# Patient Record
Sex: FEMALE | Race: WHITE | Employment: FULL TIME | URBAN - NONMETROPOLITAN AREA
[De-identification: names, ages, dates, MRNs, and addresses within clinical notes are randomized per-mention and may not be internally consistent; named-entity substitution may affect disease eponyms.]

---

## 2019-10-13 ENCOUNTER — HOSPITAL ENCOUNTER (EMERGENCY)
Age: 19
Discharge: HOME | End: 2019-10-13
Attending: NURSE PRACTITIONER
Payer: COMMERCIAL

## 2019-10-13 VITALS
DIASTOLIC BLOOD PRESSURE: 78 MMHG | HEIGHT: 68 IN | TEMPERATURE: 97.3 F | WEIGHT: 250 LBS | HEART RATE: 89 BPM | BODY MASS INDEX: 37.89 KG/M2 | SYSTOLIC BLOOD PRESSURE: 133 MMHG | OXYGEN SATURATION: 97 % | RESPIRATION RATE: 18 BRPM

## 2019-10-13 DIAGNOSIS — J01.00 ACUTE MAXILLARY SINUSITIS, RECURRENCE NOT SPECIFIED: Primary | ICD-10-CM

## 2019-10-13 DIAGNOSIS — R07.89 CHEST PAIN, MUSCULOSKELETAL: ICD-10-CM

## 2019-10-13 LAB
EKG ATRIAL RATE: 78 BPM
EKG P AXIS: 71 DEGREES
EKG P-R INTERVAL: 130 MS
EKG Q-T INTERVAL: 328 MS
EKG QRS DURATION: 84 MS
EKG QTC CALCULATION (BAZETT): 373 MS
EKG R AXIS: 69 DEGREES
EKG T AXIS: 71 DEGREES
EKG VENTRICULAR RATE: 78 BPM

## 2019-10-13 PROCEDURE — 99213 OFFICE O/P EST LOW 20 MIN: CPT

## 2019-10-13 PROCEDURE — 99203 OFFICE O/P NEW LOW 30 MIN: CPT | Performed by: NURSE PRACTITIONER

## 2019-10-13 PROCEDURE — 93010 ELECTROCARDIOGRAM REPORT: CPT | Performed by: NUCLEAR MEDICINE

## 2019-10-13 PROCEDURE — 93005 ELECTROCARDIOGRAM TRACING: CPT | Performed by: NURSE PRACTITIONER

## 2019-10-13 RX ORDER — DOXYCYCLINE HYCLATE 50 MG/1
2 CAPSULE, GELATIN COATED ORAL
COMMUNITY
End: 2021-01-28

## 2019-10-13 RX ORDER — ACETAMINOPHEN 500 MG
1000 TABLET ORAL EVERY 6 HOURS PRN
COMMUNITY
End: 2019-10-21

## 2019-10-13 RX ORDER — AMOXICILLIN AND CLAVULANATE POTASSIUM 875; 125 MG/1; MG/1
1 TABLET, FILM COATED ORAL 2 TIMES DAILY
Qty: 20 TABLET | Refills: 0 | Status: SHIPPED | OUTPATIENT
Start: 2019-10-13 | End: 2019-10-21 | Stop reason: ALTCHOICE

## 2019-10-13 RX ORDER — DEXTROMETHORPHAN HYDROBROMIDE AND PROMETHAZINE HYDROCHLORIDE 15; 6.25 MG/5ML; MG/5ML
5 SYRUP ORAL 4 TIMES DAILY PRN
Qty: 120 ML | Refills: 0 | Status: SHIPPED | OUTPATIENT
Start: 2019-10-13 | End: 2019-10-20

## 2019-10-13 RX ORDER — AMITRIPTYLINE HYDROCHLORIDE 10 MG/1
40 TABLET, FILM COATED ORAL NIGHTLY
COMMUNITY
End: 2019-12-23 | Stop reason: ALTCHOICE

## 2019-10-13 RX ORDER — VERAPAMIL HYDROCHLORIDE 120 MG/1
120 TABLET, FILM COATED ORAL 3 TIMES DAILY
COMMUNITY
End: 2020-06-22 | Stop reason: ALTCHOICE

## 2019-10-13 SDOH — HEALTH STABILITY: MENTAL HEALTH: HOW OFTEN DO YOU HAVE A DRINK CONTAINING ALCOHOL?: NEVER

## 2019-10-13 ASSESSMENT — ENCOUNTER SYMPTOMS
SINUS PRESSURE: 1
COUGH: 1
TROUBLE SWALLOWING: 0
SINUS PAIN: 1
ABDOMINAL PAIN: 0
WHEEZING: 0
SHORTNESS OF BREATH: 0

## 2019-10-13 ASSESSMENT — PAIN DESCRIPTION - PROGRESSION: CLINICAL_PROGRESSION: NOT CHANGED

## 2019-10-13 ASSESSMENT — PAIN DESCRIPTION - ORIENTATION: ORIENTATION: RIGHT

## 2019-10-13 ASSESSMENT — PAIN SCALES - GENERAL: PAINLEVEL_OUTOF10: 3

## 2019-10-13 ASSESSMENT — PAIN DESCRIPTION - DESCRIPTORS: DESCRIPTORS: DULL

## 2019-10-13 ASSESSMENT — PAIN DESCRIPTION - PAIN TYPE: TYPE: ACUTE PAIN

## 2019-10-13 ASSESSMENT — PAIN DESCRIPTION - FREQUENCY: FREQUENCY: INTERMITTENT

## 2019-10-13 ASSESSMENT — PAIN DESCRIPTION - ONSET: ONSET: ON-GOING

## 2019-10-13 ASSESSMENT — PAIN - FUNCTIONAL ASSESSMENT: PAIN_FUNCTIONAL_ASSESSMENT: PREVENTS OR INTERFERES WITH MANY ACTIVE NOT PASSIVE ACTIVITIES

## 2019-10-20 ENCOUNTER — APPOINTMENT (OUTPATIENT)
Dept: CT IMAGING | Age: 19
End: 2019-10-20
Payer: COMMERCIAL

## 2019-10-20 ENCOUNTER — HOSPITAL ENCOUNTER (EMERGENCY)
Age: 19
Discharge: HOME | End: 2019-10-20
Payer: COMMERCIAL

## 2019-10-20 VITALS
WEIGHT: 250 LBS | SYSTOLIC BLOOD PRESSURE: 141 MMHG | BODY MASS INDEX: 37.89 KG/M2 | DIASTOLIC BLOOD PRESSURE: 86 MMHG | HEART RATE: 76 BPM | TEMPERATURE: 97.8 F | RESPIRATION RATE: 16 BRPM | HEIGHT: 68 IN | OXYGEN SATURATION: 97 %

## 2019-10-20 DIAGNOSIS — R10.9 CHRONIC ABDOMINAL PAIN: ICD-10-CM

## 2019-10-20 DIAGNOSIS — G89.29 CHRONIC ABDOMINAL PAIN: ICD-10-CM

## 2019-10-20 DIAGNOSIS — K80.50 BILIARY COLIC: ICD-10-CM

## 2019-10-20 DIAGNOSIS — K80.20 CALCULUS OF GALLBLADDER WITHOUT CHOLECYSTITIS WITHOUT OBSTRUCTION: Primary | ICD-10-CM

## 2019-10-20 LAB
ALBUMIN SERPL-MCNC: 4.2 G/DL (ref 3.5–5.1)
ALP BLD-CCNC: 89 U/L (ref 38–126)
ALT SERPL-CCNC: 56 U/L (ref 11–66)
ANION GAP SERPL CALCULATED.3IONS-SCNC: 14 MEQ/L (ref 8–16)
AST SERPL-CCNC: 59 U/L (ref 5–40)
BASOPHILS # BLD: 0.2 %
BASOPHILS ABSOLUTE: 0 THOU/MM3 (ref 0–0.1)
BILIRUB SERPL-MCNC: 0.6 MG/DL (ref 0.3–1.2)
BILIRUBIN DIRECT: 0.3 MG/DL (ref 0–0.3)
BUN BLDV-MCNC: 8 MG/DL (ref 7–22)
CALCIUM SERPL-MCNC: 9.7 MG/DL (ref 8.5–10.5)
CHLORIDE BLD-SCNC: 103 MEQ/L (ref 98–111)
CO2: 21 MEQ/L (ref 23–33)
CREAT SERPL-MCNC: 0.6 MG/DL (ref 0.4–1.2)
EOSINOPHIL # BLD: 0.2 %
EOSINOPHILS ABSOLUTE: 0 THOU/MM3 (ref 0–0.4)
ERYTHROCYTE [DISTWIDTH] IN BLOOD BY AUTOMATED COUNT: 12.3 % (ref 11.5–14.5)
ERYTHROCYTE [DISTWIDTH] IN BLOOD BY AUTOMATED COUNT: 41.1 FL (ref 35–45)
GLUCOSE BLD-MCNC: 117 MG/DL (ref 70–108)
HCT VFR BLD CALC: 40.6 % (ref 37–47)
HEMOGLOBIN: 13 GM/DL (ref 12–16)
IMMATURE GRANS (ABS): 0.06 THOU/MM3 (ref 0–0.07)
IMMATURE GRANULOCYTES: 0.4 %
LIPASE: 39 U/L (ref 5.6–51.3)
LYMPHOCYTES # BLD: 9 %
LYMPHOCYTES ABSOLUTE: 1.3 THOU/MM3 (ref 1–4.8)
MCH RBC QN AUTO: 29.3 PG (ref 26–33)
MCHC RBC AUTO-ENTMCNC: 32 GM/DL (ref 32.2–35.5)
MCV RBC AUTO: 91.4 FL (ref 81–99)
MONOCYTES # BLD: 4.2 %
MONOCYTES ABSOLUTE: 0.6 THOU/MM3 (ref 0.4–1.3)
NUCLEATED RED BLOOD CELLS: 0 /100 WBC
OSMOLALITY CALCULATION: 275 MOSMOL/KG (ref 275–300)
PLATELET # BLD: 201 THOU/MM3 (ref 130–400)
PMV BLD AUTO: 10.1 FL (ref 9.4–12.4)
POTASSIUM SERPL-SCNC: 4.3 MEQ/L (ref 3.5–5.2)
PREGNANCY, SERUM: NEGATIVE
RBC # BLD: 4.44 MILL/MM3 (ref 4.2–5.4)
SEG NEUTROPHILS: 86 %
SEGMENTED NEUTROPHILS ABSOLUTE COUNT: 12.3 THOU/MM3 (ref 1.8–7.7)
SODIUM BLD-SCNC: 138 MEQ/L (ref 135–145)
TOTAL PROTEIN: 7.5 G/DL (ref 6.1–8)
WBC # BLD: 14.3 THOU/MM3 (ref 4.8–10.8)

## 2019-10-20 PROCEDURE — 74177 CT ABD & PELVIS W/CONTRAST: CPT

## 2019-10-20 PROCEDURE — 96374 THER/PROPH/DIAG INJ IV PUSH: CPT

## 2019-10-20 PROCEDURE — 6360000002 HC RX W HCPCS: Performed by: NURSE PRACTITIONER

## 2019-10-20 PROCEDURE — 82248 BILIRUBIN DIRECT: CPT

## 2019-10-20 PROCEDURE — 83690 ASSAY OF LIPASE: CPT

## 2019-10-20 PROCEDURE — 99284 EMERGENCY DEPT VISIT MOD MDM: CPT

## 2019-10-20 PROCEDURE — 80053 COMPREHEN METABOLIC PANEL: CPT

## 2019-10-20 PROCEDURE — 6370000000 HC RX 637 (ALT 250 FOR IP): Performed by: NURSE PRACTITIONER

## 2019-10-20 PROCEDURE — 96372 THER/PROPH/DIAG INJ SC/IM: CPT

## 2019-10-20 PROCEDURE — 85025 COMPLETE CBC W/AUTO DIFF WBC: CPT

## 2019-10-20 PROCEDURE — 84703 CHORIONIC GONADOTROPIN ASSAY: CPT

## 2019-10-20 PROCEDURE — 6360000004 HC RX CONTRAST MEDICATION: Performed by: NURSE PRACTITIONER

## 2019-10-20 PROCEDURE — 36415 COLL VENOUS BLD VENIPUNCTURE: CPT

## 2019-10-20 RX ORDER — KETOROLAC TROMETHAMINE 30 MG/ML
30 INJECTION, SOLUTION INTRAMUSCULAR; INTRAVENOUS ONCE
Status: COMPLETED | OUTPATIENT
Start: 2019-10-20 | End: 2019-10-20

## 2019-10-20 RX ORDER — DICYCLOMINE HYDROCHLORIDE 10 MG/1
10 CAPSULE ORAL ONCE
Status: DISCONTINUED | OUTPATIENT
Start: 2019-10-20 | End: 2019-10-20

## 2019-10-20 RX ORDER — DICYCLOMINE HYDROCHLORIDE 10 MG/ML
20 INJECTION INTRAMUSCULAR ONCE
Status: COMPLETED | OUTPATIENT
Start: 2019-10-20 | End: 2019-10-20

## 2019-10-20 RX ORDER — KETOROLAC TROMETHAMINE 10 MG/1
10 TABLET, FILM COATED ORAL EVERY 6 HOURS PRN
Qty: 20 TABLET | Refills: 0 | Status: ON HOLD | OUTPATIENT
Start: 2019-10-20 | End: 2019-10-30 | Stop reason: HOSPADM

## 2019-10-20 RX ADMIN — KETOROLAC TROMETHAMINE 30 MG: 30 INJECTION, SOLUTION INTRAMUSCULAR at 11:56

## 2019-10-20 RX ADMIN — LIDOCAINE HYDROCHLORIDE: 20 SOLUTION ORAL; TOPICAL at 09:32

## 2019-10-20 RX ADMIN — DICYCLOMINE HYDROCHLORIDE 20 MG: 10 INJECTION INTRAMUSCULAR at 10:42

## 2019-10-20 RX ADMIN — IOPAMIDOL 80 ML: 755 INJECTION, SOLUTION INTRAVENOUS at 11:19

## 2019-10-20 ASSESSMENT — ENCOUNTER SYMPTOMS
PHOTOPHOBIA: 0
NAUSEA: 0
COUGH: 0
SINUS PRESSURE: 0
WHEEZING: 0
VOMITING: 0
RHINORRHEA: 0
BACK PAIN: 0
CONSTIPATION: 0
ABDOMINAL PAIN: 1
DIARRHEA: 0
CHEST TIGHTNESS: 0
SHORTNESS OF BREATH: 0
COLOR CHANGE: 0
ABDOMINAL DISTENTION: 0
VOICE CHANGE: 0
EYE REDNESS: 0
SORE THROAT: 0
BLOOD IN STOOL: 0

## 2019-10-20 ASSESSMENT — PAIN DESCRIPTION - ORIENTATION: ORIENTATION: RIGHT;LEFT

## 2019-10-20 ASSESSMENT — PAIN DESCRIPTION - DESCRIPTORS: DESCRIPTORS: BURNING;CRAMPING

## 2019-10-20 ASSESSMENT — PAIN DESCRIPTION - FREQUENCY: FREQUENCY: INTERMITTENT

## 2019-10-20 ASSESSMENT — PAIN SCALES - GENERAL
PAINLEVEL_OUTOF10: 9
PAINLEVEL_OUTOF10: 9

## 2019-10-20 ASSESSMENT — PAIN DESCRIPTION - LOCATION: LOCATION: ABDOMEN

## 2019-10-20 ASSESSMENT — PAIN DESCRIPTION - PAIN TYPE: TYPE: ACUTE PAIN

## 2019-10-20 ASSESSMENT — PAIN DESCRIPTION - ONSET: ONSET: GRADUAL

## 2019-10-21 ENCOUNTER — NURSE ONLY (OUTPATIENT)
Dept: LAB | Age: 19
End: 2019-10-21

## 2019-10-21 ENCOUNTER — OFFICE VISIT (OUTPATIENT)
Dept: FAMILY MEDICINE CLINIC | Age: 19
End: 2019-10-21
Payer: COMMERCIAL

## 2019-10-21 VITALS
WEIGHT: 250 LBS | DIASTOLIC BLOOD PRESSURE: 64 MMHG | HEART RATE: 68 BPM | RESPIRATION RATE: 12 BRPM | SYSTOLIC BLOOD PRESSURE: 108 MMHG | BODY MASS INDEX: 37.89 KG/M2 | HEIGHT: 68 IN | OXYGEN SATURATION: 98 %

## 2019-10-21 DIAGNOSIS — K90.41 NON-CELIAC GLUTEN SENSITIVITY: ICD-10-CM

## 2019-10-21 DIAGNOSIS — K31.84 GASTROPARESIS: ICD-10-CM

## 2019-10-21 DIAGNOSIS — Z13.220 ENCOUNTER FOR SCREENING FOR LIPID DISORDER: ICD-10-CM

## 2019-10-21 DIAGNOSIS — E55.9 VITAMIN D DEFICIENCY: ICD-10-CM

## 2019-10-21 DIAGNOSIS — J30.2 SEASONAL ALLERGIC RHINITIS, UNSPECIFIED TRIGGER: ICD-10-CM

## 2019-10-21 DIAGNOSIS — Z11.4 ENCOUNTER FOR SCREENING FOR HIV: ICD-10-CM

## 2019-10-21 DIAGNOSIS — F41.9 ANXIETY: ICD-10-CM

## 2019-10-21 DIAGNOSIS — Z13.29 THYROID DISORDER SCREENING: ICD-10-CM

## 2019-10-21 DIAGNOSIS — K80.12 CALCULUS OF GALLBLADDER WITH ACUTE ON CHRONIC CHOLECYSTITIS WITHOUT OBSTRUCTION: Primary | ICD-10-CM

## 2019-10-21 DIAGNOSIS — Z00.00 WELLNESS EXAMINATION: ICD-10-CM

## 2019-10-21 DIAGNOSIS — I26.99 ACUTE PULMONARY EMBOLISM, UNSPECIFIED PULMONARY EMBOLISM TYPE, UNSPECIFIED WHETHER ACUTE COR PULMONALE PRESENT (HCC): ICD-10-CM

## 2019-10-21 DIAGNOSIS — F32.A DEPRESSION, UNSPECIFIED DEPRESSION TYPE: ICD-10-CM

## 2019-10-21 DIAGNOSIS — R05.9 COUGH: ICD-10-CM

## 2019-10-21 PROBLEM — K80.20 CHOLELITHIASIS WITHOUT OBSTRUCTION: Status: ACTIVE | Noted: 2019-10-21

## 2019-10-21 LAB
CHOLESTEROL, TOTAL: 162 MG/DL (ref 100–169)
HDLC SERPL-MCNC: 42 MG/DL
LDL CHOLESTEROL CALCULATED: 99 MG/DL
MAGNESIUM: 2.2 MG/DL (ref 1.6–2.4)
REASON FOR REJECTION: NORMAL
REJECTED TEST: NORMAL
TRIGL SERPL-MCNC: 107 MG/DL (ref 0–199)
TSH SERPL DL<=0.05 MIU/L-ACNC: 0.61 UIU/ML (ref 0.4–4.2)
VITAMIN D 25-HYDROXY: 20 NG/ML (ref 30–100)

## 2019-10-21 PROCEDURE — 99204 OFFICE O/P NEW MOD 45 MIN: CPT | Performed by: NURSE PRACTITIONER

## 2019-10-21 RX ORDER — BENZONATATE 200 MG/1
200 CAPSULE ORAL 3 TIMES DAILY PRN
Qty: 30 CAPSULE | Refills: 0 | Status: SHIPPED | OUTPATIENT
Start: 2019-10-21 | End: 2019-10-28

## 2019-10-21 RX ORDER — LORATADINE 10 MG/1
20 TABLET ORAL DAILY
Qty: 60 TABLET | Refills: 5 | Status: SHIPPED | OUTPATIENT
Start: 2019-10-21 | End: 2019-10-22 | Stop reason: DRUGHIGH

## 2019-10-21 ASSESSMENT — ENCOUNTER SYMPTOMS
SHORTNESS OF BREATH: 0
NAUSEA: 0
TROUBLE SWALLOWING: 0
VOMITING: 0
PHOTOPHOBIA: 0
COUGH: 1
EYE PAIN: 0
DIARRHEA: 0
SORE THROAT: 0
ABDOMINAL PAIN: 1
ABDOMINAL DISTENTION: 0
BLOOD IN STOOL: 0
EYE DISCHARGE: 0
RHINORRHEA: 1
CONSTIPATION: 0

## 2019-10-21 ASSESSMENT — PATIENT HEALTH QUESTIONNAIRE - PHQ9
SUM OF ALL RESPONSES TO PHQ QUESTIONS 1-9: 0
SUM OF ALL RESPONSES TO PHQ QUESTIONS 1-9: 0
1. LITTLE INTEREST OR PLEASURE IN DOING THINGS: 0
SUM OF ALL RESPONSES TO PHQ9 QUESTIONS 1 & 2: 0
2. FEELING DOWN, DEPRESSED OR HOPELESS: 0

## 2019-10-22 ENCOUNTER — TELEPHONE (OUTPATIENT)
Dept: FAMILY MEDICINE CLINIC | Age: 19
End: 2019-10-22

## 2019-10-22 ENCOUNTER — OFFICE VISIT (OUTPATIENT)
Dept: SURGERY | Age: 19
End: 2019-10-22
Payer: COMMERCIAL

## 2019-10-22 VITALS
TEMPERATURE: 97.4 F | HEART RATE: 95 BPM | RESPIRATION RATE: 18 BRPM | DIASTOLIC BLOOD PRESSURE: 80 MMHG | HEIGHT: 68 IN | SYSTOLIC BLOOD PRESSURE: 118 MMHG | BODY MASS INDEX: 39.12 KG/M2 | WEIGHT: 258.1 LBS | OXYGEN SATURATION: 99 %

## 2019-10-22 DIAGNOSIS — J30.2 SEASONAL ALLERGIC RHINITIS, UNSPECIFIED TRIGGER: ICD-10-CM

## 2019-10-22 DIAGNOSIS — I26.99 ACUTE PULMONARY EMBOLISM, UNSPECIFIED PULMONARY EMBOLISM TYPE, UNSPECIFIED WHETHER ACUTE COR PULMONALE PRESENT (HCC): ICD-10-CM

## 2019-10-22 DIAGNOSIS — Z00.00 WELLNESS EXAMINATION: ICD-10-CM

## 2019-10-22 DIAGNOSIS — K80.12 CALCULUS OF GALLBLADDER WITH ACUTE ON CHRONIC CHOLECYSTITIS WITHOUT OBSTRUCTION: Primary | ICD-10-CM

## 2019-10-22 LAB — THYROXINE (T4): 7.6 UG/DL (ref 4.5–12)

## 2019-10-22 PROCEDURE — 99204 OFFICE O/P NEW MOD 45 MIN: CPT | Performed by: SURGERY

## 2019-10-22 RX ORDER — LORATADINE 10 MG/1
10 TABLET ORAL DAILY
Qty: 60 TABLET | Refills: 5 | Status: SHIPPED | OUTPATIENT
Start: 2019-10-22 | End: 2019-10-24 | Stop reason: SDUPTHER

## 2019-10-22 ASSESSMENT — ENCOUNTER SYMPTOMS
ALLERGIC/IMMUNOLOGIC NEGATIVE: 1
CHEST TIGHTNESS: 0
NAUSEA: 0
ABDOMINAL PAIN: 1
EYES NEGATIVE: 1
APNEA: 0
DIARRHEA: 0
SORE THROAT: 0

## 2019-10-23 LAB — D-DIMER QUANTITATIVE: 294 NG/ML FEU (ref 0–500)

## 2019-10-24 DIAGNOSIS — Z00.00 WELLNESS EXAMINATION: ICD-10-CM

## 2019-10-24 DIAGNOSIS — J30.2 SEASONAL ALLERGIC RHINITIS, UNSPECIFIED TRIGGER: ICD-10-CM

## 2019-10-24 LAB — HIV-2 AB: NEGATIVE

## 2019-10-24 RX ORDER — LORATADINE 10 MG/1
10 TABLET ORAL DAILY
Qty: 30 TABLET | Refills: 5 | Status: SHIPPED | OUTPATIENT
Start: 2019-10-24 | End: 2020-04-21

## 2019-10-28 ENCOUNTER — HOSPITAL ENCOUNTER (OUTPATIENT)
Age: 19
Discharge: HOME | End: 2019-10-28
Payer: COMMERCIAL

## 2019-10-28 ENCOUNTER — HOSPITAL ENCOUNTER (OUTPATIENT)
Dept: GENERAL RADIOLOGY | Age: 19
Discharge: HOME | End: 2019-10-28
Payer: COMMERCIAL

## 2019-10-28 ENCOUNTER — OFFICE VISIT (OUTPATIENT)
Dept: FAMILY MEDICINE CLINIC | Age: 19
End: 2019-10-28
Payer: COMMERCIAL

## 2019-10-28 VITALS
HEIGHT: 68 IN | DIASTOLIC BLOOD PRESSURE: 74 MMHG | HEART RATE: 71 BPM | SYSTOLIC BLOOD PRESSURE: 112 MMHG | BODY MASS INDEX: 38.49 KG/M2 | RESPIRATION RATE: 16 BRPM | WEIGHT: 254 LBS | TEMPERATURE: 98.1 F

## 2019-10-28 DIAGNOSIS — M79.675 GREAT TOE PAIN, LEFT: Primary | ICD-10-CM

## 2019-10-28 DIAGNOSIS — E55.9 VITAMIN D DEFICIENCY: ICD-10-CM

## 2019-10-28 DIAGNOSIS — M79.675 GREAT TOE PAIN, LEFT: ICD-10-CM

## 2019-10-28 PROCEDURE — 99213 OFFICE O/P EST LOW 20 MIN: CPT | Performed by: NURSE PRACTITIONER

## 2019-10-28 PROCEDURE — 73660 X-RAY EXAM OF TOE(S): CPT

## 2019-10-28 RX ORDER — CHOLECALCIFEROL (VITAMIN D3) 50 MCG
4000 TABLET ORAL DAILY
Qty: 30 TABLET | Refills: 5 | Status: SHIPPED | OUTPATIENT
Start: 2019-10-28 | End: 2021-01-28

## 2019-10-28 ASSESSMENT — ENCOUNTER SYMPTOMS
SORE THROAT: 0
NAUSEA: 0
COUGH: 0
TROUBLE SWALLOWING: 0
SHORTNESS OF BREATH: 0
ABDOMINAL PAIN: 0
CONSTIPATION: 0
DIARRHEA: 0

## 2019-10-30 ENCOUNTER — HOSPITAL ENCOUNTER (OUTPATIENT)
Age: 19
Setting detail: OUTPATIENT SURGERY
Discharge: HOME | End: 2019-10-30
Attending: SURGERY | Admitting: SURGERY
Payer: COMMERCIAL

## 2019-10-30 ENCOUNTER — ANESTHESIA EVENT (OUTPATIENT)
Dept: OPERATING ROOM | Age: 19
End: 2019-10-30
Payer: COMMERCIAL

## 2019-10-30 ENCOUNTER — ANESTHESIA (OUTPATIENT)
Dept: OPERATING ROOM | Age: 19
End: 2019-10-30
Payer: COMMERCIAL

## 2019-10-30 VITALS
SYSTOLIC BLOOD PRESSURE: 122 MMHG | OXYGEN SATURATION: 98 % | TEMPERATURE: 97.2 F | RESPIRATION RATE: 16 BRPM | DIASTOLIC BLOOD PRESSURE: 79 MMHG

## 2019-10-30 VITALS
OXYGEN SATURATION: 99 % | TEMPERATURE: 97.5 F | BODY MASS INDEX: 38.55 KG/M2 | RESPIRATION RATE: 14 BRPM | DIASTOLIC BLOOD PRESSURE: 53 MMHG | WEIGHT: 254.4 LBS | HEIGHT: 68 IN | SYSTOLIC BLOOD PRESSURE: 100 MMHG | HEART RATE: 86 BPM

## 2019-10-30 DIAGNOSIS — K80.12 CALCULUS OF GALLBLADDER WITH ACUTE ON CHRONIC CHOLECYSTITIS WITHOUT OBSTRUCTION: Primary | ICD-10-CM

## 2019-10-30 LAB — PREGNANCY, URINE: NEGATIVE

## 2019-10-30 PROCEDURE — 2500000003 HC RX 250 WO HCPCS: Performed by: NURSE ANESTHETIST, CERTIFIED REGISTERED

## 2019-10-30 PROCEDURE — 7100000001 HC PACU RECOVERY - ADDTL 15 MIN: Performed by: SURGERY

## 2019-10-30 PROCEDURE — 2500000003 HC RX 250 WO HCPCS: Performed by: SURGERY

## 2019-10-30 PROCEDURE — 6360000002 HC RX W HCPCS: Performed by: ANESTHESIOLOGY

## 2019-10-30 PROCEDURE — 2709999900 HC NON-CHARGEABLE SUPPLY: Performed by: SURGERY

## 2019-10-30 PROCEDURE — 2780000010 HC IMPLANT OTHER: Performed by: SURGERY

## 2019-10-30 PROCEDURE — 6370000000 HC RX 637 (ALT 250 FOR IP): Performed by: SURGERY

## 2019-10-30 PROCEDURE — 7100000011 HC PHASE II RECOVERY - ADDTL 15 MIN: Performed by: SURGERY

## 2019-10-30 PROCEDURE — 7100000010 HC PHASE II RECOVERY - FIRST 15 MIN: Performed by: SURGERY

## 2019-10-30 PROCEDURE — 81025 URINE PREGNANCY TEST: CPT

## 2019-10-30 PROCEDURE — 3700000001 HC ADD 15 MINUTES (ANESTHESIA): Performed by: SURGERY

## 2019-10-30 PROCEDURE — 6360000002 HC RX W HCPCS

## 2019-10-30 PROCEDURE — S2900 ROBOTIC SURGICAL SYSTEM: HCPCS | Performed by: SURGERY

## 2019-10-30 PROCEDURE — 2580000003 HC RX 258: Performed by: SURGERY

## 2019-10-30 PROCEDURE — 3600000009 HC SURGERY ROBOT BASE: Performed by: SURGERY

## 2019-10-30 PROCEDURE — 3700000000 HC ANESTHESIA ATTENDED CARE: Performed by: SURGERY

## 2019-10-30 PROCEDURE — 3600000019 HC SURGERY ROBOT ADDTL 15MIN: Performed by: SURGERY

## 2019-10-30 PROCEDURE — 47562 LAPAROSCOPIC CHOLECYSTECTOMY: CPT | Performed by: SURGERY

## 2019-10-30 PROCEDURE — 7100000000 HC PACU RECOVERY - FIRST 15 MIN: Performed by: SURGERY

## 2019-10-30 PROCEDURE — 6360000002 HC RX W HCPCS: Performed by: NURSE ANESTHETIST, CERTIFIED REGISTERED

## 2019-10-30 PROCEDURE — 88304 TISSUE EXAM BY PATHOLOGIST: CPT

## 2019-10-30 DEVICE — Z DUP USE 2641840 CLIP INT L POLYMER LOK LIG HEM O LOK: Type: IMPLANTABLE DEVICE | Site: ABDOMEN | Status: FUNCTIONAL

## 2019-10-30 RX ORDER — NEOSTIGMINE METHYLSULFATE 5 MG/5 ML
SYRINGE (ML) INTRAVENOUS PRN
Status: DISCONTINUED | OUTPATIENT
Start: 2019-10-30 | End: 2019-10-30 | Stop reason: SDUPTHER

## 2019-10-30 RX ORDER — OXYCODONE HYDROCHLORIDE AND ACETAMINOPHEN 5; 325 MG/1; MG/1
1 TABLET ORAL EVERY 6 HOURS PRN
Qty: 20 TABLET | Refills: 0 | Status: SHIPPED | OUTPATIENT
Start: 2019-10-30 | End: 2019-11-04

## 2019-10-30 RX ORDER — DOCUSATE SODIUM 100 MG/1
100 CAPSULE, LIQUID FILLED ORAL 2 TIMES DAILY
Qty: 60 CAPSULE | Refills: 0 | Status: SHIPPED | OUTPATIENT
Start: 2019-10-30 | End: 2019-11-29

## 2019-10-30 RX ORDER — KETOROLAC TROMETHAMINE 30 MG/ML
30 INJECTION, SOLUTION INTRAMUSCULAR; INTRAVENOUS ONCE
Status: DISCONTINUED | OUTPATIENT
Start: 2019-10-30 | End: 2019-10-30 | Stop reason: HOSPADM

## 2019-10-30 RX ORDER — FENTANYL CITRATE 50 UG/ML
50 INJECTION, SOLUTION INTRAMUSCULAR; INTRAVENOUS EVERY 5 MIN PRN
Status: COMPLETED | OUTPATIENT
Start: 2019-10-30 | End: 2019-10-30

## 2019-10-30 RX ORDER — SODIUM CHLORIDE 0.9 % (FLUSH) 0.9 %
10 SYRINGE (ML) INJECTION EVERY 12 HOURS SCHEDULED
Status: CANCELLED | OUTPATIENT
Start: 2019-10-30

## 2019-10-30 RX ORDER — PHENYLEPHRINE HYDROCHLORIDE 10 MG/ML
INJECTION INTRAVENOUS PRN
Status: DISCONTINUED | OUTPATIENT
Start: 2019-10-30 | End: 2019-10-30 | Stop reason: SDUPTHER

## 2019-10-30 RX ORDER — SODIUM CHLORIDE 9 MG/ML
INJECTION, SOLUTION INTRAVENOUS CONTINUOUS
Status: DISCONTINUED | OUTPATIENT
Start: 2019-10-30 | End: 2019-10-30 | Stop reason: HOSPADM

## 2019-10-30 RX ORDER — HYDROMORPHONE HCL 110MG/55ML
PATIENT CONTROLLED ANALGESIA SYRINGE INTRAVENOUS PRN
Status: DISCONTINUED | OUTPATIENT
Start: 2019-10-30 | End: 2019-10-30 | Stop reason: SDUPTHER

## 2019-10-30 RX ORDER — KETOROLAC TROMETHAMINE 30 MG/ML
INJECTION, SOLUTION INTRAMUSCULAR; INTRAVENOUS
Status: COMPLETED
Start: 2019-10-30 | End: 2019-10-30

## 2019-10-30 RX ORDER — OXYCODONE HYDROCHLORIDE AND ACETAMINOPHEN 5; 325 MG/1; MG/1
1 TABLET ORAL ONCE
Status: DISCONTINUED | OUTPATIENT
Start: 2019-10-30 | End: 2019-10-30 | Stop reason: HOSPADM

## 2019-10-30 RX ORDER — ONDANSETRON 2 MG/ML
INJECTION INTRAMUSCULAR; INTRAVENOUS PRN
Status: DISCONTINUED | OUTPATIENT
Start: 2019-10-30 | End: 2019-10-30 | Stop reason: SDUPTHER

## 2019-10-30 RX ORDER — CEFAZOLIN SODIUM 1 G/3ML
INJECTION, POWDER, FOR SOLUTION INTRAMUSCULAR; INTRAVENOUS PRN
Status: DISCONTINUED | OUTPATIENT
Start: 2019-10-30 | End: 2019-10-30 | Stop reason: SDUPTHER

## 2019-10-30 RX ORDER — FENTANYL CITRATE 50 UG/ML
INJECTION, SOLUTION INTRAMUSCULAR; INTRAVENOUS PRN
Status: DISCONTINUED | OUTPATIENT
Start: 2019-10-30 | End: 2019-10-30 | Stop reason: SDUPTHER

## 2019-10-30 RX ORDER — GLYCOPYRROLATE 1 MG/5 ML
SYRINGE (ML) INTRAVENOUS PRN
Status: DISCONTINUED | OUTPATIENT
Start: 2019-10-30 | End: 2019-10-30 | Stop reason: SDUPTHER

## 2019-10-30 RX ORDER — MIDAZOLAM HYDROCHLORIDE 1 MG/ML
INJECTION INTRAMUSCULAR; INTRAVENOUS PRN
Status: DISCONTINUED | OUTPATIENT
Start: 2019-10-30 | End: 2019-10-30 | Stop reason: SDUPTHER

## 2019-10-30 RX ORDER — ROCURONIUM BROMIDE 10 MG/ML
INJECTION, SOLUTION INTRAVENOUS PRN
Status: DISCONTINUED | OUTPATIENT
Start: 2019-10-30 | End: 2019-10-30 | Stop reason: SDUPTHER

## 2019-10-30 RX ORDER — PROPOFOL 10 MG/ML
INJECTION, EMULSION INTRAVENOUS PRN
Status: DISCONTINUED | OUTPATIENT
Start: 2019-10-30 | End: 2019-10-30 | Stop reason: SDUPTHER

## 2019-10-30 RX ORDER — INDOCYANINE GREEN AND WATER 25 MG
5 KIT INJECTION ONCE
Status: COMPLETED | OUTPATIENT
Start: 2019-10-30 | End: 2019-10-30

## 2019-10-30 RX ORDER — PROMETHAZINE HYDROCHLORIDE 25 MG/ML
12.5 INJECTION, SOLUTION INTRAMUSCULAR; INTRAVENOUS
Status: COMPLETED | OUTPATIENT
Start: 2019-10-30 | End: 2019-10-30

## 2019-10-30 RX ORDER — HYOSCYAMINE SULFATE 0.125 MG
125 TABLET,DISINTEGRATING ORAL EVERY 4 HOURS PRN
Status: DISCONTINUED | OUTPATIENT
Start: 2019-10-30 | End: 2019-10-30 | Stop reason: HOSPADM

## 2019-10-30 RX ORDER — BUPIVACAINE HYDROCHLORIDE 5 MG/ML
INJECTION, SOLUTION EPIDURAL; INTRACAUDAL PRN
Status: DISCONTINUED | OUTPATIENT
Start: 2019-10-30 | End: 2019-10-30 | Stop reason: ALTCHOICE

## 2019-10-30 RX ORDER — SODIUM CHLORIDE 0.9 % (FLUSH) 0.9 %
10 SYRINGE (ML) INJECTION PRN
Status: CANCELLED | OUTPATIENT
Start: 2019-10-30

## 2019-10-30 RX ORDER — LIDOCAINE HCL/PF 100 MG/5ML
SYRINGE (ML) INJECTION PRN
Status: DISCONTINUED | OUTPATIENT
Start: 2019-10-30 | End: 2019-10-30 | Stop reason: SDUPTHER

## 2019-10-30 RX ORDER — DEXAMETHASONE SODIUM PHOSPHATE 4 MG/ML
INJECTION, SOLUTION INTRA-ARTICULAR; INTRALESIONAL; INTRAMUSCULAR; INTRAVENOUS; SOFT TISSUE PRN
Status: DISCONTINUED | OUTPATIENT
Start: 2019-10-30 | End: 2019-10-30 | Stop reason: SDUPTHER

## 2019-10-30 RX ORDER — LABETALOL 20 MG/4 ML (5 MG/ML) INTRAVENOUS SYRINGE
10 EVERY 10 MIN PRN
Status: DISCONTINUED | OUTPATIENT
Start: 2019-10-30 | End: 2019-10-30 | Stop reason: HOSPADM

## 2019-10-30 RX ORDER — SODIUM CHLORIDE 9 MG/ML
INJECTION, SOLUTION INTRAVENOUS CONTINUOUS
Status: CANCELLED | OUTPATIENT
Start: 2019-10-30

## 2019-10-30 RX ADMIN — HYOSCYAMINE SULFATE 125 MCG: 0.12 TABLET, ORALLY DISINTEGRATING ORAL at 12:51

## 2019-10-30 RX ADMIN — HYDROMORPHONE HYDROCHLORIDE 0.25 MG: 1 INJECTION, SOLUTION INTRAMUSCULAR; INTRAVENOUS; SUBCUTANEOUS at 10:00

## 2019-10-30 RX ADMIN — Medication 100 MG: at 08:02

## 2019-10-30 RX ADMIN — FENTANYL CITRATE 50 MCG: 50 INJECTION, SOLUTION INTRAMUSCULAR; INTRAVENOUS at 09:25

## 2019-10-30 RX ADMIN — FENTANYL CITRATE 50 MCG: 50 INJECTION, SOLUTION INTRAMUSCULAR; INTRAVENOUS at 09:38

## 2019-10-30 RX ADMIN — HYDROMORPHONE HYDROCHLORIDE 0.25 MG: 1 INJECTION, SOLUTION INTRAMUSCULAR; INTRAVENOUS; SUBCUTANEOUS at 09:50

## 2019-10-30 RX ADMIN — INDOCYANINE GREEN AND WATER 3.75 MG: KIT at 08:22

## 2019-10-30 RX ADMIN — HYDROMORPHONE HYDROCHLORIDE 0.5 MG: 2 INJECTION INTRAMUSCULAR; INTRAVENOUS; SUBCUTANEOUS at 08:28

## 2019-10-30 RX ADMIN — MIDAZOLAM HYDROCHLORIDE 2 MG: 1 INJECTION, SOLUTION INTRAMUSCULAR; INTRAVENOUS at 07:58

## 2019-10-30 RX ADMIN — HYDROMORPHONE HYDROCHLORIDE 0.25 MG: 1 INJECTION, SOLUTION INTRAMUSCULAR; INTRAVENOUS; SUBCUTANEOUS at 09:55

## 2019-10-30 RX ADMIN — ONDANSETRON HYDROCHLORIDE 4 MG: 4 INJECTION, SOLUTION INTRAMUSCULAR; INTRAVENOUS at 08:10

## 2019-10-30 RX ADMIN — FENTANYL CITRATE 100 MCG: 50 INJECTION INTRAMUSCULAR; INTRAVENOUS at 08:02

## 2019-10-30 RX ADMIN — KETOROLAC TROMETHAMINE 30 MG: 30 INJECTION, SOLUTION INTRAMUSCULAR at 09:45

## 2019-10-30 RX ADMIN — Medication 5 MG: at 08:57

## 2019-10-30 RX ADMIN — DEXAMETHASONE SODIUM PHOSPHATE 10 MG: 4 INJECTION, SOLUTION INTRAMUSCULAR; INTRAVENOUS at 08:10

## 2019-10-30 RX ADMIN — PHENYLEPHRINE HYDROCHLORIDE 100 MCG: 10 INJECTION INTRAVENOUS at 08:33

## 2019-10-30 RX ADMIN — CEFAZOLIN 2000 MG: 1 INJECTION, POWDER, FOR SOLUTION INTRAMUSCULAR; INTRAVENOUS; PARENTERAL at 08:13

## 2019-10-30 RX ADMIN — FENTANYL CITRATE 50 MCG: 50 INJECTION, SOLUTION INTRAMUSCULAR; INTRAVENOUS at 09:45

## 2019-10-30 RX ADMIN — HYDROMORPHONE HYDROCHLORIDE 0.5 MG: 2 INJECTION INTRAMUSCULAR; INTRAVENOUS; SUBCUTANEOUS at 08:58

## 2019-10-30 RX ADMIN — ROCURONIUM BROMIDE 35 MG: 10 INJECTION INTRAVENOUS at 08:04

## 2019-10-30 RX ADMIN — PROPOFOL 200 MG: 10 INJECTION, EMULSION INTRAVENOUS at 08:04

## 2019-10-30 RX ADMIN — FENTANYL CITRATE 50 MCG: 50 INJECTION, SOLUTION INTRAMUSCULAR; INTRAVENOUS at 09:33

## 2019-10-30 RX ADMIN — SODIUM CHLORIDE: 9 INJECTION, SOLUTION INTRAVENOUS at 08:51

## 2019-10-30 RX ADMIN — Medication 0.8 MG: at 08:57

## 2019-10-30 RX ADMIN — HYDROMORPHONE HYDROCHLORIDE 0.25 MG: 1 INJECTION, SOLUTION INTRAMUSCULAR; INTRAVENOUS; SUBCUTANEOUS at 10:05

## 2019-10-30 RX ADMIN — HYDROMORPHONE HYDROCHLORIDE 1 MG: 2 INJECTION INTRAMUSCULAR; INTRAVENOUS; SUBCUTANEOUS at 09:11

## 2019-10-30 RX ADMIN — PROMETHAZINE HYDROCHLORIDE 12.5 MG: 25 INJECTION INTRAMUSCULAR; INTRAVENOUS at 09:35

## 2019-10-30 RX ADMIN — SODIUM CHLORIDE: 9 INJECTION, SOLUTION INTRAVENOUS at 12:51

## 2019-10-30 RX ADMIN — SODIUM CHLORIDE: 9 INJECTION, SOLUTION INTRAVENOUS at 07:20

## 2019-10-30 ASSESSMENT — PULMONARY FUNCTION TESTS
PIF_VALUE: 23
PIF_VALUE: 25
PIF_VALUE: 20
PIF_VALUE: 24
PIF_VALUE: 25
PIF_VALUE: 20
PIF_VALUE: 0
PIF_VALUE: 14
PIF_VALUE: 23
PIF_VALUE: 20
PIF_VALUE: 25
PIF_VALUE: 2
PIF_VALUE: 25
PIF_VALUE: 15
PIF_VALUE: 20
PIF_VALUE: 20
PIF_VALUE: 24
PIF_VALUE: 34
PIF_VALUE: 20
PIF_VALUE: 2
PIF_VALUE: 30
PIF_VALUE: 2
PIF_VALUE: 25
PIF_VALUE: 25
PIF_VALUE: 23
PIF_VALUE: 20
PIF_VALUE: 22
PIF_VALUE: 1
PIF_VALUE: 24
PIF_VALUE: 20
PIF_VALUE: 18
PIF_VALUE: 21
PIF_VALUE: 1
PIF_VALUE: 17
PIF_VALUE: 3
PIF_VALUE: 2
PIF_VALUE: 19
PIF_VALUE: 20
PIF_VALUE: 25
PIF_VALUE: 20
PIF_VALUE: 25
PIF_VALUE: 6
PIF_VALUE: 21
PIF_VALUE: 18
PIF_VALUE: 32
PIF_VALUE: 22
PIF_VALUE: 25
PIF_VALUE: 1
PIF_VALUE: 20
PIF_VALUE: 25
PIF_VALUE: 25
PIF_VALUE: 24
PIF_VALUE: 11
PIF_VALUE: 23
PIF_VALUE: 4
PIF_VALUE: 21
PIF_VALUE: 24
PIF_VALUE: 20
PIF_VALUE: 1
PIF_VALUE: 3
PIF_VALUE: 0
PIF_VALUE: 24
PIF_VALUE: 17
PIF_VALUE: 20
PIF_VALUE: 23
PIF_VALUE: 13
PIF_VALUE: 24
PIF_VALUE: 15
PIF_VALUE: 21
PIF_VALUE: 25

## 2019-10-30 ASSESSMENT — PAIN SCALES - GENERAL
PAINLEVEL_OUTOF10: 6
PAINLEVEL_OUTOF10: 8
PAINLEVEL_OUTOF10: 0
PAINLEVEL_OUTOF10: 7
PAINLEVEL_OUTOF10: 9
PAINLEVEL_OUTOF10: 9
PAINLEVEL_OUTOF10: 6
PAINLEVEL_OUTOF10: 6
PAINLEVEL_OUTOF10: 7
PAINLEVEL_OUTOF10: 6
PAINLEVEL_OUTOF10: 7
PAINLEVEL_OUTOF10: 6

## 2019-10-30 ASSESSMENT — PAIN - FUNCTIONAL ASSESSMENT: PAIN_FUNCTIONAL_ASSESSMENT: 0-10

## 2019-10-30 ASSESSMENT — PAIN DESCRIPTION - LOCATION: LOCATION: ABDOMEN

## 2019-10-30 ASSESSMENT — PAIN DESCRIPTION - PAIN TYPE: TYPE: SURGICAL PAIN

## 2019-10-31 ENCOUNTER — TELEPHONE (OUTPATIENT)
Dept: SURGERY | Age: 19
End: 2019-10-31

## 2019-10-31 ENCOUNTER — TELEPHONE (OUTPATIENT)
Dept: FAMILY MEDICINE CLINIC | Age: 19
End: 2019-10-31

## 2019-10-31 DIAGNOSIS — S92.415A CLOSED NONDISPLACED FRACTURE OF PROXIMAL PHALANX OF LEFT GREAT TOE, INITIAL ENCOUNTER: Primary | ICD-10-CM

## 2019-11-01 ENCOUNTER — TELEPHONE (OUTPATIENT)
Dept: FAMILY MEDICINE CLINIC | Age: 19
End: 2019-11-01

## 2019-11-01 ENCOUNTER — OFFICE VISIT (OUTPATIENT)
Dept: FAMILY MEDICINE CLINIC | Age: 19
End: 2019-11-01
Payer: COMMERCIAL

## 2019-11-01 VITALS
BODY MASS INDEX: 39.86 KG/M2 | RESPIRATION RATE: 12 BRPM | SYSTOLIC BLOOD PRESSURE: 124 MMHG | OXYGEN SATURATION: 98 % | HEART RATE: 80 BPM | DIASTOLIC BLOOD PRESSURE: 62 MMHG | WEIGHT: 263 LBS | HEIGHT: 68 IN

## 2019-11-01 DIAGNOSIS — S92.412A CLOSED DISPLACED FRACTURE OF PROXIMAL PHALANX OF LEFT GREAT TOE, INITIAL ENCOUNTER: Primary | ICD-10-CM

## 2019-11-01 PROCEDURE — 99213 OFFICE O/P EST LOW 20 MIN: CPT | Performed by: NURSE PRACTITIONER

## 2019-11-01 ASSESSMENT — ENCOUNTER SYMPTOMS
TROUBLE SWALLOWING: 0
NAUSEA: 0
VOMITING: 0
SHORTNESS OF BREATH: 0
COLOR CHANGE: 0

## 2019-11-12 ENCOUNTER — OFFICE VISIT (OUTPATIENT)
Dept: SURGERY | Age: 19
End: 2019-11-12

## 2019-11-12 VITALS
OXYGEN SATURATION: 98 % | SYSTOLIC BLOOD PRESSURE: 124 MMHG | TEMPERATURE: 98 F | BODY MASS INDEX: 38.78 KG/M2 | DIASTOLIC BLOOD PRESSURE: 78 MMHG | WEIGHT: 255 LBS | RESPIRATION RATE: 20 BRPM | HEART RATE: 88 BPM

## 2019-11-12 DIAGNOSIS — Z48.89 ENCOUNTER FOR POSTOPERATIVE CARE: Primary | ICD-10-CM

## 2019-11-12 DIAGNOSIS — K81.9 CHOLECYSTITIS: ICD-10-CM

## 2019-11-12 PROCEDURE — 99024 POSTOP FOLLOW-UP VISIT: CPT | Performed by: SURGERY

## 2019-12-10 ENCOUNTER — HOSPITAL ENCOUNTER (OUTPATIENT)
Dept: NUCLEAR MEDICINE | Age: 19
Discharge: HOME | End: 2019-12-10
Payer: COMMERCIAL

## 2019-12-10 DIAGNOSIS — K31.84 GASTROPARESIS: ICD-10-CM

## 2019-12-10 DIAGNOSIS — R10.13 EPIGASTRIC PAIN: ICD-10-CM

## 2019-12-10 DIAGNOSIS — R11.2 NON-INTRACTABLE VOMITING WITH NAUSEA, UNSPECIFIED VOMITING TYPE: ICD-10-CM

## 2019-12-10 DIAGNOSIS — R68.81 EARLY SATIETY: ICD-10-CM

## 2019-12-10 PROCEDURE — 3430000000 HC RX DIAGNOSTIC RADIOPHARMACEUTICAL: Performed by: NURSE PRACTITIONER

## 2019-12-10 PROCEDURE — A9541 TC99M SULFUR COLLOID: HCPCS | Performed by: NURSE PRACTITIONER

## 2019-12-10 PROCEDURE — 78264 GASTRIC EMPTYING IMG STUDY: CPT

## 2019-12-10 RX ADMIN — Medication 1 MILLICURIE: at 07:52

## 2019-12-23 ENCOUNTER — NURSE ONLY (OUTPATIENT)
Dept: LAB | Age: 19
End: 2019-12-23

## 2019-12-23 ENCOUNTER — OFFICE VISIT (OUTPATIENT)
Dept: FAMILY MEDICINE CLINIC | Age: 19
End: 2019-12-23
Payer: COMMERCIAL

## 2019-12-23 VITALS
RESPIRATION RATE: 12 BRPM | BODY MASS INDEX: 38.34 KG/M2 | HEIGHT: 68 IN | DIASTOLIC BLOOD PRESSURE: 80 MMHG | HEART RATE: 77 BPM | SYSTOLIC BLOOD PRESSURE: 116 MMHG | WEIGHT: 253 LBS | OXYGEN SATURATION: 98 %

## 2019-12-23 DIAGNOSIS — F32.A DEPRESSION, UNSPECIFIED DEPRESSION TYPE: Primary | ICD-10-CM

## 2019-12-23 DIAGNOSIS — R10.13 EPIGASTRIC PAIN: ICD-10-CM

## 2019-12-23 DIAGNOSIS — E66.09 CLASS 2 OBESITY DUE TO EXCESS CALORIES WITH BODY MASS INDEX (BMI) OF 38.0 TO 38.9 IN ADULT, UNSPECIFIED WHETHER SERIOUS COMORBIDITY PRESENT: ICD-10-CM

## 2019-12-23 DIAGNOSIS — R11.0 NAUSEA: ICD-10-CM

## 2019-12-23 DIAGNOSIS — K21.9 CHRONIC GERD: ICD-10-CM

## 2019-12-23 DIAGNOSIS — N92.6 MISSED PERIOD: ICD-10-CM

## 2019-12-23 DIAGNOSIS — R10.9 ABDOMINAL CRAMPS: ICD-10-CM

## 2019-12-23 DIAGNOSIS — K31.84 GASTROPARESIS: ICD-10-CM

## 2019-12-23 PROBLEM — E66.9 OBESITY: Status: ACTIVE | Noted: 2019-12-23

## 2019-12-23 PROBLEM — I10 HYPERTENSION: Status: ACTIVE | Noted: 2019-12-23

## 2019-12-23 PROBLEM — Z86.718 HX OF BLOOD CLOTS: Status: ACTIVE | Noted: 2019-12-23

## 2019-12-23 LAB
ANION GAP SERPL CALCULATED.3IONS-SCNC: 11 MEQ/L (ref 8–16)
AVERAGE GLUCOSE: 75 MG/DL (ref 70–126)
BUN BLDV-MCNC: 10 MG/DL (ref 7–22)
CALCIUM SERPL-MCNC: 9.4 MG/DL (ref 8.5–10.5)
CHLORIDE BLD-SCNC: 104 MEQ/L (ref 98–111)
CO2: 22 MEQ/L (ref 23–33)
CREAT SERPL-MCNC: 0.7 MG/DL (ref 0.4–1.2)
ERYTHROCYTE [DISTWIDTH] IN BLOOD BY AUTOMATED COUNT: 12.6 % (ref 11.5–14.5)
ERYTHROCYTE [DISTWIDTH] IN BLOOD BY AUTOMATED COUNT: 43.8 FL (ref 35–45)
GFR SERPL CREATININE-BSD FRML MDRD: > 90 ML/MIN/1.73M2
GLUCOSE BLD-MCNC: 97 MG/DL (ref 70–108)
HBA1C MFR BLD: 4.5 % (ref 4.4–6.4)
HCG, URINE, POC: NEGATIVE
HCG,BETA SUBUNIT,QUAL,SERUM: < 0.1 MIU/ML (ref 0–5)
HCT VFR BLD CALC: 41.5 % (ref 37–47)
HEMOGLOBIN: 12.9 GM/DL (ref 12–16)
Lab: 0
MCH RBC QN AUTO: 29.4 PG (ref 26–33)
MCHC RBC AUTO-ENTMCNC: 31.1 GM/DL (ref 32.2–35.5)
MCV RBC AUTO: 94.5 FL (ref 81–99)
NEGATIVE QC PASS/FAIL: NORMAL
PLATELET # BLD: 217 THOU/MM3 (ref 130–400)
PMV BLD AUTO: 11.3 FL (ref 9.4–12.4)
POSITIVE QC PASS/FAIL: NORMAL
POTASSIUM SERPL-SCNC: 4.2 MEQ/L (ref 3.5–5.2)
RBC # BLD: 4.39 MILL/MM3 (ref 4.2–5.4)
SODIUM BLD-SCNC: 137 MEQ/L (ref 135–145)
WBC # BLD: 6.1 THOU/MM3 (ref 4.8–10.8)

## 2019-12-23 PROCEDURE — 99214 OFFICE O/P EST MOD 30 MIN: CPT | Performed by: NURSE PRACTITIONER

## 2019-12-23 PROCEDURE — 81025 URINE PREGNANCY TEST: CPT | Performed by: NURSE PRACTITIONER

## 2019-12-23 ASSESSMENT — ENCOUNTER SYMPTOMS
ABDOMINAL PAIN: 0
NAUSEA: 0
CONSTIPATION: 0
COUGH: 0
SHORTNESS OF BREATH: 0
TROUBLE SWALLOWING: 0
SORE THROAT: 0
DIARRHEA: 0

## 2020-01-23 ENCOUNTER — OFFICE VISIT (OUTPATIENT)
Dept: FAMILY MEDICINE CLINIC | Age: 20
End: 2020-01-23
Payer: COMMERCIAL

## 2020-01-23 VITALS
BODY MASS INDEX: 39.31 KG/M2 | HEART RATE: 72 BPM | DIASTOLIC BLOOD PRESSURE: 74 MMHG | HEIGHT: 68 IN | WEIGHT: 259.4 LBS | RESPIRATION RATE: 16 BRPM | TEMPERATURE: 97.7 F | SYSTOLIC BLOOD PRESSURE: 112 MMHG

## 2020-01-23 PROBLEM — R07.89 COSTOCHONDRAL CHEST PAIN: Status: ACTIVE | Noted: 2020-01-23

## 2020-01-23 LAB
INFLUENZA VIRUS A RNA: NEGATIVE
INFLUENZA VIRUS B RNA: NEGATIVE

## 2020-01-23 PROCEDURE — 99214 OFFICE O/P EST MOD 30 MIN: CPT | Performed by: NURSE PRACTITIONER

## 2020-01-23 PROCEDURE — 87502 INFLUENZA DNA AMP PROBE: CPT | Performed by: NURSE PRACTITIONER

## 2020-01-23 RX ORDER — ONDANSETRON 4 MG/1
4 TABLET, FILM COATED ORAL 3 TIMES DAILY PRN
Qty: 30 TABLET | Refills: 0 | Status: SHIPPED | OUTPATIENT
Start: 2020-01-23 | End: 2020-03-24 | Stop reason: SDUPTHER

## 2020-01-23 RX ORDER — CYCLOBENZAPRINE HCL 5 MG
5 TABLET ORAL 3 TIMES DAILY PRN
Qty: 30 TABLET | Refills: 0 | Status: SHIPPED | OUTPATIENT
Start: 2020-01-23 | End: 2020-02-02

## 2020-01-23 NOTE — PATIENT INSTRUCTIONS
You may receive a survey about your visit with us today. The feedback from our patients helps us identify what is working well and where the service to all patients can be enhanced. Thank you! Take   1000 mg vitamin C 4 times daily   25 mg Zinc 3 times daily    Patient Education        Upper Respiratory Infection (Cold): Care Instructions  Your Care Instructions    An upper respiratory infection, or URI, is an infection of the nose, sinuses, or throat. URIs are spread by coughs, sneezes, and direct contact. The common cold is the most frequent kind of URI. The flu and sinus infections are other kinds of URIs. Almost all URIs are caused by viruses. Antibiotics won't cure them. But you can treat most infections with home care. This may include drinking lots of fluids and taking over-the-counter pain medicine. You will probably feel better in 4 to 10 days. The doctor has checked you carefully, but problems can develop later. If you notice any problems or new symptoms, get medical treatment right away. Follow-up care is a key part of your treatment and safety. Be sure to make and go to all appointments, and call your doctor if you are having problems. It's also a good idea to know your test results and keep a list of the medicines you take. How can you care for yourself at home? · To prevent dehydration, drink plenty of fluids, enough so that your urine is light yellow or clear like water. Choose water and other caffeine-free clear liquids until you feel better. If you have kidney, heart, or liver disease and have to limit fluids, talk with your doctor before you increase the amount of fluids you drink. · Take an over-the-counter pain medicine, such as acetaminophen (Tylenol), ibuprofen (Advil, Motrin), or naproxen (Aleve). Read and follow all instructions on the label. · Before you use cough and cold medicines, check the label.  These medicines may not be safe for young children or for people with certain health problems. · Be careful when taking over-the-counter cold or flu medicines and Tylenol at the same time. Many of these medicines have acetaminophen, which is Tylenol. Read the labels to make sure that you are not taking more than the recommended dose. Too much acetaminophen (Tylenol) can be harmful. · Get plenty of rest.  · Do not smoke or allow others to smoke around you. If you need help quitting, talk to your doctor about stop-smoking programs and medicines. These can increase your chances of quitting for good. When should you call for help? Call 911 anytime you think you may need emergency care. For example, call if:    · You have severe trouble breathing.    Call your doctor now or seek immediate medical care if:    · You seem to be getting much sicker.     · You have new or worse trouble breathing.     · You have a new or higher fever.     · You have a new rash.    Watch closely for changes in your health, and be sure to contact your doctor if:    · You have a new symptom, such as a sore throat, an earache, or sinus pain.     · You cough more deeply or more often, especially if you notice more mucus or a change in the color of your mucus.     · You do not get better as expected. Where can you learn more? Go to https://[email or web address]. org and sign in to your [employer] account. Enter J935 in the [employer] box to learn more about \"Upper Respiratory Infection (Cold): Care Instructions. \"     If you do not have an account, please click on the \"Sign Up Now\" link. Current as of: June 9, 2019  Content Version: 12.3  © 2959-0072 [employer]. Care instructions adapted under license by [facility] ([facility]). If you have questions about a medical condition or this instruction, always ask your healthcare professional. [address] any warranty or liability for your use of this information.

## 2020-01-23 NOTE — PROGRESS NOTES
Pressure Father     Thyroid Disease Father     Other Father         gerd    Cancer Father         skin    No Known Problems Brother     Colon Polyps Maternal Grandmother     No Known Problems Maternal Grandfather     No Known Problems Paternal Grandmother     Colon Polyps Paternal Grandfather     No Known Problems Brother     Colon Cancer Neg Hx     Esophageal Cancer Neg Hx      Social History     Tobacco Use    Smoking status: Never Smoker    Smokeless tobacco: Never Used   Substance Use Topics    Alcohol use: Never     Frequency: Never      Current Outpatient Medications   Medication Sig Dispense Refill    cyclobenzaprine (FLEXERIL) 5 MG tablet Take 1 tablet by mouth 3 times daily as needed for Muscle spasms 30 tablet 0    ondansetron (ZOFRAN) 4 MG tablet Take 1 tablet by mouth 3 times daily as needed for Nausea or Vomiting 30 tablet 0    pantoprazole (PROTONIX) 40 MG tablet Take 1 tablet by mouth every morning (before breakfast) 90 tablet 1    Cholecalciferol (VITAMIN D) 50 MCG (2000 UT) TABS tablet Take 2 tablets by mouth daily 30 tablet 5    loratadine (CLARITIN) 10 MG tablet Take 1 tablet by mouth daily 30 tablet 5    Ferrous Gluconate-C-Folic Acid (IRON-C PO) Take by mouth daily      aspirin 81 MG chewable tablet Take 81 mg by mouth daily      verapamil (CALAN) 120 MG tablet Take 120 mg by mouth 3 times daily      ferrous gluconate (FERGON) 324 (38 Fe) MG tablet Take 2 tablets by mouth daily (with breakfast)      Multiple Vitamins-Calcium (ONE-A-DAY WOMENS PO) Take by mouth       No current facility-administered medications for this visit. No Known Allergies    Subjective:    Review of Systems   Constitutional: Negative for chills, fatigue and fever. HENT: Positive for congestion, postnasal drip, rhinorrhea, sinus pain and sore throat. Negative for trouble swallowing. Eyes: Negative for visual disturbance. Respiratory: Positive for cough. Negative for shortness of breath. Judgment: Judgment normal.         Lab Results   Component Value Date    WBC 6.1 12/23/2019    HGB 12.9 12/23/2019    HCT 41.5 12/23/2019     12/23/2019    CHOL 162 10/21/2019    TRIG 107 10/21/2019    HDL 42 10/21/2019    LDLCALC 99 10/21/2019    AST 59 (H) 10/20/2019     12/23/2019    K 4.2 12/23/2019     12/23/2019    CREATININE 0.7 12/23/2019    BUN 10 12/23/2019    CO2 22 (L) 12/23/2019    TSH 0.606 10/21/2019    LABA1C 4.5 12/23/2019    LABGLOM >90 12/23/2019    MG 2.2 10/21/2019    CALCIUM 9.4 12/23/2019    VITD25 20 (L) 10/21/2019       Assessment:       Diagnosis Orders   1. Viral URI     2. Muscle strain  cyclobenzaprine (FLEXERIL) 5 MG tablet   3. Repetitive motion injury  cyclobenzaprine (FLEXERIL) 5 MG tablet   4. Nausea  ondansetron (ZOFRAN) 4 MG tablet   5. Flu-like symptoms  POCT Influenza A/B DNA (Alere i)       Plan:   1. Viral URI      2. Muscle strain    - cyclobenzaprine (FLEXERIL) 5 MG tablet; Take 1 tablet by mouth 3 times daily as needed for Muscle spasms  Dispense: 30 tablet; Refill: 0    3. Repetitive motion injury    - cyclobenzaprine (FLEXERIL) 5 MG tablet; Take 1 tablet by mouth 3 times daily as needed for Muscle spasms  Dispense: 30 tablet; Refill: 0    4. Nausea    - ondansetron (ZOFRAN) 4 MG tablet; Take 1 tablet by mouth 3 times daily as needed for Nausea or Vomiting  Dispense: 30 tablet; Refill: 0    5. Flu-like symptoms    - POCT Influenza A/B DNA (Alere i)      Take   1000 mg vitamin C 4 times daily   25 mg Zinc 3 times daily     No follow-ups on file.     Orders Placed:  Orders Placed This Encounter   Procedures    POCT Influenza A/B DNA (Alere i)     Medications Prescribed:  Orders Placed This Encounter   Medications    cyclobenzaprine (FLEXERIL) 5 MG tablet     Sig: Take 1 tablet by mouth 3 times daily as needed for Muscle spasms     Dispense:  30 tablet     Refill:  0    ondansetron (ZOFRAN) 4 MG tablet     Sig: Take 1 tablet by mouth 3 times daily as needed for Nausea or Vomiting     Dispense:  30 tablet     Refill:  0       No future appointments. Patient given educational materials - see patient instructions. Discussed use, benefit, and side effects of prescribedmedications. All patient questions answered. Pt voiced understanding. Reviewed health maintenance. Instructed to continue current medications, diet and exercise. Patient agreed with treatment plan. Follow up as directed.     Electronically signed by KARLA Tobias CNP on 1/27/2020 at 5:30 PM

## 2020-01-27 PROBLEM — T14.8XXA MUSCLE STRAIN: Status: ACTIVE | Noted: 2020-01-27

## 2020-01-27 ASSESSMENT — ENCOUNTER SYMPTOMS
CONSTIPATION: 0
DIARRHEA: 0
COUGH: 1
ABDOMINAL PAIN: 0
TROUBLE SWALLOWING: 0
BACK PAIN: 1
RHINORRHEA: 1
NAUSEA: 1
SORE THROAT: 1
SHORTNESS OF BREATH: 0
SINUS PAIN: 1

## 2020-02-04 ENCOUNTER — PATIENT MESSAGE (OUTPATIENT)
Dept: FAMILY MEDICINE CLINIC | Age: 20
End: 2020-02-04

## 2020-02-06 NOTE — TELEPHONE ENCOUNTER
From: Alexx Mckeon  To: KARLA Steele CNP  Sent: 2/4/2020 6:55 PM EST  Subject: Non-Urgent Medical Question    Could you refer me to see a OBGYN preferably the petey one as my mom or do I have to come in for a visit?

## 2020-02-13 ENCOUNTER — NURSE ONLY (OUTPATIENT)
Dept: LAB | Age: 20
End: 2020-02-13

## 2020-02-13 ENCOUNTER — OFFICE VISIT (OUTPATIENT)
Dept: FAMILY MEDICINE CLINIC | Age: 20
End: 2020-02-13
Payer: COMMERCIAL

## 2020-02-13 VITALS
HEIGHT: 69 IN | BODY MASS INDEX: 39.1 KG/M2 | HEART RATE: 92 BPM | TEMPERATURE: 97.9 F | DIASTOLIC BLOOD PRESSURE: 68 MMHG | WEIGHT: 264 LBS | OXYGEN SATURATION: 97 % | SYSTOLIC BLOOD PRESSURE: 126 MMHG

## 2020-02-13 LAB
ALBUMIN SERPL-MCNC: 4.2 G/DL (ref 3.5–5.1)
ALP BLD-CCNC: 77 U/L (ref 38–126)
ALT SERPL-CCNC: 28 U/L (ref 11–66)
ANION GAP SERPL CALCULATED.3IONS-SCNC: 11 MEQ/L (ref 8–16)
AST SERPL-CCNC: 22 U/L (ref 5–40)
AVERAGE GLUCOSE: 81 MG/DL (ref 70–126)
BASOPHILS # BLD: 0.4 %
BASOPHILS ABSOLUTE: 0 THOU/MM3 (ref 0–0.1)
BILIRUB SERPL-MCNC: 0.4 MG/DL (ref 0.3–1.2)
BILIRUBIN DIRECT: < 0.2 MG/DL (ref 0–0.3)
BUN BLDV-MCNC: 10 MG/DL (ref 7–22)
C-REACTIVE PROTEIN, HIGH SENSITIVITY: 7.8 MG/L
CALCIUM SERPL-MCNC: 9.9 MG/DL (ref 8.5–10.5)
CHLORIDE BLD-SCNC: 103 MEQ/L (ref 98–111)
CHOLESTEROL, TOTAL: 159 MG/DL (ref 100–199)
CO2: 26 MEQ/L (ref 23–33)
CREAT SERPL-MCNC: 0.6 MG/DL (ref 0.4–1.2)
D-DIMER QUANTITATIVE: < 215 NG/ML FEU (ref 0–500)
EOSINOPHIL # BLD: 0.9 %
EOSINOPHILS ABSOLUTE: 0.1 THOU/MM3 (ref 0–0.4)
ERYTHROCYTE [DISTWIDTH] IN BLOOD BY AUTOMATED COUNT: 12.5 % (ref 11.5–14.5)
ERYTHROCYTE [DISTWIDTH] IN BLOOD BY AUTOMATED COUNT: 42.7 FL (ref 35–45)
GFR SERPL CREATININE-BSD FRML MDRD: > 90 ML/MIN/1.73M2
GLUCOSE BLD-MCNC: 98 MG/DL (ref 70–108)
HBA1C MFR BLD: 4.7 % (ref 4.4–6.4)
HCT VFR BLD CALC: 43 % (ref 37–47)
HDLC SERPL-MCNC: 46 MG/DL
HEMOGLOBIN: 13.7 GM/DL (ref 12–16)
HOMOCYSTEINE: 5.3 UMOL/L
IMMATURE GRANS (ABS): 0.06 THOU/MM3 (ref 0–0.07)
IMMATURE GRANULOCYTES: 0.8 %
LDL CHOLESTEROL CALCULATED: 96 MG/DL
LYMPHOCYTES # BLD: 21.3 %
LYMPHOCYTES ABSOLUTE: 1.6 THOU/MM3 (ref 1–4.8)
MAGNESIUM: 2 MG/DL (ref 1.6–2.4)
MCH RBC QN AUTO: 30 PG (ref 26–33)
MCHC RBC AUTO-ENTMCNC: 31.9 GM/DL (ref 32.2–35.5)
MCV RBC AUTO: 94.1 FL (ref 81–99)
MONOCYTES # BLD: 5.5 %
MONOCYTES ABSOLUTE: 0.4 THOU/MM3 (ref 0.4–1.3)
NUCLEATED RED BLOOD CELLS: 0 /100 WBC
PLATELET # BLD: 246 THOU/MM3 (ref 130–400)
PMV BLD AUTO: 10.3 FL (ref 9.4–12.4)
POTASSIUM SERPL-SCNC: 4.1 MEQ/L (ref 3.5–5.2)
PREGNANCY, SERUM: NEGATIVE
RBC # BLD: 4.57 MILL/MM3 (ref 4.2–5.4)
RHEUMATOID FACTOR: < 10 IU/ML (ref 0–13)
SEDIMENTATION RATE, ERYTHROCYTE: 20 MM/HR (ref 0–20)
SEG NEUTROPHILS: 71.1 %
SEGMENTED NEUTROPHILS ABSOLUTE COUNT: 5.5 THOU/MM3 (ref 1.8–7.7)
SODIUM BLD-SCNC: 140 MEQ/L (ref 135–145)
TOTAL PROTEIN: 7.6 G/DL (ref 6.1–8)
TRIGL SERPL-MCNC: 85 MG/DL (ref 0–199)
TSH SERPL DL<=0.05 MIU/L-ACNC: 0.94 UIU/ML (ref 0.4–4.2)
WBC # BLD: 7.7 THOU/MM3 (ref 4.8–10.8)

## 2020-02-13 PROCEDURE — 99214 OFFICE O/P EST MOD 30 MIN: CPT | Performed by: FAMILY MEDICINE

## 2020-02-13 SDOH — ECONOMIC STABILITY: TRANSPORTATION INSECURITY
IN THE PAST 12 MONTHS, HAS THE LACK OF TRANSPORTATION KEPT YOU FROM MEDICAL APPOINTMENTS OR FROM GETTING MEDICATIONS?: NO

## 2020-02-13 SDOH — ECONOMIC STABILITY: FOOD INSECURITY: WITHIN THE PAST 12 MONTHS, YOU WORRIED THAT YOUR FOOD WOULD RUN OUT BEFORE YOU GOT MONEY TO BUY MORE.: SOMETIMES TRUE

## 2020-02-13 SDOH — ECONOMIC STABILITY: INCOME INSECURITY: HOW HARD IS IT FOR YOU TO PAY FOR THE VERY BASICS LIKE FOOD, HOUSING, MEDICAL CARE, AND HEATING?: SOMEWHAT HARD

## 2020-02-13 SDOH — ECONOMIC STABILITY: FOOD INSECURITY: WITHIN THE PAST 12 MONTHS, THE FOOD YOU BOUGHT JUST DIDN'T LAST AND YOU DIDN'T HAVE MONEY TO GET MORE.: SOMETIMES TRUE

## 2020-02-13 SDOH — ECONOMIC STABILITY: TRANSPORTATION INSECURITY
IN THE PAST 12 MONTHS, HAS LACK OF TRANSPORTATION KEPT YOU FROM MEETINGS, WORK, OR FROM GETTING THINGS NEEDED FOR DAILY LIVING?: NO

## 2020-02-13 ASSESSMENT — ENCOUNTER SYMPTOMS
COUGH: 0
SINUS PRESSURE: 0
NAUSEA: 0
TROUBLE SWALLOWING: 0
VOMITING: 0
BACK PAIN: 0
ABDOMINAL PAIN: 0
SHORTNESS OF BREATH: 1
BLOOD IN STOOL: 0
EYE PAIN: 0
DIARRHEA: 0
CONSTIPATION: 0

## 2020-02-13 NOTE — PROGRESS NOTES
Andrews Briceño is a 20 y.o. female who presents today for:  Chief Complaint   Patient presents with    Chest Congestion     hx of blood clot s/s again     Numbness    Dizziness       Goals      Exercise 3x per week (30 min per time)           HPI:     HPI   Chest pain for 5 to 30 minutes localized in the left side of the chest - she was resting and not doing anything - radiates to her throat and has butterflies and uneasiness in her throat    History of PE and costochondritis    Hands go numb and feel weak and painful and it goes away with rest    last time the d dimer was negative and the ct was positive for the blood clot - also on her mom's side she had grandparents with  Blood clots but not why    Insurance paid for some of the gallbladder - has a large bill      No question data found.     Past Medical History:   Diagnosis Date    Anxiety     Chronic GERD     Chronic migraine     takes verapamil    Depression     Gallstones 10/2019    Gastroparesis     diagnosis as a child    Hx of blood clots     PE     Hypertension     Obesity     Pulmonary embolism (HCC)     8 months ago-was on Xarelto-sees Dr Benjamin Mata in [place]    Splenomegaly       Past Surgical History:   Procedure Laterality Date    CHOLECYSTECTOMY, LAPAROSCOPIC N/A 10/30/2019    ROBOTIC LAP BRANNON performed by Brenda Orozco MD at [address] [place] [address]  Right     TONSILLECTOMY      WISDOM TOOTH EXTRACTION       Family History   Problem Relation Age of Onset   Henry Ney Migraines Mother     Lupus Mother     High Blood Pressure Father     Thyroid Disease Father     Other Father         gerd    Cancer Father         skin    No Known Problems Brother     Colon Polyps Maternal Grandmother     No Known Problems Maternal Grandfather     No Known Problems Paternal Grandmother     Colon Polyps Paternal Grandfather     No Known Problems Brother     Colon Cancer Neg Hx     Esophageal Cancer Neg Hx      Social History     Tobacco Use Gastrointestinal: Negative for abdominal pain, blood in stool, constipation, diarrhea, nausea and vomiting. Genitourinary: Negative for difficulty urinating. Skin: Negative for rash. Neurological: Positive for dizziness. Negative for headaches. Psychiatric/Behavioral: Negative for agitation. Objective:     Vitals:    02/13/20 0751 02/13/20 0759 02/13/20 0800   BP: 122/68 134/70 126/68   Site: Right Upper Arm Right Upper Arm Right Upper Arm   Position: Supine Sitting Standing   Cuff Size: Large Adult Large Adult    Pulse: 84 88 92   Temp: 97.9 °F (36.6 °C)     TempSrc: Oral     SpO2: 97%     Weight: 264 lb (119.7 kg)     Height: 5' 9.29\" (1.76 m)         Body mass index is 38.66 kg/m². Wt Readings from Last 3 Encounters:   02/13/20 264 lb (119.7 kg)   01/23/20 259 lb 6.4 oz (117.7 kg)   12/24/19 256 lb (116.1 kg)     BP Readings from Last 3 Encounters:   02/13/20 126/68   01/23/20 112/74   12/24/19 110/64       Physical Exam  Vitals signs and nursing note reviewed. Constitutional:       General: She is not in acute distress. Appearance: She is well-developed. She is not diaphoretic. HENT:      Head: Normocephalic and atraumatic. Right Ear: External ear normal.      Left Ear: External ear normal.   Eyes:      General: No scleral icterus. Right eye: No discharge. Left eye: No discharge. Conjunctiva/sclera: Conjunctivae normal.   Neck:      Musculoskeletal: Normal range of motion. Cardiovascular:      Rate and Rhythm: Normal rate and regular rhythm. Heart sounds: Normal heart sounds. No murmur. Pulmonary:      Effort: Pulmonary effort is normal.      Breath sounds: Normal breath sounds. Skin:     General: Skin is warm and dry. Findings: No erythema or rash. Neurological:      Mental Status: She is alert and oriented to person, place, and time. Cranial Nerves: No cranial nerve deficit.    Psychiatric:         Behavior: Behavior normal. technology. **       Final report electronically signed by Dr. Becky Hernández on 12/10/2019 1:09 PM       Assessment:      Diagnosis Orders   1. Costochondral chest pain  EKG 12 Lead    HCG, Serum, Qualitative    Basic Metabolic Panel    MARISSA Screen with Reflex    CBC Auto Differential    Hemoglobin A1C    Hepatic Function Panel    High sensitivity CRP    Homocysteine, Serum    Lipid Panel    Magnesium    Sedimentation Rate    Rheumatoid Factor    TSH with Reflex    D-Dimer, Quantitative   2. Seasonal allergic rhinitis, unspecified trigger  HCG, Serum, Qualitative    Basic Metabolic Panel    MARISSA Screen with Reflex    CBC Auto Differential    Hemoglobin A1C    Hepatic Function Panel    High sensitivity CRP    Homocysteine, Serum    Lipid Panel    Magnesium    Sedimentation Rate    Rheumatoid Factor    TSH with Reflex   3. Nausea  HCG, Serum, Qualitative    Basic Metabolic Panel    MARISSA Screen with Reflex    CBC Auto Differential    Hemoglobin A1C    Hepatic Function Panel    High sensitivity CRP    Homocysteine, Serum    Lipid Panel    Magnesium    Sedimentation Rate    Rheumatoid Factor    TSH with Reflex   4. Hx of blood clots  EKG 12 Lead    HCG, Serum, Qualitative    Basic Metabolic Panel    MARISSA Screen with Reflex    CBC Auto Differential    Hemoglobin A1C    Hepatic Function Panel    High sensitivity CRP    Homocysteine, Serum    Lipid Panel    Magnesium    Sedimentation Rate    Rheumatoid Factor    TSH with Reflex    D-Dimer, Quantitative   5. Elevated liver enzymes  HCG, Serum, Qualitative    Basic Metabolic Panel    MARISSA Screen with Reflex    CBC Auto Differential    Hemoglobin A1C    Hepatic Function Panel    High sensitivity CRP    Homocysteine, Serum    Lipid Panel    Magnesium    Sedimentation Rate    Rheumatoid Factor    TSH with Reflex    D-Dimer, Quantitative   6.  Other chest pain  HCG, Serum, Qualitative    Basic Metabolic Panel    MARISSA Screen with Reflex    CBC Auto Differential    Hemoglobin A1C Hepatic Function Panel    High sensitivity CRP    Homocysteine, Serum    Lipid Panel    Magnesium    Sedimentation Rate    Rheumatoid Factor    TSH with Reflex    D-Dimer, Quantitative   7. Arm numbness  HCG, Serum, Qualitative    Basic Metabolic Panel    MARISSA Screen with Reflex    CBC Auto Differential    Hemoglobin A1C    Hepatic Function Panel    High sensitivity CRP    Homocysteine, Serum    Lipid Panel    Magnesium    Sedimentation Rate    Rheumatoid Factor    TSH with Reflex    D-Dimer, Quantitative       Plan:       will get some labs    Will do the d dimer with the labs and may need the CTA of the chest    You can't afford the DEM for the chest pain    Will see you back next week     No follow-ups on file. Orders Placed:  Orders Placed This Encounter   Procedures    HCG, Serum, Qualitative    Basic Metabolic Panel    MARISSA Screen with Reflex    CBC Auto Differential    Hemoglobin A1C    Hepatic Function Panel    High sensitivity CRP    Homocysteine, Serum    Lipid Panel    Magnesium    Sedimentation Rate    Rheumatoid Factor    TSH with Reflex    D-Dimer, Quantitative    EKG 12 Lead     Medications Prescribed:  No orders of the defined types were placed in this encounter. Future Appointments   Date Time Provider Jerome Vasquez   2/21/2020 11:40 AM KARLA Gilbert - CNP [facility]  [facility] [place]       Patient given educational materials - see patient instructions. Discussed use, benefit, and sideeffects of prescribed medications. All patient questions answered. Pt voiced understanding. Reviewed health maintenance. Instructed to continue current medications, diet and exercise. Patient agreed with treatment plan. Follow up as directed. I was present for the key portions of the exam and history and confirmed all areas of the note with the patient, staff and the student.       Electronically signed by Erlinda Mack DO on 2/13/2020 at 8:49 AM

## 2020-02-13 NOTE — PATIENT INSTRUCTIONS
Thank you   1. Thank you for trusting us with your healthcare needs. You may receive a survey regarding today's visit. It would help us out if you would take a few moments to provide your feedback. We value your input. 2. Please bring in ALL medications BOTTLES, including inhalers, herbal supplements, over the counter, prescribed & non-prescribed medicine. The office would like actual medication bottles and a list.   3. Please note our OFFICE POLICIES:   a. Prior to getting your labs drawn, please check with your insurance company for benefits and eligibility of lab services. Often, insurance companies cover certain tests for preventative visits only. It is patient's responsibility to see what is covered. b. We are unable to change a diagnosis after the test has been performed. c. Lab orders will not be re-printed. Please hold onto your original lab orders and take them to your lab to be completed. d. If you no show your scheduled appointment three times, you will be dismissed from this practice. e. Louisa Chip must be completed 24 hours prior to your schedule appointment. 4. If the list below has been completed, PLEASE FAX RECORDS TO OUR OFFICE @ [phone].  Once the records have been received we will update your records at our office:  Health Maintenance Due   Topic Date Due    Varicella vaccine (1 of 2 - 2-dose childhood series) 11/12/2000    HPV vaccine (1 - Female 2-dose series) 11/12/2010    DTaP/Tdap/Td vaccine (1 - Tdap) 11/12/2010    Chlamydia screen  11/12/2015    Flu vaccine (1) 09/01/2019

## 2020-02-13 NOTE — PROGRESS NOTES
Logan Montes is a 20 y.o. female who presents today for:  Chief Complaint   Patient presents with    Chest Congestion     hx of blood clot s/s again     Numbness    Dizziness       Goals      Exercise 3x per week (30 min per time)           HPI:     HPI     Presenting today with chest pain. Started 2 days ago. Pain is described as a sharp, stabbing pain. Now is dull in nature. Can feel like butterflies are in her throat when it happens. It is located in the LUQ and near her lower ribs on the left side, can radiate to her back. Pain will onset with no activity. She states she was sitting down last night when her heart started to race. Duration of pain can be 5 to 30 mins. When it stops, she feels no problems after. Nothing makes the pain go away. Sometimes says it feels like somebody is sitting on her chest  With increased pressure. She notices her left arm gets numb, she can still move it. Will feel sharp pains in her wrist. Also states she gets dizzy, but has never blacked out. Manual pressure doesn't make the pain any worse. States she has blood clot issues on her mothers side. Most recent PE was 8 months to a year ago - describes that the situation 2 days ago feels like this. Has a history of splenomegaly - could explain part of her symptoms on the left side of her chest and the radiation to her shoulder    No question data found.     Past Medical History:   Diagnosis Date    Anxiety     Chronic GERD     Chronic migraine     takes verapamil    Depression     Gallstones 10/2019    Gastroparesis     diagnosis as a child    Hx of blood clots     PE     Hypertension     Obesity     Pulmonary embolism (HCC)     8 months ago-was on Xarelto-sees Dr Jill Lezama in [place]    Splenomegaly       Past Surgical History:   Procedure Laterality Date    CHOLECYSTECTOMY, LAPAROSCOPIC N/A 10/30/2019    ROBOTIC LAP BRANNON performed by Ha Miller MD at [address] [place] [address] Dr Chicas     TONSILLECTOMY      WISDOM TOOTH EXTRACTION       Family History   Problem Relation Age of Onset   [facility] Migraines Mother     Lupus Mother     High Blood Pressure Father     Thyroid Disease Father     Other Father         gerd    Cancer Father         skin    No Known Problems Brother     Colon Polyps Maternal Grandmother     No Known Problems Maternal Grandfather     No Known Problems Paternal Grandmother     Colon Polyps Paternal Grandfather     No Known Problems Brother     Colon Cancer Neg Hx     Esophageal Cancer Neg Hx      Social History     Tobacco Use    Smoking status: Never Smoker    Smokeless tobacco: Never Used   Substance Use Topics    Alcohol use: Never     Frequency: Never      Current Outpatient Medications   Medication Sig Dispense Refill    ondansetron (ZOFRAN) 4 MG tablet Take 1 tablet by mouth 3 times daily as needed for Nausea or Vomiting 30 tablet 0    Ferrous Gluconate-C-Folic Acid (IRON-C PO) Take by mouth daily      aspirin 81 MG chewable tablet Take 81 mg by mouth daily      pantoprazole (PROTONIX) 40 MG tablet Take 1 tablet by mouth every morning (before breakfast) 90 tablet 1    Cholecalciferol (VITAMIN D) 50 MCG (2000 UT) TABS tablet Take 2 tablets by mouth daily 30 tablet 5    loratadine (CLARITIN) 10 MG tablet Take 1 tablet by mouth daily 30 tablet 5    verapamil (CALAN) 120 MG tablet Take 120 mg by mouth 3 times daily      ferrous gluconate (FERGON) 324 (38 Fe) MG tablet Take 2 tablets by mouth daily (with breakfast)      Multiple Vitamins-Calcium (ONE-A-DAY WOMENS PO) Take by mouth       No current facility-administered medications for this visit.       No Known Allergies    Health Maintenance   Topic Date Due    Varicella vaccine (1 of 2 - 2-dose childhood series) 11/12/2000    HPV vaccine (1 - Female 2-dose series) 11/12/2010    DTaP/Tdap/Td vaccine (1 - Tdap) 11/12/2010    Chlamydia screen  11/12/2015    Flu vaccine (1) 09/01/2019    Shingles Vaccine (1 of 2) 11/12/2049    HIV screen  Completed    Hepatitis A vaccine  Aged Out    Hepatitis B vaccine  Aged Out    Hib vaccine  Aged Out    Meningococcal (ACWY) vaccine  Aged Out    Pneumococcal 0-64 years Vaccine  Aged Out       Subjective:      Review of Systems   Constitutional: Negative for chills, fatigue and fever. HENT: Negative for congestion and sinus pressure. Eyes: Negative for pain and visual disturbance. Respiratory: Positive for shortness of breath. Negative for cough. Cardiovascular: Positive for chest pain and palpitations. Gastrointestinal: Negative for abdominal pain, constipation, diarrhea, nausea and vomiting. Musculoskeletal: Negative for back pain. Neurological: Positive for dizziness. Negative for syncope. Psychiatric/Behavioral: Negative for dysphoric mood. The patient is not nervous/anxious. Objective:     Vitals:    02/13/20 0751 02/13/20 0759 02/13/20 0800   BP: 122/68 134/70 126/68   Site: Right Upper Arm Right Upper Arm Right Upper Arm   Position: Supine Sitting Standing   Cuff Size: Large Adult Large Adult    Pulse: 84 88 92   Temp: 97.9 °F (36.6 °C)     TempSrc: Oral     SpO2: 97%     Weight: 264 lb (119.7 kg)     Height: 5' 9.29\" (1.76 m)         Body mass index is 38.66 kg/m². Wt Readings from Last 3 Encounters:   02/13/20 264 lb (119.7 kg)   01/23/20 259 lb 6.4 oz (117.7 kg)   12/24/19 256 lb (116.1 kg)     BP Readings from Last 3 Encounters:   02/13/20 126/68   01/23/20 112/74   12/24/19 110/64       Physical Exam  Constitutional:       Appearance: Normal appearance. Neck:      Musculoskeletal: No muscular tenderness. Cardiovascular:      Rate and Rhythm: Normal rate and regular rhythm. Pulses: Normal pulses. Heart sounds: Normal heart sounds. No murmur. Pulmonary:      Effort: Pulmonary effort is normal. No respiratory distress. Breath sounds: Normal breath sounds. No wheezing.    Chest:      Chest wall: Tenderness (left sided, ribs 10-11) present. Abdominal:      General: Bowel sounds are normal. There is no distension. Palpations: Abdomen is soft. Tenderness: There is abdominal tenderness (LUQ). There is guarding. Musculoskeletal:         General: No tenderness. Skin:     General: Skin is warm and dry. Neurological:      General: No focal deficit present. Mental Status: She is alert and oriented to person, place, and time. Psychiatric:         Mood and Affect: Mood normal.         Behavior: Behavior normal.         Lab Results   Component Value Date    WBC 6.1 12/23/2019    HGB 12.9 12/23/2019    HCT 41.5 12/23/2019     12/23/2019    CHOL 162 10/21/2019    TRIG 107 10/21/2019    HDL 42 10/21/2019    LDLCALC 99 10/21/2019    AST 59 (H) 10/20/2019     12/23/2019    K 4.2 12/23/2019     12/23/2019    CREATININE 0.7 12/23/2019    BUN 10 12/23/2019    CO2 22 (L) 12/23/2019    TSH 0.606 10/21/2019    LABA1C 4.5 12/23/2019    LABGLOM >90 12/23/2019    MG 2.2 10/21/2019    CALCIUM 9.4 12/23/2019    VITD25 20 (L) 10/21/2019       Imaging Results:    No results found. Assessment:      Diagnosis Orders   1. Hx of blood clots  EKG 12 Lead   2. Seasonal allergic rhinitis, unspecified trigger     3. Wellness examination     4. Nausea     5. Costochondral chest pain  EKG 12 Lead       Plan:       1. Will order routine labs including CBC with diff, blood pregnancy test, CMP, homocytseine. 2. Will order CTA but will wait for labs to come back     3. Will follow up in a week to assess labs and condition, advised to call if condition worsens. No follow-ups on file. Orders Placed:  Orders Placed This Encounter   Procedures    EKG 12 Lead     Medications Prescribed:  No orders of the defined types were placed in this encounter. No future appointments. Patient given educational materials - see patient instructions. Discussed use, benefit, and sideeffects of prescribed medications.   All

## 2020-02-14 ENCOUNTER — TELEPHONE (OUTPATIENT)
Dept: FAMILY MEDICINE CLINIC | Age: 20
End: 2020-02-14

## 2020-02-14 NOTE — TELEPHONE ENCOUNTER
----- Message from Tevin Hernandez DO sent at 2/13/2020  3:49 PM EST -----  So far the bloodwork was all pretty good!  Nothing dangerous so far - may need to look at some other ideas for the chest pain reasons besides a blood clot

## 2020-02-15 LAB — ANA SCREEN: NORMAL

## 2020-02-21 ENCOUNTER — NURSE ONLY (OUTPATIENT)
Dept: LAB | Age: 20
End: 2020-02-21

## 2020-02-21 ENCOUNTER — OFFICE VISIT (OUTPATIENT)
Dept: FAMILY MEDICINE CLINIC | Age: 20
End: 2020-02-21
Payer: COMMERCIAL

## 2020-02-21 VITALS
HEART RATE: 87 BPM | SYSTOLIC BLOOD PRESSURE: 120 MMHG | RESPIRATION RATE: 12 BRPM | OXYGEN SATURATION: 100 % | BODY MASS INDEX: 39.1 KG/M2 | TEMPERATURE: 97.6 F | HEIGHT: 69 IN | WEIGHT: 264 LBS | DIASTOLIC BLOOD PRESSURE: 74 MMHG

## 2020-02-21 LAB
FOLLICLE STIMULATING HORMONE: 2.1 MIU/ML (ref 0.6–16.9)
LUTEINIZING HORMONE: 3 MIU/ML (ref 0.6–43.9)
PROLACTIN: 11.1 NG/ML
VITAMIN D 25-HYDROXY: 16 NG/ML (ref 30–100)

## 2020-02-21 PROCEDURE — 99214 OFFICE O/P EST MOD 30 MIN: CPT | Performed by: NURSE PRACTITIONER

## 2020-02-21 ASSESSMENT — ENCOUNTER SYMPTOMS
ABDOMINAL PAIN: 0
SORE THROAT: 0
BACK PAIN: 1
SHORTNESS OF BREATH: 0
VOMITING: 0
SINUS PAIN: 0
NAUSEA: 0
COUGH: 0
SINUS PRESSURE: 0

## 2020-02-21 NOTE — PROGRESS NOTES
MG tablet Take 1 tablet by mouth daily 30 tablet 5    verapamil (CALAN) 120 MG tablet Take 120 mg by mouth 3 times daily      ferrous gluconate (FERGON) 324 (38 Fe) MG tablet Take 2 tablets by mouth daily (with breakfast)      Multiple Vitamins-Calcium (ONE-A-DAY WOMENS PO) Take by mouth       No current facility-administered medications for this visit. No Known Allergies    Health Maintenance   Topic Date Due    Varicella vaccine (1 of 2 - 2-dose childhood series) 11/12/2000    HPV vaccine (1 - Female 2-dose series) 11/12/2010    DTaP/Tdap/Td vaccine (1 - Tdap) 11/12/2010    Chlamydia screen  11/12/2015    Flu vaccine (1) 09/01/2019    Shingles Vaccine (1 of 2) 11/12/2049    HIV screen  Completed    Hepatitis A vaccine  Aged Out    Hepatitis B vaccine  Aged Out    Hib vaccine  Aged Out    Meningococcal (ACWY) vaccine  Aged Out    Pneumococcal 0-64 years Vaccine  Aged Out       Subjective:      Review of Systems   Constitutional: Negative for chills and fever. HENT: Negative for congestion, sinus pressure, sinus pain and sore throat. Eyes: Negative for visual disturbance. Respiratory: Negative for cough and shortness of breath. Cardiovascular: Negative for chest pain and palpitations. Gastrointestinal: Negative for abdominal pain, nausea and vomiting. Musculoskeletal: Positive for arthralgias (chostochondral pain ) and back pain. Neurological: Negative for dizziness and syncope. Psychiatric/Behavioral: Negative for dysphoric mood. The patient is not nervous/anxious. Objective:     Vitals:    02/21/20 1147   BP: 120/74   Site: Left Upper Arm   Position: Sitting   Cuff Size: Medium Adult   Pulse: 87   Resp: 12   Temp: 97.6 °F (36.4 °C)   TempSrc: Oral   SpO2: 100%   Weight: 264 lb (119.7 kg)   Height: 5' 9\" (1.753 m)       Body mass index is 38.99 kg/m².     Wt Readings from Last 3 Encounters:   02/21/20 264 lb (119.7 kg)   02/13/20 264 lb (119.7 kg)   01/23/20 259 lb 6.4 resolved        Plan:   1. Consider trial of a medication to control costochondral pain - gabapentin? Amitriptyline? Educated patient on the importance of at home stretches, ice packs, and heating pads. 2. Can consider ordering more specific hematological labs that are indicated in a history of blood clots - Factor V Leiden, Prothrombin mutation, Protein C/S deficiency, Anti-thrombin III deficiency, etc.     3. Can trial some B-vitamins and/or fish oil to help reduce the CRP     4. Follow up with her in 4 weeks. No follow-ups on file. Orders Placed:  No orders of the defined types were placed in this encounter. Medications Prescribed:  No orders of the defined types were placed in this encounter. No future appointments. Patient given educational materials - see patient instructions. Discussed use, benefit, and sideeffects of prescribed medications. All patient questions answered. Pt voiced understanding. Reviewed health maintenance. Instructed to continue current medications, diet and exercise. Patient agreed with treatment plan. Follow up as directed. I was present for the key portions of the exam and history and confirmed all areas of the note with the patient, staff and the student.       Electronically signed by Jose Brown on 2/21/2020 at 12:04 PM

## 2020-02-25 ENCOUNTER — NURSE ONLY (OUTPATIENT)
Dept: LAB | Age: 20
End: 2020-02-25

## 2020-02-25 ENCOUNTER — HOSPITAL ENCOUNTER (OUTPATIENT)
Dept: ULTRASOUND IMAGING | Age: 20
Discharge: HOME | End: 2020-02-25
Payer: COMMERCIAL

## 2020-02-25 LAB
DHEA UNCONJUGATED: 1.77 NG/ML (ref 1.33–7.78)
DHEAS (DHEA SULFATE): 193 UG/DL (ref 65–380)
GLUCOSE FASTING: 96 MG/DL (ref 69–109)
GLUCOSE TOLERANCE TEST 1 HOUR: 124 MG/DL (ref 120–170)
GLUCOSE TOLERANCE TEST 2 HOUR: 98 MG/DL (ref 70–120)

## 2020-02-25 PROCEDURE — 76830 TRANSVAGINAL US NON-OB: CPT

## 2020-02-29 LAB — TESTOSTERONE, FREE W SHGB, FEMALES/CHILDREN: NORMAL

## 2020-03-23 ENCOUNTER — APPOINTMENT (OUTPATIENT)
Dept: GENERAL RADIOLOGY | Age: 20
End: 2020-03-23
Payer: COMMERCIAL

## 2020-03-23 ENCOUNTER — HOSPITAL ENCOUNTER (EMERGENCY)
Age: 20
Discharge: HOME | End: 2020-03-23
Attending: EMERGENCY MEDICINE
Payer: COMMERCIAL

## 2020-03-23 ENCOUNTER — NURSE TRIAGE (OUTPATIENT)
Dept: OTHER | Facility: CLINIC | Age: 20
End: 2020-03-23

## 2020-03-23 VITALS
OXYGEN SATURATION: 99 % | HEART RATE: 85 BPM | SYSTOLIC BLOOD PRESSURE: 129 MMHG | DIASTOLIC BLOOD PRESSURE: 81 MMHG | WEIGHT: 265 LBS | HEIGHT: 69 IN | TEMPERATURE: 98.6 F | BODY MASS INDEX: 39.25 KG/M2 | RESPIRATION RATE: 18 BRPM

## 2020-03-23 LAB
EKG ATRIAL RATE: 86 BPM
EKG P AXIS: 43 DEGREES
EKG P-R INTERVAL: 150 MS
EKG Q-T INTERVAL: 334 MS
EKG QRS DURATION: 82 MS
EKG QTC CALCULATION (BAZETT): 399 MS
EKG R AXIS: 30 DEGREES
EKG T AXIS: 38 DEGREES
EKG VENTRICULAR RATE: 86 BPM
FLU A ANTIGEN: NEGATIVE
FLU B ANTIGEN: NEGATIVE
GROUP A STREP CULTURE, REFLEX: NEGATIVE
REFLEX THROAT C + S: NORMAL

## 2020-03-23 PROCEDURE — 87070 CULTURE OTHR SPECIMN AEROBIC: CPT

## 2020-03-23 PROCEDURE — 96372 THER/PROPH/DIAG INJ SC/IM: CPT

## 2020-03-23 PROCEDURE — 6360000002 HC RX W HCPCS: Performed by: EMERGENCY MEDICINE

## 2020-03-23 PROCEDURE — 71046 X-RAY EXAM CHEST 2 VIEWS: CPT

## 2020-03-23 PROCEDURE — 93005 ELECTROCARDIOGRAM TRACING: CPT | Performed by: EMERGENCY MEDICINE

## 2020-03-23 PROCEDURE — 87804 INFLUENZA ASSAY W/OPTIC: CPT

## 2020-03-23 PROCEDURE — 99282 EMERGENCY DEPT VISIT SF MDM: CPT

## 2020-03-23 PROCEDURE — 87880 STREP A ASSAY W/OPTIC: CPT

## 2020-03-23 RX ORDER — ACETAMINOPHEN 500 MG
1000 TABLET ORAL ONCE
Status: DISCONTINUED | OUTPATIENT
Start: 2020-03-23 | End: 2020-03-23 | Stop reason: HOSPADM

## 2020-03-23 RX ORDER — DEXTROMETHORPHAN HYDROBROMIDE AND PROMETHAZINE HYDROCHLORIDE 15; 6.25 MG/5ML; MG/5ML
5 SYRUP ORAL 4 TIMES DAILY PRN
Qty: 120 ML | Refills: 0 | Status: SHIPPED | OUTPATIENT
Start: 2020-03-23 | End: 2020-03-30

## 2020-03-23 RX ORDER — KETOROLAC TROMETHAMINE 30 MG/ML
30 INJECTION, SOLUTION INTRAMUSCULAR; INTRAVENOUS ONCE
Status: COMPLETED | OUTPATIENT
Start: 2020-03-23 | End: 2020-03-23

## 2020-03-23 RX ADMIN — KETOROLAC TROMETHAMINE 30 MG: 30 INJECTION, SOLUTION INTRAMUSCULAR at 08:56

## 2020-03-23 ASSESSMENT — ENCOUNTER SYMPTOMS
COUGH: 1
NAUSEA: 0
EYE DISCHARGE: 0
COLOR CHANGE: 0
EYE ITCHING: 0
DIARRHEA: 0
SHORTNESS OF BREATH: 0
WHEEZING: 0
BACK PAIN: 0
SORE THROAT: 1
CHEST TIGHTNESS: 1
RHINORRHEA: 1
ABDOMINAL PAIN: 0

## 2020-03-23 ASSESSMENT — PAIN DESCRIPTION - FREQUENCY
FREQUENCY: INTERMITTENT
FREQUENCY: INTERMITTENT

## 2020-03-23 ASSESSMENT — PAIN DESCRIPTION - PAIN TYPE
TYPE: ACUTE PAIN
TYPE: ACUTE PAIN

## 2020-03-23 ASSESSMENT — PAIN SCALES - GENERAL
PAINLEVEL_OUTOF10: 5
PAINLEVEL_OUTOF10: 4
PAINLEVEL_OUTOF10: 5

## 2020-03-23 ASSESSMENT — PAIN DESCRIPTION - LOCATION
LOCATION: CHEST
LOCATION: CHEST

## 2020-03-23 NOTE — ED PROVIDER NOTES
[address] [facility] [address] [address] EMERGENCY DEPT  EMERGENCY DEPARTMENT     Pt Name: Josesito Bolden  MRN: [id number]  [place] 2000  Date of evaluation: 3/23/2020  Provider: Grace Fuchs DO,     [facility]       Chief Complaint   Patient presents with    Cough    Fever       HISTORY OF PRESENT ILLNESS    Josesito Bolden is a 20 y.o. female who presents to the emergency department from home for evaluation of runny nose, sore throat, cough, fever chills, body aches, chest soreness. Symptoms have been progressive over the last 3 days she states last day she had a fever was yesterday. 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 She denies any known Covid-19 or travel. She states she did call in to see if she could be tested given her upper respiratory tract symptoms. He was referred to the ED due to her history of prior pulmonary embolus. She states they were unsure if she actually had a PE number of years ago at an outside hospital in [place]. She was on birth control at that time and had stopped it she was not put on blood thinners. She reports chest soreness she states coughing her soreness she states is across the anterior part and lateral rib margins. She describes it as achy. She denies sharp pain or pain with inspiration. She denies lower extremity swelling. She reports right nares intermittent bleeding resolved with pressure last was 1 day ago. Triage notes and Nursing notes were reviewed by myself. Any discrepancies are addressed above.     PAST MEDICAL HISTORY     Past Medical History:   Diagnosis Date    Anxiety     Chronic GERD     Chronic migraine     takes verapamil    Depression     Gallstones 10/2019    Gastroparesis     diagnosis as a child    Hx of blood clots     PE     Hypertension     Obesity     Pulmonary embolism ([facility] [address]     8 months ago-was on Xarelto-sees Dr Yesica Diallo in [place]    Splenomegaly        SURGICAL HISTORY       Past Surgical Sometimes true     Inability: Sometimes true    Transportation needs     Medical: No     Non-medical: No   Tobacco Use    Smoking status: Never Smoker    Smokeless tobacco: Never Used   Substance and Sexual Activity    Alcohol use: Never     Frequency: Never    Drug use: Never    Sexual activity: None   Lifestyle    Physical activity     Days per week: None     Minutes per session: None    Stress: None   Relationships    Social connections     Talks on phone: None     Gets together: None     Attends Sabianist service: None     Active member of club or organization: None     Attends meetings of clubs or organizations: None     Relationship status: None    Intimate partner violence     Fear of current or ex partner: None     Emotionally abused: None     Physically abused: None     Forced sexual activity: None   Other Topics Concern    None   Social History Narrative    None       REVIEW OF SYSTEMS     Review of Systems   Constitutional: Positive for chills and fever. HENT: Positive for congestion, postnasal drip, rhinorrhea and sore throat. Eyes: Negative for discharge and itching. Respiratory: Positive for cough and chest tightness. Negative for shortness of breath and wheezing. Cardiovascular: Positive for chest pain. Negative for palpitations and leg swelling. Gastrointestinal: Negative for abdominal pain, diarrhea and nausea. Genitourinary: Negative for decreased urine volume and dysuria. Musculoskeletal: Positive for myalgias. Negative for back pain. Skin: Negative for color change and rash. Neurological: Negative for headaches. Hematological: Negative for adenopathy. Does not bruise/bleed easily. Except as noted above the remainder of the review of systems was reviewed and is.    PHYSICAL EXAM    (up to 7 for level 4, 8 or more for level 5)     ED Triage Vitals [03/23/20 0838]   BP Temp Temp Source Pulse Resp SpO2 Height Weight   (!) 146/83 98.6 °F (37 °C) Oral 104 18 99 % 5' 9\" (1.753 m) 265 lb (120.2 kg)       Physical Exam  Vitals signs and nursing note reviewed. Exam conducted with a chaperone present. Constitutional:       General: She is not in acute distress. Appearance: Normal appearance. She is well-developed. She is not diaphoretic. HENT:      Head: Normocephalic and atraumatic. Right Ear: Tympanic membrane normal.      Left Ear: Tympanic membrane normal.      Nose: Congestion and rhinorrhea present. Comments: Right-sided anterior nares mucosal irritation stigmata of recent bleeding     Mouth/Throat:      Pharynx: Posterior oropharyngeal erythema present. Comments: Postnasal pharynx, nasal drip  Eyes:      General:         Right eye: No discharge. Left eye: No discharge. Conjunctiva/sclera: Conjunctivae normal.      Pupils: Pupils are equal, round, and reactive to light. Neck:      Musculoskeletal: Normal range of motion and neck supple. Thyroid: No thyromegaly. Vascular: No JVD. Cardiovascular:      Rate and Rhythm: Normal rate and regular rhythm. Heart sounds: Normal heart sounds. Pulmonary:      Effort: Pulmonary effort is normal. No respiratory distress. Breath sounds: Normal breath sounds. Chest:      Chest wall: Tenderness (anterior chest wall tenderness) present. Abdominal:      General: Bowel sounds are normal. There is no distension. Palpations: Abdomen is soft. Tenderness: There is no abdominal tenderness. Musculoskeletal: Normal range of motion. General: No tenderness. Comments: No Calf TTP,   Damaris's negative bilateral     Lymphadenopathy:      Cervical: Cervical adenopathy present. Skin:     General: Skin is warm and dry. Capillary Refill: Capillary refill takes less than 2 seconds. Findings: No rash. Neurological:      Mental Status: She is alert and oriented to person, place, and time. She is not disoriented. GCS: GCS eye subscore is 4.  GCS verbal subscore is 5. GCS motor subscore is 6. Cranial Nerves: No cranial nerve deficit. Sensory: No sensory deficit. Motor: No abnormal muscle tone or seizure activity. Coordination: Coordination normal.         DIAGNOSTIC RESULTS     EKG:(none if blank)  [facility] who either signs or Co-signs this chart in the absence of a cardiologist.    Normal sinus rhythm at 86 bpm, no ectopy, no acute ST-T wave changes no evidence of right heart strain. RADIOLOGY: (none if blank)   Interpretation per the Radiologistbelow, if available at the time of this note:    XR CHEST STANDARD (2 VW)   Final Result   1. No acute cardiopulmonary disease. **This report has been created using voice recognition software. It may contain minor errors which are inherent in voice recognition technology. **      Final report electronically signed by Dr. Alpa Schmitt on 3/23/2020 9:14 AM          LABS:  Labs Reviewed   RAPID INFLUENZA A/B ANTIGENS   CULTURE, THROAT   GROUP A STREP, REFLEX       All other labs were within normal range or not returned as of this dictation. Please note, any cultures that may have been sent were not resulted at the time of this patient visit. EMERGENCY DEPARTMENT COURSE andMedical Decision Making:     MDM/   Multiple etiologies were considered in the workup of this patient's complaint and presentation. The patient's chest pain is not suggestive of pulmonary embolus, cardiac ischemia, aortic dissection, or other serious etiology. The presentation is not consistent with these entities. Given the extremely low risk of these diagnoses further testing and evaluation for these possibilities in the Emergency Department today is not indicated at this time. Patient with URI viral symptoms pain with cough and chest soreness.   Her chest x-ray was negative her ECG is normal. She is to have a pulmonary embolus the diagnosis is entertained briefly because the patient has a questionable history of possible pulmonary embolus in the past.  With recent viral pandemic patient is recommended to stay off work per isolation protocols given by [facility] ([facility]). Strict follow up and return precautions have been discussed with the patient and they agree. The patient's HEART score is 1, indicating a low risk for major adverse cardiac events within the next 6 weeks. Strict returnprecautions and follow up instructions were discussed with the patient with which the patient agrees    ED Medications administered this visit:    Medications   acetaminophen (TYLENOL) tablet 1,000 mg (has no administration in time range)   ketorolac (TORADOL) injection 30 mg (30 mg Intramuscular Given 3/23/20 0856)         Procedures: (None if blank)       CLINICAL       1. Viral respiratory illness    2.  Chest wall pain          DISPOSITION/PLAN   DISPOSITION    Home  PATIENT REFERRED TO:  Jamel Barrera, APRN - CNP  [address] [facility] [address]    Schedule an appointment as soon as possible for a visit in 3 days        DISCHARGE MEDICATIONS:  Discharge Medication List as of 3/23/2020 10:18 AM      START taking these medications    Details   promethazine-dextromethorphan (PROMETHAZINE-DM) 6.25-15 MG/5ML syrup Take 5 mLs by mouth 4 times daily as needed for Cough, Disp-120 mL, R-0Print                    (Please note that portions of this note were completed with a voice recognition program.  Efforts were made to edit the dictations but occasionallywords are mis-transcribed.)      Regino Mendoza DO,(electronically signed)  Attending Physician, Emergency Department       Regino Mendoza DO  03/23/20 6010

## 2020-03-24 NOTE — TELEPHONE ENCOUNTER
Michael Jose called requesting a refill of the below medication which has been pended for you:     Requested Prescriptions     Pending Prescriptions Disp Refills    ondansetron (ZOFRAN) 4 MG tablet 30 tablet 0     Sig: Take 1 tablet by mouth 3 times daily as needed for Nausea or Vomiting       Last Appointment Date: 2/21/2020  Next Appointment Date: Visit date not found    No Known Allergies

## 2020-03-25 LAB — THROAT/NOSE CULTURE: NORMAL

## 2020-03-26 ENCOUNTER — OFFICE VISIT (OUTPATIENT)
Dept: FAMILY MEDICINE CLINIC | Age: 20
End: 2020-03-26
Payer: COMMERCIAL

## 2020-03-26 VITALS
TEMPERATURE: 98.4 F | BODY MASS INDEX: 39.25 KG/M2 | WEIGHT: 265 LBS | DIASTOLIC BLOOD PRESSURE: 88 MMHG | OXYGEN SATURATION: 98 % | HEIGHT: 69 IN | SYSTOLIC BLOOD PRESSURE: 131 MMHG | HEART RATE: 100 BPM | RESPIRATION RATE: 12 BRPM

## 2020-03-26 PROCEDURE — 99214 OFFICE O/P EST MOD 30 MIN: CPT | Performed by: FAMILY MEDICINE

## 2020-03-26 RX ORDER — ALBUTEROL SULFATE 90 UG/1
2 AEROSOL, METERED RESPIRATORY (INHALATION) 4 TIMES DAILY PRN
Qty: 1 INHALER | Refills: 0 | Status: SHIPPED | OUTPATIENT
Start: 2020-03-26 | End: 2021-02-18 | Stop reason: SDUPTHER

## 2020-03-26 RX ORDER — BLOOD PRESSURE TEST KIT
KIT MISCELLANEOUS
Qty: 1 KIT | Refills: 0 | Status: SHIPPED | OUTPATIENT
Start: 2020-03-26

## 2020-03-26 RX ORDER — ONDANSETRON 4 MG/1
4 TABLET, FILM COATED ORAL 3 TIMES DAILY PRN
Qty: 30 TABLET | Refills: 0 | Status: SHIPPED | OUTPATIENT
Start: 2020-03-26 | End: 2020-06-22 | Stop reason: ALTCHOICE

## 2020-03-26 RX ORDER — ONDANSETRON 4 MG/1
4 TABLET, ORALLY DISINTEGRATING ORAL EVERY 8 HOURS PRN
Qty: 30 TABLET | Refills: 0 | Status: SHIPPED | OUTPATIENT
Start: 2020-03-26 | End: 2020-06-03

## 2020-03-26 ASSESSMENT — ENCOUNTER SYMPTOMS
WHEEZING: 1
COUGH: 1
SORE THROAT: 1
SHORTNESS OF BREATH: 1
DIARRHEA: 0
NAUSEA: 0
CONSTIPATION: 0
BLOOD IN STOOL: 0
VOMITING: 0
EYE PAIN: 0
TROUBLE SWALLOWING: 0
ABDOMINAL PAIN: 0

## 2020-03-26 NOTE — LETTER
[address] [place] [address],[address] [facility] [address] [facility] [address] [facility] [address]  [facility] [address]  Phone: [phone]  Fax: [phone] [facility],         March 26, 2020     Patient: Nicki Boss   YOB: 2000   Date of Visit: 3/26/2020       To Whom It May Concern: It is my medical opinion that Jone Ly has been asked to stay home due to the nature of her respiratory illness and the COVID epidemic. She has medical reasons to be off work from 3-23-20 through 4-6-20. She may return to work on 4-7-20. If you have any questions or concerns, please don't hesitate to call.     Sincerely,        Angelita Douglas, DO

## 2020-03-26 NOTE — PROGRESS NOTES
Waqas Macias is a 20 y.o. female who presents today for:  Chief Complaint   Patient presents with    Chest Pain     x 1 week, EKG done 3/23/2020 at [facility]    Fever     x 1 week    Generalized Body Aches     soreness and exhausted        Goals      Exercise 3x per week (30 min per time)           HPI:     HPI   Can't walk up the steps without shortness of breath the last week - her boss at work told her to go to the er - can't lift heavy things as much without shortness of breath    She use to have an inhalers - surprised she has not been told she has asthma in the past - inhalers have helped in the past     Her brother has severe asthma    She has a purple disc at home - did taste badly but did not help    She has emergency paid leave - would be helpful to have a note that says she was told to stay home    Has been of the [facility] for 6 months now    No question data found.     Past Medical History:   Diagnosis Date    Anxiety     Chronic GERD     Chronic migraine     takes verapamil    Depression     Gallstones 10/2019    Gastroparesis     diagnosis as a child    Hx of blood clots     PE     Hypertension     Obesity     Pulmonary embolism (HCC)     8 months ago-was on Xarelto-sees Dr Marina Schofield in [place]    Splenomegaly       Past Surgical History:   Procedure Laterality Date    CHOLECYSTECTOMY, LAPAROSCOPIC N/A 10/30/2019    ROBOTIC LAP BRANNON performed by Rome Vernon MD at [address] [place] [address]  Right     TONSILLECTOMY      WISDOM TOOTH EXTRACTION       Family History   Problem Relation Age of Onset   [facility] Migraines Mother     Lupus Mother     High Blood Pressure Father     Thyroid Disease Father     Other Father         gerd    Cancer Father         skin    No Known Problems Brother     Colon Polyps Maternal Grandmother     No Known Problems Maternal Grandfather     No Known Problems Paternal Grandmother     Colon Polyps Paternal Grandfather     No Known Problems Brother     Colon Cancer Neg Hx     Esophageal Cancer Neg Hx      Social History     Tobacco Use    Smoking status: Never Smoker    Smokeless tobacco: Never Used   Substance Use Topics    Alcohol use: Never     Frequency: Never      Current Outpatient Medications   Medication Sig Dispense Refill    ondansetron (ZOFRAN) 4 MG tablet Take 1 tablet by mouth 3 times daily as needed for Nausea or Vomiting 30 tablet 0    promethazine-dextromethorphan (PROMETHAZINE-DM) 6.25-15 MG/5ML syrup Take 5 mLs by mouth 4 times daily as needed for Cough 120 mL 0    Ferrous Gluconate-C-Folic Acid (IRON-C PO) Take by mouth daily      aspirin 81 MG chewable tablet Take 81 mg by mouth daily      pantoprazole (PROTONIX) 40 MG tablet Take 1 tablet by mouth every morning (before breakfast) 90 tablet 1    Cholecalciferol (VITAMIN D) 50 MCG (2000 UT) TABS tablet Take 2 tablets by mouth daily 30 tablet 5    loratadine (CLARITIN) 10 MG tablet Take 1 tablet by mouth daily 30 tablet 5    verapamil (CALAN) 120 MG tablet Take 120 mg by mouth 3 times daily      ferrous gluconate (FERGON) 324 (38 Fe) MG tablet Take 2 tablets by mouth daily (with breakfast)      Multiple Vitamins-Calcium (ONE-A-DAY WOMENS PO) Take by mouth       No current facility-administered medications for this visit. No Known Allergies    Health Maintenance   Topic Date Due    Varicella vaccine (1 of 2 - 2-dose childhood series) 11/12/2000    HPV vaccine (1 - 2-dose series) 11/12/2010    Chlamydia screen  11/12/2015    DTaP/Tdap/Td vaccine (1 - Tdap) 11/12/2018    Flu vaccine (1) 09/01/2019    HIV screen  Completed    Hepatitis A vaccine  Aged Out    Hepatitis B vaccine  Aged Out    Hib vaccine  Aged Out    Meningococcal (ACWY) vaccine  Aged Out    Pneumococcal 0-64 years Vaccine  Aged Out       Subjective:      Review of Systems   Constitutional: Positive for fatigue and fever. Negative for chills. HENT: Positive for sore throat (if she snores).  Negative for ear pain, postnasal drip and trouble swallowing. Eyes: Negative for pain and visual disturbance. Respiratory: Positive for cough, shortness of breath and wheezing. Cardiovascular: Positive for chest pain. Negative for palpitations. Gastrointestinal: Negative for abdominal pain, blood in stool, constipation, diarrhea, nausea and vomiting. Genitourinary: Negative for difficulty urinating. Skin: Negative for rash. Neurological: Positive for headaches. Psychiatric/Behavioral: Negative for agitation. Objective:     Vitals:    03/26/20 1117   BP: 131/88   Pulse: 100   Resp: 12   Temp: 98.4 °F (36.9 °C)   TempSrc: Oral   SpO2: 98%   Weight: 265 lb (120.2 kg)   Height: 5' 9.02\" (1.753 m)       Body mass index is 39.12 kg/m². Wt Readings from Last 3 Encounters:   03/26/20 265 lb (120.2 kg)   03/23/20 265 lb (120.2 kg)   02/21/20 264 lb (119.7 kg)     BP Readings from Last 3 Encounters:   03/26/20 131/88   03/23/20 129/81   02/21/20 120/74       Physical Exam  Vitals signs and nursing note reviewed. Constitutional:       General: She is not in acute distress. Appearance: She is well-developed. She is not diaphoretic. HENT:      Head: Normocephalic and atraumatic. Right Ear: External ear normal.      Left Ear: External ear normal.   Eyes:      General: No scleral icterus. Right eye: No discharge. Left eye: No discharge. Conjunctiva/sclera: Conjunctivae normal.   Neck:      Musculoskeletal: Normal range of motion. Cardiovascular:      Rate and Rhythm: Normal rate and regular rhythm. Heart sounds: Normal heart sounds. No murmur. Pulmonary:      Effort: Pulmonary effort is normal.      Breath sounds: Normal breath sounds. Skin:     General: Skin is warm and dry. Findings: No erythema or rash. Neurological:      Mental Status: She is alert and oriented to person, place, and time. Cranial Nerves: No cranial nerve deficit.    Psychiatric: Behavior: Behavior normal.         Thought Content: Thought content normal.         Judgment: Judgment normal.           Lab Results   Component Value Date    WBC 7.7 02/13/2020    HGB 13.7 02/13/2020    HCT 43.0 02/13/2020     02/13/2020    CHOL 159 02/13/2020    TRIG 85 02/13/2020    HDL 46 02/13/2020    LDLCALC 96 02/13/2020    AST 22 02/13/2020     02/13/2020    K 4.1 02/13/2020     02/13/2020    CREATININE 0.6 02/13/2020    BUN 10 02/13/2020    CO2 26 02/13/2020    TSH 0.935 02/13/2020    GLUF 96 02/25/2020    LABA1C 4.7 02/13/2020    LABGLOM >90 02/13/2020    MG 2.0 02/13/2020    CALCIUM 9.9 02/13/2020    VITD25 16 (L) 02/21/2020       Imaging Results:    Xr Chest Standard (2 Vw)    Result Date: 3/23/2020  PROCEDURE: XR CHEST (2 VW) CLINICAL INFORMATION: chest pain, cough COMPARISON: No prior study. TECHNIQUE:  PA and lateral chest x-ray  FINDINGS: The cardiomediastinal silhouette appears within normal limits. No focal consolidation, pleural effusion or pneumothorax is seen. No acute osseous abnormalities are demonstrated. 1. No acute cardiopulmonary disease. **This report has been created using voice recognition software. It may contain minor errors which are inherent in voice recognition technology. ** Final report electronically signed by Dr. Kelechi Guzman on 3/23/2020 9:14 AM    Us Non Ob Transvaginal    Result Date: 2/25/2020  PROCEDURE: US NON OB TRANSVAGINAL CLINICAL INFORMATION: History of polycystic ovaries, Hot flashes, Abnormal menstruation. COMPARISON: No prior study. TECHNIQUE: . FINDINGS: LMP-2/25/20 Uterus - 7.5 x 5.0 x 3.5 cm Endometrium - 0.7 cm Right Ovary - 3.3 x 1.7 x 1.1 cm Left Ovary - 3.0 x 2.1 x 1.8 cm The right and left ovary are normal in appearance with no visualized evidence of polycystic ovarian changes. There is normal-appearing blood flow with Doppler assessment to the ovaries. There is trace fluid in the cul-de-sac and near the endocervical canal.     .

## 2020-04-16 ENCOUNTER — TELEPHONE (OUTPATIENT)
Dept: FAMILY MEDICINE CLINIC | Age: 20
End: 2020-04-16

## 2020-04-23 ENCOUNTER — APPOINTMENT (OUTPATIENT)
Dept: CT IMAGING | Age: 20
End: 2020-04-23
Payer: COMMERCIAL

## 2020-04-23 ENCOUNTER — NURSE TRIAGE (OUTPATIENT)
Dept: OTHER | Facility: CLINIC | Age: 20
End: 2020-04-23

## 2020-04-23 ENCOUNTER — HOSPITAL ENCOUNTER (EMERGENCY)
Age: 20
Discharge: HOME | End: 2020-04-23
Payer: COMMERCIAL

## 2020-04-23 VITALS
TEMPERATURE: 98.1 F | RESPIRATION RATE: 19 BRPM | OXYGEN SATURATION: 95 % | HEART RATE: 90 BPM | DIASTOLIC BLOOD PRESSURE: 72 MMHG | HEIGHT: 68 IN | SYSTOLIC BLOOD PRESSURE: 130 MMHG | BODY MASS INDEX: 41.37 KG/M2 | WEIGHT: 273 LBS

## 2020-04-23 LAB
ALBUMIN SERPL-MCNC: 4.5 G/DL (ref 3.5–5.1)
ALP BLD-CCNC: 77 U/L (ref 38–126)
ALT SERPL-CCNC: 23 U/L (ref 11–66)
ANION GAP SERPL CALCULATED.3IONS-SCNC: 11 MEQ/L (ref 8–16)
AST SERPL-CCNC: 19 U/L (ref 5–40)
BASOPHILS # BLD: 0.5 %
BASOPHILS ABSOLUTE: 0 THOU/MM3 (ref 0–0.1)
BILIRUB SERPL-MCNC: 0.3 MG/DL (ref 0.3–1.2)
BILIRUBIN DIRECT: < 0.2 MG/DL (ref 0–0.3)
BUN BLDV-MCNC: 9 MG/DL (ref 7–22)
C-REACTIVE PROTEIN: 1.18 MG/DL (ref 0–1)
CALCIUM SERPL-MCNC: 9.4 MG/DL (ref 8.5–10.5)
CHLORIDE BLD-SCNC: 103 MEQ/L (ref 98–111)
CO2: 25 MEQ/L (ref 23–33)
CREAT SERPL-MCNC: 0.6 MG/DL (ref 0.4–1.2)
EKG ATRIAL RATE: 88 BPM
EKG P AXIS: 34 DEGREES
EKG P-R INTERVAL: 150 MS
EKG Q-T INTERVAL: 334 MS
EKG QRS DURATION: 84 MS
EKG QTC CALCULATION (BAZETT): 404 MS
EKG R AXIS: 27 DEGREES
EKG T AXIS: 27 DEGREES
EKG VENTRICULAR RATE: 88 BPM
EOSINOPHIL # BLD: 4.4 %
EOSINOPHILS ABSOLUTE: 0.4 THOU/MM3 (ref 0–0.4)
ERYTHROCYTE [DISTWIDTH] IN BLOOD BY AUTOMATED COUNT: 12.9 % (ref 11.5–14.5)
ERYTHROCYTE [DISTWIDTH] IN BLOOD BY AUTOMATED COUNT: 42.2 FL (ref 35–45)
FLU A ANTIGEN: NEGATIVE
FLU B ANTIGEN: NEGATIVE
GFR SERPL CREATININE-BSD FRML MDRD: > 90 ML/MIN/1.73M2
GLUCOSE BLD-MCNC: 103 MG/DL (ref 70–108)
HCT VFR BLD CALC: 42.3 % (ref 37–47)
HEMOGLOBIN: 13.7 GM/DL (ref 12–16)
IMMATURE GRANS (ABS): 0.06 THOU/MM3 (ref 0–0.07)
IMMATURE GRANULOCYTES: 0.7 %
LACTIC ACID, SEPSIS: 1 MMOL/L (ref 0.5–1.9)
LD: 179 U/L (ref 100–190)
LYMPHOCYTES # BLD: 23.2 %
LYMPHOCYTES ABSOLUTE: 2 THOU/MM3 (ref 1–4.8)
MAGNESIUM: 2 MG/DL (ref 1.6–2.4)
MCH RBC QN AUTO: 29.2 PG (ref 26–33)
MCHC RBC AUTO-ENTMCNC: 32.4 GM/DL (ref 32.2–35.5)
MCV RBC AUTO: 90.2 FL (ref 81–99)
MONOCYTES # BLD: 7.3 %
MONOCYTES ABSOLUTE: 0.6 THOU/MM3 (ref 0.4–1.3)
NUCLEATED RED BLOOD CELLS: 0 /100 WBC
OSMOLALITY CALCULATION: 276.5 MOSMOL/KG (ref 275–300)
PLATELET # BLD: 208 THOU/MM3 (ref 130–400)
PMV BLD AUTO: 9.8 FL (ref 9.4–12.4)
POTASSIUM SERPL-SCNC: 3.9 MEQ/L (ref 3.5–5.2)
PREGNANCY, SERUM: NEGATIVE
PRO-BNP: 10 PG/ML (ref 0–450)
PROCALCITONIN: 0.04 NG/ML (ref 0.01–0.09)
RBC # BLD: 4.69 MILL/MM3 (ref 4.2–5.4)
SEG NEUTROPHILS: 63.9 %
SEGMENTED NEUTROPHILS ABSOLUTE COUNT: 5.6 THOU/MM3 (ref 1.8–7.7)
SODIUM BLD-SCNC: 139 MEQ/L (ref 135–145)
TOTAL PROTEIN: 7.7 G/DL (ref 6.1–8)
TROPONIN T: < 0.01 NG/ML
WBC # BLD: 8.8 THOU/MM3 (ref 4.8–10.8)

## 2020-04-23 PROCEDURE — 83615 LACTATE (LD) (LDH) ENZYME: CPT

## 2020-04-23 PROCEDURE — 84703 CHORIONIC GONADOTROPIN ASSAY: CPT

## 2020-04-23 PROCEDURE — 83605 ASSAY OF LACTIC ACID: CPT

## 2020-04-23 PROCEDURE — 96374 THER/PROPH/DIAG INJ IV PUSH: CPT

## 2020-04-23 PROCEDURE — 84484 ASSAY OF TROPONIN QUANT: CPT

## 2020-04-23 PROCEDURE — 71275 CT ANGIOGRAPHY CHEST: CPT

## 2020-04-23 PROCEDURE — 99284 EMERGENCY DEPT VISIT MOD MDM: CPT

## 2020-04-23 PROCEDURE — 93010 ELECTROCARDIOGRAM REPORT: CPT | Performed by: INTERNAL MEDICINE

## 2020-04-23 PROCEDURE — 86140 C-REACTIVE PROTEIN: CPT

## 2020-04-23 PROCEDURE — 82248 BILIRUBIN DIRECT: CPT

## 2020-04-23 PROCEDURE — 84145 PROCALCITONIN (PCT): CPT

## 2020-04-23 PROCEDURE — 87804 INFLUENZA ASSAY W/OPTIC: CPT

## 2020-04-23 PROCEDURE — 36415 COLL VENOUS BLD VENIPUNCTURE: CPT

## 2020-04-23 PROCEDURE — 6370000000 HC RX 637 (ALT 250 FOR IP): Performed by: PHYSICIAN ASSISTANT

## 2020-04-23 PROCEDURE — 85025 COMPLETE CBC W/AUTO DIFF WBC: CPT

## 2020-04-23 PROCEDURE — 80053 COMPREHEN METABOLIC PANEL: CPT

## 2020-04-23 PROCEDURE — 83735 ASSAY OF MAGNESIUM: CPT

## 2020-04-23 PROCEDURE — 6360000002 HC RX W HCPCS: Performed by: PHYSICIAN ASSISTANT

## 2020-04-23 PROCEDURE — 83880 ASSAY OF NATRIURETIC PEPTIDE: CPT

## 2020-04-23 PROCEDURE — 93005 ELECTROCARDIOGRAM TRACING: CPT | Performed by: PHYSICIAN ASSISTANT

## 2020-04-23 PROCEDURE — 2580000003 HC RX 258: Performed by: PHYSICIAN ASSISTANT

## 2020-04-23 PROCEDURE — 6360000004 HC RX CONTRAST MEDICATION: Performed by: PHYSICIAN ASSISTANT

## 2020-04-23 RX ORDER — KETOROLAC TROMETHAMINE 30 MG/ML
30 INJECTION, SOLUTION INTRAMUSCULAR; INTRAVENOUS ONCE
Status: COMPLETED | OUTPATIENT
Start: 2020-04-23 | End: 2020-04-23

## 2020-04-23 RX ORDER — ASPIRIN 81 MG/1
324 TABLET, CHEWABLE ORAL ONCE
Status: COMPLETED | OUTPATIENT
Start: 2020-04-23 | End: 2020-04-23

## 2020-04-23 RX ORDER — 0.9 % SODIUM CHLORIDE 0.9 %
1000 INTRAVENOUS SOLUTION INTRAVENOUS ONCE
Status: COMPLETED | OUTPATIENT
Start: 2020-04-23 | End: 2020-04-23

## 2020-04-23 RX ADMIN — IOPAMIDOL 80 ML: 755 INJECTION, SOLUTION INTRAVENOUS at 07:38

## 2020-04-23 RX ADMIN — ASPIRIN 81 MG 324 MG: 81 TABLET ORAL at 06:32

## 2020-04-23 RX ADMIN — KETOROLAC TROMETHAMINE 30 MG: 30 INJECTION, SOLUTION INTRAMUSCULAR at 06:32

## 2020-04-23 RX ADMIN — RIVAROXABAN 15 MG: 15 TABLET, FILM COATED ORAL at 09:17

## 2020-04-23 RX ADMIN — SODIUM CHLORIDE 1000 ML: 9 INJECTION, SOLUTION INTRAVENOUS at 06:33

## 2020-04-23 ASSESSMENT — PAIN DESCRIPTION - FREQUENCY: FREQUENCY: CONTINUOUS

## 2020-04-23 ASSESSMENT — PAIN DESCRIPTION - PAIN TYPE: TYPE: ACUTE PAIN

## 2020-04-23 ASSESSMENT — ENCOUNTER SYMPTOMS
RHINORRHEA: 0
EYE ITCHING: 0
DIARRHEA: 0
VOMITING: 0
SORE THROAT: 0
EYE DISCHARGE: 0
ABDOMINAL PAIN: 0
WHEEZING: 0
EYE PAIN: 0
BACK PAIN: 0
NAUSEA: 0
SHORTNESS OF BREATH: 1
COLOR CHANGE: 0
COUGH: 1

## 2020-04-23 ASSESSMENT — PAIN DESCRIPTION - LOCATION: LOCATION: CHEST

## 2020-04-23 ASSESSMENT — PAIN DESCRIPTION - DESCRIPTORS: DESCRIPTORS: ACHING

## 2020-04-23 ASSESSMENT — PAIN DESCRIPTION - ORIENTATION: ORIENTATION: MID

## 2020-04-23 ASSESSMENT — PAIN SCALES - GENERAL: PAINLEVEL_OUTOF10: 4

## 2020-04-23 ASSESSMENT — PAIN DESCRIPTION - ONSET: ONSET: ON-GOING

## 2020-04-23 ASSESSMENT — PAIN DESCRIPTION - PROGRESSION: CLINICAL_PROGRESSION: NOT CHANGED

## 2020-04-23 NOTE — TELEPHONE ENCOUNTER
Reason for Disposition   Chest pain present when not coughing    Answer Assessment - Initial Assessment Questions  1. ONSET: \"When did the cough begin? \"       Few days ago  2. SEVERITY: \"How bad is the cough today? \"       Not to bad  3. RESPIRATORY DISTRESS: \"Describe your breathing. \"       Mild trouble yesterday  4. FEVER: \"Do you have a fever? \" If so, ask: \"What is your temperature, how was it measured, and when did it start? \"      no  5. HEMOPTYSIS: \"Are you coughing up any blood? \" If so ask: \"How much? \" (flecks, streaks, tablespoons, etc.)      no  6. TREATMeNT: \"What have you done so far to treat the cough? \" (e.g., meds, fluids, humidifier)      Took cold and cough  7. CARDIAC HISTORY: \"Do you have any history of heart disease? \" (e.g., heart attack, congestive heart failure)       no  8. LUNG HISTORY: \"Do you have any history of lung disease? \"  (e.g., pulmonary embolus, asthma, emphysema)      no  9. PE RISK FACTORS: \"Do you have a history of blood clots? \" (or: recent major surgery, recent prolonged travel, bedridden)      yes  10. OTHER SYMPTOMS: \"Do you have any other symptoms? (e.g., runny nose, wheezing, chest pain)        Chest pain  11. PREGNANCY: \"Is there any chance you are pregnant? \" \"When was your last menstrual period? \"        no  12. TRAVEL: \"Have you traveled out of the country in the last month? \" (e.g., travel history, exposures)      no    Protocols used: COUGH-ADULT-[place]

## 2020-04-23 NOTE — ED PROVIDER NOTES
her paternal grandfather is alive. She indicated that the status of her neg hx is unknown.   family history includes Cancer in her father; Colon Polyps in her maternal grandmother and paternal grandfather; High Blood Pressure in her father; Lupus in her mother; Migraines in her mother; No Known Problems in her brother, brother, maternal grandfather, and paternal grandmother; Other in her father; Thyroid Disease in her father. SOCIALHISTORY      reports that she has never smoked. She has never used smokeless tobacco. She reports that she does not drink alcohol or use drugs. PHYSICAL EXAM     INITIAL VITALS:  height is 5' 8\" (1.727 m) and weight is 273 lb (123.8 kg). Her oral temperature is 98.1 °F (36.7 °C). Her blood pressure is 130/72 and her pulse is 90. Her respiration is 19 and oxygen saturation is 95%. Physical Exam  Vitals signs reviewed. Constitutional:       Appearance: She is well-developed. HENT:      Head: Normocephalic and atraumatic. Right Ear: Tympanic membrane normal.      Left Ear: Tympanic membrane normal.   Eyes:      Pupils: Pupils are equal, round, and reactive to light. Neck:      Musculoskeletal: Normal range of motion and neck supple. Cardiovascular:      Rate and Rhythm: Normal rate. Pulmonary:      Effort: Pulmonary effort is normal. No respiratory distress. Breath sounds: Normal breath sounds. No stridor. Abdominal:      General: There is no distension. Palpations: Abdomen is soft. There is no mass. Musculoskeletal: Normal range of motion. Skin:     General: Skin is warm and dry. Neurological:      Mental Status: She is alert and oriented to person, place, and time. Psychiatric:         Behavior: Behavior normal.         DIFFERENTIAL DIAGNOSIS:   Viral syndrome possible bronchitis possible pneumonia. She does have a history of PE and have intermittent chest pain will get a CTA of the chest.  She is not on anticoagulation.   Doubtful this is COVID    DIAGNOSTIC RESULTS     EKG: All EKG's are interpreted by the Emergency Department Physician who either signs or Co-signs this chart in the absence of a cardiologist.  ECG Results         EKG Emergency (Final result)    Component (Lab Inquiry)   Collection Time Result Time Ventricular Rate Atrial Rate P-R Interval QRS Duration Q-T Interval   04/23/20 06:27:38 04/23/20 06:27:38 88 88 150 84 334       Collection Time Result Time QTc Calculation (Bazett) P Axis R Axis T Axis   04/23/20 06:27:38 04/23/20 06:27:38 404 34 27 27         Final result                Narrative:    Normal sinus rhythm  Normal ECG  When compared with ECG of 23-MAR-2020 08:51,  No significant change was found  Confirmed by SUZE ZAMAN ([id number]) on 4/23/2020 12:44:54 PM                      RADIOLOGY: non-plain film images(s) such as CT, Ultrasound and MRI are read by the radiologist.  CT of the chest read per radiology  Final result by Monica Salazar MD (04/23/20 08:01:28)                Impression:    1.  Small filling defect consistent with small subsegmental pulmonary embolus within a posterior subsegmental branch of the right lower lobe pulmonary artery. 2. There is mild infiltrate demonstrated within the perihilar region on axial image 75 which may represent mild atelectasis or pneumonia. **This report has been created using voice recognition software.  It may contain minor errors which are inherent in voice recognition technology. **       Final report electronically signed by Dr. Kristal Nelson on 4/23/2020 8:01 AM            Narrative:    PROCEDURE: CTA CHEST W WO CONTRAST    CLINICAL INFORMATION: Chest pain PE study    COMPARISON: Chest x-ray dated 3/23/2020    TECHNIQUE: 1.5 mm axial images were obtained through the chest after the administration of IV contrast.  A non-contrast localizer was obtained.  3D reconstructions were performed on the scanner to include coronal and sagittal reformatted images through   both the

## 2020-04-24 ENCOUNTER — CARE COORDINATION (OUTPATIENT)
Dept: CARE COORDINATION | Age: 20
End: 2020-04-24

## 2020-04-24 ENCOUNTER — TELEPHONE (OUTPATIENT)
Dept: FAMILY MEDICINE CLINIC | Age: 20
End: 2020-04-24

## 2020-04-24 NOTE — CARE COORDINATION
Patient contacted regarding Yamile Hooker. Care Transition Nurse/ Ambulatory Care Manager contacted the patient by telephone to perform post discharge assessment. Verified name and  with patient as identifiers. Provided introduction to self, and explanation of the CTN/ACM role, and reason for call due to risk factors for infection and/or exposure to COVID-19. Symptoms reviewed with patient who verbalized the following symptoms: fever, cough and no worsening symptoms. Due to no new or worsening symptoms encounter was not routed to provider for escalation. Patient has following risk factors of: other chronic conditions/ ED visit. CTN/ACM reviewed discharge instructions, medical action plan and red flags such as increased shortness of breath, increasing fever and signs of decompensation with patient who verbalized understanding. Discussed exposure protocols and quarantine with CDC Guidelines What to do if you are sick with coronavirus disease .  Patient was given an opportunity for questions and concerns. The patient agrees to contact the [facility] exposure line [phone], local [facility] department [place] [facility]: ([phone]) and PCP office for questions related to their healthcare. CTN/ACM provided contact information for future needs. Reviewed and educated patient on any new and changed medications related to discharge diagnosis     Patient/family/caregiver given information for [facility] Loop and agrees to enroll yes  Patient's preferred e-mail:    Patient's preferred phone number: [phone]  Based on Loop alert triggers, patient will be contacted by nurse care manager for worsening symptoms. Pt will be further monitored by COVID Loop Team based on severity of symptoms and risk factors.

## 2020-04-26 ENCOUNTER — APPOINTMENT (OUTPATIENT)
Dept: GENERAL RADIOLOGY | Age: 20
End: 2020-04-26
Payer: COMMERCIAL

## 2020-04-26 ENCOUNTER — HOSPITAL ENCOUNTER (EMERGENCY)
Age: 20
Discharge: HOME | End: 2020-04-26
Attending: EMERGENCY MEDICINE
Payer: COMMERCIAL

## 2020-04-26 VITALS
DIASTOLIC BLOOD PRESSURE: 74 MMHG | HEART RATE: 89 BPM | SYSTOLIC BLOOD PRESSURE: 118 MMHG | WEIGHT: 275 LBS | RESPIRATION RATE: 22 BRPM | OXYGEN SATURATION: 98 % | HEIGHT: 69 IN | TEMPERATURE: 97.7 F | BODY MASS INDEX: 40.73 KG/M2

## 2020-04-26 LAB
ALBUMIN SERPL-MCNC: 4.4 G/DL (ref 3.5–5.1)
ALP BLD-CCNC: 75 U/L (ref 38–126)
ALT SERPL-CCNC: 23 U/L (ref 11–66)
ANION GAP SERPL CALCULATED.3IONS-SCNC: 11 MEQ/L (ref 8–16)
AST SERPL-CCNC: 22 U/L (ref 5–40)
BASOPHILS # BLD: 0.7 %
BASOPHILS ABSOLUTE: 0.1 THOU/MM3 (ref 0–0.1)
BILIRUB SERPL-MCNC: 0.2 MG/DL (ref 0.3–1.2)
BUN BLDV-MCNC: 13 MG/DL (ref 7–22)
CALCIUM SERPL-MCNC: 10 MG/DL (ref 8.5–10.5)
CHLORIDE BLD-SCNC: 103 MEQ/L (ref 98–111)
CO2: 25 MEQ/L (ref 23–33)
CREAT SERPL-MCNC: 0.7 MG/DL (ref 0.4–1.2)
EKG ATRIAL RATE: 86 BPM
EKG P AXIS: 38 DEGREES
EKG P-R INTERVAL: 138 MS
EKG Q-T INTERVAL: 322 MS
EKG QRS DURATION: 82 MS
EKG QTC CALCULATION (BAZETT): 385 MS
EKG R AXIS: 34 DEGREES
EKG T AXIS: 38 DEGREES
EKG VENTRICULAR RATE: 86 BPM
EOSINOPHIL # BLD: 8.2 %
EOSINOPHILS ABSOLUTE: 0.7 THOU/MM3 (ref 0–0.4)
ERYTHROCYTE [DISTWIDTH] IN BLOOD BY AUTOMATED COUNT: 13 % (ref 11.5–14.5)
ERYTHROCYTE [DISTWIDTH] IN BLOOD BY AUTOMATED COUNT: 42.7 FL (ref 35–45)
FLU A ANTIGEN: NEGATIVE
FLU B ANTIGEN: NEGATIVE
GFR SERPL CREATININE-BSD FRML MDRD: > 90 ML/MIN/1.73M2
GLUCOSE BLD-MCNC: 82 MG/DL (ref 70–108)
HCT VFR BLD CALC: 42 % (ref 37–47)
HEMOGLOBIN: 13.4 GM/DL (ref 12–16)
IMMATURE GRANS (ABS): 0.06 THOU/MM3 (ref 0–0.07)
IMMATURE GRANULOCYTES: 0.7 %
LYMPHOCYTES # BLD: 24.5 %
LYMPHOCYTES ABSOLUTE: 2.2 THOU/MM3 (ref 1–4.8)
MCH RBC QN AUTO: 28.9 PG (ref 26–33)
MCHC RBC AUTO-ENTMCNC: 31.9 GM/DL (ref 32.2–35.5)
MCV RBC AUTO: 90.7 FL (ref 81–99)
MONOCYTES # BLD: 5.8 %
MONOCYTES ABSOLUTE: 0.5 THOU/MM3 (ref 0.4–1.3)
NUCLEATED RED BLOOD CELLS: 0 /100 WBC
OSMOLALITY CALCULATION: 276.7 MOSMOL/KG (ref 275–300)
PLATELET # BLD: 212 THOU/MM3 (ref 130–400)
PMV BLD AUTO: 10.5 FL (ref 9.4–12.4)
POTASSIUM REFLEX MAGNESIUM: 4.2 MEQ/L (ref 3.5–5.2)
RBC # BLD: 4.63 MILL/MM3 (ref 4.2–5.4)
SEG NEUTROPHILS: 60.1 %
SEGMENTED NEUTROPHILS ABSOLUTE COUNT: 5.3 THOU/MM3 (ref 1.8–7.7)
SODIUM BLD-SCNC: 139 MEQ/L (ref 135–145)
TOTAL PROTEIN: 7.6 G/DL (ref 6.1–8)
TROPONIN T: < 0.01 NG/ML
WBC # BLD: 8.9 THOU/MM3 (ref 4.8–10.8)

## 2020-04-26 PROCEDURE — 36415 COLL VENOUS BLD VENIPUNCTURE: CPT

## 2020-04-26 PROCEDURE — 84484 ASSAY OF TROPONIN QUANT: CPT

## 2020-04-26 PROCEDURE — 99284 EMERGENCY DEPT VISIT MOD MDM: CPT

## 2020-04-26 PROCEDURE — 71046 X-RAY EXAM CHEST 2 VIEWS: CPT

## 2020-04-26 PROCEDURE — 87804 INFLUENZA ASSAY W/OPTIC: CPT

## 2020-04-26 PROCEDURE — 80053 COMPREHEN METABOLIC PANEL: CPT

## 2020-04-26 PROCEDURE — 93005 ELECTROCARDIOGRAM TRACING: CPT | Performed by: EMERGENCY MEDICINE

## 2020-04-26 PROCEDURE — 85025 COMPLETE CBC W/AUTO DIFF WBC: CPT

## 2020-04-26 ASSESSMENT — PAIN DESCRIPTION - LOCATION: LOCATION: GENERALIZED

## 2020-04-26 ASSESSMENT — ENCOUNTER SYMPTOMS
BACK PAIN: 0
BLOOD IN STOOL: 0
VOMITING: 0
CHEST TIGHTNESS: 0
NAUSEA: 0
ABDOMINAL PAIN: 0
TROUBLE SWALLOWING: 0
VOICE CHANGE: 0
SHORTNESS OF BREATH: 1
DIARRHEA: 0
COUGH: 1

## 2020-04-26 ASSESSMENT — PAIN DESCRIPTION - FREQUENCY: FREQUENCY: CONTINUOUS

## 2020-04-26 ASSESSMENT — PAIN SCALES - GENERAL: PAINLEVEL_OUTOF10: 6

## 2020-04-26 ASSESSMENT — PAIN DESCRIPTION - PAIN TYPE: TYPE: ACUTE PAIN

## 2020-04-26 ASSESSMENT — PAIN DESCRIPTION - DESCRIPTORS: DESCRIPTORS: ACHING

## 2020-04-26 NOTE — ED PROVIDER NOTES
education level: High school graduate   Occupational History     Employer: [employer]   Social Needs    Financial resource strain: Somewhat hard    Food insecurity     Worry: Sometimes true     Inability: Sometimes true    Transportation needs     Medical: No     Non-medical: No   Tobacco Use    Smoking status: Never Smoker    Smokeless tobacco: Never Used   Substance and Sexual Activity    Alcohol use: Never     Frequency: Never    Drug use: Never    Sexual activity: None   Lifestyle    Physical activity     Days per week: None     Minutes per session: None    Stress: None   Relationships    Social connections     Talks on phone: None     Gets together: None     Attends Yazidism service: None     Active member of club or organization: None     Attends meetings of clubs or organizations: None     Relationship status: None    Intimate partner violence     Fear of current or ex partner: None     Emotionally abused: None     Physically abused: None     Forced sexual activity: None   Other Topics Concern    None   Social History Narrative    None       REVIEW OF SYSTEMS     Review of Systems   Constitutional: Positive for fatigue and fever. Negative for chills and diaphoresis. HENT: Negative for trouble swallowing and voice change. Eyes: Negative for visual disturbance. Respiratory: Positive for cough and shortness of breath. Negative for chest tightness. Cardiovascular: Negative for chest pain and leg swelling. Gastrointestinal: Negative for abdominal pain, blood in stool, diarrhea, nausea and vomiting. Genitourinary: Negative for dysuria, frequency and hematuria. Musculoskeletal: Negative for back pain and neck pain. Skin: Negative for rash and wound. Neurological: Positive for weakness. Negative for speech difficulty, numbness and headaches. Psychiatric/Behavioral: Negative for confusion. Except as noted above the remainder of the review of systems was reviewed and is. PHYSICAL EXAM    (up to 7 for level 4, 8 or more for level 5)     ED Triage Vitals [04/26/20 0958]   BP Temp Temp Source Pulse Resp SpO2 Height Weight   113/71 97.7 °F (36.5 °C) Oral 89 19 97 % 5' 9\" (1.753 m) 275 lb (124.7 kg)       Physical Exam  Vitals signs and nursing note reviewed. Constitutional:       General: She is not in acute distress. Appearance: She is well-developed. She is not ill-appearing, toxic-appearing or diaphoretic. HENT:      Head: Normocephalic and atraumatic. Eyes:      General: No scleral icterus. Conjunctiva/sclera: Conjunctivae normal.      Right eye: Right conjunctiva is not injected. Left eye: Left conjunctiva is not injected. Pupils: Pupils are equal, round, and reactive to light. Neck:      Musculoskeletal: Normal range of motion and neck supple. Thyroid: No thyromegaly. Trachea: No tracheal deviation. Cardiovascular:      Rate and Rhythm: Normal rate and regular rhythm. Heart sounds: Normal heart sounds. No murmur. No friction rub. No gallop. Pulmonary:      Effort: Pulmonary effort is normal. No respiratory distress. Breath sounds: Normal breath sounds. No stridor. No wheezing or rales. Abdominal:      General: Bowel sounds are normal. There is no distension. Palpations: Abdomen is soft. There is no mass. Tenderness: There is no abdominal tenderness. There is no guarding or rebound. Comments: Negative Zeng's sign  Nontender McBurney's Point  Negative Rovsig's sign  No bruising or echymosis of abdomen   Musculoskeletal:         General: No tenderness. Comments: Negative Damaris's Sign bilaterally   Lymphadenopathy:      Cervical: No cervical adenopathy. Skin:     General: Skin is warm and dry. Coloration: Skin is not pale. Findings: No erythema or rash. Neurological:      Mental Status: She is alert and oriented to person, place, and time. Cranial Nerves: No cranial nerve deficit.

## 2020-04-27 ENCOUNTER — CARE COORDINATION (OUTPATIENT)
Dept: CARE COORDINATION | Age: 20
End: 2020-04-27

## 2020-04-27 ENCOUNTER — TELEPHONE (OUTPATIENT)
Dept: FAMILY MEDICINE CLINIC | Age: 20
End: 2020-04-27

## 2020-04-27 PROCEDURE — 93010 ELECTROCARDIOGRAM REPORT: CPT | Performed by: INTERNAL MEDICINE

## 2020-04-28 ENCOUNTER — CARE COORDINATION (OUTPATIENT)
Dept: CARE COORDINATION | Age: 20
End: 2020-04-28

## 2020-04-28 NOTE — CARE COORDINATION
Enrolled into Loop    Patient contacted regarding Achilles Paling. Care Transition Nurse/ Ambulatory Care Manager contacted the patient by telephone to perform post discharge assessment. Verified name and  with patient as identifiers. Provided introduction to self, and explanation of the CTN/ACM role, and reason for call due to risk factors for infection and/or exposure to COVID-19. Symptoms reviewed with patient who verbalized the following symptoms: cough, shortness of breath and no worsening symptoms. Due to no new or worsening symptoms encounter was not routed to provider for escalation. Patient has following risk factors of: ED visits/ PE. CTN/ACM reviewed discharge instructions, medical action plan and red flags such as increased shortness of breath, increasing fever and signs of decompensation with patient who verbalized understanding. Discussed exposure protocols and quarantine with CDC Guidelines What to do if you are sick with coronavirus disease .  Patient was given an opportunity for questions and concerns. The patient agrees to contact the [facility] exposure line [phone], local [facility] department [place] [facility]: ([phone]) and PCP office for questions related to their healthcare. CTN/ACM provided contact information for future needs. Reviewed and educated patient on any new and changed medications related to discharge diagnosis     Patient/family/caregiver given information for [facility] Loop and agrees to enroll yes  Patient's preferred e-mail:    Patient's preferred phone number:   Based on Loop alert triggers, patient will be contacted by nurse care manager for worsening symptoms. Pt will be further monitored by COVID Loop Team based on severity of symptoms and risk factors.

## 2020-05-25 ENCOUNTER — HOSPITAL ENCOUNTER (EMERGENCY)
Age: 20
Discharge: HOME | End: 2020-05-25
Payer: COMMERCIAL

## 2020-05-25 ENCOUNTER — APPOINTMENT (OUTPATIENT)
Dept: CT IMAGING | Age: 20
End: 2020-05-25
Payer: COMMERCIAL

## 2020-05-25 VITALS
WEIGHT: 274 LBS | BODY MASS INDEX: 40.58 KG/M2 | OXYGEN SATURATION: 99 % | SYSTOLIC BLOOD PRESSURE: 127 MMHG | TEMPERATURE: 98.4 F | HEART RATE: 94 BPM | RESPIRATION RATE: 18 BRPM | HEIGHT: 69 IN | DIASTOLIC BLOOD PRESSURE: 79 MMHG

## 2020-05-25 LAB
ALBUMIN SERPL-MCNC: 4.3 G/DL (ref 3.5–5.1)
ALP BLD-CCNC: 65 U/L (ref 38–126)
ALT SERPL-CCNC: 23 U/L (ref 11–66)
AMPHETAMINE+METHAMPHETAMINE URINE SCREEN: NEGATIVE
ANION GAP SERPL CALCULATED.3IONS-SCNC: 9 MEQ/L (ref 8–16)
AST SERPL-CCNC: 34 U/L (ref 5–40)
BACTERIA: ABNORMAL /HPF
BARBITURATE QUANTITATIVE URINE: NEGATIVE
BASOPHILS # BLD: 0.5 %
BASOPHILS ABSOLUTE: 0 THOU/MM3 (ref 0–0.1)
BENZODIAZEPINE QUANTITATIVE URINE: NEGATIVE
BILIRUB SERPL-MCNC: 0.4 MG/DL (ref 0.3–1.2)
BILIRUBIN DIRECT: < 0.2 MG/DL (ref 0–0.3)
BILIRUBIN URINE: NEGATIVE
BLOOD, URINE: NEGATIVE
BUN BLDV-MCNC: 11 MG/DL (ref 7–22)
CALCIUM SERPL-MCNC: 9.2 MG/DL (ref 8.5–10.5)
CANNABINOID QUANTITATIVE URINE: NEGATIVE
CASTS 2: ABNORMAL /LPF
CASTS UA: ABNORMAL /LPF
CHARACTER, URINE: ABNORMAL
CHLORIDE BLD-SCNC: 103 MEQ/L (ref 98–111)
CO2: 24 MEQ/L (ref 23–33)
COCAINE METABOLITE QUANTITATIVE URINE: NEGATIVE
COLOR: YELLOW
CREAT SERPL-MCNC: 0.5 MG/DL (ref 0.4–1.2)
CRYSTALS, UA: ABNORMAL
EKG ATRIAL RATE: 94 BPM
EKG P AXIS: 38 DEGREES
EKG P-R INTERVAL: 146 MS
EKG Q-T INTERVAL: 326 MS
EKG QRS DURATION: 80 MS
EKG QTC CALCULATION (BAZETT): 407 MS
EKG R AXIS: 27 DEGREES
EKG T AXIS: 20 DEGREES
EKG VENTRICULAR RATE: 94 BPM
EOSINOPHIL # BLD: 2.2 %
EOSINOPHILS ABSOLUTE: 0.2 THOU/MM3 (ref 0–0.4)
EPITHELIAL CELLS, UA: ABNORMAL /HPF
ERYTHROCYTE [DISTWIDTH] IN BLOOD BY AUTOMATED COUNT: 13.1 % (ref 11.5–14.5)
ERYTHROCYTE [DISTWIDTH] IN BLOOD BY AUTOMATED COUNT: 42.9 FL (ref 35–45)
GFR SERPL CREATININE-BSD FRML MDRD: > 90 ML/MIN/1.73M2
GLUCOSE BLD-MCNC: 93 MG/DL (ref 70–108)
GLUCOSE URINE: NEGATIVE MG/DL
HCT VFR BLD CALC: 38.4 % (ref 37–47)
HEMOGLOBIN: 12.5 GM/DL (ref 12–16)
IMMATURE GRANS (ABS): 0.03 THOU/MM3 (ref 0–0.07)
IMMATURE GRANULOCYTES: 0.4 %
INR BLD: 1 (ref 0.85–1.13)
KETONES, URINE: ABNORMAL
LEUKOCYTE ESTERASE, URINE: NEGATIVE
LIPASE: 22.6 U/L (ref 5.6–51.3)
LYMPHOCYTES # BLD: 21.9 %
LYMPHOCYTES ABSOLUTE: 1.9 THOU/MM3 (ref 1–4.8)
MCH RBC QN AUTO: 29.8 PG (ref 26–33)
MCHC RBC AUTO-ENTMCNC: 32.6 GM/DL (ref 32.2–35.5)
MCV RBC AUTO: 91.6 FL (ref 81–99)
MISCELLANEOUS 2: ABNORMAL
MONOCYTES # BLD: 4.9 %
MONOCYTES ABSOLUTE: 0.4 THOU/MM3 (ref 0.4–1.3)
NITRITE, URINE: NEGATIVE
NUCLEATED RED BLOOD CELLS: 0 /100 WBC
OPIATES, URINE: NEGATIVE
OSMOLALITY CALCULATION: 271.1 MOSMOL/KG (ref 275–300)
OXYCODONE: NEGATIVE
PH UA: 6 (ref 5–9)
PHENCYCLIDINE QUANTITATIVE URINE: NEGATIVE
PLATELET # BLD: 221 THOU/MM3 (ref 130–400)
PMV BLD AUTO: 10.7 FL (ref 9.4–12.4)
POTASSIUM SERPL-SCNC: 4.4 MEQ/L (ref 3.5–5.2)
PREGNANCY, URINE: NEGATIVE
PROTEIN UA: NEGATIVE
RBC # BLD: 4.19 MILL/MM3 (ref 4.2–5.4)
RBC URINE: ABNORMAL /HPF
REASON FOR REJECTION: NORMAL
REJECTED TEST: NORMAL
RENAL EPITHELIAL, UA: ABNORMAL
SEG NEUTROPHILS: 70.1 %
SEGMENTED NEUTROPHILS ABSOLUTE COUNT: 6 THOU/MM3 (ref 1.8–7.7)
SODIUM BLD-SCNC: 136 MEQ/L (ref 135–145)
SPECIFIC GRAVITY, URINE: 1.01 (ref 1–1.03)
TOTAL PROTEIN: 7.4 G/DL (ref 6.1–8)
TROPONIN T: < 0.01 NG/ML
UROBILINOGEN, URINE: 0.2 EU/DL (ref 0–1)
WBC # BLD: 8.5 THOU/MM3 (ref 4.8–10.8)
WBC UA: ABNORMAL /HPF
YEAST: ABNORMAL

## 2020-05-25 PROCEDURE — 96361 HYDRATE IV INFUSION ADD-ON: CPT

## 2020-05-25 PROCEDURE — 36415 COLL VENOUS BLD VENIPUNCTURE: CPT

## 2020-05-25 PROCEDURE — 84484 ASSAY OF TROPONIN QUANT: CPT

## 2020-05-25 PROCEDURE — 70450 CT HEAD/BRAIN W/O DYE: CPT

## 2020-05-25 PROCEDURE — 80307 DRUG TEST PRSMV CHEM ANLYZR: CPT

## 2020-05-25 PROCEDURE — 96374 THER/PROPH/DIAG INJ IV PUSH: CPT

## 2020-05-25 PROCEDURE — 6360000004 HC RX CONTRAST MEDICATION: Performed by: NURSE PRACTITIONER

## 2020-05-25 PROCEDURE — 99283 EMERGENCY DEPT VISIT LOW MDM: CPT

## 2020-05-25 PROCEDURE — 93005 ELECTROCARDIOGRAM TRACING: CPT | Performed by: NURSE PRACTITIONER

## 2020-05-25 PROCEDURE — 85610 PROTHROMBIN TIME: CPT

## 2020-05-25 PROCEDURE — 71275 CT ANGIOGRAPHY CHEST: CPT

## 2020-05-25 PROCEDURE — 81025 URINE PREGNANCY TEST: CPT

## 2020-05-25 PROCEDURE — 2580000003 HC RX 258: Performed by: NURSE PRACTITIONER

## 2020-05-25 PROCEDURE — 83690 ASSAY OF LIPASE: CPT

## 2020-05-25 PROCEDURE — 81001 URINALYSIS AUTO W/SCOPE: CPT

## 2020-05-25 PROCEDURE — 6360000002 HC RX W HCPCS: Performed by: NURSE PRACTITIONER

## 2020-05-25 PROCEDURE — 96375 TX/PRO/DX INJ NEW DRUG ADDON: CPT

## 2020-05-25 PROCEDURE — 87086 URINE CULTURE/COLONY COUNT: CPT

## 2020-05-25 PROCEDURE — 93010 ELECTROCARDIOGRAM REPORT: CPT | Performed by: INTERNAL MEDICINE

## 2020-05-25 PROCEDURE — 82248 BILIRUBIN DIRECT: CPT

## 2020-05-25 PROCEDURE — 80053 COMPREHEN METABOLIC PANEL: CPT

## 2020-05-25 PROCEDURE — 85025 COMPLETE CBC W/AUTO DIFF WBC: CPT

## 2020-05-25 RX ORDER — DIPHENHYDRAMINE HYDROCHLORIDE 50 MG/ML
25 INJECTION INTRAMUSCULAR; INTRAVENOUS ONCE
Status: COMPLETED | OUTPATIENT
Start: 2020-05-25 | End: 2020-05-25

## 2020-05-25 RX ORDER — 0.9 % SODIUM CHLORIDE 0.9 %
1000 INTRAVENOUS SOLUTION INTRAVENOUS ONCE
Status: COMPLETED | OUTPATIENT
Start: 2020-05-25 | End: 2020-05-25

## 2020-05-25 RX ORDER — METOCLOPRAMIDE HYDROCHLORIDE 5 MG/ML
10 INJECTION INTRAMUSCULAR; INTRAVENOUS ONCE
Status: COMPLETED | OUTPATIENT
Start: 2020-05-25 | End: 2020-05-25

## 2020-05-25 RX ADMIN — METOCLOPRAMIDE 10 MG: 5 INJECTION, SOLUTION INTRAMUSCULAR; INTRAVENOUS at 20:12

## 2020-05-25 RX ADMIN — SODIUM CHLORIDE 1000 ML: 9 INJECTION, SOLUTION INTRAVENOUS at 20:08

## 2020-05-25 RX ADMIN — DIPHENHYDRAMINE HYDROCHLORIDE 25 MG: 50 INJECTION, SOLUTION INTRAMUSCULAR; INTRAVENOUS at 20:12

## 2020-05-25 RX ADMIN — IOPAMIDOL 80 ML: 755 INJECTION, SOLUTION INTRAVENOUS at 19:53

## 2020-05-25 ASSESSMENT — ENCOUNTER SYMPTOMS
EYE PAIN: 1
VOMITING: 1
NAUSEA: 1
SHORTNESS OF BREATH: 0
ABDOMINAL PAIN: 0

## 2020-05-25 ASSESSMENT — PAIN SCALES - GENERAL
PAINLEVEL_OUTOF10: 7
PAINLEVEL_OUTOF10: 7

## 2020-05-25 ASSESSMENT — PAIN DESCRIPTION - PAIN TYPE: TYPE: ACUTE PAIN

## 2020-05-25 ASSESSMENT — PAIN DESCRIPTION - LOCATION
LOCATION: HEAD
LOCATION: HEAD

## 2020-05-25 NOTE — ED PROVIDER NOTES
tablet Take 2 tablets by mouth daily (with breakfast)Historical Med      Multiple Vitamins-Calcium (ONE-A-DAY WOMENS PO) Take by mouthHistorical Med             ALLERGIES     has No Known Allergies. FAMILY HISTORY     She indicated that her mother is alive. She indicated that her father is alive. She indicated that both of her brothers are alive. She indicated that her maternal grandmother is alive. She indicated that her maternal grandfather is alive. She indicated that her paternal grandmother is alive. She indicated that her paternal grandfather is alive. She indicated that the status of her neg hx is unknown.   family history includes Cancer in her father; Colon Polyps in her maternal grandmother and paternal grandfather; High Blood Pressure in her father; Lupus in her mother; Migraines in her mother; No Known Problems in her brother, brother, maternal grandfather, and paternal grandmother; Other in her father; Thyroid Disease in her father. SOCIAL HISTORY      reports that she has never smoked. She has never used smokeless tobacco. She reports that she does not drink alcohol or use drugs. PHYSICAL EXAM     INITIAL VITALS:  height is 5' 9\" (1.753 m) and weight is 274 lb (124.3 kg). Her oral temperature is 98.4 °F (36.9 °C). Her blood pressure is 127/79 and her pulse is 94. Her respiration is 18 and oxygen saturation is 99%. Physical Exam  Vitals signs and nursing note reviewed. Constitutional:       Appearance: She is well-developed. She is not diaphoretic. HENT:      Head: Normocephalic and atraumatic. Right Ear: External ear normal.      Left Ear: External ear normal.   Eyes:      General: No scleral icterus. Right eye: No discharge. Left eye: No discharge. Conjunctiva/sclera: Conjunctivae normal.      Comments: Pin point pupils, minimally reactive. Neck:      Musculoskeletal: Normal range of motion and neck supple. Vascular: No JVD.    Pulmonary:      Effort: edema or hemorrhage. **This report has been created using voice recognition software. It may contain minor errors which are inherent in voice recognition technology. **      Final report electronically signed by Dr. Oscar Mansfield on 5/25/2020 8:15 PM            LABS:   Labs Reviewed   CULTURE, REFLEXED, URINE - Abnormal; Notable for the following components:       Result Value    Urine Culture Reflex   (*)     Value: Growth of Contaminants. The mixture of organisms present are not a common cause of urinary tract infections and probably represent skin dhara or distal urethral dhara. Organism Growth of Contaminants   (*)     All other components within normal limits    Narrative:     Source: urine, clean catch       Site:           Current Antibiotics:   CBC WITH AUTO DIFFERENTIAL - Abnormal; Notable for the following components:    RBC 4.19 (*)     All other components within normal limits   OSMOLALITY - Abnormal; Notable for the following components:    Osmolality Calc 271.1 (*)     All other components within normal limits   URINE WITH REFLEXED MICRO - Abnormal; Notable for the following components:    Ketones, Urine TRACE (*)     Character, Urine CLOUDY (*)     All other components within normal limits   BASIC METABOLIC PANEL   HEPATIC FUNCTION PANEL   LIPASE   PROTIME-INR   URINE DRUG SCREEN   PREGNANCY, URINE   ANION GAP   GLOMERULAR FILTRATION RATE, ESTIMATED   SPECIMEN REJECTION   TROPONIN         EMERGENCYDEPARTMENT COURSE AND MEDICAL DECISION MAKING:   Vitals:    Vitals:    05/25/20 1830 05/25/20 2006   BP: 131/70 127/79   Pulse: 103 94   Resp: 16 18   Temp: 98.4 °F (36.9 °C)    TempSrc: Oral    SpO2: 98% 99%   Weight: 274 lb (124.3 kg)    Height: 5' 9\" (1.753 m)          Pertinent Labs & Imaging studies reviewed. (See chart for details)           Controlled Substances Monitoring:     No flowsheet data found.                   The patient was seen and evaluated in atimely manner for multiple complaints. Appropriate labs and imaging are ordered and reviewed. Work up is grossly benign including CT head and CTA of chest.  Benadryl and reglan improved her headache. The patient has a long history of atypical migraines. This is likely what this is. She is requesting to leave just as her results come back. States she feels better. Discharged home in stable condition. Strict return precautions and follow up instructions were discussed with the patient with which the patient agrees     Physical assessment findings, diagnostic testing(s) if applicable, and vital signs reviewed with patient/patient representative. Questions answered. Medications asdirected, including OTC medications for supportive care. Education provided on medications. Differential diagnosis(s) discussed with patient/patient representative. Home care/self care instructions reviewed withpatient/patient representative. Patient is to follow-up with family care provider in 2-3 days if no improvement. Patient is to go to the emergency department if symptoms worsen. Patient/patient representative isaware of care plan, questions answered, verbalizes understanding and is in agreement. ED Medications administered this visit:   Medications   0.9 % sodium chloride bolus (0 mLs Intravenous Stopped 5/25/20 2059)   iopamidol (ISOVUE-370) 76 % injection 80 mL (80 mLs Intravenous Given 5/25/20 1953)   diphenhydrAMINE (BENADRYL) injection 25 mg (25 mg Intravenous Given 5/25/20 2012)   metoclopramide (REGLAN) injection 10 mg (10 mg Intravenous Given 5/25/20 2012)           I have given the patient strict written and verbal instructions about care at home,follow-up, and signs and symptoms of worsening of condition and they did verbalize understanding. Patient was seen independently by myself. The Patient's final impression and disposition and plan was determined by myself.        CRITICAL CARE:   None CONSULTS:  None    PROCEDURES:  None    FINAL IMPRESSION      1. Atypical migraine          DISPOSITION/PLAN   DISPOSITION          PATIENT REFERRED TO:  KARLA Mcneill - CNP  [address]  [phone]    Schedule an appointment as soon as possible for a visit in 2 days  For follow up      [address]:  Discharge Medication List as of 5/25/2020  8:42 PM          (Please note that portions of this note were completed with a voice recognition program.  Efforts were made to edit thedictations but occasionally words are mis-transcribed.)    By signing my name below, Cornelius Zarco, attest that this documentation has been prepared under the direction and in the presence of Chantal Whaley CNP     Electronically Signed: Jennifer Robles, 5/25/20, 6:44 PM EDT.      KARLA Jorgensen CNP, APRN - CNP  05/27/20 0576

## 2020-05-26 ENCOUNTER — TELEPHONE (OUTPATIENT)
Dept: FAMILY MEDICINE CLINIC | Age: 20
End: 2020-05-26

## 2020-05-26 LAB
ORGANISM: ABNORMAL
URINE CULTURE REFLEX: ABNORMAL

## 2020-05-26 NOTE — TELEPHONE ENCOUNTER
[facility] ED on 4/23/20 for PE. They put her on blood thinners but Aspen wanted to see her before refilling those. Offered her a video visit but she is asking how that works when you need to listen to her lungs? She would not schedule. She was at the ED again last night 5/25/20 for a severe dejaviu moment. Please advise.     [facility] [facility]

## 2020-05-26 NOTE — ED NOTES
Pt reassessed at this time. Resting comfortably on cot. Rate headache 7/10. Denies any needs at this time. Call light in reach.  Will continue to monitor      Akilah Torres RN  05/25/20 2008

## 2020-06-03 ENCOUNTER — OFFICE VISIT (OUTPATIENT)
Dept: FAMILY MEDICINE CLINIC | Age: 20
End: 2020-06-03
Payer: COMMERCIAL

## 2020-06-03 VITALS
BODY MASS INDEX: 38.91 KG/M2 | HEART RATE: 83 BPM | DIASTOLIC BLOOD PRESSURE: 70 MMHG | SYSTOLIC BLOOD PRESSURE: 128 MMHG | WEIGHT: 271.8 LBS | TEMPERATURE: 97.4 F | OXYGEN SATURATION: 98 % | HEIGHT: 70 IN

## 2020-06-03 PROCEDURE — 99214 OFFICE O/P EST MOD 30 MIN: CPT | Performed by: NURSE PRACTITIONER

## 2020-06-03 RX ORDER — BLOOD PRESSURE TEST KIT
KIT MISCELLANEOUS
COMMUNITY
Start: 2020-03-26 | End: 2021-09-28 | Stop reason: ALTCHOICE

## 2020-06-03 ASSESSMENT — ENCOUNTER SYMPTOMS
VOMITING: 0
SINUS PAIN: 0
BACK PAIN: 1
SHORTNESS OF BREATH: 0
SINUS PRESSURE: 0
ABDOMINAL PAIN: 0
NAUSEA: 0
SORE THROAT: 0
COUGH: 0

## 2020-06-03 NOTE — PATIENT INSTRUCTIONS
Thank you   1. Thank you for trusting us with your healthcare needs. You may receive a survey regarding today's visit. It would help us out if you would take a few moments to provide your feedback. We value your input. 2. Please bring in ALL medications BOTTLES, including inhalers, herbal supplements, over the counter, prescribed & non-prescribed medicine. The office would like actual medication bottles and a list.   3. Please note our OFFICE POLICIES:   a. Prior to getting your labs drawn, please check with your insurance company for benefits and eligibility of lab services. Often, insurance companies cover certain tests for preventative visits only. It is patient's responsibility to see what is covered. b. We are unable to change a diagnosis after the test has been performed. c. Lab orders will not be re-printed. Please hold onto your original lab orders and take them to your lab to be completed. d. If you no show your scheduled appointment three times, you will be dismissed from this practice. e. Brijesh Meline must be completed 24 hours prior to your schedule appointment. 4. If the list below has been completed, PLEASE FAX RECORDS TO OUR OFFICE @ [phone].  Once the records have been received we will update your records at our office:  Health Maintenance Due   Topic Date Due    Varicella vaccine (1 of 2 - 2-dose childhood series) 11/12/2000    Pneumococcal 0-64 years Vaccine (1 of 1 - PPSV23) 11/12/2005    HPV vaccine (1 - 2-dose series) 11/12/2010    Chlamydia screen  11/12/2015    DTaP/Tdap/Td vaccine (1 - Tdap) 11/12/2018

## 2020-06-03 NOTE — PROGRESS NOTES
0-64 years Vaccine (1 of 1 - PPSV23) 11/12/2005    HPV vaccine (1 - 2-dose series) 11/12/2010    Chlamydia screen  11/12/2015    DTaP/Tdap/Td vaccine (1 - Tdap) 11/12/2018    Flu vaccine (Season Ended) 09/01/2020    HIV screen  Completed    Hepatitis A vaccine  Aged Out    Hepatitis B vaccine  Aged Out    Hib vaccine  Aged Out    Meningococcal (ACWY) vaccine  Aged Out       Past Medical History:   Diagnosis Date    Anxiety     Chronic GERD     Chronic migraine     takes verapamil    Depression     Gallstones 10/2019    Gastroparesis     diagnosis as a child    Hx of blood clots     PE     Hypertension     Obesity     Pulmonary embolism (HCC)     8 months ago-was on Xarelto-sees Dr Sigifredo Cotton in [place]    Splenomegaly       Past Surgical History:   Procedure Laterality Date    CHOLECYSTECTOMY, LAPAROSCOPIC N/A 10/30/2019    ROBOTIC LAP BRANNON performed by [facility]ha Saucedo [facility] at [facility] Right     TONSILLECTOMY      WISDOM TOOTH EXTRACTION       Family History   Problem Relation Age of Onset    Migraines Mother     Lupus Mother     High Blood Pressure Father     Thyroid Disease Father     Other Father         gerd    Cancer Father         skin    No Known Problems Brother     Colon Polyps Maternal Grandmother     No Known Problems Maternal Grandfather     No Known Problems Paternal Grandmother     Colon Polyps Paternal Grandfather     No Known Problems Brother     Colon Cancer Neg Hx     Esophageal Cancer Neg Hx      Social History     Tobacco Use    Smoking status: Never Smoker    Smokeless tobacco: Never Used   Substance Use Topics    Alcohol use: Never     Frequency: Never      Current Outpatient Medications   Medication Sig Dispense Refill    aspirin 81 MG tablet Take by mouth      Blood Pressure Monitoring (OMRON 5 SERIES BP MONITOR) SHAWANDA       ondansetron (ZOFRAN) 4 MG tablet Take 1 tablet by mouth 3 times daily as needed for Nausea or Vomiting 30 tablet 0 Class 2 obesity with body mass index (BMI) of 39.0 to 39.9 in adult, unspecified obesity type, unspecified whether serious comorbidity present     2. History of blood clots  Easton Duke MD, Hematology & Oncology, [place]   3. History of hypertension  [address], MD, Cardiology, [place]   4. Chronic migraine  External Referral To Neurology    Magnesium   5. Vitamin D deficiency  Vitamin D 25 Hydroxy   6. Depressed mood  Crystal Ovalle, PhD, [facility], [facility] [place]       Plan:   1. History of blood clots    - Easton Duke MD, Hematology & Oncology, [place]    2. History of hypertension    - [facility] [address], MD, Cardiology, [place]    3. Chronic migraine    - External Referral To Neurology  - Magnesium; Future    4. Vitamin D deficiency    - Vitamin D 25 Hydroxy; Future    5. Class 2 obesity with body mass index (BMI) of 39.0 to 39.9 in adult, unspecified obesity type, unspecified whether serious comorbidity present  Will write down her calorie intake and food choices     6. Depressed mood    - Crystal Ovalle, PhD, Psychology, [facility]    Next  Review her food intake Mg and vit D       Return in about 1 month (around 7/3/2020). Orders Placed:  Orders Placed This Encounter   Procedures    Vitamin D 25 Hydroxy    Magnesium    Easton Duke MD, Hematology & Oncology, [address] [place] [address], MD, Cardiology, [facility] External Referral To Neurology   Crystal Ovalle, PhD, Psychology, [facility]     Medications Prescribed:  No orders of the defined types were placed in this encounter. Future Appointments   Date Time Provider Jerome Vasquez   6/18/2020  9:00 AM Rick Langston PA-C Oncology [facility] [place]        Patient given educational materials - see patient instructions. Discussed use, benefit, and side effects of prescribedmedications. All patient questions answered. Pt voiced understanding. Reviewed health maintenance.   Instructed to continue current medications, diet and exercise. Patient agreed with treatment plan. Follow up as directed.     Electronically signed by KARLA Vicente CNP on 6/3/2020 at 4:58 PM

## 2020-06-18 ENCOUNTER — TELEPHONE (OUTPATIENT)
Dept: ONCOLOGY | Age: 20
End: 2020-06-18

## 2020-06-22 ENCOUNTER — HOSPITAL ENCOUNTER (EMERGENCY)
Age: 20
Discharge: HOME | End: 2020-06-22
Payer: COMMERCIAL

## 2020-06-22 VITALS
DIASTOLIC BLOOD PRESSURE: 67 MMHG | HEART RATE: 72 BPM | TEMPERATURE: 97.7 F | OXYGEN SATURATION: 98 % | WEIGHT: 275 LBS | SYSTOLIC BLOOD PRESSURE: 109 MMHG | HEIGHT: 69 IN | RESPIRATION RATE: 16 BRPM | BODY MASS INDEX: 40.73 KG/M2

## 2020-06-22 PROCEDURE — 6360000002 HC RX W HCPCS: Performed by: NURSE PRACTITIONER

## 2020-06-22 PROCEDURE — 99213 OFFICE O/P EST LOW 20 MIN: CPT | Performed by: NURSE PRACTITIONER

## 2020-06-22 PROCEDURE — 99214 OFFICE O/P EST MOD 30 MIN: CPT

## 2020-06-22 PROCEDURE — 96372 THER/PROPH/DIAG INJ SC/IM: CPT

## 2020-06-22 PROCEDURE — 6370000000 HC RX 637 (ALT 250 FOR IP): Performed by: NURSE PRACTITIONER

## 2020-06-22 RX ORDER — ONDANSETRON 4 MG/1
4 TABLET, FILM COATED ORAL EVERY 8 HOURS PRN
Qty: 30 TABLET | Refills: 0 | Status: SHIPPED | OUTPATIENT
Start: 2020-06-22 | End: 2022-02-10

## 2020-06-22 RX ORDER — ONDANSETRON 4 MG/1
4 TABLET, ORALLY DISINTEGRATING ORAL ONCE
Status: COMPLETED | OUTPATIENT
Start: 2020-06-22 | End: 2020-06-22

## 2020-06-22 RX ORDER — SUMATRIPTAN 50 MG/1
50 TABLET, FILM COATED ORAL
Qty: 9 TABLET | Refills: 0 | Status: SHIPPED | OUTPATIENT
Start: 2020-06-22 | End: 2020-07-07

## 2020-06-22 RX ORDER — KETOROLAC TROMETHAMINE 30 MG/ML
30 INJECTION, SOLUTION INTRAMUSCULAR; INTRAVENOUS ONCE
Status: COMPLETED | OUTPATIENT
Start: 2020-06-22 | End: 2020-06-22

## 2020-06-22 RX ADMIN — ONDANSETRON 4 MG: 4 TABLET, ORALLY DISINTEGRATING ORAL at 18:41

## 2020-06-22 RX ADMIN — KETOROLAC TROMETHAMINE 30 MG: 30 INJECTION, SOLUTION INTRAMUSCULAR at 18:41

## 2020-06-22 ASSESSMENT — ENCOUNTER SYMPTOMS
SORE THROAT: 0
SHORTNESS OF BREATH: 0
EYE REDNESS: 0
DIARRHEA: 0
COUGH: 0
EYE DISCHARGE: 0
PHOTOPHOBIA: 1
NAUSEA: 1
TROUBLE SWALLOWING: 0
RHINORRHEA: 0
VOMITING: 0

## 2020-06-22 ASSESSMENT — PAIN DESCRIPTION - LOCATION: LOCATION: HEAD

## 2020-06-22 ASSESSMENT — PAIN SCALES - GENERAL: PAINLEVEL_OUTOF10: 10

## 2020-06-22 NOTE — ED PROVIDER NOTES
 Pulmonary embolism (HCC)     8 months ago-was on Xarelto-sees Dr Stewart Stephens in [place] Splenomegaly        SURGICAL HISTORY     Patient  has a past surgical history that includes Tonsillectomy; [place] tooth extraction; Elbow surgery (Right); and Cholecystectomy, laparoscopic (N/A, 10/30/2019). CURRENT MEDICATIONS       Previous Medications    ALBUTEROL SULFATE HFA (VENTOLIN HFA) 108 (90 BASE) MCG/ACT INHALER    Inhale 2 puffs into the lungs 4 times daily as needed for Wheezing    BLOOD PRESSURE MONITOR KIT    Take blood pressure daily    BLOOD PRESSURE MONITORING (OMRON 5 SERIES BP MONITOR) SHAWNADA        CHOLECALCIFEROL (VITAMIN D) 50 MCG (2000 UT) TABS TABLET    Take 2 tablets by mouth daily    FERROUS GLUCONATE (FERGON) 324 (38 FE) MG TABLET    Take 2 tablets by mouth daily (with breakfast)    FERROUS GLUCONATE-C-FOLIC ACID (IRON-C PO)    Take by mouth daily    PANTOPRAZOLE (PROTONIX) 40 MG TABLET    Take 1 tablet by mouth every morning (before breakfast)    RIVAROXABAN (XARELTO) 15 MG TABS TABLET    Take 1 tablet by mouth 2 times daily (with meals)       ALLERGIES     Patient is has No Known Allergies. FAMILY HISTORY     Patient'sfamily history includes Cancer in her father; Colon Polyps in her maternal grandmother and paternal grandfather; High Blood Pressure in her father; Lupus in her mother; Migraines in her mother; No Known Problems in her brother, brother, maternal grandfather, and paternal grandmother; Other in her father; Thyroid Disease in her father. SOCIAL HISTORY     Patient  reports that she has never smoked. She has never used smokeless tobacco. She reports that she does not drink alcohol or use drugs. PHYSICAL EXAM     ED TRIAGE VITALS  BP: 109/67, Temp: 97.7 °F (36.5 °C), Pulse: 72, Resp: 16, SpO2: 98 %  Physical Exam  Vitals signs and nursing note reviewed. Constitutional:       General: She is not in acute distress. Appearance: Normal appearance. She is well-developed.  She Mental Status: She is alert and oriented to person, place, and time. She is not disoriented. Cranial Nerves: Cranial nerves are intact. Psychiatric:         Mood and Affect: Mood normal.         Behavior: Behavior is cooperative. DIAGNOSTIC RESULTS   Labs: No results found for this visit on 06/22/20. IMAGING:  No orders to display     URGENT CARE COURSE:     Vitals:    06/22/20 1823 06/22/20 1903   BP: 120/78 109/67   Pulse: 72 72   Resp: 16    Temp: 97.7 °F (36.5 °C)    TempSrc: Infrared    SpO2: 98%    Weight: 275 lb (124.7 kg)    Height: 5' 9\" (1.753 m)        Medications   ketorolac (TORADOL) injection 30 mg (30 mg Intramuscular Given 6/22/20 1841)   ondansetron (ZOFRAN-ODT) disintegrating tablet 4 mg (4 mg Oral Given 6/22/20 1841)     PROCEDURES:  None  FINALIMPRESSION      1. Migraine with aura and without status migrainosus, not intractable        DISPOSITION/PLAN   DISPOSITION Decision To Discharge 06/22/2020 06:39:33 PM  Nontoxic, no acute distress. Neurologically intact. Exam consistent with migraine headache. Patient was given Toradol 30 mg in office as well as Zofran. Imitrex and Zofran prescribed. Follow-up with neurology as scheduled. If symptoms worsen go to ER. PATIENT REFERRED TO:  KARLA Goetz CNP  [address]  [address]  [phone]      Follow up with PCP as needed. Medications as prescribed. If worse go to ER. DISCHARGE MEDICATIONS:  New Prescriptions    ONDANSETRON (ZOFRAN) 4 MG TABLET    Take 1 tablet by mouth every 8 hours as needed for Nausea or Vomiting    SUMATRIPTAN (IMITREX) 50 MG TABLET    Take 1 tablet by mouth once as needed for Migraine May repeat dose in 2 hours. Not to exceed 200 mg in 24 hours.      Current Discharge Medication List          [address], KARLA  [place]  06/22/20 1905

## 2020-06-23 ENCOUNTER — TELEPHONE (OUTPATIENT)
Dept: FAMILY MEDICINE CLINIC | Age: 20
End: 2020-06-23

## 2020-07-07 RX ORDER — SUMATRIPTAN 50 MG/1
TABLET, FILM COATED ORAL
Qty: 9 TABLET | Refills: 0 | Status: SHIPPED | OUTPATIENT
Start: 2020-07-07 | End: 2020-07-24

## 2020-07-13 ENCOUNTER — NURSE ONLY (OUTPATIENT)
Dept: LAB | Age: 20
End: 2020-07-13

## 2020-07-13 LAB
MAGNESIUM: 2.1 MG/DL (ref 1.6–2.4)
VITAMIN D 25-HYDROXY: 19 NG/ML (ref 30–100)

## 2020-07-20 ENCOUNTER — PATIENT MESSAGE (OUTPATIENT)
Dept: FAMILY MEDICINE CLINIC | Age: 20
End: 2020-07-20

## 2020-07-20 NOTE — LETTER
[address] [place] [address],[address] [facility] [address] [place] [address]  Phone: [phone]  Fax: [phone]    KARLA Tadeo CNP        July 21, 2020     Patient: Katherine Ward   YOB: 2000   Date of Visit: 7/20/2020       To Whom It May Concern: It is my medical opinion that Tierra Alexander may return to full participation immediately with restrictions: do not wear a mask. If you have to wear ome limit to no more than 20 minutes. If you have any questions or concerns, please don't hesitate to call.     Sincerely,          KARLA Tadeo CNP

## 2020-07-20 NOTE — TELEPHONE ENCOUNTER
From: Andry Fry  To: KARLA Madrigal - CNP  Sent: 7/20/2020 11:55 AM EDT  Subject: Non-Urgent Medical Question    My job is now requiring us to wear a mask or a Drs note saying we have a reason not to wear then. I remembered you offered to write one before and I was wondering if you still could?

## 2020-07-20 NOTE — LETTER
[address] [address],[address] [facility] [address] [place] [address]  Phone: [phone]  Fax: [phone]    KARLA Medina CNP        July 21, 2020     Patient: Annemarie Stark   YOB: 2000   Date of Visit: 7/20/2020       To Whom It May Concern: It is my medical opinion that Samina Faes may return to full participation immediately with restrictions: do not wear a mask. If you have any questions or concerns, please don't hesitate to call.     Sincerely,          KARLA Medina CNP

## 2020-07-24 RX ORDER — SUMATRIPTAN 50 MG/1
TABLET, FILM COATED ORAL
Qty: 9 TABLET | Refills: 0 | Status: SHIPPED | OUTPATIENT
Start: 2020-07-24 | End: 2020-08-25 | Stop reason: SINTOL

## 2020-08-25 ENCOUNTER — OFFICE VISIT (OUTPATIENT)
Dept: CARDIOLOGY CLINIC | Age: 20
End: 2020-08-25
Payer: COMMERCIAL

## 2020-08-25 VITALS
DIASTOLIC BLOOD PRESSURE: 81 MMHG | HEART RATE: 79 BPM | WEIGHT: 261 LBS | SYSTOLIC BLOOD PRESSURE: 120 MMHG | BODY MASS INDEX: 39.56 KG/M2 | HEIGHT: 68 IN

## 2020-08-25 PROBLEM — R07.89 CHEST PAIN, ATYPICAL: Status: ACTIVE | Noted: 2020-08-25

## 2020-08-25 PROBLEM — Z86.711 HISTORY OF PULMONARY EMBOLISM: Status: ACTIVE | Noted: 2020-08-25

## 2020-08-25 PROCEDURE — 99204 OFFICE O/P NEW MOD 45 MIN: CPT | Performed by: INTERNAL MEDICINE

## 2020-08-25 RX ORDER — TOPIRAMATE 25 MG/1
25 TABLET ORAL
COMMUNITY
Start: 2020-08-13 | End: 2021-07-21 | Stop reason: SDUPTHER

## 2020-08-25 NOTE — PROGRESS NOTES
Chief Complaint   Patient presents with    New Patient     NP referral, dx hypertension      Moved from [facility]    Used to follow see hematologist in [facility]; for PE DXed multiple episodes since 2018 and no taking any OA now -per information from the pat    Referred to me for HTN and chest pain    DXed with PE 04/2020   and took xerelto for 1 month and then stopped not on it for a while now    Has been having chronic chest pain -considered to be costochondritis. Dull , intermittent, worse with cough, not related to exercise    A lots of heavy lifting at [employer]    Sob on exertion    Denied palpitation or dizziness or edema    States no changes in her pain or symptoms and was previously evaluated and diagnosed. Patient Active Problem List   Diagnosis    Cholelithiasis without obstruction    Chronic migraine    Pulmonary embolism (HCC)    Anxiety    Depression    Gastroparesis    Obesity    Chronic GERD    Hypertension    Hx of blood clots    Costochondral chest pain    Muscle strain    Chest pain, atypical    History of pulmonary embolism       Past Surgical History:   Procedure Laterality Date    CHOLECYSTECTOMY, LAPAROSCOPIC N/A 10/30/2019    ROBOTIC LAP BRANNON performed by Yoly Bradley MD at [address] [place] [address]  Right     TONSILLECTOMY      WISDOM TOOTH EXTRACTION         Allergies   Allergen Reactions    Imitrex [Sumatriptan] Shortness Of Breath     Pt reports hot flashes and chest pain also.          Family History   Problem Relation Age of Onset   [place] Migraines Mother     Lupus Mother     High Blood Pressure Father     Thyroid Disease Father     Other Father         gerd    Cancer Father         skin    No Known Problems Brother     Colon Polyps Maternal Grandmother     No Known Problems Maternal Grandfather     No Known Problems Paternal Grandmother     Colon Polyps Paternal Grandfather     No Known Problems Brother     Colon Cancer Neg Hx     Esophageal Cancer Neg Hx [facility] [place] Social History     Socioeconomic History    Marital status: Single     Spouse name: Not on file    Number of children: 0    Years of education: 15    Highest education level: High school graduate   Occupational History     Employer: [employer]   Social Needs    Financial resource strain: Somewhat hard    Food insecurity     Worry: Sometimes true     Inability: Sometimes true    Transportation needs     Medical: No     Non-medical: No   Tobacco Use    Smoking status: Never Smoker    Smokeless tobacco: Never Used   Substance and Sexual Activity    Alcohol use: Never     Frequency: Never    Drug use: Never    Sexual activity: Not on file   Lifestyle    Physical activity     Days per week: Not on file     Minutes per session: Not on file    Stress: Not on file   Relationships    Social connections     Talks on phone: Not on file     Gets together: Not on file     Attends Adventism service: Not on file     Active member of club or organization: Not on file     Attends meetings of clubs or organizations: Not on file     Relationship status: Not on file    Intimate partner violence     Fear of current or ex partner: Not on file     Emotionally abused: Not on file     Physically abused: Not on file     Forced sexual activity: Not on file   Other Topics Concern    Not on file   Social History Narrative    Not on file       Current Outpatient Medications   Medication Sig Dispense Refill    topiramate (TOPAMAX) 25 MG tablet Take 25 mg by mouth      ondansetron (ZOFRAN) 4 MG tablet Take 1 tablet by mouth every 8 hours as needed for Nausea or Vomiting 30 tablet 0    Blood Pressure Monitoring (OMRON 5 SERIES BP MONITOR) SHAWANDA       Blood Pressure Monitor KIT Take blood pressure daily 1 kit 0    rivaroxaban (XARELTO) 15 MG TABS tablet Take 1 tablet by mouth 2 times daily (with meals) (Patient not taking: Reported on 6/3/2020) 42 tablet 0    albuterol sulfate HFA (VENTOLIN HFA) 108 (90 Base) MCG/ACT inhaler Inhale 2 puffs into the lungs 4 times daily as needed for Wheezing (Patient not taking: Reported on 8/25/2020) 1 Inhaler 0    Ferrous Gluconate-C-Folic Acid (IRON-C PO) Take by mouth daily      pantoprazole (PROTONIX) 40 MG tablet Take 1 tablet by mouth every morning (before breakfast) (Patient not taking: Reported on 6/3/2020) 90 tablet 1    Cholecalciferol (VITAMIN D) 50 MCG (2000 UT) TABS tablet Take 2 tablets by mouth daily (Patient not taking: Reported on 6/3/2020) 30 tablet 5    ferrous gluconate (FERGON) 324 (38 Fe) MG tablet Take 2 tablets by mouth daily (with breakfast)       No current facility-administered medications for this visit. Review of Systems -     General ROS: negative  Psychological ROS: negative  Hematological and Lymphatic ROS: No history of blood clots or bleeding disorder. Respiratory ROS: no cough,  or wheezing, the rest see HPI  Cardiovascular ROS: See HPI  Gastrointestinal ROS: negative  Genito-Urinary ROS: no dysuria, trouble voiding, or hematuria  Musculoskeletal ROS: negative  Neurological ROS: no TIA or stroke symptoms  Dermatological ROS: negative      Blood pressure 120/81, pulse 79, height 5' 8\" (1.727 m), weight 261 lb (118.4 kg), not currently breastfeeding.         Physical Examination:    General appearance - alert, well appearing, and in no distress  HEENT- Pink conjunctiva  , Non-icteri sclera,PERRLA  Mental status - alert, oriented to person, place, and time  Neck - supple, no significant adenopathy, no JVD, or carotid bruits  Chest - clear to auscultation, no wheezes, rales or rhonchi, symmetric air entry  Heart - normal rate, regular rhythm, normal S1, S2, no murmurs, rubs, clicks or gallops  Abdomen - soft, nontender, nondistended, no masses or organomegaly  MJ- no CVA or flank tenderness, no suprapubic tenderness  Neurological - alert, oriented, normal speech, no focal findings or movement disorder noted  Musculoskeletal/limbs - no joint tenderness, deformity or swelling   - peripheral pulses normal, no pedal edema, no clubbing or cyanosis  Skin - normal coloration and turgor, no rashes, no suspicious skin lesions noted  Psych- appropriate mood and affect    Lab  No results for input(s): CKTOTAL, CKMB, CKMBINDEX, TROPONINI in the last 72 hours. CBC:   Lab Results   Component Value Date    WBC 8.5 05/25/2020    RBC 4.19 05/25/2020    HGB 12.5 05/25/2020    HCT 38.4 05/25/2020    MCV 91.6 05/25/2020    MCH 29.8 05/25/2020    MCHC 32.6 05/25/2020     05/25/2020    MPV 10.7 05/25/2020     BMP:    Lab Results   Component Value Date     05/25/2020    K 4.4 05/25/2020    K 4.2 04/26/2020     05/25/2020    CO2 24 05/25/2020    BUN 11 05/25/2020    LABALBU 4.3 05/25/2020    CREATININE 0.5 05/25/2020    CALCIUM 9.2 05/25/2020    LABGLOM >90 05/25/2020    GLUCOSE 93 05/25/2020     Hepatic Function Panel:    Lab Results   Component Value Date    ALKPHOS 65 05/25/2020    ALT 23 05/25/2020    AST 34 05/25/2020    PROT 7.4 05/25/2020    BILITOT 0.4 05/25/2020    BILIDIR <0.2 05/25/2020    LABALBU 4.3 05/25/2020     Magnesium:    Lab Results   Component Value Date    MG 2.1 07/13/2020     Warfarin PT/INR:  No components found for: PTPATWAR, PTINRWAR  HgBA1c:    Lab Results   Component Value Date    LABA1C 4.7 02/13/2020     FLP:    Lab Results   Component Value Date    TRIG 85 02/13/2020    HDL 46 02/13/2020    LDLCALC 96 02/13/2020     TSH:    Lab Results   Component Value Date    TSH 0.935 02/13/2020     Small filling defect consistent with small subsegmental pulmonary embolus within a posterior subsegmental branch of the right lower lobe pulmonary artery.         2.  There is mild infiltrate demonstrated within the perihilar region on axial image 75 which may represent mild atelectasis or pneumonia.         This report has been created using voice recognition software.  It may contain minor errors which are inherent in voice recognition technology.          Final report electronically signed by Dr. Baljit Borrego on 4/23/2020 8:01 AM                No pulmonary emboli or pulmonary infiltrates.                        This report has been created using voice recognition software. It may contain minor errors which are inherent in voice recognition technology.        Final report electronically signed by Dr. Jasson Torres on 5/25/2020 8:22 PM             ekg  Sinus rhythm with marked sinus arrhythmia  Otherwise normal ECG  When compared with ECG of 26-APR-2020 09:55,  No significant change was found  Confirmed by Paras Lowery MD, [place] ([id number]) on 5/25/2020 9:13:13 PM    Assessment   Diagnosis Orders   1. Chest pain, atypical     2.  History of pulmonary embolism           Plan       Chest pain on deep breath and bend over  Atypical  Chronic   For few yrs  Lift staffs at work  ekg no acute abn  Echo  Stress echo    No HTN   BP normal in office   Previous BP profile good    Hx of PE  Off her OA  Need hematology eval asap for work up and decision for long term OA  FHX Blood clot issue  D/w the pat the plan of care        RTC in 3 weeks      [facility]

## 2020-09-10 ENCOUNTER — OFFICE VISIT (OUTPATIENT)
Dept: PSYCHIATRY | Age: 20
End: 2020-09-10
Payer: COMMERCIAL

## 2020-09-10 PROCEDURE — 90791 PSYCH DIAGNOSTIC EVALUATION: CPT | Performed by: PSYCHOLOGIST
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
## 2020-09-21 ENCOUNTER — OFFICE VISIT (OUTPATIENT)
Dept: CARDIOLOGY CLINIC | Age: 20
End: 2020-09-21
Payer: COMMERCIAL

## 2020-09-21 VITALS
SYSTOLIC BLOOD PRESSURE: 130 MMHG | HEIGHT: 68 IN | DIASTOLIC BLOOD PRESSURE: 89 MMHG | BODY MASS INDEX: 40.18 KG/M2 | WEIGHT: 265.13 LBS | HEART RATE: 66 BPM

## 2020-09-21 PROCEDURE — 93000 ELECTROCARDIOGRAM COMPLETE: CPT | Performed by: INTERNAL MEDICINE

## 2020-09-21 PROCEDURE — 99213 OFFICE O/P EST LOW 20 MIN: CPT | Performed by: INTERNAL MEDICINE

## 2020-09-21 NOTE — PROGRESS NOTES
Chief Complaint   Patient presents with   [facility] from [facility]    Used to follow see hematologist in [facility]; for PE DXed multiple episodes since 2018 and no taking any OA now -per information from the pat    Referred to me for HTN and chest pain    DXed with PE 04/2020   and took xerelto for 1 month and then stopped not on it for a while now      Pt here for 3 week check up     Pt scheduled to have echo 9/28/20    Remain to have chest pain  Like 2 to 3 x / week hours to whole day  Did not have any of the test ordered    Has been having chronic chest pain -considered to be costochondritis. Dull , intermittent, worse with cough, not related to exercise    A lots of heavy lifting at [employer]    Sob on exertion    Denied palpitation or dizziness or edema    States no changes in her pain or symptoms and was previously evaluated and diagnosed. Patient Active Problem List   Diagnosis    Cholelithiasis without obstruction    Chronic migraine    Pulmonary embolism (HCC)    Anxiety    Depression    Gastroparesis    Obesity    Chronic GERD    Hypertension    Hx of blood clots    Costochondral chest pain    Muscle strain    Chest pain, atypical    History of pulmonary embolism       Past Surgical History:   Procedure Laterality Date    CHOLECYSTECTOMY, LAPAROSCOPIC N/A 10/30/2019    ROBOTIC LAP BRANNON performed by Coretta Melvin MD at [address] [place] [address]  Right     TONSILLECTOMY      WISDOM TOOTH EXTRACTION         Allergies   Allergen Reactions    Imitrex [Sumatriptan] Shortness Of Breath     Pt reports hot flashes and chest pain also.          Family History   Problem Relation Age of Onset   [place] Migraines Mother     Lupus Mother     High Blood Pressure Father     Thyroid Disease Father     Other Father         gerd    Cancer Father         skin    No Known Problems Brother     Colon Polyps Maternal Grandmother     No Known Problems Maternal Grandfather     No Known Problems Paternal Grandmother     Colon Polyps Paternal Grandfather     No Known Problems Brother     Colon Cancer Neg Hx     Esophageal Cancer Neg Hx         Social History     Socioeconomic History    Marital status: Single     Spouse name: Not on file    Number of children: 0    Years of education: 15    Highest education level: High school graduate   Occupational History     Employer: [employer]   Social Needs    Financial resource strain: Somewhat hard    Food insecurity     Worry: Sometimes true     Inability: Sometimes true    Transportation needs     Medical: No     Non-medical: No   Tobacco Use    Smoking status: Never Smoker    Smokeless tobacco: Never Used   Substance and Sexual Activity    Alcohol use: Never     Frequency: Never    Drug use: Never    Sexual activity: Not on file   Lifestyle    Physical activity     Days per week: Not on file     Minutes per session: Not on file    Stress: Not on file   Relationships    Social connections     Talks on phone: Not on file     Gets together: Not on file     Attends Yazidi service: Not on file     Active member of club or organization: Not on file     Attends meetings of clubs or organizations: Not on file     Relationship status: Not on file    Intimate partner violence     Fear of current or ex partner: Not on file     Emotionally abused: Not on file     Physically abused: Not on file     Forced sexual activity: Not on file   Other Topics Concern    Not on file   Social History Narrative    Not on file       Current Outpatient Medications   Medication Sig Dispense Refill    topiramate (TOPAMAX) 25 MG tablet Take 25 mg by mouth      ondansetron (ZOFRAN) 4 MG tablet Take 1 tablet by mouth every 8 hours as needed for Nausea or Vomiting 30 tablet 0    Blood Pressure Monitoring (OMRON 5 SERIES BP MONITOR) SHAWANDA       Blood Pressure Monitor KIT Take blood pressure daily 1 kit 0    albuterol sulfate HFA (VENTOLIN HFA) 108 (90 Base) MCG/ACT inhaler Inhale 2 puffs into the lungs 4 times daily as needed for Wheezing 1 Inhaler 0    Ferrous Gluconate-C-Folic Acid (IRON-C PO) Take by mouth daily      Cholecalciferol (VITAMIN D) 50 MCG (2000 UT) TABS tablet Take 2 tablets by mouth daily 30 tablet 5    ferrous gluconate (FERGON) 324 (38 Fe) MG tablet Take 2 tablets by mouth daily (with breakfast)       No current facility-administered medications for this visit. Review of Systems -     General ROS: negative  Psychological ROS: negative  Hematological and Lymphatic ROS: No history of blood clots or bleeding disorder. Respiratory ROS: no cough,  or wheezing, the rest see HPI  Cardiovascular ROS: See HPI  Gastrointestinal ROS: negative  Genito-Urinary ROS: no dysuria, trouble voiding, or hematuria  Musculoskeletal ROS: negative  Neurological ROS: no TIA or stroke symptoms  Dermatological ROS: negative      Blood pressure 130/89, pulse 66, height 5' 8\" (1.727 m), weight 265 lb 2 oz (120.3 kg), not currently breastfeeding.         Physical Examination:    General appearance - alert, well appearing, and in no distress  HEENT- Pink conjunctiva  , Non-icteri sclera,PERRLA  Mental status - alert, oriented to person, place, and time  Neck - supple, no significant adenopathy, no JVD, or carotid bruits  Chest - clear to auscultation, no wheezes, rales or rhonchi, symmetric air entry  Heart - normal rate, regular rhythm, normal S1, S2, no murmurs, rubs, clicks or gallops  Abdomen - soft, nontender, nondistended, no masses or organomegaly  MJ- no CVA or flank tenderness, no suprapubic tenderness  Neurological - alert, oriented, normal speech, no focal findings or movement disorder noted  Musculoskeletal/limbs - no joint tenderness, deformity or swelling   - peripheral pulses normal, no pedal edema, no clubbing or cyanosis  Skin - normal coloration and turgor, no rashes, no suspicious skin lesions noted  Psych- appropriate mood and affect    Lab  No results for may contain minor errors which are inherent in voice recognition technology.        Final report electronically signed by Dr. Lilo Fry on 5/25/2020 8:22 PM             ekg  Sinus rhythm with marked sinus arrhythmia  Otherwise normal ECG  When compared with ECG of 26-APR-2020 09:55,  No significant change was found  Confirmed by Zana Khan MD, Dio Sanchez ([id number]) on 5/25/2020 9:13:13 PM      ekg 9/21/2020  Sinus  Rhythm   WITHIN NORMAL LIMITS      Assessment   Diagnosis Orders   1. Chest pain, atypical     2. Costochondral chest pain     3. Essential hypertension     4.  History of pulmonary embolism           Plan     meds and labs reviewed  Chest pain on deep breath and bend over  Atypical  Chronic   For few yrs  Lift staffs at work  ekg no acute abn  Echo and Stress echo- not done yet    No HTN   BP normal in office   Previous BP profile good    Hx of PE  Had  Been Off her OA  Need hematology eval asap for work up and decision for long term OA  FHX Blood clot issue  D/w the pat the plan of care  Re-advised to see hematologist    Advised to get the stress and echo done    RTC in 3 weeks      [facility]

## 2020-09-27 ENCOUNTER — APPOINTMENT (OUTPATIENT)
Dept: GENERAL RADIOLOGY | Age: 20
End: 2020-09-27
Payer: COMMERCIAL

## 2020-09-27 ENCOUNTER — HOSPITAL ENCOUNTER (EMERGENCY)
Age: 20
Discharge: HOME | End: 2020-09-28
Attending: EMERGENCY MEDICINE
Payer: COMMERCIAL

## 2020-09-27 VITALS
TEMPERATURE: 98.3 F | OXYGEN SATURATION: 99 % | BODY MASS INDEX: 39.99 KG/M2 | HEART RATE: 94 BPM | RESPIRATION RATE: 18 BRPM | WEIGHT: 270 LBS | DIASTOLIC BLOOD PRESSURE: 81 MMHG | SYSTOLIC BLOOD PRESSURE: 159 MMHG | HEIGHT: 69 IN

## 2020-09-27 PROCEDURE — 99282 EMERGENCY DEPT VISIT SF MDM: CPT

## 2020-09-27 PROCEDURE — 73130 X-RAY EXAM OF HAND: CPT

## 2020-09-27 PROCEDURE — 99283 EMERGENCY DEPT VISIT LOW MDM: CPT

## 2020-09-27 RX ORDER — AMOXICILLIN AND CLAVULANATE POTASSIUM 875; 125 MG/1; MG/1
1 TABLET, FILM COATED ORAL 2 TIMES DAILY
Qty: 14 TABLET | Refills: 0 | Status: SHIPPED | OUTPATIENT
Start: 2020-09-27 | End: 2020-10-04

## 2020-09-27 ASSESSMENT — PAIN SCALES - GENERAL: PAINLEVEL_OUTOF10: 3

## 2020-09-28 ENCOUNTER — TELEPHONE (OUTPATIENT)
Dept: FAMILY MEDICINE CLINIC | Age: 20
End: 2020-09-28

## 2020-09-28 ASSESSMENT — ENCOUNTER SYMPTOMS
EYE REDNESS: 0
PHOTOPHOBIA: 0
CHEST TIGHTNESS: 0
SINUS PRESSURE: 0
WHEEZING: 0
NAUSEA: 0
SORE THROAT: 0
CONSTIPATION: 0
CHOKING: 0
ABDOMINAL DISTENTION: 0
VOICE CHANGE: 0
ABDOMINAL PAIN: 0
APNEA: 0
SINUS PAIN: 0
STRIDOR: 0
BACK PAIN: 0
COUGH: 0
DIARRHEA: 0
TROUBLE SWALLOWING: 0
SHORTNESS OF BREATH: 0

## 2020-09-28 NOTE — TELEPHONE ENCOUNTER
[facility]  [address], [address]  Phone: [phone]  Fax: [phone]    September 28, 2020    [address] [address]      Dear Chacorta Robertson,    This letter is regarding your Emergency Department (ED) visit at [facility] on 9/28/20. Dr. Margaret Schmitt wanted to make sure that you understand your discharge instructions and that you were able to fill any prescriptions that may have been ordered for you. Please contact the office at the above phone number if the ED advised you to follow up with Dr. Margaret Schmitt, or if you have any further questions or needs. Also did you know -   *Visiting the ED for a non-emergency could result in higher co-pays than you would normally be subject to paying? *You can call your doctor even after hours so they can direct you to the most appropriate care. [facility]) practices can often offer you an appointment on the same day that you call. *We have some [facility] offices that offer Walk-in appointments; check our website for availability in your community, [email or web address].      *Evisits are now available for patients for $36 through [employer] for certain conditions:  * Sinus, cold and or cough       * Diarrhea            * Headache  * Heartburn                                * Poison Josie          * Back pain     * Urinary problems                         If you do not have [employer] and are interested, please contact the office and a staff member may assist you or go to www."[employer]     Sincerely,     KARLA Farris CNP and your [facility]

## 2020-09-28 NOTE — ED PROVIDER NOTES
Physician Note:    I have personally performed and participated in the history, exam and medical decision making and agree with all pertinent clinical information. I have also reviewed and agree with the past medical, family and social history unless otherwise noted. Evaluation:      10:37 PM EDT: The patient was evaluated. Ed Prieto is a 20 y.o. female who presents to the Emergency Department for the evaluation of dog bite. Patient was bit on the left hand. Minor abrasions, no puncture wounds. Minor hematoma. Patient is distally neurovascularly intact. Patient able to move hand without difficulty. Investigation found that the dog had jumped over the railing on the porch and she reached over to try to pull it back so it would not struggle itself and it bit her. He did not bite all the way through. XR HAND LEFT (MIN 3 VIEWS)   Final Result   Ordering Diagnosis: Animal Bite (Left hand bit by dog)      IMPRESSION:   No acute findings. This document has been electronically signed by: Kael Cronin MD on    09/27/2020 10:51 PM               FINAL IMPRESSION      1. Bite by animal        I saw this patient with Dr. Kelsey Trujillo; I agree with her assessment and plan.         Carmen Montilla DO 11:38 PM          Carmen Montilla,   09/28/20 [id number],   09/28/20 [id number]

## 2020-09-28 NOTE — ED PROVIDER NOTES
[place] ENCOUNTER          Pt Name: Marbella Hampton  MRN: [id number]  [place] 2000  Date of evaluation: 9/27/2020  Treating Resident Physician: Hollis Lara MD  Supervising Physician: Dr. Khoa Marks       Chief Complaint   Patient presents with    Animal Bite     lt hand     History obtained from the patient. HISTORY OF PRESENT ILLNESS    HPI  Marbella Hampton is a 20 y.o. female who presents to the emergency department for evaluation of right to left hand 2 hrs ago. Bite was provoked of vaccinated dog. Patient having some pain over dorsum of hand. She described it  As an aching, 3 out of 10, worse with movement, relieved by remaining still. Denies any numbness or weakness to hand. The patient has no other acute complaints at this time. REVIEW OF SYSTEMS   Review of Systems   Constitutional: Negative for activity change, chills, diaphoresis, fatigue and fever. HENT: Negative for sinus pressure, sinus pain, sneezing, sore throat, trouble swallowing and voice change. Eyes: Negative for photophobia, redness and visual disturbance. Respiratory: Negative for apnea, cough, choking, chest tightness, shortness of breath, wheezing and stridor. Cardiovascular: Negative for chest pain, palpitations and leg swelling. Gastrointestinal: Negative for abdominal distention, abdominal pain, constipation, diarrhea and nausea. Genitourinary: Negative for dysuria, frequency, hematuria and urgency. Musculoskeletal: Negative for arthralgias, back pain, gait problem, joint swelling, myalgias, neck pain and neck stiffness. Neurological: Negative for dizziness, tremors, seizures, weakness, light-headedness, numbness and headaches. Hematological: Does not bruise/bleed easily.          PAST MEDICAL AND SURGICAL HISTORY     Past Medical History:   Diagnosis Date    Anxiety     Chronic GERD     Chronic migraine takes verapamil    Depression     Gallstones 10/2019    Gastroparesis     diagnosis as a child    Hx of blood clots     PE     Hypertension     Obesity     Pulmonary embolism (HCC)     8 months ago-was on Xarelto-sees Dr Jennifer Chand in [place]    Splenomegaly      Past Surgical History:   Procedure Laterality Date    CHOLECYSTECTOMY, LAPAROSCOPIC N/A 10/30/2019    ROBOTIC LAP BRANNON performed by Miko Page MD at [address] [place] [address] Dr Right    [address]   No current facility-administered medications for this encounter.      Current Outpatient Medications:     amoxicillin-clavulanate (AUGMENTIN) 875-125 MG per tablet, Take 1 tablet by mouth 2 times daily for 7 days, Disp: 14 tablet, Rfl: 0    topiramate (TOPAMAX) 25 MG tablet, Take 25 mg by mouth, Disp: , Rfl:     ondansetron (ZOFRAN) 4 MG tablet, Take 1 tablet by mouth every 8 hours as needed for Nausea or Vomiting, Disp: 30 tablet, Rfl: 0    Blood Pressure Monitoring (OMRON 5 SERIES BP MONITOR) SHAWANDA, , Disp: , Rfl:     Blood Pressure Monitor KIT, Take blood pressure daily, Disp: 1 kit, Rfl: 0    albuterol sulfate HFA (VENTOLIN HFA) 108 (90 Base) MCG/ACT inhaler, Inhale 2 puffs into the lungs 4 times daily as needed for Wheezing, Disp: 1 Inhaler, Rfl: 0    Ferrous Gluconate-C-Folic Acid (IRON-C PO), Take by mouth daily, Disp: , Rfl:     Cholecalciferol (VITAMIN D) 50 MCG (2000 UT) TABS tablet, Take 2 tablets by mouth daily, Disp: 30 tablet, Rfl: 5    ferrous gluconate (FERGON) 324 (38 Fe) MG tablet, Take 2 tablets by mouth daily (with breakfast), Disp: , Rfl:       SOCIAL HISTORY     Social History     Social History Narrative    Not on file     Social History     Tobacco Use    Smoking status: Never Smoker    Smokeless tobacco: Never Used   Substance Use Topics    Alcohol use: Never     Frequency: Never    Drug use: Never         ALLERGIES     Allergies   Allergen Reactions    Imitrex [Sumatriptan] Shortness Of Breath     Pt reports hot flashes and chest pain also. FAMILY HISTORY     Family History   Problem Relation Age of Onset   Deloris Me Migraines Mother     Lupus Mother     High Blood Pressure Father     Thyroid Disease Father     Other Father         gerd    Cancer Father         skin    No Known Problems Brother     Colon Polyps Maternal Grandmother     No Known Problems Maternal Grandfather     No Known Problems Paternal Grandmother     Colon Polyps Paternal Grandfather     No Known Problems Brother     Colon Cancer Neg Hx     Esophageal Cancer Neg Hx          PREVIOUS RECORDS   Previous records reviewed: Past medical, surgical, medications, allergies. PHYSICAL EXAM     ED Triage Vitals [09/27/20 2157]   BP Temp Temp Source Pulse Resp SpO2 Height Weight   (!) 159/81 98.3 °F (36.8 °C) Oral 94 18 99 % 5' 9\" (1.753 m) 270 lb (122.5 kg)     Initial vital signs and nursing assessment reviewed and Hypertensive. Pulsoximetry is normal per my interpretation. Additional Vital Signs:  Vitals:    09/27/20 2157   BP: (!) 159/81   Pulse: 94   Resp: 18   Temp: 98.3 °F (36.8 °C)   SpO2: 99%       Physical Exam  Constitutional:       General: She is not in acute distress. Appearance: She is not toxic-appearing. HENT:      Head: Normocephalic and atraumatic. Right Ear: External ear normal.      Left Ear: External ear normal.      Nose: Nose normal.      Mouth/Throat:      Mouth: Mucous membranes are moist.      Pharynx: Oropharynx is clear. Eyes:      Conjunctiva/sclera: Conjunctivae normal.   Neck:      Musculoskeletal: Normal range of motion and neck supple. Cardiovascular:      Rate and Rhythm: Normal rate and regular rhythm. Pulses: Normal pulses. Heart sounds: Normal heart sounds. Pulmonary:      Effort: Pulmonary effort is normal.      Breath sounds: Normal breath sounds. Abdominal:      General: Abdomen is flat.       Palpations: Abdomen is soft. Musculoskeletal:         General: Signs of injury present. No deformity. Left hand: She exhibits tenderness and swelling. She exhibits normal range of motion and no bony tenderness. Hands:       Right lower leg: No edema. Left lower leg: No edema. Skin:     General: Skin is warm and dry. Capillary Refill: Capillary refill takes less than 2 seconds. Findings: Bruising present. Neurological:      General: No focal deficit present. Mental Status: She is alert. Mental status is at baseline. MEDICAL DECISION MAKING   Initial Assessment: 50-year-old female who presented after dog bite, provoked, with bruising and hematoma to dorsum of left hand. X-ray unremarkable. Plan: Discharge to home with Augmentin. Patient instructed on strict return precautions, including signs of hematoma infection. ED RESULTS   Laboratory results:  Labs Reviewed - No data to display    Radiologic studies results:  XR HAND LEFT (MIN 3 VIEWS)   Final Result   Ordering Diagnosis: Animal Bite (Left hand bit by dog)      IMPRESSION:   No acute findings. This document has been electronically signed by: Emily Law MD on    09/27/2020 10:51 PM             ED Medications administered this visit: Medications - No data to display      ED COURSE        Strict return precautions and follow up instructions were discussed with the patient prior to discharge, with which the patient agrees. MEDICATION CHANGES     Discharge Medication List as of 9/28/2020 12:02 AM      START taking these medications    Details   amoxicillin-clavulanate (AUGMENTIN) 875-125 MG per tablet Take 1 tablet by mouth 2 times daily for 7 days, Disp-14 tablet,R-0Print               FINAL DISPOSITION     Final diagnoses:   Bite by animal     Condition: condition: good  Dispo: Discharge to home      This transcription was electronically signed.  Parts of this transcriptions may have been dictated by use of voice recognition software and electronically transcribed, and parts may have been transcribed with the assistance of an ED scribe. The transcription may contain errors not detected in proofreading. Please refer to my supervising physician's documentation if my documentation differs.     Electronically Signed: Kendall Brown, 09/28/20, 7:24 AM       Kendall Brown MD  Resident  09/28/20 7278

## 2020-09-28 NOTE — ED NOTES
Pt to er. Pt states she was trying to help a dog from hanging by his leash and the dog bit her. States happened in [address] and it was her boyfriends dog. Dog did not puncture skin but bruising noted to lt hand.       Otilio Romberg, RN  09/27/20 0717

## 2020-10-05 NOTE — TELEPHONE ENCOUNTER
Noted the Dr Carrillo Number referral from Feb and that the patient was seen in the office on 2-21-20. I did see the scanned document from the referral patient was seen in march at ob/gyn office.     Nancy Navarro cosigned the medical student note and added her own wording to the note

## 2020-10-06 ENCOUNTER — NURSE TRIAGE (OUTPATIENT)
Dept: OTHER | Facility: CLINIC | Age: 20
End: 2020-10-06

## 2020-10-06 ENCOUNTER — TELEPHONE (OUTPATIENT)
Dept: FAMILY MEDICINE CLINIC | Age: 20
End: 2020-10-06

## 2020-10-06 NOTE — TELEPHONE ENCOUNTER
Nurse Triage called requesting an appointment for the patient, she stated that the recommendation was for the patient to be seen either today or tomorrow and requested something before 11:30am. The patient screened is needing seen for the following symptoms cough, sinus congestion, headache.  Patient is scheduled for a VV appointment with Juan Manuel Bryant at 11:40 am.

## 2020-10-06 NOTE — TELEPHONE ENCOUNTER
Reason for Disposition   Using nasal washes and pain medicine > 24 hours and sinus pain persists   Patient wants to be seen    Protocols used: COUGH-ADULT-[place]    Spoke to patient directly regarding cough and fever 100.2 that has been on going for 2 days. Patient denies severe difficulty breathing, allergic reaction, chest pain, fainting, coughing up blood and weakened immune system. Patient states that even with pain medications she continues with the sinus pain. She is also requesting to be seen. Recommend that patient is seen today, due to timing of call, recommend that patient goes to urgent care. Patient states she is currently at work and is not able to be seen and is requesting appointment for tomorrow. Patient at work and is unable to continue with phone with triage nurse. Nursing called [facility] for available appointments. Video appointment made for patient with scheduling. Patient called back by nursing and explained appointment time, provider and time of appointment made. Again, reiterated that if patient felt she needs to be seen sooner, then she should proceed to urgent care. Care advice as documented, patient agreeable.

## 2020-10-07 ENCOUNTER — HOSPITAL ENCOUNTER (OUTPATIENT)
Age: 20
Discharge: HOME | End: 2020-10-07
Payer: COMMERCIAL

## 2020-10-07 ENCOUNTER — VIRTUAL VISIT (OUTPATIENT)
Dept: FAMILY MEDICINE CLINIC | Age: 20
End: 2020-10-07
Payer: COMMERCIAL

## 2020-10-07 ENCOUNTER — TELEPHONE (OUTPATIENT)
Dept: FAMILY MEDICINE CLINIC | Age: 20
End: 2020-10-07

## 2020-10-07 DIAGNOSIS — R05.9 COUGH: ICD-10-CM

## 2020-10-07 DIAGNOSIS — R50.9 FEBRILE ILLNESS: ICD-10-CM

## 2020-10-07 DIAGNOSIS — Z20.822 ENCOUNTER BY TELEHEALTH FOR SUSPECTED COVID-19: ICD-10-CM

## 2020-10-07 LAB
FLU A ANTIGEN: NEGATIVE
FLU B ANTIGEN: NEGATIVE

## 2020-10-07 PROCEDURE — 99214 OFFICE O/P EST MOD 30 MIN: CPT | Performed by: NURSE PRACTITIONER

## 2020-10-07 PROCEDURE — U0003 INFECTIOUS AGENT DETECTION BY NUCLEIC ACID (DNA OR RNA); SEVERE ACUTE RESPIRATORY SYNDROME CORONAVIRUS 2 (SARS-COV-2) (CORONAVIRUS DISEASE [COVID-19]), AMPLIFIED PROBE TECHNIQUE, MAKING USE OF HIGH THROUGHPUT TECHNOLOGIES AS DESCRIBED BY CMS-2020-01-R: HCPCS

## 2020-10-07 PROCEDURE — 87804 INFLUENZA ASSAY W/OPTIC: CPT

## 2020-10-07 PROCEDURE — 99211 OFF/OP EST MAY X REQ PHY/QHP: CPT

## 2020-10-07 ASSESSMENT — ENCOUNTER SYMPTOMS
ANAL BLEEDING: 0
EYE DISCHARGE: 0
ABDOMINAL DISTENTION: 0
ABDOMINAL PAIN: 0
COUGH: 1
RHINORRHEA: 0
SHORTNESS OF BREATH: 0
DIARRHEA: 0
EYE REDNESS: 0
SORE THROAT: 0
BLOOD IN STOOL: 0
NAUSEA: 0
COLOR CHANGE: 0
CONSTIPATION: 0

## 2020-10-07 NOTE — LETTER
[address] [place] [address],[address] [facility] [address] [place] [address]  Phone: [phone]  Fax: [place]. Ignacia Cornelius, APRN - ADOLFO        October 7, 2020     Patient: Providence Boast   YOB: 2000   Date of Visit: 10/7/2020       To Whom It May Concern: It is my medical opinion that Franck Mendoza may return to work on 10/12/2020. If you have any questions or concerns, please don't hesitate to call.     Sincerely,        Billy Le, KARLA - CNP

## 2020-10-07 NOTE — PROGRESS NOTES
abdominal pain, anal bleeding, blood in stool, constipation, diarrhea and nausea. Skin: Negative for color change and rash. Neurological: Negative for facial asymmetry, speech difficulty and weakness. Hematological: Does not bruise/bleed easily. Psychiatric/Behavioral: Negative for agitation and confusion. Objective: There were no vitals filed for this visit. There is no height or weight on file to calculate BMI. Wt Readings from Last 3 Encounters:   09/27/20 270 lb (122.5 kg)   09/21/20 265 lb 2 oz (120.3 kg)   08/25/20 261 lb (118.4 kg)     BP Readings from Last 3 Encounters:   09/27/20 (!) 159/81   09/21/20 130/89   08/25/20 120/81     Physical Exam    Lab Results   Component Value Date    WBC 8.5 05/25/2020    HGB 12.5 05/25/2020    HCT 38.4 05/25/2020     05/25/2020    CHOL 159 02/13/2020    TRIG 85 02/13/2020    HDL 46 02/13/2020    LDLCALC 96 02/13/2020    AST 34 05/25/2020     05/25/2020    K 4.4 05/25/2020     05/25/2020    CREATININE 0.5 05/25/2020    BUN 11 05/25/2020    CO2 24 05/25/2020    TSH 0.935 02/13/2020    INR 1.00 05/25/2020    GLUF 96 02/25/2020    LABA1C 4.7 02/13/2020    LABGLOM >90 05/25/2020    MG 2.1 07/13/2020    CALCIUM 9.2 05/25/2020    VITD25 19 (L) 07/13/2020     Assessment:       Diagnosis Orders   1. Febrile illness  Rapid Influenza A/B Antigens    COVID-19 Ambulatory   2. Cough  Rapid Influenza A/B Antigens    COVID-19 Ambulatory   3. Encounter by telehealth for suspected COVID-19  COVID-19 Ambulatory       Plan:   Work letter given - off until COVID test is resulted  Will also test for flu    · Source of symptoms likely viral  · Instructed most viral illnesses last 7-14 days, and antibiotics do not help.     · Get plenty of rest  · Increase fluids  · Avoid secondhand smoke  · Can use the [place] to rinse the sinuses as needed  · Cool-mist humidifier at night   · Use Tylenol OTC as directed for fever  · Return to office  if symptoms do not start to improve over the 7-10 days    Pt will go to  for testing      No follow-ups on file. Orders Placed:  Orders Placed This Encounter   Procedures    Rapid Influenza A/B Antigens    COVID-19 Ambulatory     Medications Prescribed:  No orders of the defined types were placed in this encounter. Future Appointments   Date Time Provider Jerome Christina   10/16/2020  8:30 AM STR ECHO RM3 STRZ ECHO Brandon HOD   10/16/2020  9:30 AM STR ECHO RM1 STRZ ECHO Brandon HOD   11/2/2020 11:00 AM Shalini Baltazar MD [address] [facility] [place]   1/7/2021  8:30 AM Blanche Peak, PhD Leslie Santiago P - [place]   1/14/2021  8:30 AM Blanche Peak, PhD Leslie Santiago P - [place]   1/21/2021  8:30 AM Blanche Peak, PhD Leslie Santiago P - [place]   1/28/2021  8:30 AM Blanche Peak, PhD Leslie Santiago P - [place]   2/4/2021  8:30 AM Blanche Peak, PhD Leslie Santiago [facility] - [place]   2/11/2021  8:30 AM Blanche Peak, PhD Leslie Santiago P - [place]   2/18/2021  8:30 AM Blanche Peak, PhD Leslie Santiago [facility] - [place]   2/25/2021  8:30 AM Blanche Peak, PhD Leslie Santiago P - [place]   3/4/2021  8:30 AM Blanche Peak, PhD Leslie Santiago [facility] - [place]   3/11/2021  8:30 AM Blanche Peak, PhD Leslie Santiago P - [place]   3/18/2021  8:30 AM Blanche Peak, PhD Leslie Santiago [facility] [place]   3/25/2021  8:30 AM Blanche Urbina, PhD Leslie Santiago [address] [facility]      Patient given educational materials - see patient instructions. Discussed use, benefit, and side effects of prescribedmedications. All patient questions answered. Pt voiced understanding. Reviewed health maintenance. Instructed to continue current medications, diet and exercise. Patient agreed with treatment plan. Follow up as directed. Carla Mallory is a 20 y.o. female being evaluated by a Virtual Visit (video visit) encounter to address concerns as mentioned above. A caregiver was present when appropriate. Due to this being a TeleHealth encounter (During [facility]- public health emergency).

## 2020-10-07 NOTE — TELEPHONE ENCOUNTER
Patient had Anton Lambert  return to work letter from . [facility], I contacted patient , she verified her personal information and also verified Anton Lambert was able to  the letter. Second verbal was completed by London Medrano.

## 2020-10-07 NOTE — TELEPHONE ENCOUNTER
Called and verified with patient birthdate  Who and why paper was being picked up then Blayne Walker verified with her.

## 2020-10-08 ENCOUNTER — TELEPHONE (OUTPATIENT)
Dept: FAMILY MEDICINE CLINIC | Age: 20
End: 2020-10-08

## 2020-10-09 LAB — SARS-COV-2: NOT DETECTED

## 2020-10-16 ENCOUNTER — HOSPITAL ENCOUNTER (OUTPATIENT)
Dept: NON INVASIVE DIAGNOSTICS | Age: 20
Discharge: HOME | End: 2020-10-16
Payer: COMMERCIAL

## 2020-10-16 VITALS — WEIGHT: 267 LBS | BODY MASS INDEX: 39.55 KG/M2 | HEIGHT: 69 IN

## 2020-10-16 LAB
LV EF: 50 %
LV EF: 63 %
LVEF MODALITY: NORMAL
LVEF MODALITY: NORMAL

## 2020-10-16 PROCEDURE — 93306 TTE W/DOPPLER COMPLETE: CPT

## 2020-10-16 PROCEDURE — 93017 CV STRESS TEST TRACING ONLY: CPT | Performed by: INTERNAL MEDICINE

## 2020-10-19 ENCOUNTER — TELEPHONE (OUTPATIENT)
Dept: FAMILY MEDICINE CLINIC | Age: 20
End: 2020-10-19

## 2020-10-19 ENCOUNTER — OFFICE VISIT (OUTPATIENT)
Dept: FAMILY MEDICINE CLINIC | Age: 20
End: 2020-10-19
Payer: COMMERCIAL

## 2020-10-19 VITALS
SYSTOLIC BLOOD PRESSURE: 128 MMHG | HEIGHT: 68 IN | WEIGHT: 258 LBS | OXYGEN SATURATION: 99 % | HEART RATE: 81 BPM | TEMPERATURE: 97.5 F | BODY MASS INDEX: 39.1 KG/M2 | DIASTOLIC BLOOD PRESSURE: 70 MMHG

## 2020-10-19 LAB
BILIRUBIN URINE: NEGATIVE
BLOOD URINE, POC: NORMAL
CHARACTER, URINE: NORMAL
COLOR, URINE: YELLOW
CONTROL: NORMAL
GLUCOSE URINE: NEGATIVE MG/DL
KETONES, URINE: NEGATIVE
LEUKOCYTE CLUMPS, URINE: NEGATIVE
NITRITE, URINE: NEGATIVE
PH, URINE: 6 (ref 5–9)
PREGNANCY TEST URINE, POC: NEGATIVE
PROTEIN, URINE: NEGATIVE MG/DL
SPECIFIC GRAVITY, URINE: >= 1.03 (ref 1–1.03)
UROBILINOGEN, URINE: 0.2 EU/DL (ref 0–1)

## 2020-10-19 PROCEDURE — 81003 URINALYSIS AUTO W/O SCOPE: CPT | Performed by: FAMILY MEDICINE

## 2020-10-19 PROCEDURE — 99214 OFFICE O/P EST MOD 30 MIN: CPT | Performed by: FAMILY MEDICINE

## 2020-10-19 PROCEDURE — 81025 URINE PREGNANCY TEST: CPT | Performed by: FAMILY MEDICINE

## 2020-10-19 ASSESSMENT — ENCOUNTER SYMPTOMS
TROUBLE SWALLOWING: 0
COUGH: 0
SHORTNESS OF BREATH: 0
NAUSEA: 0
VOMITING: 0
CONSTIPATION: 0
DIARRHEA: 0
ABDOMINAL PAIN: 1
BLOOD IN STOOL: 0
EYE PAIN: 0

## 2020-10-19 NOTE — TELEPHONE ENCOUNTER
[facility] [place]   Obstetrician-gynecologist   [address] [place] [address]  [phone]  Called office,  Spoke with [facility]  No pap smear results. 03/17/2020 seen, not completed,   She will fax over old results.

## 2020-10-19 NOTE — PATIENT INSTRUCTIONS
Will do a pregnancy test and a urine today    Will see janelle 11-2 to go over the test    If the cramping is worse - can do an US and look at the ovaries    Will see you in 6 months and recheck the vit d and mag and the cbc at that time    If the abd pain gets worse - can get the labs before you comeback to see us

## 2020-10-19 NOTE — TELEPHONE ENCOUNTER
Health Maintenance Due   Topic Date Due    Varicella vaccine (1 of 2 - 2-dose childhood series) 11/12/2000  Pending     Pneumococcal 0-64 years Vaccine (1 of 1 - PPSV23) 11/12/2005 refused     HPV vaccine (1 - 2-dose series) 11/12/2010 refused     Chlamydia screen  11/12/2015 830     DTaP/Tdap/Td vaccine (1 - Tdap) 11/12/2018 refused     Flu vaccine (1) 09/01/2020 refused

## 2020-11-02 ENCOUNTER — TELEPHONE (OUTPATIENT)
Dept: CARDIOLOGY CLINIC | Age: 20
End: 2020-11-02

## 2020-11-03 ENCOUNTER — NURSE TRIAGE (OUTPATIENT)
Dept: OTHER | Facility: CLINIC | Age: 20
End: 2020-11-03

## 2020-11-03 NOTE — TELEPHONE ENCOUNTER
Reason for Disposition   Back pain from overuse (work, exercise, gardening) OR from twisting, lifting, or bending injury   Back pain    Answer Assessment - Initial Assessment Questions  1. MECHANISM: \"How did the injury happen? 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 ONSET: \"When did the injury happen? \" (Minutes or hours ago)      Two days ago    3. LOCATION: \"What part of the back is injured? \"      Mid part of back    4. SEVERITY: \"Can you move the back normally? \"      Cannot bend as far down    5. PAIN: \"Is there any pain? \" If so, ask: \"How bad is the pain? \"   (Scale 1-10; or mild, moderate, severe)      Moderate unless bend over is severe    6. CORD SYMPTOMS: Any weakness or numbness of the arms or legs? \"      No    7. SIZE: For cuts, bruises, or swelling, ask: \"How large is it? \" (e.g., inches or centimeters)      No    8. TETANUS: For any breaks in the skin, ask: \"When was the last tetanus booster? \"      N/a    9. OTHER SYMPTOMS: \"Do you have any other symptoms? \" (e.g., abdominal pain, blood in urine)      No    10. PREGNANCY: \"Is there any chance you are pregnant? \" \"When was your last menstrual period? \"        no    Answer Assessment - Initial Assessment Questions  1. ONSET: \"When did the pain begin? \"       Two days ago    2. LOCATION: \"Where does it hurt? \" (upper, mid or lower back)      Mid    3. SEVERITY: \"How bad is the pain? \"  (e.g., Scale 1-10; mild, moderate, or severe)    - MILD (1-3): doesn't interfere with normal activities     - MODERATE (4-7): interferes with normal activities or awakens from sleep     - SEVERE (8-10): excruciating pain, unable to do any normal activities       Moderate    4. PATTERN: \"Is the pain constant? \" (e.g., yes, no; constant, intermittent)       Constant but worse if bend over or turn to side    5. RADIATION: \"Does the pain shoot into your legs or elsewhere? \"      No    6. CAUSE:  \"What do you think is causing the back pain? \"       Lifting bags of dog food and water boxes    7. BACK OVERUSE:  Jarred Patterson recent lifting of heavy objects, strenuous work or exercise? \"      Yes at work    8. MEDICATIONS: \"What have you taken so far for the pain? \" (e.g., nothing, acetaminophen, NSAIDS)      Tylenol and icy hot     9. NEUROLOGIC SYMPTOMS: \"Do you have any weakness, numbness, or problems with bowel/bladder control? \"      No    10. OTHER SYMPTOMS: \"Do you have any other symptoms? \" (e.g., fever, abdominal pain, burning with urination, blood in urine)        No    11. PREGNANCY: \"Is there any chance you are pregnant? \" (e.g., yes, no; LMP)        no    Protocols used: BACK INJURY-ADULT-[place], BACK PAIN-ADULT-[place]    Pt needs note for work today    Attention Provider: Thank you for allowing me to participate in the care of your patient. The patient was connected to triage in response to information provided to the [facility]. Please do not respond through this encounter as the response is not directed to a shared pool. Caller provided care advice and instructed to call back with worsening symptoms.

## 2020-12-02 ENCOUNTER — OFFICE VISIT (OUTPATIENT)
Dept: CARDIOLOGY CLINIC | Age: 20
End: 2020-12-02
Payer: COMMERCIAL

## 2020-12-02 VITALS
DIASTOLIC BLOOD PRESSURE: 72 MMHG | WEIGHT: 263 LBS | SYSTOLIC BLOOD PRESSURE: 109 MMHG | BODY MASS INDEX: 38.95 KG/M2 | HEART RATE: 81 BPM | HEIGHT: 69 IN

## 2020-12-02 PROCEDURE — 99213 OFFICE O/P EST LOW 20 MIN: CPT | Performed by: INTERNAL MEDICINE

## 2020-12-02 NOTE — PROGRESS NOTES
Component Value Date    WBC 8.5 05/25/2020    RBC 4.19 05/25/2020    HGB 12.5 05/25/2020    HCT 38.4 05/25/2020    MCV 91.6 05/25/2020    MCH 29.8 05/25/2020    MCHC 32.6 05/25/2020     05/25/2020    MPV 10.7 05/25/2020     BMP:    Lab Results   Component Value Date     05/25/2020    K 4.4 05/25/2020    K 4.2 04/26/2020     05/25/2020    CO2 24 05/25/2020    BUN 11 05/25/2020    LABALBU 4.3 05/25/2020    CREATININE 0.5 05/25/2020    CALCIUM 9.2 05/25/2020    LABGLOM >90 05/25/2020    GLUCOSE 93 05/25/2020     Hepatic Function Panel:    Lab Results   Component Value Date    ALKPHOS 65 05/25/2020    ALT 23 05/25/2020    AST 34 05/25/2020    PROT 7.4 05/25/2020    BILITOT 0.4 05/25/2020    BILIDIR <0.2 05/25/2020    LABALBU 4.3 05/25/2020     Magnesium:    Lab Results   Component Value Date    MG 2.1 07/13/2020     Warfarin PT/INR:  No components found for: PTPATWAR, PTINRWAR  HgBA1c:    Lab Results   Component Value Date    LABA1C 4.7 02/13/2020     FLP:    Lab Results   Component Value Date    TRIG 85 02/13/2020    HDL 46 02/13/2020    LDLCALC 96 02/13/2020     TSH:    Lab Results   Component Value Date    TSH 0.935 02/13/2020     Small filling defect consistent with small subsegmental pulmonary embolus within a posterior subsegmental branch of the right lower lobe pulmonary artery.         2. There is mild infiltrate demonstrated within the perihilar region on axial image 75 which may represent mild atelectasis or pneumonia.         This report has been created using voice recognition software.  It may contain minor errors which are inherent in voice recognition technology.             Final report electronically signed by Dr. Edgar Haddad on 4/23/2020 8:01 AM                No pulmonary emboli or pulmonary infiltrates.                        This report has been created using voice recognition software.  It may contain minor errors which are inherent in voice recognition technology.        Final report electronically signed by Dr. June Pathak on 5/25/2020 8:22 PM             ekg  Sinus rhythm with marked sinus arrhythmia  Otherwise normal ECG  When compared with ECG of 26-APR-2020 09:55,  No significant change was found  Confirmed by Marleni Salmon MD, Tamara Lowery ([id number]) on 5/25/2020 9:13:13 PM      ekg 9/21/2020  Sinus  Rhythm   WITHIN NORMAL LIMITS      Conclusions    Summary   No chest pain   No stress Induced Arrhythmia   Fair exercise capacity with METS of 10   Exercise time was 9 minutes   Exercise EKG is negative for ischemia   no stress-induced segmental wall motion abnormality. Proper augmentation of the left ventricular with exercise   no stress induced cavity dilation. Exercise stress echo is not suggestive for cardiac ischemia   Appropriate hemodynamic response to exercise. Signature    ----------------------------------------------------------------   Electronically signed by Flaca Underwood MD (Interpreting   physician) on 10/16/2020 at 08:23 PM   ----------------------------------------------------------------      Conclusions      Summary   Normal left ventricle size and systolic function. Ejection fraction was   estimated at 60 to 65 %. There were no regional left ventricular wall   motion abnormalities and wall thickness was within normal limits. Signature      ----------------------------------------------------------------   Electronically signed by Flaca Underwood MD (Interpreting   physician) on 10/16/2020 at 08:20 PM          Assessment   Diagnosis Orders   1. Costochondral chest pain     2.  History of pulmonary embolism           Plan     The current meds and labs reviewed  Hx of Chest pain on deep breath and bend over  Atypical  Chronic   For few yrs  Lift staffs at work  ekg no acute abn  Echo and Stress echo-  WNL    No HTN   BP normal in office   Previous BP profile good    Hx of PE  Had  Been Off her OA by herself  Need hematology eval asap for work up and decision for long term OA  FHX Blood clot issue  D/w the pat the plan of care  [id number] to see hematologist  Referred to Dr. Jess Doyle for evaluation and care and decision for OA  Re-advised the need for OA  NO bleeding Hx from xarelto  Tend to have heavy mensus  Had been be off OA for several months      D/w the pat the plan of care    Overall better    RTC in 6 months      [facility]

## 2020-12-06 PROCEDURE — 96374 THER/PROPH/DIAG INJ IV PUSH: CPT

## 2020-12-06 PROCEDURE — 99283 EMERGENCY DEPT VISIT LOW MDM: CPT

## 2020-12-07 ENCOUNTER — HOSPITAL ENCOUNTER (EMERGENCY)
Age: 20
Discharge: HOME | End: 2020-12-07
Payer: COMMERCIAL

## 2020-12-07 ENCOUNTER — APPOINTMENT (OUTPATIENT)
Dept: GENERAL RADIOLOGY | Age: 20
End: 2020-12-07
Payer: COMMERCIAL

## 2020-12-07 ENCOUNTER — APPOINTMENT (OUTPATIENT)
Dept: CT IMAGING | Age: 20
End: 2020-12-07
Payer: COMMERCIAL

## 2020-12-07 VITALS
WEIGHT: 265 LBS | TEMPERATURE: 98.4 F | BODY MASS INDEX: 39.13 KG/M2 | HEART RATE: 86 BPM | OXYGEN SATURATION: 99 % | DIASTOLIC BLOOD PRESSURE: 84 MMHG | RESPIRATION RATE: 18 BRPM | SYSTOLIC BLOOD PRESSURE: 129 MMHG

## 2020-12-07 LAB
ALBUMIN SERPL-MCNC: 4 G/DL (ref 3.5–5.1)
ALP BLD-CCNC: 64 U/L (ref 38–126)
ALT SERPL-CCNC: 15 U/L (ref 11–66)
ANION GAP SERPL CALCULATED.3IONS-SCNC: 10 MEQ/L (ref 8–16)
AST SERPL-CCNC: 15 U/L (ref 5–40)
BASOPHILS # BLD: 0.3 %
BASOPHILS ABSOLUTE: 0 THOU/MM3 (ref 0–0.1)
BILIRUB SERPL-MCNC: 0.2 MG/DL (ref 0.3–1.2)
BUN BLDV-MCNC: 7 MG/DL (ref 7–22)
CALCIUM SERPL-MCNC: 9.1 MG/DL (ref 8.5–10.5)
CHLORIDE BLD-SCNC: 102 MEQ/L (ref 98–111)
CO2: 24 MEQ/L (ref 23–33)
CREAT SERPL-MCNC: 0.5 MG/DL (ref 0.4–1.2)
EKG ATRIAL RATE: 89 BPM
EKG P AXIS: 51 DEGREES
EKG P-R INTERVAL: 142 MS
EKG Q-T INTERVAL: 326 MS
EKG QRS DURATION: 74 MS
EKG QTC CALCULATION (BAZETT): 396 MS
EKG R AXIS: 38 DEGREES
EKG T AXIS: 30 DEGREES
EKG VENTRICULAR RATE: 89 BPM
EOSINOPHIL # BLD: 1 %
EOSINOPHILS ABSOLUTE: 0 THOU/MM3 (ref 0–0.4)
ERYTHROCYTE [DISTWIDTH] IN BLOOD BY AUTOMATED COUNT: 12.3 % (ref 11.5–14.5)
ERYTHROCYTE [DISTWIDTH] IN BLOOD BY AUTOMATED COUNT: 42.2 FL (ref 35–45)
GFR SERPL CREATININE-BSD FRML MDRD: > 90 ML/MIN/1.73M2
GLUCOSE BLD-MCNC: 94 MG/DL (ref 70–108)
HCT VFR BLD CALC: 40.2 % (ref 37–47)
HEMOGLOBIN: 13.2 GM/DL (ref 12–16)
IMMATURE GRANS (ABS): 0.01 THOU/MM3 (ref 0–0.07)
IMMATURE GRANULOCYTES: 0.3 %
LYMPHOCYTES # BLD: 29 %
LYMPHOCYTES ABSOLUTE: 1.2 THOU/MM3 (ref 1–4.8)
MCH RBC QN AUTO: 30.7 PG (ref 26–33)
MCHC RBC AUTO-ENTMCNC: 32.8 GM/DL (ref 32.2–35.5)
MCV RBC AUTO: 93.5 FL (ref 81–99)
MONOCYTES # BLD: 9.3 %
MONOCYTES ABSOLUTE: 0.4 THOU/MM3 (ref 0.4–1.3)
NUCLEATED RED BLOOD CELLS: 0 /100 WBC
OSMOLALITY CALCULATION: 269.7 MOSMOL/KG (ref 275–300)
PLATELET # BLD: 178 THOU/MM3 (ref 130–400)
PMV BLD AUTO: 10.4 FL (ref 9.4–12.4)
POTASSIUM SERPL-SCNC: 3.8 MEQ/L (ref 3.5–5.2)
PREGNANCY, SERUM: NEGATIVE
RBC # BLD: 4.3 MILL/MM3 (ref 4.2–5.4)
SEG NEUTROPHILS: 60.1 %
SEGMENTED NEUTROPHILS ABSOLUTE COUNT: 2.4 THOU/MM3 (ref 1.8–7.7)
SODIUM BLD-SCNC: 136 MEQ/L (ref 135–145)
TOTAL PROTEIN: 6.5 G/DL (ref 6.1–8)
WBC # BLD: 4 THOU/MM3 (ref 4.8–10.8)

## 2020-12-07 PROCEDURE — 6360000004 HC RX CONTRAST MEDICATION: Performed by: PHYSICIAN ASSISTANT

## 2020-12-07 PROCEDURE — 2580000003 HC RX 258: Performed by: PHYSICIAN ASSISTANT

## 2020-12-07 PROCEDURE — 93010 ELECTROCARDIOGRAM REPORT: CPT | Performed by: NUCLEAR MEDICINE

## 2020-12-07 PROCEDURE — 80053 COMPREHEN METABOLIC PANEL: CPT

## 2020-12-07 PROCEDURE — 71275 CT ANGIOGRAPHY CHEST: CPT

## 2020-12-07 PROCEDURE — 85025 COMPLETE CBC W/AUTO DIFF WBC: CPT

## 2020-12-07 PROCEDURE — 84703 CHORIONIC GONADOTROPIN ASSAY: CPT

## 2020-12-07 PROCEDURE — 6360000002 HC RX W HCPCS

## 2020-12-07 PROCEDURE — 93005 ELECTROCARDIOGRAM TRACING: CPT | Performed by: PHYSICIAN ASSISTANT

## 2020-12-07 PROCEDURE — 71045 X-RAY EXAM CHEST 1 VIEW: CPT

## 2020-12-07 PROCEDURE — 36415 COLL VENOUS BLD VENIPUNCTURE: CPT

## 2020-12-07 RX ORDER — KETOROLAC TROMETHAMINE 30 MG/ML
30 INJECTION, SOLUTION INTRAMUSCULAR; INTRAVENOUS ONCE
Status: COMPLETED | OUTPATIENT
Start: 2020-12-07 | End: 2020-12-07

## 2020-12-07 RX ORDER — KETOROLAC TROMETHAMINE 30 MG/ML
INJECTION, SOLUTION INTRAMUSCULAR; INTRAVENOUS
Status: COMPLETED
Start: 2020-12-07 | End: 2020-12-07

## 2020-12-07 RX ORDER — 0.9 % SODIUM CHLORIDE 0.9 %
500 INTRAVENOUS SOLUTION INTRAVENOUS ONCE
Status: COMPLETED | OUTPATIENT
Start: 2020-12-07 | End: 2020-12-07

## 2020-12-07 RX ADMIN — KETOROLAC TROMETHAMINE 30 MG: 30 INJECTION, SOLUTION INTRAMUSCULAR; INTRAVENOUS at 02:15

## 2020-12-07 RX ADMIN — IOPAMIDOL 80 ML: 755 INJECTION, SOLUTION INTRAVENOUS at 01:25

## 2020-12-07 RX ADMIN — SODIUM CHLORIDE 500 ML: 9 INJECTION, SOLUTION INTRAVENOUS at 00:45

## 2020-12-07 RX ADMIN — KETOROLAC TROMETHAMINE 30 MG: 30 INJECTION, SOLUTION INTRAMUSCULAR at 02:15

## 2020-12-07 ASSESSMENT — ENCOUNTER SYMPTOMS
COUGH: 1
EYE DISCHARGE: 0
EYE ITCHING: 0
VOMITING: 0
SHORTNESS OF BREATH: 1
SORE THROAT: 0
RHINORRHEA: 1
ABDOMINAL PAIN: 0
NAUSEA: 1
DIARRHEA: 0
SINUS PRESSURE: 0

## 2020-12-07 ASSESSMENT — PAIN SCALES - GENERAL
PAINLEVEL_OUTOF10: 6

## 2020-12-07 ASSESSMENT — PAIN DESCRIPTION - LOCATION: LOCATION: GENERALIZED

## 2020-12-07 ASSESSMENT — PAIN DESCRIPTION - PAIN TYPE: TYPE: ACUTE PAIN

## 2020-12-07 ASSESSMENT — PAIN DESCRIPTION - DESCRIPTORS: DESCRIPTORS: ACHING

## 2020-12-07 NOTE — ED PROVIDER NOTES
medical history of Anxiety, Chronic GERD, Chronic migraine, Depression, Gallstones, Gastroparesis, Hx of blood clots, Hypertension, Obesity, Pulmonary embolism ([address] and Splenomegaly. SURGICAL HISTORY      has a past surgical history that includes Tonsillectomy; [place] tooth extraction; Elbow surgery (Right); and Cholecystectomy, laparoscopic (N/A, 10/30/2019). CURRENT MEDICATIONS       Previous Medications    ALBUTEROL SULFATE HFA (VENTOLIN HFA) 108 (90 BASE) MCG/ACT INHALER    Inhale 2 puffs into the lungs 4 times daily as needed for Wheezing    BLOOD PRESSURE MONITOR KIT    Take blood pressure daily    BLOOD PRESSURE MONITORING (OMRON 5 SERIES BP MONITOR) SHAWANDA        CHOLECALCIFEROL (VITAMIN D) 50 MCG (2000 UT) TABS TABLET    Take 2 tablets by mouth daily    FERROUS GLUCONATE (FERGON) 324 (38 FE) MG TABLET    Take 2 tablets by mouth daily (with breakfast)    FERROUS GLUCONATE-C-FOLIC ACID (IRON-C PO)    Take by mouth daily    ONDANSETRON (ZOFRAN) 4 MG TABLET    Take 1 tablet by mouth every 8 hours as needed for Nausea or Vomiting    TOPIRAMATE (TOPAMAX) 25 MG TABLET    Take 25 mg by mouth       ALLERGIES     is allergic to imitrex [sumatriptan]. FAMILY HISTORY     She indicated that her mother is alive. She indicated that her father is alive. She indicated that both of her brothers are alive. She indicated that her maternal grandmother is alive. She indicated that her maternal grandfather is alive. She indicated that her paternal grandmother is alive. She indicated that her paternal grandfather is alive. She indicated that the status of her neg hx is unknown.   family history includes Cancer in her father; Colon Polyps in her maternal grandmother and paternal grandfather; High Blood Pressure in her father; Lupus in her mother; Migraines in her mother; No Known Problems in her brother, brother, maternal grandfather, and paternal grandmother; Other in her father; Thyroid Disease in her father.     SOCIAL HISTORY    reports that she has never smoked. She has never used smokeless tobacco. She reports that she does not drink alcohol or use drugs. PHYSICAL EXAM     INITIAL VITALS:  weight is 265 lb (120.2 kg). Her oral temperature is 98.4 °F (36.9 °C). Her blood pressure is 129/84 and her pulse is 86. Her respiration is 18 and oxygen saturation is 99%. Physical Exam  Constitutional:       Appearance: Normal appearance. She is well-developed. She is morbidly obese. She is not ill-appearing or diaphoretic. HENT:      Head: Normocephalic and atraumatic. Right Ear: Hearing normal.      Left Ear: Hearing normal.      Nose: Nose normal. No rhinorrhea. Mouth/Throat:      Lips: Pink. Mouth: Mucous membranes are moist.      Pharynx: Oropharynx is clear. Eyes:      General: Lids are normal. No scleral icterus. Extraocular Movements: Extraocular movements intact. Conjunctiva/sclera: Conjunctivae normal.      Pupils: Pupils are equal, round, and reactive to light. Neck:      Musculoskeletal: Normal range of motion and neck supple. No neck rigidity. Trachea: Trachea normal.   Cardiovascular:      Rate and Rhythm: Normal rate and regular rhythm. Heart sounds: Normal heart sounds. No murmur. Pulmonary:      Effort: Pulmonary effort is normal.      Breath sounds: Normal breath sounds and air entry. No decreased breath sounds, wheezing or rhonchi. Chest:       Abdominal:      General: There is no distension. Palpations: Abdomen is soft. Tenderness: There is no abdominal tenderness. Musculoskeletal:      Comments: Well perfused; movement normal as observed; no signs of DVT   Lymphadenopathy:      Cervical: No cervical adenopathy. Skin:     General: Skin is warm and dry. Findings: No rash. Neurological:      General: No focal deficit present. Mental Status: She is alert. Sensory: Sensation is intact. Motor: Motor function is intact.       Gait: Gait is intact. Psychiatric:         Mood and Affect: Mood normal.         Speech: Speech normal.         Behavior: Behavior is cooperative. DIFFERENTIAL DIAGNOSIS:   Including but not limited to: Covid pneumonia, PE, costochondritis, influenza    DIAGNOSTIC RESULTS     EKG: All E[facility]'s are [facility] who either signs or Co-signs this chart in the absence of a cardiologist.  Ventricular rate 89 bpm  UT interval 142 ms  QRS duration 74 ms  QTc interval 396 ms  P discharge ST axes 51, 38, and 30  Normal sinus rhythm. No PE    RADIOLOGY: non-plain film images(s) such as CT,Ultrasound and MRI are read by the radiologist.  Plain radiographic images are visualized and preliminarily interpreted by the emergency physician unless otherwise stated below. CTA CHEST W WO CONTRAST   Final Result   Impression:   Finding suggestive of very early atypical pneumonia. Although subtle,    findings are increased since previous      This document has been electronically signed by: Jeannie Holm MD on    12/07/2020 02:00 AM      All CT scans at this facility use dose modulation, iterative    reconstruction, and/or weight-based   dosing when appropriate to reduce radiation dose to as low as reasonably    achievable. XR CHEST PORTABLE   Final Result   Impression:   No acute disease. This document has been electronically signed by: Jeannie Holm MD on    12/07/2020 01:02 AM             LABS:   Labs Reviewed   CBC WITH AUTO DIFFERENTIAL - Abnormal; Notable for the following components:       Result Value    WBC 4.0 (*)     All other components within normal limits   COMPREHENSIVE METABOLIC PANEL - Abnormal; Notable for the following components:     Total Bilirubin 0.2 (*)     All other components within normal limits   OSMOLALITY - Abnormal; Notable for the following components:    Osmolality Calc 269.7 (*)     All other components within normal limits   HCG, SERUM, QUALITATIVE ANION GAP   GLOMERULAR FILTRATION RATE, ESTIMATED       EMERGENCY DEPARTMENT COURSE:   Vitals:    Vitals:    12/07/20 0006 12/07/20 0008 12/07/20 0036 12/07/20 0053   BP: 139/80  129/84    Pulse: 94  98 86   Resp: 18  16 18   Temp:  98.4 °F (36.9 °C)     TempSrc:  Oral     SpO2: 98%  95% 99%   Weight: 265 lb (120.2 kg)          MDM:  The patient was seen and evaluated within the ED today for concern for PE. On exam, I appreciated a nontoxic-appearing patient in no respiratory distress working on her computer. Old records were reviewed. Within the department, I observed the patient's vital signs to be within acceptable range. Radiological studies within the department revealed no PE. Laboratory work was reassuring. Within the department, the patient was treated with Toradol and IV fluids. I observed the patient's condition to modestly improve during the duration of their stay. I have considered PE, sepsis and this patient's presentation is not consistent with such entities and therefore, no further testing was warranted. The patient was comfortable with the plan of discharge home and to follow up with this department if symptoms worsen. Anticipatory guidance was given. The patient was instructed to return to the emergency department for any worsening of their symptoms. Patient was discharged from the emergency department in good condition with all questions answered. See disposition below. I have given the patient strict written and verbal instructions about care at home, follow-up, and signs and symptoms of worsening of condition and they did verbalize understanding. CRITICAL CARE:   None    CONSULTS:  None    PROCEDURES:  None    FINAL IMPRESSION      1. COVID-19 virus infection    2. Chest pain, unspecified type    3. Dyspnea and respiratory abnormalities          DISPOSITION/PLAN     1. COVID-19 virus infection    2. Chest pain, unspecified type    3.  Dyspnea and respiratory abnormalities        PATIENT REFERRED TO:  MD Destiney Haque[address] [facility] [phone]      As scheduled    [address] [address] EMERGENCY DEPT  [address] [facility]  [phone]    If symptoms worsen      DISCHARGE MEDICATIONS:  New Prescriptions    No medications on file       (Please note that portions of this note were completed with a voice recognition program.  Efforts were made to edit the dictations but occasionally words are mis-transcribed.)    Marina Young PA-C 12/07/20 2:17 AM    XENIA De Paz PA-C  12/07/20 [id number]

## 2020-12-08 ENCOUNTER — CARE COORDINATION (OUTPATIENT)
Dept: CARE COORDINATION | Age: 20
End: 2020-12-08

## 2020-12-08 NOTE — CARE COORDINATION
Attempted outreach for [id number] monitoring. Left message to return phone call to ambulatory care manager at [phone]. 2nd message left. 3rd attempt to reach for covid19 monitoring. Left message to return phone call to ambulatory care manager at [phone]. Unable to complete follow up.

## 2021-01-14 ENCOUNTER — NURSE ONLY (OUTPATIENT)
Dept: LAB | Age: 21
End: 2021-01-14

## 2021-01-14 ENCOUNTER — OFFICE VISIT (OUTPATIENT)
Dept: PSYCHIATRY | Age: 21
End: 2021-01-14
Payer: COMMERCIAL

## 2021-01-14 DIAGNOSIS — K31.84 GASTROPARESIS: ICD-10-CM

## 2021-01-14 DIAGNOSIS — R10.30 LOWER ABDOMINAL PAIN: ICD-10-CM

## 2021-01-14 DIAGNOSIS — F43.9 TRAUMA AND STRESSOR-RELATED DISORDER: Primary | ICD-10-CM

## 2021-01-14 DIAGNOSIS — N83.201 CYST OF RIGHT OVARY: ICD-10-CM

## 2021-01-14 LAB
ALBUMIN SERPL-MCNC: 4.2 G/DL (ref 3.5–5.1)
ALP BLD-CCNC: 67 U/L (ref 38–126)
ALT SERPL-CCNC: 16 U/L (ref 11–66)
ANION GAP SERPL CALCULATED.3IONS-SCNC: 8 MEQ/L (ref 8–16)
AST SERPL-CCNC: 18 U/L (ref 5–40)
BASOPHILS # BLD: 0.3 %
BASOPHILS ABSOLUTE: 0 THOU/MM3 (ref 0–0.1)
BILIRUB SERPL-MCNC: 0.4 MG/DL (ref 0.3–1.2)
BILIRUBIN DIRECT: < 0.2 MG/DL (ref 0–0.3)
BUN BLDV-MCNC: 11 MG/DL (ref 7–22)
CALCIUM SERPL-MCNC: 9.7 MG/DL (ref 8.5–10.5)
CHLORIDE BLD-SCNC: 103 MEQ/L (ref 98–111)
CO2: 25 MEQ/L (ref 23–33)
CREAT SERPL-MCNC: 0.5 MG/DL (ref 0.4–1.2)
EOSINOPHIL # BLD: 1 %
EOSINOPHILS ABSOLUTE: 0.1 THOU/MM3 (ref 0–0.4)
ERYTHROCYTE [DISTWIDTH] IN BLOOD BY AUTOMATED COUNT: 12.3 % (ref 11.5–14.5)
ERYTHROCYTE [DISTWIDTH] IN BLOOD BY AUTOMATED COUNT: 42.4 FL (ref 35–45)
GFR SERPL CREATININE-BSD FRML MDRD: > 90 ML/MIN/1.73M2
GLUCOSE BLD-MCNC: 95 MG/DL (ref 70–108)
HCG,BETA SUBUNIT,QUAL,SERUM: < 0.1 MIU/ML (ref 0–5)
HCT VFR BLD CALC: 42.1 % (ref 37–47)
HEMOGLOBIN: 13.3 GM/DL (ref 12–16)
IMMATURE GRANS (ABS): 0.03 THOU/MM3 (ref 0–0.07)
IMMATURE GRANULOCYTES: 0.4 %
IRON: 70 UG/DL (ref 50–170)
LYMPHOCYTES # BLD: 21.3 %
LYMPHOCYTES ABSOLUTE: 1.5 THOU/MM3 (ref 1–4.8)
MAGNESIUM: 2 MG/DL (ref 1.6–2.4)
MCH RBC QN AUTO: 29.6 PG (ref 26–33)
MCHC RBC AUTO-ENTMCNC: 31.6 GM/DL (ref 32.2–35.5)
MCV RBC AUTO: 93.8 FL (ref 81–99)
MONOCYTES # BLD: 6 %
MONOCYTES ABSOLUTE: 0.4 THOU/MM3 (ref 0.4–1.3)
NUCLEATED RED BLOOD CELLS: 0 /100 WBC
PLATELET # BLD: 231 THOU/MM3 (ref 130–400)
PMV BLD AUTO: 11.1 FL (ref 9.4–12.4)
POTASSIUM SERPL-SCNC: 4.2 MEQ/L (ref 3.5–5.2)
RBC # BLD: 4.49 MILL/MM3 (ref 4.2–5.4)
SEG NEUTROPHILS: 71 %
SEGMENTED NEUTROPHILS ABSOLUTE COUNT: 5 THOU/MM3 (ref 1.8–7.7)
SODIUM BLD-SCNC: 136 MEQ/L (ref 135–145)
TOTAL IRON BINDING CAPACITY: 271 UG/DL (ref 171–450)
TOTAL PROTEIN: 7.1 G/DL (ref 6.1–8)
VITAMIN D 25-HYDROXY: 16 NG/ML (ref 30–100)
WBC # BLD: 7.1 THOU/MM3 (ref 4.8–10.8)

## 2021-01-14 PROCEDURE — 90834 PSYTX W PT 45 MINUTES: CPT | Performed by: PSYCHOLOGIST

## 2021-01-18 LAB — VZV IGG SER QL IA: 1.57

## 2021-01-19 LAB — VARICELLA ZOSTER AB IGM: 0.04 ISR

## 2021-01-19 NOTE — PROGRESS NOTES
This note will not be viewable in [facility] for the following reason(s). This is a Psychotherapy Note. PSYCHOLOGICAL FOLLOW-UP FOR trauma:       History of Presenting Illness:   Sudha Garner is a 23year-old, female with PMH of chronic migraine, PE, anxiety, chronic GERD, hypertension, and obesity who was referred by Minerva Larsen for mood. Today is her first follow up for trauma focused therapy. Assessment:  Patient reports that she has made behavioral changes that have improved her mood. She is denying some symptoms of PTSD related to previous trauma; however, she is still reporting subclinical symptoms of PTSD. She reports she recently moved back in with her mother and her mother has been supportive. Patient also disclosed additional information related to her mother's history of trauma and patient reports this is how her mother is able to relate to her. PSYCHIATRIC EXAM  General appearance: dressed appropriately, obese  Attitude & Behavior: pleasant and cooperative  Motor Activity & Musculoskeletal: some fidgeting during session likely related to increased anxiety  Speech & Language: normal rate, volume, and prosody  Gait & Station: sitting  Mood: calm, somewhat anxious  Affect: congruent with mood, appropriate to setting  Thought content: appropriate  Suicidal ideation: denies  Homicidal ideation: denies  Thought process & Associations: logical, coherent, goal-directed  Perceptions: appropriate  Orientation: to person, place, and time  Attention & Concentration: appears intact  Fund of knowledge: average  Insight: average  Judgment: average    DSM DIAGNOSIS:  Trauma and stressor related disorder     INTERVENTION:  Reviewed treatment rationale for CPT vs. WET and pros/cons of each treatment. Based on patient's symptoms it was decided to move forward with briefer trauma treatment, WET. Completed session 1 today. Patient verbally identified index trauma.         SUMMARY/PLAN:  Patient was actively engaged during today's visit.   This session was co-ran by Ronnie Sutton, PhD and nurse practitioner student, Luis Uribe. Patient agrees to treatment plan and agrees to nurse practitioner student facilitating care with supervision from Ronnie Sutton, PhD.       Psychology Clinician:  Ronnie Sutton, PhD     Start time: 8:00am  End time: 8:50am

## 2021-01-22 ENCOUNTER — TELEPHONE (OUTPATIENT)
Dept: FAMILY MEDICINE CLINIC | Age: 21
End: 2021-01-22

## 2021-01-22 NOTE — TELEPHONE ENCOUNTER
Patient calling in requesting pregnancy blood draw. States she had a positive at home urine test last night and this morning. Advised patient normally requires a visit. Advised her to call her OBGYN and make an appointment as well. Please advise if patient needs to be seen or not.

## 2021-01-22 NOTE — TELEPHONE ENCOUNTER
Patient declined appointment at this time. States she will call back if she decides to schedule. Denied any other questions or concerns at this time.

## 2021-01-27 ENCOUNTER — TELEPHONE (OUTPATIENT)
Dept: FAMILY MEDICINE CLINIC | Age: 21
End: 2021-01-27

## 2021-01-27 NOTE — TELEPHONE ENCOUNTER
Pt called states that her Period was 10 days late, and she started bleeding today with clots and cramping in the right side. Pt states had 2 positive pregnancy test on the 21st and today 27th. Scheduled an appointment for pt 1/28/21 at 9:40a. Pt verbalized understanding.

## 2021-01-28 ENCOUNTER — OFFICE VISIT (OUTPATIENT)
Dept: FAMILY MEDICINE CLINIC | Age: 21
End: 2021-01-28
Payer: COMMERCIAL

## 2021-01-28 ENCOUNTER — NURSE ONLY (OUTPATIENT)
Dept: LAB | Age: 21
End: 2021-01-28

## 2021-01-28 ENCOUNTER — OFFICE VISIT (OUTPATIENT)
Dept: PSYCHIATRY | Age: 21
End: 2021-01-28
Payer: COMMERCIAL

## 2021-01-28 VITALS
DIASTOLIC BLOOD PRESSURE: 78 MMHG | WEIGHT: 267.4 LBS | OXYGEN SATURATION: 99 % | RESPIRATION RATE: 16 BRPM | SYSTOLIC BLOOD PRESSURE: 126 MMHG | TEMPERATURE: 97.2 F | HEART RATE: 81 BPM | BODY MASS INDEX: 39.49 KG/M2

## 2021-01-28 DIAGNOSIS — F43.9 TRAUMA AND STRESSOR-RELATED DISORDER: Primary | ICD-10-CM

## 2021-01-28 DIAGNOSIS — N93.9 VAGINAL BLEEDING: Primary | ICD-10-CM

## 2021-01-28 DIAGNOSIS — E55.9 HYPOVITAMINOSIS D: ICD-10-CM

## 2021-01-28 DIAGNOSIS — R10.9 ABDOMINAL PAIN, UNSPECIFIED ABDOMINAL LOCATION: ICD-10-CM

## 2021-01-28 DIAGNOSIS — N93.9 VAGINAL BLEEDING: ICD-10-CM

## 2021-01-28 LAB
CONTROL: NORMAL
HCG,BETA SUBUNIT,QUAL,SERUM: < 0.1 MIU/ML (ref 0–5)
HCT VFR BLD CALC: 41.3 % (ref 37–47)
HEMOGLOBIN: 13.1 GM/DL (ref 12–16)
PREGNANCY TEST URINE, POC: NEGATIVE

## 2021-01-28 PROCEDURE — 90834 PSYTX W PT 45 MINUTES: CPT | Performed by: PSYCHOLOGIST

## 2021-01-28 PROCEDURE — 99213 OFFICE O/P EST LOW 20 MIN: CPT | Performed by: STUDENT IN AN ORGANIZED HEALTH CARE EDUCATION/TRAINING PROGRAM

## 2021-01-28 PROCEDURE — 81025 URINE PREGNANCY TEST: CPT | Performed by: FAMILY MEDICINE

## 2021-01-28 RX ORDER — PNV NO.95/FERROUS FUM/FOLIC AC 28MG-0.8MG
1 TABLET ORAL DAILY
Qty: 90 TABLET | Refills: 3 | Status: SHIPPED | OUTPATIENT
Start: 2021-01-28 | End: 2021-01-28

## 2021-01-28 RX ORDER — PNV NO.95/FERROUS FUM/FOLIC AC 28MG-0.8MG
1 TABLET ORAL DAILY
Qty: 90 TABLET | Refills: 3 | Status: SHIPPED | OUTPATIENT
Start: 2021-01-28 | End: 2021-08-02 | Stop reason: SDUPTHER

## 2021-01-28 RX ORDER — MELATONIN
2000 DAILY
Qty: 90 TABLET | Refills: 3 | Status: SHIPPED | OUTPATIENT
Start: 2021-01-28 | End: 2021-01-28

## 2021-01-28 RX ORDER — MELATONIN
2000 DAILY
Qty: 90 TABLET | Refills: 3 | Status: SHIPPED | OUTPATIENT
Start: 2021-01-28

## 2021-01-28 ASSESSMENT — ENCOUNTER SYMPTOMS
NAUSEA: 1
SHORTNESS OF BREATH: 0
DIARRHEA: 0
VOMITING: 0
EYE PAIN: 0
ABDOMINAL PAIN: 0
BLOOD IN STOOL: 0
COUGH: 0
TROUBLE SWALLOWING: 0
CONSTIPATION: 0

## 2021-01-28 NOTE — PROGRESS NOTES
Drea Gage (:  2000) is a 23 y.o. female,Established patient, here for evaluation of the following chief complaint(s):  Abdominal Pain (left side) and Other (2 positive pregnancy test and started bleeding yesterday. )      ASSESSMENT/PLAN:  1. Vaginal bleeding  Negative urine preg in office. Suspect this is her normal menstrual cycle bleeding considering pattern of home pregnancy tests as below and timing of bleeding. Will get anticardiolipin abs to further assess for antiphospholipid syndrome consdering multiple similar events. Labs as below. Per USPSTF guidelines, will start her on prenatal vitamins. She is advised to follow up with OBGYN as scheduled on 2021 for further evaluation and management as she would like to become pregnant.  -     POCT urine pregnancy  -     Hemoglobin and Hematocrit, Blood; Future  -     HCG, Quantitative, Pregnancy; Future  -     Prenatal Vit-Fe Fumarate-FA (PRENATAL VITAMIN) 27-0.8 MG TABS; Take 1 tablet by mouth daily, Disp-90 tablet, R-3Normal  -     vitamin D3 (CHOLECALCIFEROL) 25 MCG (1000 UT) TABS tablet; Take 2 tablets by mouth daily, Disp-90 tablet, R-3Normal  -     Cardiolipin Antibodies IgG & IgM; Future  2. Abdominal pain, unspecified abdominal location  As above. -     POCT urine pregnancy  -     Hemoglobin and Hematocrit, Blood; Future  -     HCG, Quantitative, Pregnancy; Future  -     Prenatal Vit-Fe Fumarate-FA (PRENATAL VITAMIN) 27-0.8 MG TABS; Take 1 tablet by mouth daily, Disp-90 tablet, R-3Normal  -     vitamin D3 (CHOLECALCIFEROL) 25 MCG (1000 UT) TABS tablet; Take 2 tablets by mouth daily, Disp-90 tablet, R-3Normal  -     Cardiolipin Antibodies IgG & IgM; Future  3. Hypovitaminosis D  Supplementation ordered. Should improve chronic fatigue as well. -     vitamin D3 (CHOLECALCIFEROL) 25 MCG (1000 UT) TABS tablet;  Take 2 tablets by mouth daily, Disp-90 tablet, R-3Normal      Return in about 3 months (around 2021) for follow up with  [facility].    SUBJECTIVE/OBJECTIVE:  HPI     2 home pregnancy test were positive. First positive was on the 21st. Had a negative one on 24th. Maybe another positive on the 26th but not sure. Pinkish discharge, heavy bleeding at this time, having right lower abdominal cramps. Did have big clots as well. Bleeding started yesterday. Prior to this: was getting period every 28-30 days. Last period started 12/19/2020. Ended on 12/24/2020. Usually last 4-6 days. No fevers. Chronic nausea. Is sexually active with fiancee. No other partners. Does have a history of blood clots, 2-3 PE in the past. Using pull out method as best as possible. Does track ovulation at home. Is seeing OBGYN on 2/19/2021. Review of Systems   Constitutional: Positive for fever. Negative for chills and fatigue. HENT: Negative for ear pain, postnasal drip and trouble swallowing. Eyes: Negative for pain and visual disturbance. Respiratory: Negative for cough and shortness of breath. Cardiovascular: Negative for chest pain and palpitations. Gastrointestinal: Positive for nausea. Negative for abdominal pain, blood in stool, constipation, diarrhea and vomiting. Genitourinary: Positive for vaginal bleeding. Negative for dysuria. Skin: Negative for rash. Neurological: Negative for headaches. Physical Exam  Vitals signs and nursing note reviewed. Constitutional:       General: She is not in acute distress. Appearance: She is well-developed. She is not diaphoretic. HENT:      Head: Normocephalic and atraumatic. Right Ear: External ear normal.      Left Ear: External ear normal.      Nose: Nose normal.   Eyes:      General: No scleral icterus. Right eye: No discharge. Left eye: No discharge. Conjunctiva/sclera: Conjunctivae normal.   Neck:      Musculoskeletal: Normal range of motion. Cardiovascular:      Rate and Rhythm: Normal rate and regular rhythm.       Heart sounds:

## 2021-01-28 NOTE — LETTER
[facility] [address],[address] [facility] [address] [place] [address]  Phone: [phone]  Fax: [phone]    Madina Reyes MD        January 28, 2021     Patient: Miah Cutler   YOB: 2000   Date of Visit: 1/28/2021       To Whom it May Concern:    Tete Vaughn was seen in my clinic on 1/28/2021. She may return to work on 1/29/2021. If you have any questions or concerns, please don't hesitate to call.     Sincerely,         Madina Reyes MD

## 2021-01-28 NOTE — PROGRESS NOTES
S: 23 y.o. female with   Chief Complaint   Patient presents with    Abdominal Pain     left side    Other     2 positive pregnancy test and started bleeding yesterday. HPI: today's preg test is negative in the office. 4 preg test at home, last one 2-4 days ago. 2 days ago bleeding with clots. Happening on and off for years. Now having lower abd pain and cramping. Chronic nausea. LMP 39 days ago. BP Readings from Last 3 Encounters:   01/28/21 126/78   12/07/20 129/84   12/02/20 109/72     Wt Readings from Last 3 Encounters:   01/28/21 267 lb 6.4 oz (121.3 kg)   12/07/20 265 lb (120.2 kg)   12/02/20 263 lb (119.3 kg)           O: VS:  weight is 267 lb 6.4 oz (121.3 kg). Her skin temperature is 97.2 °F (36.2 °C). Her blood pressure is 126/78 and her pulse is 81. Her respiration is 16 and oxygen saturation is 99%. Diagnosis Orders   1. Abdominal pain, unspecified abdominal location  POCT urine pregnancy   2. Vaginal bleeding  POCT urine pregnancy       Plan:  Serum preg test ordered. Cbc ordered. F/u with GYN for better method of contraception. Health Maintenance Due   Topic Date Due    Hepatitis C screen  2000    Cervical cancer screen  11/12/2020         Attending Physician Statement  I have discussed the case, including pertinent history and exam findings with the resident. I agree with the documented assessment and plan as documented by the resident.         Enrrique Mendez DO 1/28/2021 9:34 AM

## 2021-01-28 NOTE — PROGRESS NOTES
Health Maintenance Due   Topic Date Due    Hepatitis C screen  2000    HPV vaccine (1 - 2-dose series) 11/12/2010 patient unsure, patient declines    DTaP/Tdap/Td vaccine (1 - Tdap) 11/12/2018 patient unsure, declines today    Flu vaccine (1) 09/01/2020 patient declines    Cervical cancer screen  11/12/2020 patient is due.

## 2021-01-29 NOTE — PROGRESS NOTES
This note will not be viewable in [employer] [facility] for the following reason(s). This is a Psychotherapy Note. PSYCHOLOGICAL FOLLOW-UP FOR trauma:       History of Presenting Illness:   Ms. Thea Mcfarlane is a 23year-old, female with PMH of chronic migraine, PE, anxiety, chronic GERD, hypertension, and obesity who was referred by Vivek Bermudez for mood. Today is her second follow up for trauma focused therapy. Assessment:  Patient reports that she is currently experiencing a miscarriage and has an OB appointment after this appointment. She also reported that her godfather was given 2 weeks to live and passed away last night from stage 3 lung cancer. Patient said she thought about the trauma index event for 2 days before other stressors filled her mind. Patient reports she has been sad and grieving about recent life stressors. PSYCHIATRIC EXAM  General appearance: dressed appropriately, morbidly obese  Attitude & Behavior: calm and cooperative  Motor Activity & Musculoskeletal: intact  Speech & Language: normal rate, volume, and prosody  Gait & Station: sitting  Mood: sad and grieving  Affect: congruent with mood, appropriate to setting  Thought content: appropriate  Suicidal ideation: denies  Homicidal ideation: denies  Thought process & Associations: logical, coherent, goal-directed  Perceptions: appropriate  Orientation: to person, place, and time  Attention & Concentration: appears intact  Fund of knowledge: average  Insight: average  Judgment: average    DSM DIAGNOSIS:  Trauma and stressor related disorder     INTERVENTION:  Completed session 2 of 5 written exposure therapy sessions today following written exposure protocol. Patient verbally identified index trauma included the distress of being alienated from her parents and family. SUDS rating prior to writing was 20/100 and SUDS rating after writing remained the same at 20/100 per patient report. Due to recent life events, suicidality was also assessed. Patient denies suicidal ideation, plan and intent. She identified her boyfriend and family as her reasons for living. SUMMARY/PLAN: Patient was actively engaged during today's visit. She has multiple follow up appointments scheduled with Psychology.        Denzel Britton MSN, BSN, RN, Psychiatric Nurse Practitioner Student, [facility]    Time in: 8:10am  Time out:  9:00am

## 2021-01-30 NOTE — RESULT ENCOUNTER NOTE
Hemoglobin is normal. Blood pregnancy hormone level testing is negative - this means you likely were not pregnant and that your current bleeding is your usual menstrual cycle. Thank you for getting labs done.

## 2021-01-31 LAB
CARDIOLIPIN AB IGM: 6 MPL (ref 0–12)
CARDIOLIPIN ANTIBODY, IGG: 5 GPL (ref 0–14)

## 2021-02-18 RX ORDER — ALBUTEROL SULFATE 90 UG/1
2 AEROSOL, METERED RESPIRATORY (INHALATION) 4 TIMES DAILY PRN
Qty: 1 INHALER | Refills: 0 | Status: SHIPPED | OUTPATIENT
Start: 2021-02-18 | End: 2021-10-21 | Stop reason: SDUPTHER

## 2021-02-18 NOTE — TELEPHONE ENCOUNTER
Recent Visits  Date Type Provider Dept   01/28/21 Office Visit James Rowan MD [facility]   10/19/20 Office Visit Catracho Julian, [address]   06/03/20 Office Visit KARLA Vazquez CNP  [facility]   03/26/20 Office Visit Chasity Hernández   02/21/20 Office Visit KARLA Vazquez CNP [facility]   02/13/20 Office Visit Chasity Hernández   01/23/20 Office Visit KARLA Vazquez CNP 59   12/23/19 Office Visit KARLA Vazquez CNP [facility]   11/01/19 Office Visit KARLA Jamison CNP [facility]   10/28/19 Office Visit KARLA Vazquez - CNP [facility]   Showing recent visits within past 540 days with a meds authorizing provider and meeting all other requirements     Future Appointments  Date Type Provider Dept   04/19/21 Appointment Catracho Julian, [address]   Showing future appointments within next 150 days with a meds authorizing provider and meeting all other requirements

## 2021-02-24 ENCOUNTER — TELEPHONE (OUTPATIENT)
Dept: PSYCHIATRY | Age: 21
End: 2021-02-24

## 2021-02-25 ENCOUNTER — APPOINTMENT (OUTPATIENT)
Dept: GENERAL RADIOLOGY | Age: 21
End: 2021-02-25
Payer: COMMERCIAL

## 2021-02-25 ENCOUNTER — HOSPITAL ENCOUNTER (EMERGENCY)
Age: 21
Discharge: HOME | End: 2021-02-25
Attending: EMERGENCY MEDICINE
Payer: COMMERCIAL

## 2021-02-25 VITALS
BODY MASS INDEX: 39.25 KG/M2 | TEMPERATURE: 98 F | HEIGHT: 69 IN | SYSTOLIC BLOOD PRESSURE: 125 MMHG | OXYGEN SATURATION: 99 % | WEIGHT: 265 LBS | RESPIRATION RATE: 12 BRPM | HEART RATE: 82 BPM | DIASTOLIC BLOOD PRESSURE: 82 MMHG

## 2021-02-25 DIAGNOSIS — R19.7 NAUSEA VOMITING AND DIARRHEA: Primary | ICD-10-CM

## 2021-02-25 DIAGNOSIS — R11.2 NAUSEA VOMITING AND DIARRHEA: Primary | ICD-10-CM

## 2021-02-25 DIAGNOSIS — R10.13 ABDOMINAL PAIN, EPIGASTRIC: ICD-10-CM

## 2021-02-25 LAB
ALBUMIN SERPL-MCNC: 4.3 G/DL (ref 3.5–5.1)
ALP BLD-CCNC: 72 U/L (ref 38–126)
ALT SERPL-CCNC: 33 U/L (ref 11–66)
ANION GAP SERPL CALCULATED.3IONS-SCNC: 7 MEQ/L (ref 8–16)
AST SERPL-CCNC: 27 U/L (ref 5–40)
BASOPHILS # BLD: 0.5 %
BASOPHILS ABSOLUTE: 0 THOU/MM3 (ref 0–0.1)
BILIRUB SERPL-MCNC: 0.6 MG/DL (ref 0.3–1.2)
BILIRUBIN DIRECT: < 0.2 MG/DL (ref 0–0.3)
BILIRUBIN URINE: NEGATIVE
BLOOD, URINE: NEGATIVE
BUN BLDV-MCNC: 14 MG/DL (ref 7–22)
CALCIUM SERPL-MCNC: 9.1 MG/DL (ref 8.5–10.5)
CHARACTER, URINE: CLEAR
CHLORIDE BLD-SCNC: 105 MEQ/L (ref 98–111)
CO2: 26 MEQ/L (ref 23–33)
COLOR: YELLOW
CREAT SERPL-MCNC: 0.6 MG/DL (ref 0.4–1.2)
EKG ATRIAL RATE: 68 BPM
EKG P AXIS: 34 DEGREES
EKG P-R INTERVAL: 138 MS
EKG Q-T INTERVAL: 380 MS
EKG QRS DURATION: 88 MS
EKG QTC CALCULATION (BAZETT): 404 MS
EKG R AXIS: 41 DEGREES
EKG T AXIS: 34 DEGREES
EKG VENTRICULAR RATE: 68 BPM
EOSINOPHIL # BLD: 1.2 %
EOSINOPHILS ABSOLUTE: 0.1 THOU/MM3 (ref 0–0.4)
ERYTHROCYTE [DISTWIDTH] IN BLOOD BY AUTOMATED COUNT: 12.8 % (ref 11.5–14.5)
ERYTHROCYTE [DISTWIDTH] IN BLOOD BY AUTOMATED COUNT: 43.5 FL (ref 35–45)
GFR SERPL CREATININE-BSD FRML MDRD: > 90 ML/MIN/1.73M2
GLUCOSE BLD-MCNC: 99 MG/DL (ref 70–108)
GLUCOSE URINE: NEGATIVE MG/DL
HCT VFR BLD CALC: 42.9 % (ref 37–47)
HEMOGLOBIN: 13.6 GM/DL (ref 12–16)
IMMATURE GRANS (ABS): 0.02 THOU/MM3 (ref 0–0.07)
IMMATURE GRANULOCYTES: 0.3 %
KETONES, URINE: NEGATIVE
LEUKOCYTE ESTERASE, URINE: NEGATIVE
LIPASE: 28.9 U/L (ref 5.6–51.3)
LYMPHOCYTES # BLD: 22.5 %
LYMPHOCYTES ABSOLUTE: 1.5 THOU/MM3 (ref 1–4.8)
MCH RBC QN AUTO: 29.4 PG (ref 26–33)
MCHC RBC AUTO-ENTMCNC: 31.7 GM/DL (ref 32.2–35.5)
MCV RBC AUTO: 92.9 FL (ref 81–99)
MONOCYTES # BLD: 5.6 %
MONOCYTES ABSOLUTE: 0.4 THOU/MM3 (ref 0.4–1.3)
NITRITE, URINE: NEGATIVE
NUCLEATED RED BLOOD CELLS: 0 /100 WBC
OSMOLALITY CALCULATION: 276.2 MOSMOL/KG (ref 275–300)
PH UA: 8 (ref 5–9)
PLATELET # BLD: 210 THOU/MM3 (ref 130–400)
PMV BLD AUTO: 10.2 FL (ref 9.4–12.4)
POTASSIUM REFLEX MAGNESIUM: 4.4 MEQ/L (ref 3.5–5.2)
PREGNANCY, SERUM: NEGATIVE
PROTEIN UA: NEGATIVE
RBC # BLD: 4.62 MILL/MM3 (ref 4.2–5.4)
SEG NEUTROPHILS: 69.9 %
SEGMENTED NEUTROPHILS ABSOLUTE COUNT: 4.5 THOU/MM3 (ref 1.8–7.7)
SODIUM BLD-SCNC: 138 MEQ/L (ref 135–145)
SPECIFIC GRAVITY, URINE: 1.02 (ref 1–1.03)
TOTAL PROTEIN: 7.6 G/DL (ref 6.1–8)
UROBILINOGEN, URINE: 0.2 EU/DL (ref 0–1)
WBC # BLD: 6.5 THOU/MM3 (ref 4.8–10.8)

## 2021-02-25 PROCEDURE — 36415 COLL VENOUS BLD VENIPUNCTURE: CPT

## 2021-02-25 PROCEDURE — 93010 ELECTROCARDIOGRAM REPORT: CPT | Performed by: NUCLEAR MEDICINE

## 2021-02-25 PROCEDURE — 2580000003 HC RX 258: Performed by: STUDENT IN AN ORGANIZED HEALTH CARE EDUCATION/TRAINING PROGRAM

## 2021-02-25 PROCEDURE — 83690 ASSAY OF LIPASE: CPT

## 2021-02-25 PROCEDURE — 6360000002 HC RX W HCPCS: Performed by: STUDENT IN AN ORGANIZED HEALTH CARE EDUCATION/TRAINING PROGRAM

## 2021-02-25 PROCEDURE — 85025 COMPLETE CBC W/AUTO DIFF WBC: CPT

## 2021-02-25 PROCEDURE — 96374 THER/PROPH/DIAG INJ IV PUSH: CPT

## 2021-02-25 PROCEDURE — 80076 HEPATIC FUNCTION PANEL: CPT

## 2021-02-25 PROCEDURE — 81003 URINALYSIS AUTO W/O SCOPE: CPT

## 2021-02-25 PROCEDURE — 80048 BASIC METABOLIC PNL TOTAL CA: CPT

## 2021-02-25 PROCEDURE — 96375 TX/PRO/DX INJ NEW DRUG ADDON: CPT

## 2021-02-25 PROCEDURE — 99284 EMERGENCY DEPT VISIT MOD MDM: CPT

## 2021-02-25 PROCEDURE — 71045 X-RAY EXAM CHEST 1 VIEW: CPT

## 2021-02-25 PROCEDURE — 84703 CHORIONIC GONADOTROPIN ASSAY: CPT

## 2021-02-25 PROCEDURE — 93005 ELECTROCARDIOGRAM TRACING: CPT | Performed by: STUDENT IN AN ORGANIZED HEALTH CARE EDUCATION/TRAINING PROGRAM

## 2021-02-25 RX ORDER — DIPHENHYDRAMINE HYDROCHLORIDE 50 MG/ML
25 INJECTION INTRAMUSCULAR; INTRAVENOUS ONCE
Status: COMPLETED | OUTPATIENT
Start: 2021-02-25 | End: 2021-02-25

## 2021-02-25 RX ORDER — ONDANSETRON 2 MG/ML
4 INJECTION INTRAMUSCULAR; INTRAVENOUS ONCE
Status: COMPLETED | OUTPATIENT
Start: 2021-02-25 | End: 2021-02-25

## 2021-02-25 RX ORDER — METOCLOPRAMIDE HYDROCHLORIDE 5 MG/ML
10 INJECTION INTRAMUSCULAR; INTRAVENOUS ONCE
Status: COMPLETED | OUTPATIENT
Start: 2021-02-25 | End: 2021-02-25

## 2021-02-25 RX ORDER — 0.9 % SODIUM CHLORIDE 0.9 %
1000 INTRAVENOUS SOLUTION INTRAVENOUS ONCE
Status: COMPLETED | OUTPATIENT
Start: 2021-02-25 | End: 2021-02-25

## 2021-02-25 RX ORDER — DICYCLOMINE HYDROCHLORIDE 10 MG/ML
10 INJECTION INTRAMUSCULAR ONCE
Status: DISCONTINUED | OUTPATIENT
Start: 2021-02-25 | End: 2021-02-25

## 2021-02-25 RX ADMIN — SODIUM CHLORIDE 1000 ML: 9 INJECTION, SOLUTION INTRAVENOUS at 08:00

## 2021-02-25 RX ADMIN — METOCLOPRAMIDE 10 MG: 5 INJECTION, SOLUTION INTRAMUSCULAR; INTRAVENOUS at 08:04

## 2021-02-25 RX ADMIN — ONDANSETRON 4 MG: 2 INJECTION INTRAMUSCULAR; INTRAVENOUS at 08:00

## 2021-02-25 RX ADMIN — DIPHENHYDRAMINE HYDROCHLORIDE 25 MG: 50 INJECTION, SOLUTION INTRAMUSCULAR; INTRAVENOUS at 08:03

## 2021-02-25 ASSESSMENT — ENCOUNTER SYMPTOMS
BLOOD IN STOOL: 0
NAUSEA: 1
SHORTNESS OF BREATH: 0
ABDOMINAL PAIN: 1
RHINORRHEA: 0
COUGH: 1
SINUS PAIN: 0
VOMITING: 1
SORE THROAT: 0
EYE REDNESS: 0
BACK PAIN: 0
DIARRHEA: 1

## 2021-02-25 ASSESSMENT — PAIN DESCRIPTION - FREQUENCY: FREQUENCY: CONTINUOUS

## 2021-02-25 ASSESSMENT — PAIN SCALES - GENERAL: PAINLEVEL_OUTOF10: 6

## 2021-02-25 ASSESSMENT — PAIN DESCRIPTION - LOCATION: LOCATION: ABDOMEN

## 2021-02-25 ASSESSMENT — PAIN DESCRIPTION - DESCRIPTORS: DESCRIPTORS: CRAMPING;ACHING

## 2021-02-25 NOTE — ED PROVIDER NOTES
for back pain. Skin: Negative for rash. Neurological: Negative for dizziness, light-headedness and headaches. Psychiatric/Behavioral: Negative for agitation. PAST MEDICAL AND SURGICAL HISTORY     Past Medical History:   Diagnosis Date    Anxiety     Chronic GERD     Chronic migraine     takes verapamil    Depression     Gallstones 10/2019    Gastroparesis     diagnosis as a child    Hx of blood clots     PE     Hypertension     Obesity     Pulmonary embolism (HCC)     8 months ago-was on Xarelto-sees Dr Cate Khan in [facility]      Past Surgical History:   Procedure Laterality Date    CHOLECYSTECTOMY, LAPAROSCOPIC N/A 10/30/2019    ROBOTIC LAP BRANNON performed by Gabe Mercedes MD at [address] [place] [address] Dr Right    [address]   No current facility-administered medications for this encounter.      Current Outpatient Medications:     albuterol sulfate HFA (VENTOLIN HFA) 108 (90 Base) MCG/ACT inhaler, Inhale 2 puffs into the lungs 4 times daily as needed for Wheezing, Disp: 1 Inhaler, Rfl: 0    Prenatal Vit-Fe Fumarate-FA (PRENATAL VITAMIN) 27-0.8 MG TABS, Take 1 tablet by mouth daily, Disp: 90 tablet, Rfl: 3    vitamin D3 (CHOLECALCIFEROL) 25 MCG (1000 UT) TABS tablet, Take 2 tablets by mouth daily, Disp: 90 tablet, Rfl: 3    topiramate (TOPAMAX) 25 MG tablet, Take 25 mg by mouth, Disp: , Rfl:     ondansetron (ZOFRAN) 4 MG tablet, Take 1 tablet by mouth every 8 hours as needed for Nausea or Vomiting, Disp: 30 tablet, Rfl: 0    Blood Pressure Monitoring (OMRON 5 SERIES BP MONITOR) SHAWANDA, , Disp: , Rfl:     Blood Pressure Monitor KIT, Take blood pressure daily, Disp: 1 kit, Rfl: 0      SOCIAL HISTORY     Social History     Social History Narrative    Not on file     Social History     Tobacco Use    Smoking status: Never Smoker    Smokeless tobacco: Never Used   Substance Use Topics    Alcohol use: Never Frequency: Never    Drug use: Never         ALLERGIES     Allergies   Allergen Reactions    Imitrex [Sumatriptan] Shortness Of Breath     Pt reports hot flashes and chest pain also. FAMILY HISTORY     Family History   Problem Relation Age of Onset   [facility] Migraines Mother     Lupus Mother     High Blood Pressure Father     Thyroid Disease Father     Other Father         gerd    Cancer Father         skin    No Known Problems Brother     Colon Polyps Maternal Grandmother     No Known Problems Maternal Grandfather     No Known Problems Paternal Grandmother     Colon Polyps Paternal Grandfather     No Known Problems Brother     Colon Cancer Neg Hx     Esophageal Cancer Neg Hx          PREVIOUS RECORDS   Previous records reviewed: Patient seen here on 12/7/2020 for COVID-19. PHYSICAL EXAM     ED Triage Vitals [02/25/21 0738]   BP Temp Temp Source Pulse Resp SpO2 Height Weight   130/86 98 °F (36.7 °C) Oral 77 15 98 % 5' 9\" (1.753 m) 265 lb (120.2 kg)     Initial vital signs and nursing assessment reviewed and normal. Pulsoximetry is normal per my interpretation. Additional Vital Signs:  Vitals:    02/25/21 0921   BP: 125/82   Pulse: 82   Resp: 12   Temp:    SpO2: 99%       Physical Exam  Vitals signs and nursing note reviewed. Constitutional:       General: She is not in acute distress. Appearance: Normal appearance. She is not toxic-appearing. HENT:      Head: Normocephalic and atraumatic. Right Ear: External ear normal.      Left Ear: External ear normal.      Nose: Nose normal. No congestion or rhinorrhea. Mouth/Throat:      Mouth: Mucous membranes are moist.      Pharynx: Oropharynx is clear. Eyes:      General: No scleral icterus. Extraocular Movements: Extraocular movements intact. Conjunctiva/sclera: Conjunctivae normal.      Pupils: Pupils are equal, round, and reactive to light. Neck:      Musculoskeletal: Normal range of motion and neck supple.  No All other components within normal limits   BASIC METABOLIC PANEL W/ REFLEX TO MG FOR LOW K   HEPATIC FUNCTION PANEL   LIPASE   URINE RT REFLEX TO CULTURE   HCG, SERUM, QUALITATIVE   OSMOLALITY   GLOMERULAR FILTRATION RATE, ESTIMATED       Radiologic studies results:  XR CHEST PORTABLE   Final Result   No acute cardiopulmonary process. **This report has been created using voice recognition software. It may contain minor errors which are inherent in voice recognition technology. **      Final report electronically signed by Dr. Racheal Coyne on 2/25/2021 8:38 AM          ED Medications administered this visit:   Medications   ondansetron [facility]) injection 4 mg (4 mg Intravenous Given 2/25/21 0800)   0.9 % sodium chloride bolus (1,000 mLs Intravenous New Bag 2/25/21 0800)   metoclopramide (REGLAN) injection 10 mg (10 mg Intravenous Given 2/25/21 0804)   diphenhydrAMINE (BENADRYL) injection 25 mg (25 mg Intravenous Given 2/25/21 0803)         ED COURSE     ED Course as of Feb 25 1001   Thu Feb 25, 2021   0817 Within normal limits   CBC Auto Differential(!):    WBC 6.5   RBC 4.62   Hemoglobin Quant 13.6   Hematocrit 42.9   MCV 92.9   MCH 29.4   MCHC 31.7(!)   RDW-CV 12.8   RDW-SD 43.5   Platelet Count 008   MPV 10.2   Seg Neutrophils 69.9   Lymphocytes 22.5   Monocytes 5.6   Eosinophils 1.2   Basophils 0.5   Immature Granulocytes 0.3   Segs Absolute 4.5   Lymphocytes Absolute 1.5   Monocytes Absolute 0.4   Eosinophils Absolute 0.1   Basophils Absolute 0.0   Immature Grans (Abs) 0.02   Nucleated Red Blood Cells 0 [AL]   0817 Preg, Serum: NEGATIVE [AL]   0819 Lipase: 28.9 [AL]   0819 Within normal limits   Basic Metabolic Panel w/ Reflex to MG:    Sodium 138   Potassium 4.4   Chloride 105   CO2 26   Glucose 99   BUN 14   Creatinine 0.6   Calcium 9.1 [AL]   0819 Within normal limts   Hepatic Function Panel:    Albumin 4.3   Bilirubin 0.6   Bilirubin, Direct <0.2   Alk Phos 72   AST 27   ALT 33   Total Protein 7.6 [AL]   0955 Urinalysis is negative for any signs of infection. Urinalysis Reflex to Culture:    Glucose, UA NEGATIVE   Bilirubin, Urine NEGATIVE   Ketones, Urine NEGATIVE   Specific Gravity, Urine 1.017   Blood, Urine NEGATIVE   pH, UA 8.0   Protein, UA NEGATIVE   Urobilinogen, Urine 0.2   Nitrite, Urine NEGATIVE   Leukocyte Esterase, Urine NEGATIVE   Color, UA YELLOW   Character, Urine CLEAR [AL]   1001 Patient will be discharged home given strict return precautions and will follow up with her primary care physician as well as her OB. [AL]      ED Course User Index  [AL] Eliana Velazquez MD     Strict return precautions and follow up instructions were discussed with the patient prior to discharge, with which the patient agrees. MEDICATION CHANGES     New Prescriptions    No medications on file         FINAL DISPOSITION     Final diagnoses:   Abdominal pain, epigastric   Nausea vomiting and diarrhea     Condition: condition: good  Dispo: Discharge to home      This transcription was electronically signed. Parts of this transcriptions may have been dictated by use of voice recognition software and electronically transcribed, and parts may have been transcribed with the assistance of an ED scribe. The transcription may contain errors not detected in proofreading. Please refer to my supervising physician's documentation if my documentation differs.     Electronically Signed: Eliana Velazquez, 02/25/21, 10:01 AM         Eliana Velazquez MD  Resident  02/25/21 5687

## 2021-02-25 NOTE — ED TRIAGE NOTES
Pt presents to ED c.c abdominal pain and cramping that started 2-3 days ago and intermittent emesis that started yesterday. Pt states she has vomited 5-6 times. Pt has hx of gastroparesis.

## 2021-05-25 ENCOUNTER — HOSPITAL ENCOUNTER (OUTPATIENT)
Dept: CT IMAGING | Age: 21
Discharge: HOME | End: 2021-05-25
Payer: COMMERCIAL

## 2021-05-25 ENCOUNTER — OFFICE VISIT (OUTPATIENT)
Dept: FAMILY MEDICINE CLINIC | Age: 21
End: 2021-05-25
Payer: COMMERCIAL

## 2021-05-25 VITALS
TEMPERATURE: 98.2 F | WEIGHT: 267.8 LBS | HEIGHT: 69 IN | HEART RATE: 82 BPM | OXYGEN SATURATION: 98 % | SYSTOLIC BLOOD PRESSURE: 102 MMHG | DIASTOLIC BLOOD PRESSURE: 68 MMHG | RESPIRATION RATE: 18 BRPM | BODY MASS INDEX: 39.66 KG/M2

## 2021-05-25 DIAGNOSIS — R07.9 CHEST PAIN, UNSPECIFIED TYPE: Primary | ICD-10-CM

## 2021-05-25 DIAGNOSIS — R07.9 CHEST PAIN, UNSPECIFIED TYPE: ICD-10-CM

## 2021-05-25 DIAGNOSIS — Z86.711 HISTORY OF PULMONARY EMBOLUS (PE): ICD-10-CM

## 2021-05-25 PROCEDURE — 6360000004 HC RX CONTRAST MEDICATION: Performed by: STUDENT IN AN ORGANIZED HEALTH CARE EDUCATION/TRAINING PROGRAM

## 2021-05-25 PROCEDURE — 93000 ELECTROCARDIOGRAM COMPLETE: CPT | Performed by: STUDENT IN AN ORGANIZED HEALTH CARE EDUCATION/TRAINING PROGRAM

## 2021-05-25 PROCEDURE — 99214 OFFICE O/P EST MOD 30 MIN: CPT | Performed by: STUDENT IN AN ORGANIZED HEALTH CARE EDUCATION/TRAINING PROGRAM

## 2021-05-25 PROCEDURE — 71275 CT ANGIOGRAPHY CHEST: CPT

## 2021-05-25 RX ADMIN — IOPAMIDOL 80 ML: 755 INJECTION, SOLUTION INTRAVENOUS at 12:49

## 2021-05-25 SDOH — ECONOMIC STABILITY: FOOD INSECURITY: WITHIN THE PAST 12 MONTHS, THE FOOD YOU BOUGHT JUST DIDN'T LAST AND YOU DIDN'T HAVE MONEY TO GET MORE.: SOMETIMES TRUE

## 2021-05-25 ASSESSMENT — ENCOUNTER SYMPTOMS
BLOOD IN STOOL: 0
COUGH: 0
DIARRHEA: 0
CHEST TIGHTNESS: 1
SHORTNESS OF BREATH: 1
ABDOMINAL PAIN: 0
TROUBLE SWALLOWING: 0
CONSTIPATION: 0
EYE PAIN: 0
NAUSEA: 0
VOMITING: 0

## 2021-05-25 ASSESSMENT — SOCIAL DETERMINANTS OF HEALTH (SDOH): HOW HARD IS IT FOR YOU TO PAY FOR THE VERY BASICS LIKE FOOD, HOUSING, MEDICAL CARE, AND HEATING?: SOMEWHAT HARD

## 2021-05-25 NOTE — PROGRESS NOTES
Health Maintenance Due   Topic Date Due    Hepatitis C screen  Never done    COVID-19 Vaccine (1) Never done    Cervical cancer screen  Never done    Chlamydia screen  03/19/2021     Pt aware.      [facility] [address]

## 2021-05-25 NOTE — PROGRESS NOTES
[address] [facility] [address] [address]  Dept: [phone]  Loc: [phone]      Maureen Aguilar (:  2000) is a 23 y.o. female,Established patient, here for evaluation of the following chief complaint(s):  Hip Pain (left side - left shoulder, lower back and chest pain started yesterday, HX blood clots) and Letter for School/Work (works for [employer])      ASSESSMENT/PLAN:  1. Chest pain, unspecified type  -     EKG 12 Lead; Standing  -     EKG 12 Lead  -     CTA CHEST W WO CONTRAST; Future  2. History of pulmonary embolus (PE)  -     CTA CHEST W WO CONTRAST; Future  -     Marina Hall MD, Hematology & Oncology, [facility]    EKG reviewed: no abnormal findings. Concern for PE - STAT CTA chest with and without contrast ordered. If positive, will start xarelto. If negative, will plan to manage as MSK pain considering reproducible pain with palpation of midsternal region of chest.   Referral to hematology considering recurrent PE in the past, not on any anticoagulation. Return in about 6 weeks (around 2021). 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 Left hip popped yesterday, hurt a lot, went away, 2/3 pain. Developed chest pain, sharp, sitting it is 6/10. With movement is it 9/10. Started yesterday. Having SOB. History of clots - 2 or 3, both PE. Last one was almost 1 year ago. Is not on any anticoagulation. Initial etiology thought to be birth control, had an IUD. Currently no birth control, implanted or oral. Has been seeing cardiology. Denies any calf pain or swelling. Review of Systems   Constitutional: Negative for chills, fatigue and fever. HENT: Negative for ear pain, postnasal drip and trouble swallowing. Eyes: Negative for pain and visual disturbance.    Respiratory: Positive for chest tightness and shortness of breath. Negative for cough. Cardiovascular: Negative for chest pain and palpitations. Gastrointestinal: Negative for abdominal pain, blood in stool, constipation, diarrhea, nausea and vomiting. Genitourinary: Negative for dysuria. Musculoskeletal:        No calf swelling, redness, or pain   Skin: Negative for rash. Neurological: Negative for headaches. Physical Exam  Vitals and nursing note reviewed. Constitutional:       General: She is not in acute distress. Appearance: She is well-developed. She is not diaphoretic. HENT:      Head: Normocephalic and atraumatic. Right Ear: External ear normal.      Left Ear: External ear normal.      Nose: Nose normal.   Eyes:      General: No scleral icterus. Right eye: No discharge. Left eye: No discharge. Conjunctiva/sclera: Conjunctivae normal.   Cardiovascular:      Rate and Rhythm: Normal rate and regular rhythm. Heart sounds: Normal heart sounds. No murmur heard. Pulmonary:      Effort: Pulmonary effort is normal.      Breath sounds: Normal breath sounds. Musculoskeletal:      Cervical back: Normal range of motion. Comments: Reproducible chest pain with palpation of midsternal region. Skin:     General: Skin is warm and dry. Findings: No erythema or rash. Neurological:      Mental Status: She is alert and oriented to person, place, and time. Cranial Nerves: No cranial nerve deficit. Psychiatric:         Behavior: Behavior normal.         Thought Content: Thought content normal.         Judgment: Judgment normal.           Vitals:    05/25/21 1056   BP: 102/68   Site: Left Upper Arm   Position: Sitting   Cuff Size: Medium Adult   Pulse: 82   Resp: 18   Temp: 98.2 °F (36.8 °C)   TempSrc: Oral   SpO2: 98%   Weight: 267 lb 12.8 oz (121.5 kg)   Height: 5' 9\" (1.753 m)         An electronic signature was used to authenticate this note.     --Jesse Carlson MD

## 2021-05-25 NOTE — PROGRESS NOTES
normal.         Behavior: Behavior normal.         Thought Content: Thought content normal.         Judgment: Judgment normal.             No results found for this visit on 05/25/21. Lab Results   Component Value Date    LABA1C 4.7 02/13/2020       Lab Results   Component Value Date    CHOL 159 02/13/2020    TRIG 85 02/13/2020    HDL 46 02/13/2020    LDLCALC 96 02/13/2020       The ASCVD Risk score (Elvie Galindo et al., 2013) failed to calculate for the following reasons: The 2013 ASCVD risk score is only valid for ages 36 to 78    Lab Results   Component Value Date     02/25/2021    K 4.4 02/25/2021     02/25/2021    CO2 26 02/25/2021    BUN 14 02/25/2021    CREATININE 0.6 02/25/2021    GLUCOSE 99 02/25/2021    CALCIUM 9.1 02/25/2021    PROT 7.6 02/25/2021    LABALBU 4.3 02/25/2021    BILITOT 0.6 02/25/2021    ALKPHOS 72 02/25/2021    AST 27 02/25/2021    ALT 33 02/25/2021    LABGLOM >90 02/25/2021         Lab Results   Component Value Date    TSH 0.935 02/13/2020       Lab Results   Component Value Date    WBC 6.5 02/25/2021    HGB 13.6 02/25/2021    HCT 42.9 02/25/2021    MCV 92.9 02/25/2021     02/25/2021         There is no immunization history on file for this patient.     Health Maintenance   Topic Date Due    Hepatitis C screen  Never done    COVID-19 Vaccine (1) Never done    Cervical cancer screen  Never done    Chlamydia screen  03/19/2021    HPV vaccine (1 - 2-dose series) 01/28/2022 (Originally 11/12/2010)    DTaP/Tdap/Td vaccine (1 - Tdap) 01/28/2022 (Originally 11/12/2018)    Flu vaccine (Season Ended) 01/28/2022 (Originally 9/1/2021)    HIV screen  Completed    Hepatitis A vaccine  Aged Out    Hepatitis B vaccine  Aged Out    Hib vaccine  Aged Out    Meningococcal (ACWY) vaccine  Aged Out    Pneumococcal 0-64 years Vaccine  Aged Out    Varicella vaccine  Discontinued              Future Appointments   Date Time Provider Jerome Vasquez   6/2/2021  9:00 AM Rex Mao MD N SRPX Heart [facility] [email or web address]   6/9/2021 11:00 AM Moiz Batista APRN - CNP N Oncology [facility] [email or web address]       ASSESSMENT       Diagnosis Orders   1. Chest pain, unspecified type  EKG 12 Lead    EKG 12 Lead    EKG 12 Lead    CTA CHEST W WO CONTRAST   2. History of pulmonary embolus (PE)  CTA CHEST Silvia Abarca MD, Hematology & Oncology, [facility]      After discussion with pt and Dr. Gordon Ibrahim, we agreed on plan as follows:    Check EKG now  Check stat CTA chest with and without contrast to rule out PE at this time as patient states similar symptoms occurred with previous PE. Refer to hematology for hypercoagulation work-up -Dr. Marian Monsalve in 1 week for reevaluation. Attending Physician Statement  I have discussed the case, including pertinent history and exam findings with the resident. I also have seen the patient and performed key portions of the examination. I agree with the documented assessment and plan as documented by the resident.   GC modifier added to this encounter     Electronically signed by Erik Washington MD on 5/25/2021 at 5:34 PM

## 2021-05-25 NOTE — LETTER
[address] [address],[address] [address]  Phone: [phone]  Fax: [phone]    Laury Treviño MD        May 25, 2021     Patient: Modesto Acevedo   YOB: 2000   Date of Visit: 5/25/2021       To Whom it May Concern:    Clayton Lubin was seen in my clinic on 5/25/2021. She may return to work on 5/28/2021. If you have any questions or concerns, please don't hesitate to call.     Sincerely,           Laury Treviño MD

## 2021-06-02 ENCOUNTER — TELEPHONE (OUTPATIENT)
Dept: FAMILY MEDICINE CLINIC | Age: 21
End: 2021-06-02

## 2021-06-02 NOTE — TELEPHONE ENCOUNTER
Pt called to see if we received a fax from her employer, she has some paperwork needing to be filled out. If not she has a copy she can bring by, pt would like a call back either way at #[phone].

## 2021-06-09 ENCOUNTER — HOSPITAL ENCOUNTER (OUTPATIENT)
Dept: INFUSION THERAPY | Age: 21
Discharge: HOME | End: 2021-06-09
Payer: COMMERCIAL

## 2021-06-09 ENCOUNTER — OFFICE VISIT (OUTPATIENT)
Dept: ONCOLOGY | Age: 21
End: 2021-06-09
Payer: COMMERCIAL

## 2021-06-09 VITALS
OXYGEN SATURATION: 96 % | HEART RATE: 78 BPM | SYSTOLIC BLOOD PRESSURE: 117 MMHG | WEIGHT: 266.8 LBS | RESPIRATION RATE: 18 BRPM | TEMPERATURE: 98.1 F | HEIGHT: 69 IN | DIASTOLIC BLOOD PRESSURE: 66 MMHG | BODY MASS INDEX: 39.52 KG/M2

## 2021-06-09 DIAGNOSIS — D68.59 HYPERCOAGULABLE STATE (HCC): ICD-10-CM

## 2021-06-09 DIAGNOSIS — D68.59 HYPERCOAGULABLE STATE (HCC): Primary | ICD-10-CM

## 2021-06-09 LAB
ABSOLUTE IMMATURE GRANULOCYTE: 0.04 THOU/MM3 (ref 0–0.07)
BASINOPHIL, AUTOMATED: 0 % (ref 0–3)
BASOPHILS ABSOLUTE: 0 THOU/MM3 (ref 0–0.1)
EOSINOPHILS ABSOLUTE: 0.1 THOU/MM3 (ref 0–0.4)
EOSINOPHILS RELATIVE PERCENT: 2 % (ref 0–4)
FIBRINOGEN: 358 MG/100ML (ref 155–475)
HCT VFR BLD CALC: 41.1 % (ref 37–47)
HEMOGLOBIN: 13.7 GM/DL (ref 12–16)
HOMOCYSTEINE: 5.8 UMOL/L
IMMATURE GRANULOCYTES: 1 %
LYMPHOCYTES # BLD: 27 % (ref 15–47)
LYMPHOCYTES ABSOLUTE: 1.8 THOU/MM3 (ref 1–4.8)
MCH RBC QN AUTO: 30 PG (ref 26–33)
MCHC RBC AUTO-ENTMCNC: 33.3 GM/DL (ref 32.2–35.5)
MCV RBC AUTO: 90 FL (ref 81–99)
MONOCYTES ABSOLUTE: 0.4 THOU/MM3 (ref 0.4–1.3)
MONOCYTES: 6 % (ref 0–12)
PDW BLD-RTO: 12.3 % (ref 11.5–14.5)
PLATELET # BLD: 235 THOU/MM3 (ref 130–400)
PMV BLD AUTO: 10.4 FL (ref 9.4–12.4)
RBC # BLD: 4.56 MILL/MM3 (ref 4.2–5.4)
SEG NEUTROPHILS: 64 % (ref 43–75)
SEGMENTED NEUTROPHILS ABSOLUTE COUNT: 4.1 THOU/MM3 (ref 1.8–7.7)
WBC # BLD: 6.5 THOU/MM3 (ref 4.8–10.8)

## 2021-06-09 PROCEDURE — 85613 RUSSELL VIPER VENOM DILUTED: CPT

## 2021-06-09 PROCEDURE — 85610 PROTHROMBIN TIME: CPT

## 2021-06-09 PROCEDURE — 85025 COMPLETE CBC W/AUTO DIFF WBC: CPT

## 2021-06-09 PROCEDURE — 81240 F2 GENE: CPT

## 2021-06-09 PROCEDURE — 99204 OFFICE O/P NEW MOD 45 MIN: CPT | Performed by: NURSE PRACTITIONER

## 2021-06-09 PROCEDURE — 85300 ANTITHROMBIN III ACTIVITY: CPT

## 2021-06-09 PROCEDURE — 85303 CLOT INHIBIT PROT C ACTIVITY: CPT

## 2021-06-09 PROCEDURE — 36415 COLL VENOUS BLD VENIPUNCTURE: CPT

## 2021-06-09 PROCEDURE — 85302 CLOT INHIBIT PROT C ANTIGEN: CPT

## 2021-06-09 PROCEDURE — 85305 CLOT INHIBIT PROT S TOTAL: CPT

## 2021-06-09 PROCEDURE — 86146 BETA-2 GLYCOPROTEIN ANTIBODY: CPT

## 2021-06-09 PROCEDURE — 99211 OFF/OP EST MAY X REQ PHY/QHP: CPT

## 2021-06-09 PROCEDURE — 85385 FIBRINOGEN ANTIGEN: CPT

## 2021-06-09 PROCEDURE — 83090 ASSAY OF HOMOCYSTEINE: CPT

## 2021-06-09 PROCEDURE — 85730 THROMBOPLASTIN TIME PARTIAL: CPT

## 2021-06-09 PROCEDURE — 86147 CARDIOLIPIN ANTIBODY EA IG: CPT

## 2021-06-09 PROCEDURE — 85306 CLOT INHIBIT PROT S FREE: CPT

## 2021-06-09 PROCEDURE — 81241 F5 GENE: CPT

## 2021-06-09 RX ORDER — NAPROXEN SODIUM 220 MG
220 TABLET ORAL PRN
COMMUNITY
End: 2022-01-19 | Stop reason: ALTCHOICE

## 2021-06-09 RX ORDER — ASPIRIN 81 MG/1
81 TABLET ORAL DAILY
COMMUNITY

## 2021-06-09 NOTE — PROGRESS NOTES
[facility] [place] [facility], [address] [address],[address]  [address] [address]  Dept: [phone]  Dept Fax: [phone]: [phone]      Visit Date:6/9/2021     Kris Oliveros is a 23 y.o. female who presents today for:   Chief Complaint   Patient presents with    New Patient     Hx of PE        HPI:   Kris Oliveros is a 23 y.o. female referred to our office by her PCP for history of pulmonary embolism. Hx PEs in 2016, 2017, 2019. PMH includes cholelithiasis, chronic migraines, PE, anxiety, depression, gastroparesis, obesity with BMI 39.40, chronic GERD, HTN, Hx blood clots, COVID -19 in December 2020. Pt has not received COVID-19 vaccinations. Family history lupus, arthritis. Pt had recent admission on 5/25/2021 to ED for CP, per chart review pt had stated symptoms were similar to when she had PE, CTA (-) PE. Pt is a current smoker, used to smoke 1/2 ppd - 1 ppd, now vaping & trying to cut down. Pt is a social drinker, less than weekly. Pt denies street drug use. Pt works FT at [employer] goes to gym 3-4 x per week, no immobility. No long term hospitalizations. Lap/D&C in March 2021 s/p miscarriage in Feb 2021. No trauma at time of PEs. Pt was on Topamax but was not on it during PEs. Pt was on amitriptyline when had last PE, not on it now. No extended travel during PEs. No kidney/liver/heart disease or DM. NO IBD, Crohns, UC.  (+) PCOS. No SARAI, has been tested before. No varicose veins noted. Pt was on birth control when first PE occurred, was told to go off birth control. Pt stopped birth control but had 2 subsequent episodes of PEs. Pt reports chronic migraines. Pt reports chronic cough since COVID in Dec.  Pt reports chronic costochondritis. Pt denies redness/swelling/pain/warmth to extremities. PMH, SH, and FH:  I reviewed the patient's medication and allergy lists as noted on the electronic medical record.   The PMH, SH, and FH were also reviewed as noted on the EMR.        Past Medical History:   Diagnosis Date    Anxiety     Chronic GERD     Chronic migraine     takes verapamil    Depression     Gallstones 10/2019    Gastroparesis     diagnosis as a child    Hx of blood clots     PE     Hypertension     Obesity     Pulmonary embolism ([address]     8 months ago-was on Xarelto-sees Dr Denise Miranda in [place]    Splenomegaly       Past Surgical History:   Procedure Laterality Date    CHOLECYSTECTOMY, LAPAROSCOPIC N/A 10/30/2019    ROBOTIC LAP BRANNON performed by Mar Shankar MD at [facility]  03/16/2021    ELBOW SURGERY Right     TONSILLECTOMY      WISDOM TOOTH EXTRACTION        Family History   Problem Relation Age of Onset   Kayla Colón Migraines Mother     Lupus Mother     High Blood Pressure Father     Thyroid Disease Father     Other Father         gerd    Cancer Father         skin    No Known Problems Brother     Colon Polyps Maternal Grandmother     No Known Problems Maternal Grandfather     No Known Problems Paternal Grandmother     Colon Polyps Paternal Grandfather     No Known Problems Brother     Colon Cancer Neg Hx     Esophageal Cancer Neg Hx       Social History     Tobacco Use    Smoking status: Never Smoker    Smokeless tobacco: Never Used   Substance Use Topics    Alcohol use: Never      Current Outpatient Medications   Medication Sig Dispense Refill    naproxen sodium (ALEVE) 220 MG tablet Take by mouth      albuterol sulfate HFA (VENTOLIN HFA) 108 (90 Base) MCG/ACT inhaler Inhale 2 puffs into the lungs 4 times daily as needed for Wheezing 1 Inhaler 0    Prenatal Vit-Fe Fumarate-FA (PRENATAL VITAMIN) 27-0.8 MG TABS Take 1 tablet by mouth daily 90 tablet 3    vitamin D3 (CHOLECALCIFEROL) 25 MCG (1000 UT) TABS tablet Take 2 tablets by mouth daily 90 tablet 3    ondansetron (ZOFRAN) 4 MG tablet Take 1 tablet by mouth every 8 hours as needed for Nausea or Vomiting 30 tablet 0    Blood Pressure Monitoring (OMRON 5 SERIES BP MONITOR) SHAWANDA       Blood Pressure Monitor KIT Take blood pressure daily 1 kit 0    aspirin 81 MG EC tablet Take by mouth (Patient not taking: Reported on 6/9/2021)      topiramate (TOPAMAX) 25 MG tablet Take 25 mg by mouth (Patient not taking: Reported on 5/25/2021)       No current facility-administered medications for this visit. Allergies   Allergen Reactions    Imitrex [Sumatriptan] Shortness Of Breath     Pt reports hot flashes and chest pain also. Review of Systems:   Review of Systems   Pertinent review of systems noted in HPI, all other ROS negative. Objective:   Physical Exam   /66 (Site: Left Upper Arm, Position: Sitting, Cuff Size: Medium Adult)   Pulse 78   Temp 98.1 °F (36.7 °C) (Oral)   Resp 18   Ht 5' 9\" (1.753 m)   Wt 266 lb 12.8 oz (121 kg)   SpO2 96%   BMI 39.40 kg/m²    General appearance: No apparent distress, calm and cooperative. Obese, BMI 39.40. HEENT: Pupils equal, round, and reactive to light. Conjunctivae/corneas clear. Oral mucosa moist.  Neck: Supple, with full range of motion. Trachea midline. Respiratory:  Normal respiratory effort. Clear to auscultation, bilaterally diminished. Cardiovascular:  RRR, S1/S2. Abdomen: Soft, non-tender, non-distended with active BS x 4. Musculoskeletal: No clubbing, cyanosis or edema bilaterally. Pt able to ambulate without difficulty or use of assistive device. Skin: Skin color, texture, turgor normal.  No visible rashes or lesions. Neurologic:  Neurovascularly intact without any focal sensory/motor deficits.  Cranial nerves: II-XII intact, grossly non-focal.  Psychiatric: Alert and oriented x 3, thought content appropriate, normal insight  Capillary Refill: Brisk,< 3 seconds   Peripheral Pulses: +2 palpable, equal bilaterally       Imaging Studies and Labs:   CBC:   Lab Results   Component Value Date    WBC 6.5 02/25/2021    HGB 13.6 02/25/2021    HCT 42.9 02/25/2021    MCV 92.9 02/25/2021

## 2021-06-11 LAB
ANTITHROMBIN ACTIVITY: 117 % (ref 76–128)
CARDIOLIPIN AB IGM: < 10 MPL (ref 0–12)
CARDIOLIPIN ANTIBODY, IGG: < 10 GPL (ref 0–14)
DRVVT 1:1 MIX: ABNORMAL SEC (ref 33–44)
DRVVT CONFIRMATION TEST: ABNORMAL RATIO
DRVVT SCREEN: 31 SEC (ref 33–44)
HEXAGONAL PHOSPHOLIPID NEUTRALIZAT TEST: ABNORMAL
LUPUS ANTICOAG INTERP: ABNORMAL
PLATELET NEUTRALIZATION: ABNORMAL
PROTEIN C FUNCTIONAL: 164 % (ref 83–168)
PROTEIN S ANTIGEN, TOTAL: 124 % (ref 63–126)
PROTHROMBIN TIME: 12 SEC (ref 12–15.5)
PTT 1:1 MIX: ABNORMAL SEC (ref 32–48)
PTT LUPUS ANTICOAGULANT: 37 SEC (ref 32–48)
PTT-HEPARIN NEUTRALIZED: ABNORMAL SEC (ref 32–48)
REPTILASE TIME: ABNORMAL SEC
THROMBIN TIME: ABNORMAL SEC (ref 14.7–19.5)

## 2021-06-12 LAB
MISC. #1 REFERENCE GROUP TEST: NORMAL
PROTEIN C ANTIGEN: 91 % (ref 63–153)
PROTEIN S ANTIGEN, FREE: NORMAL

## 2021-06-13 LAB — MISC. #1 REFERENCE GROUP TEST: NORMAL

## 2021-06-14 LAB — MISC. #1 REFERENCE GROUP TEST: NORMAL

## 2021-07-19 ENCOUNTER — TELEPHONE (OUTPATIENT)
Dept: CARDIOLOGY CLINIC | Age: 21
End: 2021-07-19

## 2021-07-21 ENCOUNTER — OFFICE VISIT (OUTPATIENT)
Dept: FAMILY MEDICINE CLINIC | Age: 21
End: 2021-07-21
Payer: COMMERCIAL

## 2021-07-21 VITALS
BODY MASS INDEX: 39.84 KG/M2 | RESPIRATION RATE: 16 BRPM | HEIGHT: 69 IN | WEIGHT: 269 LBS | OXYGEN SATURATION: 98 % | DIASTOLIC BLOOD PRESSURE: 86 MMHG | SYSTOLIC BLOOD PRESSURE: 128 MMHG | TEMPERATURE: 97.5 F | HEART RATE: 87 BPM

## 2021-07-21 DIAGNOSIS — G43.809 OTHER MIGRAINE WITHOUT STATUS MIGRAINOSUS, NOT INTRACTABLE: Primary | ICD-10-CM

## 2021-07-21 PROCEDURE — 96372 THER/PROPH/DIAG INJ SC/IM: CPT | Performed by: STUDENT IN AN ORGANIZED HEALTH CARE EDUCATION/TRAINING PROGRAM

## 2021-07-21 PROCEDURE — 99214 OFFICE O/P EST MOD 30 MIN: CPT | Performed by: STUDENT IN AN ORGANIZED HEALTH CARE EDUCATION/TRAINING PROGRAM

## 2021-07-21 RX ORDER — ONDANSETRON 4 MG/1
4 TABLET, ORALLY DISINTEGRATING ORAL EVERY 8 HOURS PRN
Qty: 30 TABLET | Refills: 1 | Status: SHIPPED | OUTPATIENT
Start: 2021-07-21 | End: 2021-09-28 | Stop reason: ALTCHOICE

## 2021-07-21 RX ORDER — PROMETHAZINE HYDROCHLORIDE 25 MG/ML
25 INJECTION, SOLUTION INTRAMUSCULAR; INTRAVENOUS ONCE
Status: COMPLETED | OUTPATIENT
Start: 2021-07-21 | End: 2021-07-21

## 2021-07-21 RX ORDER — KETOROLAC TROMETHAMINE 30 MG/ML
30 INJECTION, SOLUTION INTRAMUSCULAR; INTRAVENOUS ONCE
Status: DISCONTINUED | OUTPATIENT
Start: 2021-07-21 | End: 2021-07-21

## 2021-07-21 RX ORDER — TOPIRAMATE 25 MG/1
25 TABLET ORAL DAILY PRN
Qty: 30 TABLET | Refills: 1 | Status: SHIPPED | OUTPATIENT
Start: 2021-07-21 | End: 2021-07-21

## 2021-07-21 RX ORDER — TOPIRAMATE 25 MG/1
25 TABLET ORAL EVERY EVENING
Qty: 30 TABLET | Refills: 1 | Status: SHIPPED | OUTPATIENT
Start: 2021-07-21 | End: 2021-08-02

## 2021-07-21 RX ORDER — KETOROLAC TROMETHAMINE 30 MG/ML
30 INJECTION, SOLUTION INTRAMUSCULAR; INTRAVENOUS ONCE
Status: COMPLETED | OUTPATIENT
Start: 2021-07-21 | End: 2021-07-21

## 2021-07-21 RX ADMIN — KETOROLAC TROMETHAMINE 30 MG: 30 INJECTION, SOLUTION INTRAMUSCULAR; INTRAVENOUS at 13:52

## 2021-07-21 RX ADMIN — PROMETHAZINE HYDROCHLORIDE 25 MG: 25 INJECTION, SOLUTION INTRAMUSCULAR; INTRAVENOUS at 13:53

## 2021-07-21 ASSESSMENT — ENCOUNTER SYMPTOMS
CONSTIPATION: 0
SHORTNESS OF BREATH: 0
COUGH: 0
ABDOMINAL PAIN: 0
TROUBLE SWALLOWING: 0
DIARRHEA: 0
VOMITING: 0
NAUSEA: 0
BLOOD IN STOOL: 0
EYE PAIN: 0

## 2021-07-21 NOTE — PROGRESS NOTES
SUBJECTIVE     Alyssa Garcia is a 23 y. o.female    Chief Complaint   Patient presents with    Migraine     Pt presents c/o a migraine.  Referral - General     She would like a referral to Neuro in [facility]. [place]nam Chief complaint, [place], and all pertinent details of the case reviewed with the resident. Please see resident's note for specific details discussed at today's visit. Patient Active Problem List   Diagnosis    Cholelithiasis without obstruction    Chronic migraine    Pulmonary embolism (HCC)    Anxiety    Depression    Gastroparesis    Obesity    Chronic GERD    Hypertension    Hx of blood clots    Costochondral chest pain    Muscle strain    Chest pain, atypical    History of pulmonary embolism       Current Outpatient Medications   Medication Sig Dispense Refill    ondansetron (ZOFRAN ODT) 4 MG disintegrating tablet Take 1 tablet by mouth every 8 hours as needed for Nausea or Vomiting 30 tablet 1    topiramate (TOPAMAX) 25 MG tablet Take 1 tablet by mouth every evening 30 tablet 1    naproxen sodium (ALEVE) 220 MG tablet Take by mouth      albuterol sulfate HFA (VENTOLIN HFA) 108 (90 Base) MCG/ACT inhaler Inhale 2 puffs into the lungs 4 times daily as needed for Wheezing 1 Inhaler 0    Prenatal Vit-Fe Fumarate-FA (PRENATAL VITAMIN) 27-0.8 MG TABS Take 1 tablet by mouth daily 90 tablet 3    ondansetron (ZOFRAN) 4 MG tablet Take 1 tablet by mouth every 8 hours as needed for Nausea or Vomiting 30 tablet 0    Blood Pressure Monitoring (OMRON 5 SERIES BP MONITOR) SHAWANDA       Blood Pressure Monitor KIT Take blood pressure daily 1 kit 0    aspirin 81 MG EC tablet Take by mouth  (Patient not taking: Reported on 7/21/2021)      vitamin D3 (CHOLECALCIFEROL) 25 MCG (1000 UT) TABS tablet Take 2 tablets by mouth daily (Patient not taking: Reported on 7/21/2021) 90 tablet 3     No current facility-administered medications for this visit.        Review of Systems per  [place]    OBJECTIVE     /86   Pulse 87   Temp 97.5 °F (36.4 °C)   Resp 16   Ht 5' 9\" (1.753 m)   Wt 269 lb (122 kg)   SpO2 98%   BMI 39.72 kg/m²   BP Readings from Last 3 Encounters:   07/21/21 128/86   06/09/21 117/66   05/25/21 102/68       Wt Readings from Last 3 Encounters:   07/21/21 269 lb (122 kg)   06/09/21 266 lb 12.8 oz (121 kg)   05/25/21 267 lb 12.8 oz (121.5 kg)     Body mass index is 39.72 kg/m². Physical Exam  Vitals and nursing note reviewed. Constitutional:       General: She is not in acute distress. Appearance: Normal appearance. She is normal weight. She is not ill-appearing, toxic-appearing or diaphoretic. HENT:      Head: Normocephalic and atraumatic. Nose: Nose normal.   Eyes:      General: No scleral icterus. Right eye: No discharge. Left eye: No discharge. Extraocular Movements: Extraocular movements intact. Conjunctiva/sclera: Conjunctivae normal.      Pupils: Pupils are equal, round, and reactive to light. Neck:      Vascular: No carotid bruit. Cardiovascular:      Rate and Rhythm: Normal rate and regular rhythm. Heart sounds: Normal heart sounds. No murmur heard. No friction rub. No gallop. Pulmonary:      Effort: Pulmonary effort is normal. No respiratory distress. Breath sounds: Normal breath sounds. No stridor. No wheezing, rhonchi or rales. Abdominal:      General: Abdomen is flat. Bowel sounds are normal. There is no distension. Palpations: Abdomen is soft. There is no mass. Tenderness: There is no abdominal tenderness. There is no guarding or rebound. Hernia: No hernia is present. Musculoskeletal:         General: No swelling or signs of injury. Normal range of motion. Cervical back: Normal range of motion. Right lower leg: No edema. Left lower leg: No edema. Skin:     General: Skin is warm and dry. Coloration: Skin is not jaundiced or pale.       Findings: No bruising, erythema, lesion or rash. Neurological:      General: No focal deficit present. Mental Status: She is alert and oriented to person, place, and time. Psychiatric:         Mood and Affect: Mood normal.         Behavior: Behavior normal.         Thought Content: Thought content normal.         Judgment: Judgment normal.           No results found for this visit on 07/21/21. Lab Results   Component Value Date    LABA1C 4.7 02/13/2020       Lab Results   Component Value Date    CHOL 159 02/13/2020    TRIG 85 02/13/2020    HDL 46 02/13/2020    LDLCALC 96 02/13/2020       The ASCVD Risk score (Sam Stephenson et al., 2013) failed to calculate for the following reasons: The 2013 ASCVD risk score is only valid for ages 36 to 78    Lab Results   Component Value Date     02/25/2021    K 4.4 02/25/2021     02/25/2021    CO2 26 02/25/2021    BUN 14 02/25/2021    CREATININE 0.6 02/25/2021    GLUCOSE 99 02/25/2021    CALCIUM 9.1 02/25/2021    PROT 7.6 02/25/2021    LABALBU 4.3 02/25/2021    BILITOT 0.6 02/25/2021    ALKPHOS 72 02/25/2021    AST 27 02/25/2021    ALT 33 02/25/2021    LABGLOM >90 02/25/2021       No results found for: LABMICR, [id number]    Lab Results   Component Value Date    TSH 0.935 02/13/2020       Lab Results   Component Value Date    WBC 6.5 06/09/2021    HGB 13.7 06/09/2021    HCT 41.1 06/09/2021    MCV 90 06/09/2021     06/09/2021         There is no immunization history on file for this patient.     Health Maintenance   Topic Date Due    Hepatitis C screen  Never done    COVID-19 Vaccine (1) Never done    Cervical cancer screen  Never done    Chlamydia screen  03/19/2021    HPV vaccine (1 - 2-dose series) 01/28/2022 (Originally 11/12/2010)    DTaP/Tdap/Td vaccine (1 - Tdap) 01/28/2022 (Originally 11/12/2018)    Flu vaccine (1) 09/01/2021    HIV screen  Completed    Hepatitis A vaccine  Aged Out    Hepatitis B vaccine  Aged Out    Hib vaccine  Aged C/ Fabio Angulo 19 Meningococcal (ACWY) vaccine  Aged Out    Pneumococcal 0-64 years Vaccine  Aged Out    Varicella vaccine  Discontinued         Future Appointments   Date Time Provider Jerome Vasquez   8/2/2021  8:20 AM Grecia Wood MD [facility]       ASSESSMENT       Diagnosis Orders   1. Other migraine without status migrainosus, not intractable  External Referral To Neurology    ondansetron (ZOFRAN ODT) 4 MG disintegrating tablet    promethazine (PHENERGAN) injection 25 mg    topiramate (TOPAMAX) 25 MG tablet    ketorolac (TORADOL) injection 30 mg    DISCONTINUED: topiramate (TOPAMAX) 25 MG tablet    DISCONTINUED: ketorolac (TORADOL) injection 30 mg       PLAN      After discussion with pt and Dr. Radha Montes, we agreed on plan as follows: Toradol 30 mg IM x1 and Phenergan 25 mg IM x1 in office today  Start Topamax 25 mg nightly (#30/1 refill) -we will consider increasing Topamax to 25 mg twice daily at follow-up visit  Refer to [address] [place] [address] neurology per patient preference  Okay for Zofran ODT as needed for nausea associated with migraines  Follow-up in 1 week or sooner if any further problems          Attending Physician Statement  I have discussed the case, including pertinent history and exam findings with the resident. I also have seen the patient and performed key portions of the examination. I agree with the documented assessment and plan as documented by the resident.   GC modifier added to this encounter     Electronically signed by Garland Matos MD on 7/21/2021 at 2:30 PM

## 2021-07-21 NOTE — PROGRESS NOTES
[address] [facility] [address] [address]  Dept: [phone]  Loc: Jose Guadalupe Castro (:  2000) is a 23 y.o. female,Established patient, here for evaluation of the following chief complaint(s):  Migraine (Pt presents c/o a migraine. ) and Referral - General (She would like a referral to Neuro in [facility]. )      ASSESSMENT/PLAN:  1. Other migraine without status migrainosus, not intractable  -     External Referral To Neurology  -     ondansetron (ZOFRAN ODT) 4 MG disintegrating tablet; Take 1 tablet by mouth every 8 hours as needed for Nausea or Vomiting, Disp-30 tablet, R-1Normal  -     promethazine (PHENERGAN) injection 25 mg; 25 mg, Intramuscular, ONCE, On 21 at 1345, For 1 doseRecommended route is IM. For IV administration, dilute to 10ml with normal saline. Must be administered over at least 10 minutes. -     ketorolac (TORADOL) injection 30 mg; 30 mg, Intramuscular, ONCE, On 21 at 1345, For 1 doseDo not administer for more than 5 days. -     topiramate (TOPAMAX) 25 MG tablet; Take 1 tablet by mouth every evening, Disp-30 tablet, R-1Normal    Medications given today, pt tolerated well. Topamax refilled, will start as 25 mg qhs for one week then ramp up to BID. Start tomorrow. Zofran given for PRN use. Referral made per patient preference. Will plan to see her back in 1 week to discuss memory. Return in about 1 week (around 2021) for migraines, memory. SUBJECTIVE/OBJECTIVE:  HPI     Migraine x2 weeks, left side of head. Has taken ibuprofen, tried sleeping, warm shower, tylenol, excedrin - none of these helped. Has an allergy to imitrex. Has not had topamax for months. Reports blurriness in the left eye. Has been able to drive. Review of Systems   Constitutional: Negative for chills, fatigue and fever.    HENT: Negative for ear pain, postnasal drip and trouble swallowing. Eyes: Negative for pain and visual disturbance. Respiratory: Negative for cough and shortness of breath. Cardiovascular: Negative for chest pain and palpitations. Gastrointestinal: Negative for abdominal pain, blood in stool, constipation, diarrhea, nausea and vomiting. Genitourinary: Negative for dysuria. Skin: Negative for rash. Neurological: Positive for headaches. Physical Exam  Vitals and nursing note reviewed. Constitutional:       General: She is not in acute distress. Appearance: She is well-developed. She is not diaphoretic. HENT:      Head: Normocephalic and atraumatic. Right Ear: External ear normal.      Left Ear: External ear normal.      Nose: Nose normal.   Eyes:      General: No scleral icterus. Right eye: No discharge. Left eye: No discharge. Conjunctiva/sclera: Conjunctivae normal.   Cardiovascular:      Rate and Rhythm: Normal rate and regular rhythm. Heart sounds: Normal heart sounds. No murmur heard. Pulmonary:      Effort: Pulmonary effort is normal.      Breath sounds: Normal breath sounds. Musculoskeletal:      Cervical back: Normal range of motion. Skin:     General: Skin is warm and dry. Findings: No erythema or rash. Neurological:      Mental Status: She is alert and oriented to person, place, and time. Cranial Nerves: No cranial nerve deficit. Psychiatric:         Behavior: Behavior normal.         Thought Content: Thought content normal.         Judgment: Judgment normal.           Vitals:    07/21/21 1300   BP: 128/86   Pulse: 87   Resp: 16   Temp: 97.5 °F (36.4 °C)   SpO2: 98%   Weight: 269 lb (122 kg)   Height: 5' 9\" (1.753 m)         An electronic signature was used to authenticate this note.     --Jerrod Poole MD

## 2021-07-27 NOTE — PROGRESS NOTES
Administrations This Visit     ketorolac (TORADOL) injection 30 mg     Admin Date  07/21/2021  13:52 Action  Given Dose  30 mg Route  Intramuscular Site  Deltoid Right Administered By  Kentrell Bush MD    Ordering Provider: Kentrell Bush MD    [facility][1]: [id number]    Lot#: [id number]    : [facility] [facility]    Patient Supplied?: No    Comments: Given by SYLVIA Lynn MD.          promethazine [facility]) injection 25 mg     Admin Date  07/21/2021  13:53 Action  Given Dose  25 mg Route  Intramuscular Site  Deltoid Left Administered By  Kentrell Bush MD    Ordering Provider: Kentrell Bush MD    [facility][1]: [id number]    Lot#: [id number]    : [address] [facility]    Patient Supplied?: No    Comments: Given by SYLVIA Lynn MD.

## 2021-08-02 ENCOUNTER — OFFICE VISIT (OUTPATIENT)
Dept: FAMILY MEDICINE CLINIC | Age: 21
End: 2021-08-02
Payer: COMMERCIAL

## 2021-08-02 ENCOUNTER — NURSE ONLY (OUTPATIENT)
Dept: LAB | Age: 21
End: 2021-08-02

## 2021-08-02 VITALS
TEMPERATURE: 97.1 F | SYSTOLIC BLOOD PRESSURE: 130 MMHG | DIASTOLIC BLOOD PRESSURE: 78 MMHG | HEART RATE: 86 BPM | RESPIRATION RATE: 14 BRPM | WEIGHT: 268.8 LBS | HEIGHT: 69 IN | OXYGEN SATURATION: 98 % | BODY MASS INDEX: 39.81 KG/M2

## 2021-08-02 DIAGNOSIS — Z32.00 ENCOUNTER FOR PREGNANCY TEST, RESULT UNKNOWN: ICD-10-CM

## 2021-08-02 DIAGNOSIS — Z31.69 PRE-CONCEPTION COUNSELING: ICD-10-CM

## 2021-08-02 DIAGNOSIS — R51.9 CHRONIC NONINTRACTABLE HEADACHE, UNSPECIFIED HEADACHE TYPE: Primary | ICD-10-CM

## 2021-08-02 DIAGNOSIS — E55.9 VITAMIN D DEFICIENCY: ICD-10-CM

## 2021-08-02 DIAGNOSIS — G89.29 CHRONIC NONINTRACTABLE HEADACHE, UNSPECIFIED HEADACHE TYPE: Primary | ICD-10-CM

## 2021-08-02 LAB
REASON FOR REJECTION: NORMAL
REJECTED TEST: NORMAL

## 2021-08-02 PROCEDURE — 99214 OFFICE O/P EST MOD 30 MIN: CPT | Performed by: STUDENT IN AN ORGANIZED HEALTH CARE EDUCATION/TRAINING PROGRAM

## 2021-08-02 RX ORDER — MULTIVITAMIN WITH IRON
500 TABLET ORAL
Qty: 240 TABLET | Refills: 1 | Status: SHIPPED | OUTPATIENT
Start: 2021-08-02 | End: 2022-02-10

## 2021-08-02 RX ORDER — PNV NO.95/FERROUS FUM/FOLIC AC 28MG-0.8MG
1 TABLET ORAL DAILY
Qty: 90 TABLET | Refills: 3 | Status: SHIPPED | OUTPATIENT
Start: 2021-08-02

## 2021-08-02 ASSESSMENT — ENCOUNTER SYMPTOMS
ABDOMINAL PAIN: 0
NAUSEA: 0
COUGH: 0
CONSTIPATION: 0
DIARRHEA: 0
VOMITING: 0
BLOOD IN STOOL: 0
TROUBLE SWALLOWING: 0
SHORTNESS OF BREATH: 0
EYE PAIN: 0

## 2021-08-02 NOTE — PROGRESS NOTES
today to discuss migraines and mood. Reports medications given at last visit helped relieve migraines - dulled it to a headache. Allergic to imitrex. Causes throat swelling. Nipple soreness and sensitivity, had unprotected sex on the 27th of last month. Is taking a prenatal, needs a refill. Review of Systems   Constitutional: Negative for chills, fatigue and fever. HENT: Negative for ear pain, postnasal drip and trouble swallowing. Eyes: Negative for pain and visual disturbance. Respiratory: Negative for cough and shortness of breath. Cardiovascular: Negative for chest pain and palpitations. Gastrointestinal: Negative for abdominal pain, blood in stool, constipation, diarrhea, nausea and vomiting. Genitourinary: Negative for dysuria. Skin: Negative for rash. Neurological: Positive for headaches. Physical Exam  Vitals and nursing note reviewed. Constitutional:       General: She is not in acute distress. Appearance: She is well-developed. She is not diaphoretic. HENT:      Head: Normocephalic and atraumatic. Right Ear: External ear normal.      Left Ear: External ear normal.      Nose: Nose normal.   Eyes:      General: No scleral icterus. Right eye: No discharge. Left eye: No discharge. Conjunctiva/sclera: Conjunctivae normal.   Cardiovascular:      Rate and Rhythm: Normal rate and regular rhythm. Heart sounds: Normal heart sounds. No murmur heard. Pulmonary:      Effort: Pulmonary effort is normal.      Breath sounds: Normal breath sounds. Musculoskeletal:      Cervical back: Normal range of motion. Skin:     General: Skin is warm and dry. Findings: No erythema or rash. Neurological:      Mental Status: She is alert and oriented to person, place, and time. Cranial Nerves: No cranial nerve deficit. Psychiatric:         Behavior: Behavior normal.         Thought Content:  Thought content normal.         Judgment: Judgment normal.           Vitals:    08/02/21 1548   BP: 130/78   Pulse: 86   Resp: 14   Temp: 97.1 °F (36.2 °C)   SpO2: 98%   Weight: 268 lb 12.8 oz (121.9 kg)   Height: 5' 9\" (1.753 m)         An electronic signature was used to authenticate this note.     --Nella Harrison MD

## 2021-08-02 NOTE — PROGRESS NOTES
S: 23 y.o. female with   Chief Complaint   Patient presents with    Follow-up     Pt presents for a 1 week f/u. Pt states the migrane has gotten better but is still there. She also needs the lymph node in her arm looked at. toradol helped the migraine last time    Has not taken a lot - imitrex causes throat swelling  Comfortable now not doing a lot of meds and will see neurology    Not sure if she is pregnant but wants to be - curently on topamax    Can't do ocp - did get a blood clot    BP Readings from Last 3 Encounters:   08/02/21 130/78   07/21/21 128/86   06/09/21 117/66     Wt Readings from Last 3 Encounters:   08/02/21 268 lb 12.8 oz (121.9 kg)   07/21/21 269 lb (122 kg)   06/09/21 266 lb 12.8 oz (121 kg)           O: VS:   Vitals:    08/02/21 1548   BP: 130/78   Pulse: 86   Resp: 14   Temp: 97.1 °F (36.2 °C)   SpO2: 98%   Weight: 268 lb 12.8 oz (121.9 kg)   Height: 5' 9\" (1.753 m)     Body mass index is 39.69 kg/m². AAO/NAD, appropriate affect for mood  Normocephalic, atraumatic, eyes - conjunctiva and sclera normal,   skin no rashes on exposed areas   Insight, judgement normal and in no acute distress      Lab Results   Component Value Date    WBC 6.5 06/09/2021    HGB 13.7 06/09/2021    HCT 41.1 06/09/2021     06/09/2021    CHOL 159 02/13/2020    TRIG 85 02/13/2020    HDL 46 02/13/2020    LDLCALC 96 02/13/2020    AST 27 02/25/2021     02/25/2021    K 4.4 02/25/2021     02/25/2021    CREATININE 0.6 02/25/2021    BUN 14 02/25/2021    CO2 26 02/25/2021    TSH 0.935 02/13/2020    INR 1.00 05/25/2020    GLUF 96 02/25/2020    LABA1C 4.7 02/13/2020    LABGLOM >90 02/25/2021    MG 2.0 01/14/2021    CALCIUM 9.1 02/25/2021    VITD25 16 (L) 01/14/2021       No results found. Diagnosis Orders   1. Chronic nonintractable headache, unspecified headache type  magnesium (MAGNESIUM-OXIDE) 250 MG TABS tablet   2.  Pre-conception counseling  Prenatal Vit-Fe Fumarate-FA (PRENATAL VITAMIN) 27-0.8 MG TABS    HCG, Serum, Qualitative   3. Encounter for pregnancy test, result unknown  HCG, Serum, Qualitative       Plan  Will d/c topamax    Will do 500mg 4 times a day for the headaches    Will do the pregnancy test in case of pregnancy       Return in about 4 weeks (around 8/30/2021) for HAs. Orders Placed:  Orders Placed This Encounter   Procedures    HCG, Serum, Qualitative     Medications Prescribed:  Orders Placed This Encounter   Medications    magnesium (MAGNESIUM-OXIDE) 250 MG TABS tablet     Sig: Take 2 tablets by mouth 4 times daily (with meals and nightly)     Dispense:  240 tablet     Refill:  1    Prenatal Vit-Fe Fumarate-FA (PRENATAL VITAMIN) 27-0.8 MG TABS     Sig: Take 1 tablet by mouth daily     Dispense:  90 tablet     Refill:  3       No future appointments. Health Maintenance Due   Topic Date Due    Hepatitis C screen  Never done    COVID-19 Vaccine (1) Never done    Cervical cancer screen  Never done    Chlamydia screen  03/19/2021         Attending Physician Statement  I have discussed the case, including pertinent history and exam findings with the resident. I also have seen the patient and performed key portions of the examination. I agree with the documented assessment and plan as documented by the resident.   GE modifier added to this encounter      Uma Negron, DO 8/2/2021 4:08 PM

## 2021-08-04 LAB — PREGNANCY, SERUM: NEGATIVE

## 2021-08-05 ENCOUNTER — OFFICE VISIT (OUTPATIENT)
Dept: FAMILY MEDICINE CLINIC | Age: 21
End: 2021-08-05
Payer: COMMERCIAL

## 2021-08-05 VITALS
RESPIRATION RATE: 14 BRPM | HEIGHT: 69 IN | HEART RATE: 78 BPM | DIASTOLIC BLOOD PRESSURE: 74 MMHG | OXYGEN SATURATION: 98 % | WEIGHT: 266.4 LBS | BODY MASS INDEX: 39.46 KG/M2 | TEMPERATURE: 98.1 F | SYSTOLIC BLOOD PRESSURE: 102 MMHG

## 2021-08-05 DIAGNOSIS — Z11.59 NEED FOR HEPATITIS C SCREENING TEST: ICD-10-CM

## 2021-08-05 DIAGNOSIS — Z32.00 ENCOUNTER FOR PREGNANCY TEST, RESULT UNKNOWN: Primary | ICD-10-CM

## 2021-08-05 PROCEDURE — 99213 OFFICE O/P EST LOW 20 MIN: CPT | Performed by: STUDENT IN AN ORGANIZED HEALTH CARE EDUCATION/TRAINING PROGRAM

## 2021-08-05 ASSESSMENT — ENCOUNTER SYMPTOMS
EYE PAIN: 0
TROUBLE SWALLOWING: 0
VOMITING: 0
SHORTNESS OF BREATH: 0
ABDOMINAL PAIN: 0
NAUSEA: 1
DIARRHEA: 1
CONSTIPATION: 0
COUGH: 0
BLOOD IN STOOL: 0

## 2021-08-05 NOTE — PATIENT INSTRUCTIONS
Patient Education        Learning About Pregnancy  Your Care Instructions     Your health in the early weeks of your pregnancy is particularly important for your baby's health. Take good care of yourself. Anything you do that harms your body can also harm your baby. Make sure to go to all of your doctor appointments. Regular checkups will help keep you and your baby healthy. How can you care for yourself at home? Diet    · Eat a balanced diet. Make sure your diet includes plenty of beans, peas, and leafy green vegetables.     · Do not skip meals or go for many hours without eating. If you are nauseated, try to eat a small, healthy snack every 2 to 3 hours.     · Do not eat fish that has a high level of mercury, such as shark, swordfish, or mackerel. Do not eat more than one can of tuna each week.     · Drink plenty of fluids. If you have kidney, heart, or liver disease and have to limit fluids, talk with your doctor before you increase the amount of fluids you drink.     · Cut down on caffeine, such as coffee, tea, and cola.     · Do not drink alcohol, such as beer, wine, or hard liquor.     · Take a multivitamin that contains at least 400 micrograms (mcg) of folic acid to help prevent birth defects. Fortified cereal and whole wheat bread are good additional sources of folic acid.     · Increase the calcium in your diet. Try to drink a quart of skim milk each day. You may also take calcium supplements and choose foods such as cheese and yogurt. Lifestyle    · Make sure you go to your follow-up appointments.     · Get plenty of rest. You may be unusually tired while you are pregnant.     · Get at least 30 minutes of exercise on most days of the week. Walking is a good choice. If you have not exercised in the past, start out slowly. Take several short walks each day.     · Do not smoke. If you need help quitting, talk to your doctor about stop-smoking programs.  These can increase your chances of quitting for good.     · Do not touch cat feces or litter boxes. Also, wash your hands after you handle raw meat, and fully cook all meat before you eat it. Wear gloves when you work in the yard or garden, and wash your hands well when you are done. Cat feces, raw or undercooked meat, and contaminated dirt can cause an infection that may harm your baby or lead to a miscarriage.     · Do not use saunas or hot tubs. Raising your body temperature may harm your baby.     · Avoid chemical fumes, paint fumes, or poisons.     · Do not use illegal drugs, marijuana, or alcohol. Medicines    · Review all of your medicines with your doctor. Some of your routine medicines may need to be changed to protect your baby.     · Use acetaminophen (Tylenol) to relieve minor problems, such as a mild headache or backache or a mild fever with cold symptoms. Do not use nonsteroidal anti-inflammatory drugs (NSAIDs), such as ibuprofen (Advil, Motrin) or naproxen (Aleve), unless your doctor says it is okay.     · Do not take two or more pain medicines at the same time unless the doctor told you to. Many pain medicines have acetaminophen, which is Tylenol. Too much acetaminophen (Tylenol) can be harmful.     · Take your medicines exactly as prescribed. Call your doctor if you think you are having a problem with your medicine. To manage morning sickness    · If you feel sick when you first wake up, try eating a small snack (such as crackers) before you get out of bed. Allow some time to digest the snack, and then get out of bed slowly.     · Do not skip meals or go for long periods without eating. An empty stomach can make nausea worse.     · Eat small, frequent meals instead of three large meals each day.     · Drink plenty of fluids.     · Eat foods that are high in protein but low in fat.     · If you are taking iron supplements, ask your doctor if they are necessary.  Iron can make nausea worse.     · Avoid any smells, such as coffee, that make you feel sick.     · Get lots of rest. Morning sickness may be worse when you are tired. Follow-up care is a key part of your treatment and safety. Be sure to make and go to all appointments, and call your doctor if you are having problems. It's also a good idea to know your test results and keep a list of the medicines you take. Where can you learn more? Go to [email or web address] and sign in to your [employer] account. Enter N430 in the [employer] box to learn more about \"Learning About Pregnancy. \"     If you do not have an account, please click on the \"Sign Up Now\" link. Current as of: October 8, 2020               Content Version: 12.9  © 2006-2021 Healthwise, Incorporated. Care instructions adapted under license by [facility] ([facility]). If you have questions about a medical condition or this instruction, always ask your healthcare professional. [place] [address] any warranty or liability for your use of this information.

## 2021-08-05 NOTE — PROGRESS NOTES
S: 23 y.o. female with   Chief Complaint   Patient presents with    Other     possible pregnancy       Trying to get pregnant and had intercourse when doing the ovulation test - did a home pregnancy test - one was positive and one negative    Serum hcg was negative last week    BP Readings from Last 3 Encounters:   08/05/21 102/74   08/02/21 130/78   07/21/21 128/86     Wt Readings from Last 3 Encounters:   08/05/21 266 lb 6.4 oz (120.8 kg)   08/02/21 268 lb 12.8 oz (121.9 kg)   07/21/21 269 lb (122 kg)           O: VS:   Vitals:    08/05/21 0914   BP: 102/74   Site: Left Upper Arm   Position: Sitting   Cuff Size: Large Adult   Pulse: 78   Resp: 14   Temp: 98.1 °F (36.7 °C)   TempSrc: Oral   SpO2: 98%   Weight: 266 lb 6.4 oz (120.8 kg)   Height: 5' 9\" (1.753 m)     Body mass index is 39.34 kg/m². AAO/NAD, appropriate affect for mood  Normocephalic, atraumatic, eyes - conjunctiva and sclera normal,   skin no rashes on exposed areas   Insight, judgement normal and in no acute distress      Lab Results   Component Value Date    WBC 6.5 06/09/2021    HGB 13.7 06/09/2021    HCT 41.1 06/09/2021     06/09/2021    CHOL 159 02/13/2020    TRIG 85 02/13/2020    HDL 46 02/13/2020    LDLCALC 96 02/13/2020    AST 27 02/25/2021     02/25/2021    K 4.4 02/25/2021     02/25/2021    CREATININE 0.6 02/25/2021    BUN 14 02/25/2021    CO2 26 02/25/2021    TSH 0.935 02/13/2020    INR 1.00 05/25/2020    GLUF 96 02/25/2020    LABA1C 4.7 02/13/2020    LABGLOM >90 02/25/2021    MG 2.0 01/14/2021    CALCIUM 9.1 02/25/2021    VITD25 16 (L) 01/14/2021       No results found. Diagnosis Orders   1. Encounter for pregnancy test, result unknown  POCT urine pregnancy   2.  Need for hepatitis C screening test  Hepatitis C Antibody       Plan  Will try another urine today    The serum may be positive but wait till next week to get it    Will see you back on the 30th with Dr Jeannette tejeda do at least 2000 of the d a day    Can stay on the prenatal - be sure to be on the folic acid       No follow-ups on file. Orders Placed:  No orders of the defined types were placed in this encounter. Medications Prescribed:  No orders of the defined types were placed in this encounter. Future Appointments   Date Time Provider Jerome Vasquez   8/30/2021  9:40 AM Ricci Lundborg, MD [facility] Maintenance Due   Topic Date Due    Hepatitis C screen  Never done    COVID-19 Vaccine (1) Never done    Cervical cancer screen  Never done    Chlamydia screen  03/19/2021         Attending Physician Statement  I have discussed the case, including pertinent history and exam findings with the resident. I also have seen the patient and performed key portions of the examination. I agree with the documented assessment and plan as documented by the resident.   GE modifier added to this encounter      Blayne Ivy DO 8/5/2021 9:46 AM

## 2021-08-05 NOTE — PROGRESS NOTES
Health Maintenance Due   Topic Date Due    Hepatitis C screen  Never done    COVID-19 Vaccine (1) Never done    Cervical cancer screen  Never done    Chlamydia screen  03/19/2021   Patient states that she is here for a visit, she did two pregnancy tests this morning, one was positive and one was negative. Neetu Kennedy states that she was here earlier this week and saw Dr. Asmita Adams, who thinks she pregnant. Lab was done for this, serum, which was negative. Patient c/o same symptoms nipple soreness, spotting and discharge, stomach acting off.

## 2021-08-05 NOTE — PROGRESS NOTES
[id number] [facility] [address]  Dept: [phone]  Loc: [phone]      Murali Rojas (:  2000) is a 23 y.o. female,Established patient, here for evaluation of the following chief complaint(s): Other (possible pregnancy)      ASSESSMENT/PLAN:  1. Encounter for pregnancy test, result unknown  -     Possible pregnancy with sexual intercourse during ovulation 2021.  - Patient counseled on importance of prenatal vitamins (including folate and vitamin D), avoiding alcohol, healthy diet and exercise, and overall pregnancy/prenatal care. - Recommended patient wait several more days days to 1 week before testing serum hCG as takes minimum 6 to 8 weeks for hCG to show in serum.  - HCG, Serum, Qualitative; Future  2. Need for hepatitis C screening test  -     Hepatitis C Antibody; Future    Return in about 2 weeks (around 2021). SUBJECTIVE/OBJECTIVE:  HPI  Patient presents to clinic for possible pregnancy. Patient very anxious to know if she is positive for negative for pregnancy. Last menstrual period: Started 2021. Ovulated on 2021, patient doing at home ovulation test.    Sexual intercourse the day before, day of and day after a positive ovulation test.   Today patient took two pregnancy tests, one positive pregnancy test, 1 negative pregnancy test.    No barrier contraceptives or other form of contraception use. 1 male partner x3 years    Monday, pink/brown vaginal discharge. Also a clear watery like discharge. No pain on urination. Starting 2021 Sensitive nipples and areola, shirts, bras, sheets at night all cause discomfort. Mild nausea, no vomiting. Some abdominal discomfort patient attributes to life long gastroparesis and GERD. Review of Systems   Constitutional: Negative for appetite change, chills, fatigue and fever.    HENT: Negative for ear pain, postnasal drip and trouble swallowing. Eyes: Negative for pain and visual disturbance. Respiratory: Negative for cough and shortness of breath. Cardiovascular: Negative for chest pain and palpitations. Gastrointestinal: Positive for diarrhea (patient attributes to gastroparesis) and nausea. Negative for abdominal pain, blood in stool, constipation and vomiting. Genitourinary: Negative for dysuria. Skin: Negative for rash. Neurological: Positive for headaches. Physical Exam  Vitals and nursing note reviewed. Constitutional:       General: She is not in acute distress. Appearance: She is well-developed. She is not diaphoretic. HENT:      Head: Normocephalic and atraumatic. Right Ear: External ear normal.      Left Ear: External ear normal.      Nose: Nose normal.   Eyes:      General: No scleral icterus. Right eye: No discharge. Left eye: No discharge. Conjunctiva/sclera: Conjunctivae normal.   Cardiovascular:      Rate and Rhythm: Normal rate and regular rhythm. Heart sounds: Normal heart sounds. No murmur heard. Pulmonary:      Effort: Pulmonary effort is normal.      Breath sounds: Normal breath sounds. Musculoskeletal:      Cervical back: Normal range of motion. Skin:     General: Skin is warm and dry. Findings: No erythema or rash. Neurological:      Mental Status: She is alert and oriented to person, place, and time. Cranial Nerves: No cranial nerve deficit. Psychiatric:         Behavior: Behavior normal.         Thought Content: Thought content normal.         Judgment: Judgment normal.         Vitals:    08/05/21 0914   BP: 102/74   Site: Left Upper Arm   Position: Sitting   Cuff Size: Large Adult   Pulse: 78   Resp: 14   Temp: 98.1 °F (36.7 °C)   TempSrc: Oral   SpO2: 98%   Weight: 266 lb 6.4 oz (120.8 kg)   Height: 5' 9\" (1.753 m)         An electronic signature was used to authenticate this note.     --Noe Rojas MD

## 2021-08-07 ENCOUNTER — NURSE ONLY (OUTPATIENT)
Dept: LAB | Age: 21
End: 2021-08-07

## 2021-08-07 DIAGNOSIS — Z11.59 NEED FOR HEPATITIS C SCREENING TEST: ICD-10-CM

## 2021-08-07 DIAGNOSIS — Z32.00 ENCOUNTER FOR PREGNANCY TEST, RESULT UNKNOWN: ICD-10-CM

## 2021-08-07 LAB
HEPATITIS C ANTIBODY: NEGATIVE
PREGNANCY, SERUM: NEGATIVE

## 2021-09-20 PROBLEM — J45.20 MILD INTERMITTENT ASTHMA: Status: ACTIVE | Noted: 2021-09-20

## 2021-09-22 ENCOUNTER — TELEPHONE (OUTPATIENT)
Dept: FAMILY MEDICINE CLINIC | Age: 21
End: 2021-09-22

## 2021-09-22 NOTE — TELEPHONE ENCOUNTER
Pt called c/o R ear tenderness and fulness that started yesterday (it is now starting on her L side and she thinks it is an ear infection). She was seen 9/20/21 about some cold sxs.  Please advise

## 2021-09-23 RX ORDER — OXYMETAZOLINE HYDROCHLORIDE 0.05 G/100ML
2 SPRAY NASAL 2 TIMES DAILY
Qty: 1 EACH | Refills: 1 | Status: SHIPPED | OUTPATIENT
Start: 2021-09-23 | End: 2021-10-05 | Stop reason: ALTCHOICE

## 2021-09-23 RX ORDER — GUAIFENESIN 600 MG/1
600 TABLET, EXTENDED RELEASE ORAL 2 TIMES DAILY
Qty: 30 TABLET | Refills: 0 | Status: SHIPPED | OUTPATIENT
Start: 2021-09-23 | End: 2021-09-28 | Stop reason: ALTCHOICE

## 2021-09-23 RX ORDER — FLUTICASONE PROPIONATE 50 MCG
1 SPRAY, SUSPENSION (ML) NASAL DAILY
Qty: 32 G | Refills: 1 | Status: SHIPPED | OUTPATIENT
Start: 2021-09-23 | End: 2021-10-05 | Stop reason: ALTCHOICE

## 2021-09-23 NOTE — TELEPHONE ENCOUNTER
May want to get a covid test - and will also give some things to get your ears to drain    The zithromax should work if we get them to drain - will add flonase and afrin for a few days and mucinex

## 2021-09-24 ENCOUNTER — TELEPHONE (OUTPATIENT)
Dept: FAMILY MEDICINE CLINIC | Age: 21
End: 2021-09-24

## 2021-09-24 NOTE — TELEPHONE ENCOUNTER
Pt called states that she started taking mucinex today with breakfast and is experiencing dizzy feeling like she is on a boat. Pt asking if this is a side effect of mucinex and what she should do. Please advise.

## 2021-09-28 ENCOUNTER — OFFICE VISIT (OUTPATIENT)
Dept: FAMILY MEDICINE CLINIC | Age: 21
End: 2021-09-28
Payer: COMMERCIAL

## 2021-09-28 VITALS
OXYGEN SATURATION: 99 % | DIASTOLIC BLOOD PRESSURE: 82 MMHG | SYSTOLIC BLOOD PRESSURE: 122 MMHG | HEIGHT: 69 IN | WEIGHT: 246.2 LBS | BODY MASS INDEX: 36.46 KG/M2 | TEMPERATURE: 97.9 F | HEART RATE: 86 BPM | RESPIRATION RATE: 16 BRPM

## 2021-09-28 DIAGNOSIS — H92.01 RIGHT EAR PAIN: Primary | ICD-10-CM

## 2021-09-28 PROCEDURE — 99213 OFFICE O/P EST LOW 20 MIN: CPT | Performed by: STUDENT IN AN ORGANIZED HEALTH CARE EDUCATION/TRAINING PROGRAM

## 2021-09-28 ASSESSMENT — ENCOUNTER SYMPTOMS
WHEEZING: 1
RHINORRHEA: 1
SINUS PRESSURE: 1
COUGH: 1

## 2021-09-28 NOTE — PATIENT INSTRUCTIONS
Thank you   1. Thank you for trusting us with your healthcare needs. You may receive a survey regarding today's visit. It would help us out if you would take a few moments to provide your feedback. We value your input. 2. Please bring in ALL medications BOTTLES, including inhalers, herbal supplements, over the counter, prescribed & non-prescribed medicine. The office would like actual medication bottles and a list.   3. Please note our OFFICE POLICIES:   a. Prior to getting your labs drawn, please check with your insurance company for benefits and eligibility of lab services. Often, insurance companies cover certain tests for preventative visits only. It is patient's responsibility to see what is covered. b. We are unable to change a diagnosis after the test has been performed. c. Lab orders will not be re-printed. Please hold onto your original lab orders and take them to your lab to be completed. d. If you no show your scheduled appointment three times, you will be dismissed from this practice. e. Teryl [place] must be completed 24 hours prior to your schedule appointment. 4. If the list below has been completed, PLEASE FAX RECORDS TO OUR OFFICE @ [phone].  Once the records have been received we will update your records at our office:  Health Maintenance Due   Topic Date Due    Pneumococcal 0-64 years Vaccine (1 of 2 - PPSV23) Never done    Pap smear  Never done    Chlamydia screen  03/19/2021    Flu vaccine (1) 09/01/2021    COVID-19 Vaccine (2 - Moderna 2-dose series) 10/02/2021

## 2021-09-28 NOTE — PROGRESS NOTES
facility-administered medications for this visit. Review of Systems per Dr. Maria Dolores Garcia     /82 (Site: Left Upper Arm, Position: Sitting, Cuff Size: Large Adult)   Pulse 86   Temp 97.9 °F (36.6 °C) (Temporal)   Resp 16   Ht 5' 9\" (1.753 m)   Wt 246 lb 3.2 oz (111.7 kg)   SpO2 99%   BMI 36.36 kg/m²   BP Readings from Last 3 Encounters:   09/28/21 122/82   08/05/21 102/74   08/02/21 130/78       Wt Readings from Last 3 Encounters:   09/28/21 246 lb 3.2 oz (111.7 kg)   08/05/21 266 lb 6.4 oz (120.8 kg)   08/02/21 268 lb 12.8 oz (121.9 kg)     Body mass index is 36.36 kg/m². Physical Exam per Dr. Luli Hartmann    Lab Results   Component Value Date    LABA1C 4.7 02/13/2020       Lab Results   Component Value Date    CHOL 159 02/13/2020    TRIG 85 02/13/2020    HDL 46 02/13/2020    LDLCALC 96 02/13/2020       The ASCVD Risk score (Génesis Flannery, et al., 2013) failed to calculate for the following reasons: The 2013 ASCVD risk score is only valid for ages 36 to 78    Lab Results   Component Value Date     02/25/2021    K 4.4 02/25/2021     02/25/2021    CO2 26 02/25/2021    BUN 14 02/25/2021    CREATININE 0.6 02/25/2021    GLUCOSE 99 02/25/2021    CALCIUM 9.1 02/25/2021    PROT 7.6 02/25/2021    LABALBU 4.3 02/25/2021    BILITOT 0.6 02/25/2021    ALKPHOS 72 02/25/2021    AST 27 02/25/2021    ALT 33 02/25/2021    LABGLOM >90 02/25/2021       Lab Results   Component Value Date    TSH 0.935 02/13/2020       Lab Results   Component Value Date    WBC 6.5 06/09/2021    HGB 13.7 06/09/2021    HCT 41.1 06/09/2021    MCV 90 06/09/2021     06/09/2021         No future appointments. ASSESSMENT       Diagnosis Orders   1. Right ear pain         PLAN      After discussion with pt and Dr. Luli Hartmann, we agreed on plan as follows:    1.  antihistamine. Tylenol prn Follow up if not improved.             Attending Physician Statement  I have discussed the case, including pertinent history and exam findings with the resident. I agree with the documented assessment and plan as documented by the resident.   GE modifier added  to this encounter        Electronically signed by Tomer Busby MD on 9/28/2021 at 5:01 PM

## 2021-09-28 NOTE — PROGRESS NOTES
Arsenio Severance is a 23 y.o. female who presents today for:  Chief Complaint   Patient presents with    Ear Fullness     Right Ear pain and popping started Saturday       HPI:   Lots of pressure in the R ear  Tried hot showers  Did Zpack  Chest clearing up, ear is lingering  Cough better, rhinorrhea better   Was covid neg  Flonase, mucinex, mist nose spray  No fevers or chills    Not on Claritin or zyrtec        Food Insecurity: Food Insecurity Present    Worried About Running Out of Food in the Last Year: Sometimes true    Ran Out of Food in the Last Year: Sometimes true     Health Maintenance reviewed -   Health Maintenance   Topic Date Due    Pneumococcal 0-64 years Vaccine (1 of 2 - PPSV23) Never done    Pap smear  Never done    Chlamydia screen  03/19/2021    Flu vaccine (1) 09/01/2021    COVID-19 Vaccine (2 - Moderna 2-dose series) 10/02/2021    HPV vaccine (1 - 2-dose series) 01/28/2022 (Originally 11/12/2010)    DTaP/Tdap/Td vaccine (1 - Tdap) 01/28/2022 (Originally 11/12/2018)    Hepatitis C screen  Completed    HIV screen  Completed    Hepatitis A vaccine  Aged Out    Hepatitis B vaccine  Aged Out    Hib vaccine  Aged Out    Meningococcal (ACWY) vaccine  Aged Out    Varicella vaccine  Discontinued     Current Outpatient Medications   Medication Sig Dispense Refill    Phenylephrine-DM-GG-APAP (DELSYM COUGH/COLD DAYTIME PO) Take by mouth as needed      oxymetazoline (12 HOUR NASAL SPRAY) 0.05 % nasal spray 2 sprays by Nasal route 2 times daily 1 each 1    fluticasone (FLONASE) 50 MCG/ACT nasal spray 1 spray by Each Nostril route daily 32 g 1    magnesium (MAGNESIUM-OXIDE) 250 MG TABS tablet Take 2 tablets by mouth 4 times daily (with meals and nightly) 240 tablet 1    Prenatal Vit-Fe Fumarate-FA (PRENATAL VITAMIN) 27-0.8 MG TABS Take 1 tablet by mouth daily 90 tablet 3    aspirin 81 MG EC tablet Take 81 mg by mouth daily       naproxen sodium (ALEVE) 220 MG tablet Take 220 mg by mouth as needed       albuterol sulfate HFA (VENTOLIN HFA) 108 (90 Base) MCG/ACT inhaler Inhale 2 puffs into the lungs 4 times daily as needed for Wheezing 1 Inhaler 0    vitamin D3 (CHOLECALCIFEROL) 25 MCG (1000 UT) TABS tablet Take 2 tablets by mouth daily 90 tablet 3    ondansetron (ZOFRAN) 4 MG tablet Take 1 tablet by mouth every 8 hours as needed for Nausea or Vomiting 30 tablet 0    Blood Pressure Monitor KIT Take blood pressure daily (Patient not taking: Reported on 9/28/2021) 1 kit 0     No current facility-administered medications for this visit. Social History     Tobacco Use    Smoking status: Never Smoker    Smokeless tobacco: Never Used   Substance Use Topics    Alcohol use: Never      Subjective:   Review of Systems   Constitutional: Positive for fever. HENT: Positive for congestion, ear pain, rhinorrhea and sinus pressure. Respiratory: Positive for cough and wheezing. Cardiovascular: Negative for chest pain and palpitations. Objective:     Vitals:    09/28/21 1613   BP: 122/82   Site: Left Upper Arm   Position: Sitting   Cuff Size: Large Adult   Pulse: 86   Resp: 16   Temp: 97.9 °F (36.6 °C)   TempSrc: Temporal   SpO2: 99%   Weight: 246 lb 3.2 oz (111.7 kg)   Height: 5' 9\" (1.753 m)     Body mass index is 36.36 kg/m². Wt Readings from Last 3 Encounters:   09/28/21 246 lb 3.2 oz (111.7 kg)   08/05/21 266 lb 6.4 oz (120.8 kg)   08/02/21 268 lb 12.8 oz (121.9 kg)     BP Readings from Last 3 Encounters:   09/28/21 122/82   08/05/21 102/74   08/02/21 130/78     Physical Exam  Vitals and nursing note reviewed. Constitutional:       General: She is not in acute distress. Appearance: She is well-developed. She is not diaphoretic. HENT:      Left Ear: Tympanic membrane normal.      Ears:      Comments: R TM erythematous, no overt air fluid levels  Cardiovascular:      Rate and Rhythm: Normal rate and regular rhythm. Heart sounds: Normal heart sounds. No murmur heard. Pulmonary:      Effort: Pulmonary effort is normal.      Breath sounds: Normal breath sounds. No wheezing. Abdominal:      General: There is no distension. Palpations: Abdomen is soft. Tenderness: There is no abdominal tenderness. Neurological:      Mental Status: She is alert. Psychiatric:         Thought Content: Thought content normal.         Judgment: Judgment normal.         Assessment / Plan:   Kathy Greenfield was seen today for ear fullness. Diagnoses and all orders for this visit:    Right ear pain      Patient presents the same day to discuss his right ear pain   Right ear pain-patient getting over an upper respiratory virus. Was tested for Covid and was negative. Other symptoms are resolving other than her right ear pain. Likely fluid backup. Discussed using Claritin versus Zyrtec in addition to the Flonase and nasal spray she is currently using. Discussed that should get better with time. Recommended copious hydration as well    Patient to follow-up in 4 months, earlier as needed       Return in about 4 months (around 1/28/2022). Medications Prescribed:  No orders of the defined types were placed in this encounter. No future appointments. Patient given educational materials - see patient instructions. Discussed use, benefit, and side effects of prescribed medications. All patient questions answered. Pt voiced understanding. Instructed to continue current medications, diet and exercise. Patient agreed with treatment plan. Follow up as directed. Part of this visit was documented via [id number] technology, please excuse any errors.      Electronically signed by Chanda Choudhary MD on 9/28/2021 at 5:11 PM

## 2021-09-28 NOTE — PROGRESS NOTES
Health Maintenance Due   Topic Date Due    Pneumococcal 0-64 years Vaccine (1 of 2 - PPSV23) Never done    Pap smear  Never done    Chlamydia screen  03/19/2021    Flu vaccine (1) 09/01/2021    COVID-19 Vaccine (2 - Moderna 2-dose series) 10/02/2021

## 2021-09-28 NOTE — TELEPHONE ENCOUNTER
No chest pain  EKG showed sinus tachycardia on admission  Initial troponin 0 06, trended down to 0 05 and 0 05 subsequently  Non MI troponin elevation Patient states that she stopped taking mucinex. Patient states that she has not experienced symptoms since she stopped taking it. Patient c/o right ear feels clogged, like there is a lot of pressure, using nasal spray, can't hear out of ear. Patient okay with scheduling an appointment, scheduled 9/28/21 with Dr. Clara Gonsalez.

## 2021-10-04 ENCOUNTER — TELEPHONE (OUTPATIENT)
Dept: FAMILY MEDICINE CLINIC | Age: 21
End: 2021-10-04

## 2021-10-04 NOTE — TELEPHONE ENCOUNTER
Dr. Francy Lopes states that she got her 2nd dose of Moderna vaccine on Saturday and is now having low grade fever that comes and goes, vertigo, and chest pains. Please Advise.

## 2021-10-05 ENCOUNTER — NURSE TRIAGE (OUTPATIENT)
Dept: OTHER | Facility: CLINIC | Age: 21
End: 2021-10-05

## 2021-10-05 ENCOUNTER — OFFICE VISIT (OUTPATIENT)
Dept: FAMILY MEDICINE CLINIC | Age: 21
End: 2021-10-05
Payer: COMMERCIAL

## 2021-10-05 VITALS
TEMPERATURE: 97.3 F | RESPIRATION RATE: 16 BRPM | OXYGEN SATURATION: 100 % | SYSTOLIC BLOOD PRESSURE: 120 MMHG | WEIGHT: 277.6 LBS | HEIGHT: 69 IN | BODY MASS INDEX: 41.12 KG/M2 | HEART RATE: 76 BPM | DIASTOLIC BLOOD PRESSURE: 76 MMHG

## 2021-10-05 DIAGNOSIS — G43.909 MIGRAINE WITHOUT STATUS MIGRAINOSUS, NOT INTRACTABLE, UNSPECIFIED MIGRAINE TYPE: Primary | ICD-10-CM

## 2021-10-05 DIAGNOSIS — R07.9 CHEST PAIN, UNSPECIFIED TYPE: ICD-10-CM

## 2021-10-05 PROCEDURE — 93000 ELECTROCARDIOGRAM COMPLETE: CPT | Performed by: STUDENT IN AN ORGANIZED HEALTH CARE EDUCATION/TRAINING PROGRAM

## 2021-10-05 PROCEDURE — 99213 OFFICE O/P EST LOW 20 MIN: CPT | Performed by: STUDENT IN AN ORGANIZED HEALTH CARE EDUCATION/TRAINING PROGRAM

## 2021-10-05 RX ORDER — BUTALBITAL, ACETAMINOPHEN AND CAFFEINE 50; 325; 40 MG/1; MG/1; MG/1
1 TABLET ORAL EVERY 4 HOURS PRN
Qty: 10 TABLET | Refills: 0 | Status: SHIPPED | OUTPATIENT
Start: 2021-10-05

## 2021-10-05 RX ORDER — KETOROLAC TROMETHAMINE 10 MG/1
10 TABLET, FILM COATED ORAL 2 TIMES DAILY
Qty: 6 TABLET | Refills: 0 | Status: SHIPPED | OUTPATIENT
Start: 2021-10-05 | End: 2021-10-25

## 2021-10-05 ASSESSMENT — ENCOUNTER SYMPTOMS
COUGH: 1
SHORTNESS OF BREATH: 0
WHEEZING: 0

## 2021-10-05 NOTE — PROGRESS NOTES
S: 23 y.o. female with   Chief Complaint   Patient presents with    Follow-up     See Nurse Triage Note 10/05/2021 Migraine, Chest Pain, and Vertigo       After second shot feeling really poorly    Tried tylenol - feels worse than yesterday - pushing the water    Having chest pain also    Also a migraine    Allergic to triptans - fioricet helped in the past - toradol shots also help    BP Readings from Last 3 Encounters:   10/05/21 120/76   09/28/21 122/82   08/05/21 102/74     Wt Readings from Last 3 Encounters:   10/05/21 277 lb 9.6 oz (125.9 kg)   09/28/21 246 lb 3.2 oz (111.7 kg)   08/05/21 266 lb 6.4 oz (120.8 kg)           O: VS:   Vitals:    10/05/21 1324   BP: 120/76   Site: Right Upper Arm   Position: Sitting   Cuff Size: Large Adult   Pulse: 76   Resp: 16   Temp: 97.3 °F (36.3 °C)   TempSrc: Temporal   SpO2: 100%   Weight: 277 lb 9.6 oz (125.9 kg)   Height: 5' 9\" (1.753 m)     Body mass index is 40.99 kg/m². Lab Results   Component Value Date    WBC 6.5 06/09/2021    HGB 13.7 06/09/2021    HCT 41.1 06/09/2021     06/09/2021    CHOL 159 02/13/2020    TRIG 85 02/13/2020    HDL 46 02/13/2020    LDLCALC 96 02/13/2020    AST 27 02/25/2021     02/25/2021    K 4.4 02/25/2021     02/25/2021    CREATININE 0.6 02/25/2021    BUN 14 02/25/2021    CO2 26 02/25/2021    TSH 0.935 02/13/2020    INR 1.00 05/25/2020    GLUF 96 02/25/2020    LABA1C 4.7 02/13/2020    LABGLOM >90 02/25/2021    MG 2.0 01/14/2021    CALCIUM 9.1 02/25/2021    VITD25 16 (L) 01/14/2021       No results found. Diagnosis Orders   1. Migraine without status migrainosus, not intractable, unspecified migraine type     2. Chest pain, unspecified type  EKG 12 Lead       Plan  Will do 10 of the fioricet and a few of the toradol also for the next few days    Lots of water       No follow-ups on file.     Orders Placed:  Orders Placed This Encounter   Procedures    EKG 12 Lead     Medications Prescribed:  No orders of the defined types were placed in this encounter. No future appointments. Health Maintenance Due   Topic Date Due    Pneumococcal 0-64 years Vaccine (1 of 2 - PPSV23) Never done    Chlamydia screen  03/19/2021    Flu vaccine (1) 09/01/2021         Attending Physician Statement  I have discussed the case, including pertinent history and exam findings with the resident. [id number] agree with the documented assessment and plan as documented by the resident.   GE modifier added to this encounter      Adrian Yung DO 10/5/2021 1:57 PM

## 2021-10-05 NOTE — TELEPHONE ENCOUNTER
This should last for about 2 days . Gia Sales Can you call to see if she neds a work note or how she is doing today? If not a lot better can do a video visit  in the next few days . Gia Sales

## 2021-10-05 NOTE — PROGRESS NOTES
Hesham Duran is a 23 y.o. female who presents today for:  Chief Complaint   Patient presents with    Follow-up     See Nurse Triage Note 10/05/2021 Migraine, Chest Pain, and Vertigo       HPI:   Got 2nd COVID shot and feeling poorly  Got it 10/2/21  Has a R sided migraine, chest pains, dizziness, arm pain, R eye blurry, neck pain, nausea   Tried tylenol and excedrin and ibuprofen without relief   No problem with first shot   Feeling worse than yesterday  Pushing water   Has had some success with fiorcet and toradol in the past     Health Maintenance reviewed -   Health Maintenance   Topic Date Due    Pneumococcal 0-64 years Vaccine (1 of 2 - PPSV23) Never done    Chlamydia screen  03/19/2021    Flu vaccine (1) 09/01/2021    HPV vaccine (1 - 2-dose series) 01/28/2022 (Originally 11/12/2010)    DTaP/Tdap/Td vaccine (1 - Tdap) 01/28/2022 (Originally 11/12/2018)    Pap smear  03/05/2024    COVID-19 Vaccine  Completed    Hepatitis C screen  Completed    HIV screen  Completed    Hepatitis A vaccine  Aged Out    Hepatitis B vaccine  Aged Out    Hib vaccine  Aged Out    Meningococcal (ACWY) vaccine  Aged Out    Varicella vaccine  Discontinued     Current Outpatient Medications   Medication Sig Dispense Refill    butalbital-acetaminophen-caffeine (FIORICET, ESGIC) -40 MG per tablet Take 1 tablet by mouth every 4 hours as needed for Headaches 10 tablet 0    ketorolac (TORADOL) 10 MG tablet Take 1 tablet by mouth 2 times daily for 3 days 6 tablet 0    Phenylephrine-DM-GG-APAP (DELSYM COUGH/COLD DAYTIME PO) Take by mouth as needed      magnesium (MAGNESIUM-OXIDE) 250 MG TABS tablet Take 2 tablets by mouth 4 times daily (with meals and nightly) 240 tablet 1    Prenatal Vit-Fe Fumarate-FA (PRENATAL VITAMIN) 27-0.8 MG TABS Take 1 tablet by mouth daily 90 tablet 3    naproxen sodium (ALEVE) 220 MG tablet Take 220 mg by mouth as needed       albuterol sulfate HFA (VENTOLIN HFA) 108 (90 Base) MCG/ACT sounds: Normal heart sounds. No murmur heard. Pulmonary:      Effort: Pulmonary effort is normal.      Breath sounds: Normal breath sounds. No wheezing. Abdominal:      General: There is no distension. Palpations: Abdomen is soft. Tenderness: There is no abdominal tenderness. Neurological:      Mental Status: She is alert. Psychiatric:         Thought Content: Thought content normal.         Judgment: Judgment normal.       EKG reviewed, within normal limits    Assessment / Plan:   Amadeo Raphael was seen today for follow-up. Diagnoses and all orders for this visit:    Migraine without status migrainosus, not intractable, unspecified migraine type  -     butalbital-acetaminophen-caffeine (FIORICET, ESGIC) -40 MG per tablet; Take 1 tablet by mouth every 4 hours as needed for Headaches  -     ketorolac (TORADOL) 10 MG tablet; Take 1 tablet by mouth 2 times daily for 3 days    Chest pain, unspecified type  -     EKG 12 Lead      Patient presents same day to discuss vaccine reaction   Chest pains-likely secondary to recent second COVID vaccine. EKG done in office was within normal limits. Continue to monitor   Migraine-chronic, poorly controlled. Patient is on 3 medications to try and get pregnant. Not currently pregnant. Will prescribe short courses of Fioricet and Toradol. Patient to push fluids and rest.   Work note was provided    Patient to follow-up in 3 months for general follow-up, earlier as needed       Return in about 3 months (around 1/5/2022). Medications Prescribed:  Orders Placed This Encounter   Medications    butalbital-acetaminophen-caffeine (FIORICET, ESGIC) -40 MG per tablet     Sig: Take 1 tablet by mouth every 4 hours as needed for Headaches     Dispense:  10 tablet     Refill:  0    ketorolac (TORADOL) 10 MG tablet     Sig: Take 1 tablet by mouth 2 times daily for 3 days     Dispense:  6 tablet     Refill:  0       No future appointments.     Patient given educational materials - see patient instructions. Discussed use, benefit, and side effects of prescribed medications. All patient questions answered. Pt voiced understanding. Instructed to continue current medications, diet and exercise. Patient agreed with treatment plan. Follow up as directed. Part of this visit was documented via [id number] technology, please excuse any errors.      Electronically signed by Guido Awan MD on 10/5/2021 at 2:19 PM

## 2021-10-05 NOTE — PATIENT INSTRUCTIONS
Thank you   1. Thank you for trusting us with your healthcare needs. You may receive a survey regarding today's visit. It would help us out if you would take a few moments to provide your feedback. We value your input. 2. Please bring in ALL medications BOTTLES, including inhalers, herbal supplements, over the counter, prescribed & non-prescribed medicine. The office would like actual medication bottles and a list.   3. Please note our OFFICE POLICIES:   a. Prior to getting your labs drawn, please check with your insurance company for benefits and eligibility of lab services. Often, insurance companies cover certain tests for preventative visits only. It is patient's responsibility to see what is covered. b. We are unable to change a diagnosis after the test has been performed. c. Lab orders will not be re-printed. Please hold onto your original lab orders and take them to your lab to be completed. d. If you no show your scheduled appointment three times, you will be dismissed from this practice. e. Reggie Eaton must be completed 24 hours prior to your schedule appointment. 4. If the list below has been completed, PLEASE FAX RECORDS TO OUR OFFICE @ [phone].  Once the records have been received we will update your records at our office:  Health Maintenance Due   Topic Date Due    Pneumococcal 0-64 years Vaccine (1 of 2 - PPSV23) Never done    Chlamydia screen  03/19/2021    Flu vaccine (1) 09/01/2021

## 2021-10-05 NOTE — TELEPHONE ENCOUNTER
Reason for Disposition   Sounds like a severe, unusual reaction to the triager    Answer Assessment - Initial Assessment Questions  1. MAIN CONCERN OR SYMPTOM:  \"What is your main concern right now? \" \"What question do you have? \" \"What's the main symptom you're worried about? \" (e.g., fever, pain, redness, swelling)      Chest pain and vertigo, right side migraine    2. VACCINE: \"What vaccination did you receive? \" \"Is this your first or second shot? \" (e.g., none; [employer], J&J, [address], other)      2nd dose of [address] on Saturday at noon    3. SYMPTOM ONSET: \"When did the sx begin? \" (e.g., not relevant; hours, days)       Vertigo started couple hours after shot on Saturday and progressively getting worse. CP started yesterday. 4. SYMPTOM SEVERITY: \"How bad is it? \"       Migraine 10/10    5. FEVER: \"Is there a fever? \" If Yes, ask: \"What is it, how was it measured, and when did it start? \"       Off and on, hasn't checked today. 6. PAST REACTIONS: \"Have you reacted to immunizations before? \" If Yes, ask: \"What happened? \"     Flu shot gets deathly sick    7. OTHER SYMPTOMS: \"Do you have any other symptoms? \"      nausea    Protocols used: CORONAVIRUS (COVID-19) VACCINE QUESTIONS AND REACTIONS-ADULT-[place]    Received call from [facility] with Red Flag Complaint. Brief description of triage: see above    2nd level triage completed with Magnolia Esqueda NP; provider recommends patient be seen today by PCP. Advised to go to [facility] if no appts available. Care advice provided, patient verbalizes understanding; denies any other questions or concerns; instructed to call back for any new or worsening symptoms. Writer provided warm transfer to [facility] for appointment scheduling. Attention Provider: Thank you for allowing me to participate in the care of your patient. The patient was connected to triage in response to information provided to the [facility].   Please do not respond through this encounter as the response is not directed to a shared pool.

## 2021-10-05 NOTE — PROGRESS NOTES
Health Maintenance Due   Topic Date Due    Pneumococcal 0-64 years Vaccine (1 of 2 - PPSV23) Never done    Chlamydia screen  03/19/2021    Flu vaccine (1) 09/01/2021

## 2021-10-21 ENCOUNTER — PATIENT MESSAGE (OUTPATIENT)
Dept: FAMILY MEDICINE CLINIC | Age: 21
End: 2021-10-21

## 2021-10-21 NOTE — TELEPHONE ENCOUNTER
Patient's last appointment was : 10/5/2021  Patient's next appointment is : Visit date not found  Last refilled: 2/18/2021

## 2021-10-21 NOTE — TELEPHONE ENCOUNTER
Spoke with pt, scheduled appointment for pt to discuss asthma. Appointment 10/25/2021 at 3:40p with Dr Teresa Clarke. Pt verbalized understanding.

## 2021-10-23 RX ORDER — ALBUTEROL SULFATE 90 UG/1
2 AEROSOL, METERED RESPIRATORY (INHALATION) 4 TIMES DAILY PRN
Qty: 1 EACH | Refills: 1 | Status: SHIPPED | OUTPATIENT
Start: 2021-10-23 | End: 2021-10-25 | Stop reason: SDUPTHER

## 2021-10-25 ENCOUNTER — OFFICE VISIT (OUTPATIENT)
Dept: FAMILY MEDICINE CLINIC | Age: 21
End: 2021-10-25
Payer: COMMERCIAL

## 2021-10-25 VITALS
OXYGEN SATURATION: 97 % | BODY MASS INDEX: 41.35 KG/M2 | DIASTOLIC BLOOD PRESSURE: 70 MMHG | TEMPERATURE: 97.2 F | HEIGHT: 69 IN | SYSTOLIC BLOOD PRESSURE: 122 MMHG | HEART RATE: 69 BPM | RESPIRATION RATE: 16 BRPM | WEIGHT: 279.2 LBS

## 2021-10-25 DIAGNOSIS — J45.41 MODERATE PERSISTENT ASTHMA WITH ACUTE EXACERBATION: ICD-10-CM

## 2021-10-25 DIAGNOSIS — J45.909 ACUTE ASTHMA: Primary | ICD-10-CM

## 2021-10-25 PROCEDURE — 99213 OFFICE O/P EST LOW 20 MIN: CPT | Performed by: STUDENT IN AN ORGANIZED HEALTH CARE EDUCATION/TRAINING PROGRAM

## 2021-10-25 RX ORDER — PREDNISONE 20 MG/1
20 TABLET ORAL EVERY MORNING
Qty: 5 TABLET | Refills: 0 | Status: SHIPPED | OUTPATIENT
Start: 2021-10-25 | End: 2021-10-30

## 2021-10-25 RX ORDER — ALBUTEROL SULFATE 90 UG/1
2 AEROSOL, METERED RESPIRATORY (INHALATION) 4 TIMES DAILY PRN
Qty: 1 EACH | Refills: 1 | Status: SHIPPED | OUTPATIENT
Start: 2021-10-25

## 2021-10-25 NOTE — PROGRESS NOTES
After pharmacist chart review, the following recommendations are made:    1. Due to patient's recent asthma attack, appears patient is only on a rescue inhaler (albuterol) - could consider adding maintenance inhaler (low dose corticosteroid is recommended for mild therapy/step up from current therapy)  · Flovent HFA - 88mcg BID  · Arnuity Ellipta - 100mcg/day     2. Could recommend for patient to get the following vaccines: pneumococcal and flu    For Pharmacy [id number] in place:   Yes   Recommendation Provided To: Provider: 2 via Note to Provider   Intervention Detail: New Rx: 1, reason: Needs Additional Therapy and Vaccine Recommended/Administered   Gap Closed?: No    Intervention Accepted By: Provider: 2   Time Spent (min): 20 Alert and oriented to person, place and time, memory intact, behavior appropriate to situation, PERRL.

## 2021-10-25 NOTE — PROGRESS NOTES
[id number] [facility] Stephanie MA [address] [address]  Dept: [phone]  Loc: [phone]      Mikel Mirza (:  2000) is a 23 y.o. female,Established patient, here for evaluation of the following chief complaint(s):  Asthma (attack)      ASSESSMENT/PLAN:  1. Acute asthma  -     predniSONE (DELTASONE) 20 MG tablet; Take 1 tablet by mouth every morning for 5 days, Disp-5 tablet, R-0Normal  -     mometasone-formoterol (DULERA) 100-5 MCG/ACT inhaler; Inhale 2 puffs into the lungs every 12 hours, Disp-1 each, R-2Normal  -     Spacer/Aero-Holding Chambers SHAWANDA; DAILY Starting Mon 10/25/2021, Disp-1 each, R-0, Normal  2. Moderate persistent asthma with acute exacerbation  -     mometasone-formoterol (DULERA) 100-5 MCG/ACT inhaler; Inhale 2 puffs into the lungs every 12 hours, Disp-1 each, R-2Normal  -     Spacer/Aero-Holding Chambers SHAWANDA; DAILY Starting Mon 10/25/2021, Disp-1 each, R-0, Normal    While pt does have clear lungs on exam, she is complaining of chest tightness with trying to take deep breaths. Considering significant symptoms, will do a short steroid burst at this time to reduce any inflammation in lungs/chest.  Will step up her asthma management for the fall/winter since this appears to be when she has the worst symptoms. Pt does not want to try advair again because it made her cough. She is advised that powder inhalers can occasional do this. Symbicort apparently not covered by her insurance based on Epic. Dulera inhaler sent to her pharmacy. Spacer ordered to be used with albuterol MDI which is also refilled today. Pt strongly advised to get flu vaccine but she declines today. She is advised if repeat severe breathing symptoms again, she should go straight to ED. Return in about 4 weeks (around 2021) for asthma.     SUBJECTIVE/OBJECTIVE:  HPI     Had an asthma attack on Friday - a friend gave her her albuterol inhaler which helped. Had another episode on Saturday. Chest feels tight. Did not go to ED. States last asthma attack was last year around this time. Symptoms worse when it starts to get cold. Was using her albuterol inhaler almost every day in the week leading up to above acute exacerbation. States albuterol inhaler almost empty. Feeling well at this time without any symptoms or difficulty breathing. Review of Systems   Constitutional: Negative for chills, fatigue and fever. HENT: Negative for ear pain, postnasal drip and trouble swallowing. Eyes: Negative for pain and visual disturbance. Respiratory: Negative for cough and shortness of breath. Cardiovascular: Negative for chest pain and palpitations. Gastrointestinal: Negative for abdominal pain, blood in stool, constipation, diarrhea, nausea and vomiting. Genitourinary: Negative for dysuria. Skin: Negative for rash. Neurological: Negative for headaches. Physical Exam  Vitals and nursing note reviewed. Constitutional:       General: She is not in acute distress. Appearance: She is well-developed. She is not diaphoretic. HENT:      Head: Normocephalic and atraumatic. Right Ear: External ear normal.      Left Ear: External ear normal.      Nose: Nose normal.   Eyes:      General: No scleral icterus. Right eye: No discharge. Left eye: No discharge. Conjunctiva/sclera: Conjunctivae normal.   Cardiovascular:      Rate and Rhythm: Normal rate and regular rhythm. Heart sounds: Normal heart sounds. No murmur heard. Pulmonary:      Effort: Pulmonary effort is normal. No respiratory distress. Breath sounds: Normal breath sounds. No stridor. No wheezing. Musculoskeletal:      Cervical back: Normal range of motion. Skin:     General: Skin is warm and dry. Findings: No erythema or rash.    Neurological:      Mental Status: She is alert and oriented to person, place, and time.      Cranial Nerves: No cranial nerve deficit. Psychiatric:         Behavior: Behavior normal.         Thought Content: Thought content normal.         Judgment: Judgment normal.           Vitals:    10/25/21 1612   BP: 122/70   Site: Left Upper Arm   Position: Sitting   Cuff Size: Large Adult   Pulse: 69   Resp: 16   Temp: 97.2 °F (36.2 °C)   TempSrc: Skin   SpO2: 97%   Weight: 279 lb 3.2 oz (126.6 kg)   Height: 5' 9\" (1.753 m)         An electronic signature was used to authenticate this note.     --Troy Mehta MD

## 2021-10-25 NOTE — PROGRESS NOTES
S: 23 y.o. female with   Chief Complaint   Patient presents with    Asthma     attack     Few days ago had sudden chest tightness and wheezing. Had friend had albuterol she used which slowly helped. Last exacerbation last year. Seems seasonal.  Using albuterol almost every day for past 2 days    No wheezing today. Some chest tightness. Given prednisone daily x 5 days and albuterol   Starting dulera for maintenance     BP Readings from Last 3 Encounters:   10/25/21 122/70   10/05/21 120/76   09/28/21 122/82     Wt Readings from Last 3 Encounters:   10/25/21 279 lb 3.2 oz (126.6 kg)   10/05/21 277 lb 9.6 oz (125.9 kg)   09/28/21 246 lb 3.2 oz (111.7 kg)           O: VS:   Vitals:    10/25/21 1612   BP: 122/70   Site: Left Upper Arm   Position: Sitting   Cuff Size: Large Adult   Pulse: 69   Resp: 16   Temp: 97.2 °F (36.2 °C)   TempSrc: Skin   SpO2: 97%   Weight: 279 lb 3.2 oz (126.6 kg)   Height: 5' 9\" (1.753 m)     Body mass index is 41.23 kg/m². Lab Results   Component Value Date    WBC 6.5 06/09/2021    HGB 13.7 06/09/2021    HCT 41.1 06/09/2021     06/09/2021    CHOL 159 02/13/2020    TRIG 85 02/13/2020    HDL 46 02/13/2020    LDLCALC 96 02/13/2020    AST 27 02/25/2021     02/25/2021    K 4.4 02/25/2021     02/25/2021    CREATININE 0.6 02/25/2021    BUN 14 02/25/2021    CO2 26 02/25/2021    TSH 0.935 02/13/2020    INR 1.00 05/25/2020    GLUF 96 02/25/2020    LABA1C 4.7 02/13/2020    LABGLOM >90 02/25/2021    MG 2.0 01/14/2021    CALCIUM 9.1 02/25/2021    VITD25 16 (L) 01/14/2021       No results found. Diagnosis Orders   1. Acute asthma  predniSONE (DELTASONE) 20 MG tablet    mometasone-formoterol (DULERA) 100-5 MCG/ACT inhaler    Spacer/Aero-Holding Chambers SHAWANDA   2.  Moderate persistent asthma with acute exacerbation  mometasone-formoterol (DULERA) 100-5 MCG/ACT inhaler    Spacer/Aero-Holding [place]         Return in about 4 weeks (around 11/22/2021) for asthma. Orders Placed:  No orders of the defined types were placed in this encounter. Medications Prescribed:  Orders Placed This Encounter   Medications    albuterol sulfate HFA (VENTOLIN HFA) 108 (90 Base) MCG/ACT inhaler     Sig: Inhale 2 puffs into the lungs 4 times daily as needed for Wheezing     Dispense:  1 each     Refill:  1    predniSONE (DELTASONE) 20 MG tablet     Sig: Take 1 tablet by mouth every morning for 5 days     Dispense:  5 tablet     Refill:  0    mometasone-formoterol (DULERA) 100-5 MCG/ACT inhaler     Sig: Inhale 2 puffs into the lungs every 12 hours     Dispense:  1 each     Refill:  2    Spacer/Aero-Holding Chambers SHAWANDA     Si Device by Does not apply route daily     Dispense:  1 each     Refill:  0       Future Appointments   Date Time Provider Jerome Gomezi   2021  3:40 PM Judy Irwin MD  Renny Brown [facility] Maintenance Due   Topic Date Due    Pneumococcal 0-64 years Vaccine (1 of 2 - PPSV23) Never done    Chlamydia screen  2021    Flu vaccine (1) 2021         Attending Physician Statement  I have discussed the case, including pertinent history and exam findings with the resident. I agree with the documented assessment and plan as documented by the resident.   GE modifier added to this encounter      Tiffanie Jenkins MD 10/25/2021 5:25 PM

## 2021-10-25 NOTE — PATIENT INSTRUCTIONS
Patient Education        Asthma Attack: Care Instructions  Overview     During an asthma attack, the airways swell and narrow. This makes it hard to breathe. Severe asthma attacks can be dangerous. But you can help prevent these attacks by keeping your asthma under control and treating symptoms before they get bad. Symptoms include being short of breath, having chest tightness, coughing, and wheezing. Noting and treating these symptoms can also help you avoid trips to the emergency room. If you notice any problems or new symptoms, get medical treatment right away. Follow-up care is a key part of your treatment and safety. Be sure to make and go to all appointments, and call your doctor if you are having problems. It's also a good idea to know your test results and keep a list of the medicines you take. How can you care for yourself at home? · Follow your asthma action plan to prevent and treat attacks. If you don't have an asthma action plan, work with your doctor to create one. · Take your asthma medicines exactly as prescribed. Talk to your doctor right away if you have any questions about how to take them. ? Use your quick-relief medicine when you have symptoms of an asthma attack. Some people need to use quick-relief medicine before they exercise to prevent asthma symptoms. Albuterol is a quick-relief medicine that is often used. In some cases, a certain type of controller inhaler is used as a quick-relief medicine. Ask your doctor what to use for quick relief. ? Take your controller medicine. If you have symptoms often, you will likely need to take it every day. Controller medicine usually includes an inhaled corticosteroid. The goal is to prevent problems before they occur. ? If your doctor prescribed corticosteroid pills to use during an attack, take them exactly as prescribed. It may take hours for the pills to work, but they may make the episode shorter and help you breathe better. ?  Keep your quick-relief medicine with you at all times. · Talk to your doctor before using other medicines. Some medicines, such as aspirin, can cause asthma attacks in some people. · If you have a peak flow meter, use it to check how well you are breathing. This can help you predict when an asthma attack is going to occur. Then you can take medicine to prevent the asthma attack or make it less severe. · Do not smoke or allow others to smoke around you. Avoid smoky places. Smoking makes asthma worse. If you need help quitting, talk to your doctor about stop-smoking programs and medicines. These can increase your chances of quitting for good. · Learn what triggers an asthma attack for you, and avoid the triggers when you can. Common triggers include colds, smoke, air pollution, dust, pollen, mold, pets, cockroaches, stress, and cold air. · Avoid colds and the flu. Talk to your doctor about getting a pneumococcal vaccine shot. If you have had one before, ask your doctor if you need a second dose. Get a flu vaccine every fall. If you must be around people with colds or the flu, wash your hands often. When should you call for help? Call 911 anytime you think you may need emergency care. For example, call if:    · You have severe trouble breathing. Call your doctor now or seek immediate medical care if:    · Your symptoms do not get better after you have followed your asthma action plan.     · You have new or worse trouble breathing.     · Your coughing and wheezing get worse.     · You cough up dark brown or bloody mucus (sputum).     · You have a new or higher fever.    Watch closely for changes in your health, and be sure to contact your doctor if:    · You need to use quick-relief medicine on more than 2 days a week within a month (unless it is just for exercise).     · You cough more deeply or more often, especially if you notice more mucus or a change in the color of your mucus.     · You are not getting better as expected. Where can you learn more? Go to https://chpepiceweb.[email or web address]. org and sign in to your [employer] account. Enter R518 in the [facility] box to learn more about \"Asthma Attack: Care Instructions. \"     If you do not have an account, please click on the \"Sign Up Now\" link. Current as of: July 6, 2021               Content Version: 13.0  © 2006-2021 [employer]. Care instructions adapted under license by [facility] ([facility]). If you have questions about a medical condition or this instruction, always ask your healthcare professional. [place] [address] any warranty or liability for your use of this information. Patient Education        Learning About Asthma Triggers  What are asthma triggers? When you have asthma, some things can make your symptoms worse. These things are called triggers. Things that you're allergic to can trigger your asthma. These may include dust or dust mites, cockroach droppings, pet dander, or mold. Other things can also trigger your asthma, like smoke, colds or the flu, or air pollution. How do asthma triggers affect you? Triggers can make it harder for your lungs to work as they should. They can lead to sudden breathing problems and other symptoms. When you are around a trigger, an asthma attack is more likely. If your symptoms are severe, you may need emergency treatment or have to go to the hospital for treatment. What can you do to avoid triggers? The best way to avoid your asthma triggers is to know what your triggers are. When you are having symptoms, note the things around you that might be causing them. Then look for patterns that may be triggering your symptoms. Record your triggers on a piece of paper or in an asthma diary. When you have your list of possible triggers, work with your doctor to find ways to avoid them. Here are some ways to avoid a few common triggers.   · Don't smoke or allow others to smoke around you. If you need help quitting, talk to your doctor about stop-smoking programs and medicines. These can increase your chances of quitting for good. · If there is a lot of pollution, pollen, or dust outside, stay at home and keep your windows closed. Use an air conditioner or air filter in your home. Check your local weather report or newspaper for air quality and pollen reports. · Avoid colds and flu. Get a flu vaccine every year, as soon as it's available. If you must be around people with colds or the flu, wash your hands often. · Talk to your doctor about getting a pneumococcal vaccine shot. If you have had one before, ask your doctor if you need a second dose. To help prevent problems before they occur, take your controller medicine every day, not only when you have symptoms. Where can you learn more? Go to https://LearnVestpeglenneb.[email or web address] org and sign in to your [employer] account. Enter G021 in the [employer] box to learn more about \"Learning About Asthma Triggers. \"     If you do not have an account, please click on the \"Sign Up Now\" link. Current as of: July 6, 2021               Content Version: 13.0  © 9917-4138 [employer]. Care instructions adapted under license by [facility] ([facility]). If you have questions about a medical condition or this instruction, always ask your healthcare professional. [place] [address] any warranty or liability for your use of this information. Patient Education        Using a Metered-Dose Inhaler: Care Instructions  Overview     A metered-dose inhaler lets you breathe medicine into your lungs quickly. Inhaled medicine works faster than the same medicine in a pill. An inhaler allows you to take less medicine than you would need if you took it as a pill. \"Metered-dose\" means that the inhaler gives a measured amount of medicine each time you use it.  A metered-dose inhaler gives medicine in the form of a liquid mist.  Your doctor may want you to use a spacer with your inhaler. A spacer is a chamber that you attach to the inhaler. The chamber holds the medicine before you inhale it. That way, you can inhale the medicine in as many breaths as you need. Doctors recommend using a spacer with most metered-dose inhalers. This is even more important when using corticosteroid medicines. Follow-up care is a key part of your treatment and safety. Be sure to make and go to all appointments, and call your doctor if you are having problems. It's also a good idea to know your test results and keep a list of the medicines you take. How can you care for yourself at home? To get started  · Talk with your health care provider to be sure you are using your inhaler the right way. It might help if you practice using it in front of a mirror. Use the inhaler exactly as prescribed. · Check that you have the correct medicine. If you use more than one inhaler, put a label on each one. This will let you know which one to use at the right time. · Keep track of how much medicine is in the inhaler. Check the label to see how many doses are in the container. If you know how many puffs you can take, you can replace the inhaler before you run out. Ask your health care provider how you can keep track of how much medicine is left. · Talk to your health care provider about using a spacer with your inhaler. Spacers make it easier to get the medicine into your lungs. You may need a spacer if you are using corticosteroid medicines. A spacer can also help if you have problems pressing the inhaler and breathing in at the same time. · If you are using a corticosteroid inhaler, gargle and rinse out your mouth with water after use. Do not swallow the water. Swallowing the water will increase the chance that the medicine will get into your bloodstream. This may make it more likely that you will have side effects. To use a spacer with an inhaler  1. Shake the inhaler. Remove the inhaler cap, and place the mouthpiece of the inhaler into the spacer. Check the inhaler instructions to see if you need to prime your inhaler before you use it. If it needs priming, follow the instructions on how to prime your inhaler. 2. Remove the cap from the spacer. 3. Hold the inhaler upright with the mouthpiece at the bottom. 4. Tilt your head back a little, and breathe out slowly and completely. 5. Place the spacer's mouthpiece in your mouth. 6. Press down on the inhaler to spray one puff of medicine into the spacer, and then start breathing in slowly. Wait to inhale until after you have pressed down on the inhaler. Some spacers have a whistle. If you hear it, you should breathe in more slowly. 7. Hold your breath for 10 seconds. This will let the medicine settle in your lungs. 8. If you need to take a second dose, wait 30 to 60 seconds to allow the inhaler valve to refill. To use an inhaler without a spacer  1. Shake the inhaler as directed. Remove the cap. Check the instructions to see if you need to prime your inhaler before you use it. If it needs priming, follow the instructions on how to prime your inhaler. 2. Hold the inhaler upright with the mouthpiece at the bottom. 3. Tilt your head back a little, and breathe out slowly and completely. 4. Position the inhaler in one of two ways:  ? You can place the inhaler in your mouth. This is easier for most people. And it lowers the risk that any of the medicine will get into your eyes. ? Or you can place the inhaler 1 to 2 inches in front of your open mouth, without closing your lips over it. Try to open your mouth as wide as you can. Placing the inhaler in front of your open mouth may be better for getting the medicine into your lungs. But some people may find this too hard to do. 5. Start taking slow, even breaths through your mouth. Press down on the inhaler once, then inhale fully. 6. Hold your breath for 10 seconds.  This will let the medicine settle in your lungs. 7. If you need to take a second dose, wait 30 to 60 seconds to allow the inhaler valve to refill. Where can you learn more? Go to https://Betteryjovita.Mobisante. org and sign in to your "[employer] account. Enter K111 in the [facility] box to learn more about \"Using a Metered-Dose Inhaler: Care Instructions. \"     If you do not have an account, please click on the \"Sign Up Now\" link. Current as of: July 6, 2021               Content Version: 13.0  © 8176-0720 [employer] ???. Care instructions adapted under license by [facility] ([facility]). If you have questions about a medical condition or this instruction, always ask your healthcare professional. [address] any warranty or liability for your use of this information. Patient Education        Using a Dry Powder Inhaler: Care Instructions  Overview     A dry powder inhaler lets you breathe medicine into your lungs quickly. Inhaled medicine works faster than the same medicine in a pill. An inhaler lets you take less medicine than you would need if you took it as a pill. A dry powder inhaler delivers medicine in the form of a fine powder. Dry powder inhalers are activated by breathing. When you breathe in through the inhaler, the inhaler puts medicine into your lungs. Dry powder inhalers come in different shapes and sizes. For some, you need to add the medicine to the inhaler each time you use it. Some come with a supply of medicine already in them. For these, you'll need to [facility]" each dose of medicine each time you use it. How you load a dose depends on the type of inhaler you have. Follow-up care is a key part of your treatment and safety. Be sure to make and go to all appointments, and call your doctor if you are having problems. It's also a good idea to know your test results and keep a list of the medicines you take. How can you care for yourself at home?   To get started  · Talk with your doctor, respiratory therapist, or pharmacist to be sure you are using your inhaler the right way. It might help if you practice using it in front of a mirror. Use the inhaler exactly as prescribed. · Check that you have the correct medicine. If you use several inhalers, put a label on each one so that you know which one to use at the right time. · Keep your inhaler in a cool, dry place. Do not store your inhaler in the bathroom. Moisture in the air can cause the dry powder to clump together. This can clog the inhaler. · Keep track of how much medicine is in the inhaler. Some dry powder inhalers have dose counters that show how many doses are left. If your inhaler does not have a dose counter, your doctor or pharmacist can teach you how to keep track of how much medicine is left. · If you are using a steroid medicine in the inhaler, gargle and rinse out your mouth with water after use. Do not swallow the water. Swallowing the water will increase the chance that the medicine will get into your bloodstream. This may make it more likely that you will have side effects. · Some powder may build up on the inhaler. You do not need to clean the inhaler every day. Follow your doctor's or pharmacist's instructions for cleaning your inhaler. To use a dry powder inhaler  · Remove the inhaler cap, if there is one. · Add or load a dose of medicine as directed by your health care provider. · Tilt your head back a little, and breathe out slowly and completely. Hold the inhaler away from your mouth while you breathe out. Do not breathe out into the inhaler. This can blow some of the powder medicine out of the inhaler. The moisture in your breath also can cause the dry powder to clump together and clog the inhaler. · Place the inhaler's mouthpiece in your mouth. Close your lips tightly around the mouthpiece. · Inhale quickly and deeply through your mouth for 2 or 3 seconds.  This pulls the powder from the inhaler into your lungs. After you have inhaled the powder, take the inhaler out of your mouth. · Hold your breath for 10 seconds. This will let the medicine settle in your lungs. Then slowly breathe out through pursed lips. Make sure not to breathe out into the inhaler. · Repeat these steps if you need to take a second dose. Where can you learn more? Go to https://Brown [email or web address] Meyer Enterprisespepiceweb.[email or web address] org and sign in to your [email or web address] account. Enter 0489 [id number] [phone] in the [employer] box to learn more about \"Using a Dry Powder Inhaler: Care Instructions. \"     If you do not have an account, please click on the \"Sign Up Now\" link. Current as of: July 6, 2021               Content Version: 13.0  © 5210-3673 [employer]. Care instructions adapted under license by [facility] ([facility]). If you have questions about a medical condition or this instruction, always ask your healthcare professional. [place] [address] any warranty or liability for your use of this information.

## 2021-10-26 ASSESSMENT — ENCOUNTER SYMPTOMS
CONSTIPATION: 0
BLOOD IN STOOL: 0
EYE PAIN: 0
DIARRHEA: 0
VOMITING: 0
ABDOMINAL PAIN: 0
COUGH: 0
NAUSEA: 0
TROUBLE SWALLOWING: 0
SHORTNESS OF BREATH: 0

## 2021-11-12 ENCOUNTER — HOSPITAL ENCOUNTER (EMERGENCY)
Age: 21
Discharge: HOME | End: 2021-11-12
Payer: COMMERCIAL

## 2021-11-12 VITALS
TEMPERATURE: 98.2 F | RESPIRATION RATE: 16 BRPM | OXYGEN SATURATION: 98 % | SYSTOLIC BLOOD PRESSURE: 130 MMHG | HEART RATE: 88 BPM | DIASTOLIC BLOOD PRESSURE: 86 MMHG

## 2021-11-12 DIAGNOSIS — R11.2 NON-INTRACTABLE VOMITING WITH NAUSEA, UNSPECIFIED VOMITING TYPE: Primary | ICD-10-CM

## 2021-11-12 LAB
BILIRUBIN URINE: NEGATIVE
BLOOD, URINE: NORMAL
CHARACTER, URINE: CLEAR
COLOR: YELLOW
GLUCOSE URINE: NEGATIVE MG/DL
KETONES, URINE: NEGATIVE
LEUKOCYTE ESTERASE, URINE: NEGATIVE
NITRITE, URINE: NEGATIVE
PH UA: 6.5 (ref 5–9)
PREGNANCY, URINE: NEGATIVE
PROTEIN UA: NEGATIVE MG/DL
SPECIFIC GRAVITY UA: 1.02 (ref 1–1.03)
UROBILINOGEN, URINE: 0.2 EU/DL (ref 0.2–1)

## 2021-11-12 PROCEDURE — 96372 THER/PROPH/DIAG INJ SC/IM: CPT

## 2021-11-12 PROCEDURE — 81003 URINALYSIS AUTO W/O SCOPE: CPT

## 2021-11-12 PROCEDURE — 81025 URINE PREGNANCY TEST: CPT

## 2021-11-12 PROCEDURE — 99214 OFFICE O/P EST MOD 30 MIN: CPT | Performed by: NURSE PRACTITIONER

## 2021-11-12 PROCEDURE — 6360000002 HC RX W HCPCS: Performed by: NURSE PRACTITIONER

## 2021-11-12 PROCEDURE — 99213 OFFICE O/P EST LOW 20 MIN: CPT

## 2021-11-12 RX ORDER — PROMETHAZINE HYDROCHLORIDE 25 MG/ML
25 INJECTION, SOLUTION INTRAMUSCULAR; INTRAVENOUS ONCE
Status: COMPLETED | OUTPATIENT
Start: 2021-11-12 | End: 2021-11-12

## 2021-11-12 RX ADMIN — PROMETHAZINE HYDROCHLORIDE 25 MG: 25 INJECTION INTRAMUSCULAR; INTRAVENOUS at 14:53

## 2021-11-12 ASSESSMENT — ENCOUNTER SYMPTOMS
BACK PAIN: 0
SHORTNESS OF BREATH: 0
ABDOMINAL PAIN: 0
WHEEZING: 0
VOMITING: 0
EYE REDNESS: 0
ALLERGIC/IMMUNOLOGIC NEGATIVE: 1
EYE DISCHARGE: 0
EYE PAIN: 0
NAUSEA: 1
DIARRHEA: 1
SORE THROAT: 0
TROUBLE SWALLOWING: 0
COUGH: 0
RHINORRHEA: 0
CONSTIPATION: 0

## 2021-11-12 NOTE — ED PROVIDER NOTES
[facility] 36  Urgent Care Encounter      CHIEF COMPLAINT       Chief Complaint   Patient presents with    Emesis       Nurses Notes reviewed and I agree except as noted in the HPI. HISTORY OF PRESENT ILLNESS   Jaiden Galindo is a 24 y.o. female who presents with complaint of nausea onset yesterday that has not been helped with Phenergan last night and 2 doses of Zofran today. Patient states she has a history of chronic nausea intermittently, and gastroparesis since she was a child. Her last emesis was 3 hours ago, but she states it is not producing any type of solids or liquids as she has vomited several times in the last 2 days. Denies any different kind of food intake, travel or other etiology that she can think of. Denies fever, chills, abdominal pain but has had an onset of diarrhea. Denies urinary symptoms. Last menstrual period was very unusual and short over 2 weeks ago. Patient denies use of marijuana. REVIEW OF SYSTEMS     Review of Systems   Constitutional: Negative for activity change, fatigue and fever. HENT: Negative for congestion, ear pain, rhinorrhea, sore throat and trouble swallowing. Eyes: Negative for pain, discharge and redness. Respiratory: Negative for cough, shortness of breath and wheezing. Cardiovascular: Negative. Gastrointestinal: Positive for diarrhea and nausea. Negative for abdominal pain, constipation and vomiting. Endocrine: Negative. Genitourinary: Negative for dysuria, frequency and urgency. Musculoskeletal: Negative for arthralgias, back pain and myalgias. Skin: Negative for rash. Allergic/Immunologic: Negative. Neurological: Negative for dizziness, tremors, weakness and headaches. Hematological: Negative. Psychiatric/Behavioral: Negative for dysphoric mood and sleep disturbance. The patient is not nervous/anxious.         PAST MEDICAL HISTORY         Diagnosis Date    Anxiety     Chronic GERD     Chronic migraine takes verapamil    Depression     Gallstones 10/2019    Gastroparesis     diagnosis as a child    Hx of blood clots     PE     Hypertension     Obesity     Pulmonary embolism (HCC)     8 months ago-was on Xarelto-sees Dr Maritza Fink in [facility]        SURGICAL HISTORY     Patient  has a past surgical history that includes Tonsillectomy; Stevenson tooth extraction; Elbow surgery (Right); Cholecystectomy, laparoscopic (N/A, 10/30/2019); and Dilation and curettage of uterus (03/16/2021). CURRENT MEDICATIONS       Previous Medications    ALBUTEROL SULFATE HFA (VENTOLIN HFA) 108 (90 BASE) MCG/ACT INHALER    Inhale 2 puffs into the lungs 4 times daily as needed for Wheezing    ASPIRIN 81 MG EC TABLET    Take 81 mg by mouth daily     BLOOD PRESSURE MONITOR KIT    Take blood pressure daily    BUTALBITAL-ACETAMINOPHEN-CAFFEINE (FIORICET, ESGIC) -40 MG PER TABLET    Take 1 tablet by mouth every 4 hours as needed for Headaches    MAGNESIUM (MAGNESIUM-OXIDE) 250 MG TABS TABLET    Take 2 tablets by mouth 4 times daily (with meals and nightly)    MOMETASONE-FORMOTEROL (DULERA) 100-5 MCG/ACT INHALER    Inhale 2 puffs into the lungs every 12 hours    NAPROXEN SODIUM (ALEVE) 220 MG TABLET    Take 220 mg by mouth as needed     ONDANSETRON (ZOFRAN) 4 MG TABLET    Take 1 tablet by mouth every 8 hours as needed for Nausea or Vomiting    PRENATAL VIT-FE FUMARATE-FA (PRENATAL VITAMIN) 27-0.8 MG TABS    Take 1 tablet by mouth daily    SPACER/AERO-HOLDING CHAMBERS SHAWANDA    1 Device by Does not apply route daily    VITAMIN D3 (CHOLECALCIFEROL) 25 MCG (1000 UT) TABS TABLET    Take 2 tablets by mouth daily       ALLERGIES     Patient is is allergic to imitrex [sumatriptan] and mucinex [guaifenesin er].     FAMILY HISTORY     Patient'sfamily history includes Cancer in her father; Colon Polyps in her maternal grandmother and paternal grandfather; High Blood Pressure in her father; Lupus in her mother; Migraines in her mother; No Known Problems in her brother, brother, maternal grandfather, and paternal grandmother; Other in her father; Thyroid Disease in her father. SOCIAL HISTORY     Patient  reports that she has never smoked. She has never used smokeless tobacco. She reports that she does not drink alcohol and does not use drugs. PHYSICAL EXAM     ED TRIAGE VITALS  BP: 130/86, Temp: 98.2 °F (36.8 °C), Pulse: 88, Resp: 16, SpO2: 98 %  Physical Exam  Vitals reviewed. Constitutional:       General: She is not in acute distress. Appearance: Normal appearance. She is well-developed. She is not toxic-appearing or diaphoretic. HENT:      Head: Normocephalic. No right periorbital erythema or left periorbital erythema. Jaw: No trismus. Right Ear: Hearing, tympanic membrane, ear canal and external ear normal.      Left Ear: Hearing, tympanic membrane, ear canal and external ear normal.      Nose: Nose normal. No mucosal edema or rhinorrhea. Mouth/Throat:      Mouth: Mucous membranes are moist.      Dentition: Normal dentition. Pharynx: Uvula midline. No posterior oropharyngeal erythema. Tonsils: No tonsillar exudate. 0 on the right. 0 on the left. Eyes:      General: Lids are normal.         Right eye: No discharge. Left eye: No discharge. Conjunctiva/sclera: Conjunctivae normal.      Right eye: Right conjunctiva is not injected. No chemosis. Left eye: No chemosis. Pupils: Pupils are equal, round, and reactive to light. Neck:      Vascular: No JVD. Trachea: Trachea normal.   Cardiovascular:      Rate and Rhythm: Normal rate and regular rhythm. Heart sounds: Normal heart sounds. No murmur heard. Pulmonary:      Effort: Pulmonary effort is normal. No respiratory distress. Breath sounds: Normal breath sounds. No stridor. No wheezing. Abdominal:      General: Bowel sounds are normal. There is no distension. Palpations: Abdomen is soft.       Tenderness: There is no abdominal tenderness. Musculoskeletal:         General: No tenderness or signs of injury. Normal range of motion. Cervical back: Full passive range of motion without pain and normal range of motion. Lymphadenopathy:      Cervical: No cervical adenopathy. Skin:     General: Skin is warm and dry. Capillary Refill: Capillary refill takes less than 2 seconds. Findings: No rash. Neurological:      Mental Status: She is alert and oriented to person, place, and time. GCS: GCS eye subscore is 4. GCS verbal subscore is 5. GCS motor subscore is 6. Cranial Nerves: No cranial nerve deficit. Coordination: Coordination normal.      Gait: Gait normal.   Psychiatric:         Mood and Affect: Mood is not anxious or depressed. Speech: Speech normal.         Behavior: Behavior normal. Behavior is not withdrawn or hyperactive. Behavior is cooperative. Thought Content:  Thought content normal.         Judgment: Judgment normal.         DIAGNOSTIC RESULTS   Labs:   Results for orders placed or performed during the hospital encounter of 11/12/21   Urinalysis   Result Value Ref Range    Glucose, Ur Negative NEGATIVE mg/dl    Bilirubin Urine Negative NEGATIVE    Ketones, Urine Negative NEGATIVE    Specific Gravity, UA 1.020 1.002 - 1.030    Blood, Urine Trace-lysed NEGATIVE    pH, UA 6.50 5.0 - 9.0    Protein, UA Negative NEGATIVE mg/dl    Urobilinogen, Urine 0.20 0.2 - 1.0 eu/dl    Nitrite, Urine Negative NEGATIVE    Leukocyte Esterase, Urine Negative NEGATIVE    Color, UA Yellow STRAW-YELLOW    Character, Urine Clear CLEAR-SL CLOUD       IMAGING:    URGENT CARE COURSE:     Vitals:    11/12/21 1406   BP: 130/86   Pulse: 88   Resp: 16   Temp: 98.2 °F (36.8 °C)   TempSrc: Temporal   SpO2: 98%     UA and urine pregnancy is negative    Medications   promethazine (PHENERGAN) injection 25 mg (has no administration in time range)     PROCEDURES:  None  FINAL IMPRESSION      1. Non-intractable vomiting with nausea, unspecified vomiting type        DISPOSITION/PLAN   DISPOSITION      Patient is given an injection of Phenergan 25 mg in the urgent care to see if this will resolve her nausea. She will go home and rest.  We will follow-up with her PCP if it continues.     PATIENT REFERRED TO:  DO Lori Stein [address] [place] [id number]      As needed    DISCHARGE MEDICATIONS:  New Prescriptions    No medications on file     Current Discharge Medication List          KARLA De La Cruz CNP, APRN - CNP  11/12/21 2816

## 2021-11-12 NOTE — ED NOTES
Pt given shot. Does not want to wait shot time. Ambulatory to exit with no problem.      Keith Ochoa RN  11/12/21 7644

## 2021-11-16 ENCOUNTER — OFFICE VISIT (OUTPATIENT)
Dept: FAMILY MEDICINE CLINIC | Age: 21
End: 2021-11-16
Payer: COMMERCIAL

## 2021-11-16 VITALS
DIASTOLIC BLOOD PRESSURE: 76 MMHG | SYSTOLIC BLOOD PRESSURE: 114 MMHG | WEIGHT: 282.2 LBS | RESPIRATION RATE: 16 BRPM | HEART RATE: 95 BPM | OXYGEN SATURATION: 98 % | HEIGHT: 69 IN | TEMPERATURE: 96.8 F | BODY MASS INDEX: 41.8 KG/M2

## 2021-11-16 DIAGNOSIS — Z31.69 ENCOUNTER FOR PRECONCEPTION CONSULTATION: ICD-10-CM

## 2021-11-16 DIAGNOSIS — J45.40 MODERATE PERSISTENT ASTHMA WITHOUT COMPLICATION: Primary | ICD-10-CM

## 2021-11-16 DIAGNOSIS — F32.89 OTHER DEPRESSION: ICD-10-CM

## 2021-11-16 PROCEDURE — 99214 OFFICE O/P EST MOD 30 MIN: CPT | Performed by: STUDENT IN AN ORGANIZED HEALTH CARE EDUCATION/TRAINING PROGRAM

## 2021-11-16 RX ORDER — METFORMIN HYDROCHLORIDE 500 MG/1
500 TABLET, EXTENDED RELEASE ORAL
Qty: 30 TABLET | Refills: 0 | Status: SHIPPED | OUTPATIENT
Start: 2021-11-16 | End: 2021-11-30

## 2021-11-16 ASSESSMENT — ENCOUNTER SYMPTOMS
TROUBLE SWALLOWING: 0
CONSTIPATION: 0
NAUSEA: 0
SHORTNESS OF BREATH: 0
BLOOD IN STOOL: 0
EYE PAIN: 0
ABDOMINAL PAIN: 0
VOMITING: 0
DIARRHEA: 0
COUGH: 0

## 2021-11-16 NOTE — PROGRESS NOTES
[address] [facility] [address]  [place] [address]  Dept: [phone]  Loc: [phone]      Jeanette Saunders (:  2000) is a 24 y.o. female,Established patient, here for evaluation of the following chief complaint(s):  Follow-up (Pt presents for a 4 week f/u for asthma. Pt states she has been doing better. )      ASSESSMENT/PLAN:  1. Moderate persistent asthma without complication  2. Encounter for preconception consultation  -     Progesterone; Future  3. Other depression  -     sertraline (ZOLOFT) 50 MG tablet; Take 1 tablet by mouth daily, Disp-30 tablet, R-1Normal  4. BMI 40.0-44.9, adult (HCC)  -     Hemoglobin A1C; Future  -     metFORMIN (GLUCOPHAGE-XR) 500 MG extended release tablet; Take 1 tablet by mouth daily (with breakfast), Disp-30 tablet, R-0Normal    Asthma is stable on current treatment - continue daily dulera + albuterol MDI PRN. Will check progesterone level 4 days after first positive LH at home. Noted previous FSH/LH testing last year, low normal.  Recheck A1c d/t obesity and recent weight gain. Start metformin to aid with weight loss and possibly to increase fertility. Start zoloft d/t long-standing depression and anxiety. Return in about 1 month (around 2021) for pre conception. SUBJECTIVE/OBJECTIVE:  HPI     4 week asthma follow up. Was started on dulera at last visit. Doing well, using albuterol inhaler sparingly. Is doing ovulation testing, having sex daily when ovulation test is positive. Currently on day 13 of current cycle. Cycles are usual 35-38 days. Had a D&C in March of this year. No past term pregnancies, no living children. Depressed and anxious, long term. Has on elavil in the past and gained weight on it. Mood swings. Birth defects in the family - pt will bring in list.       Review of Systems   Constitutional: Negative for chills, fatigue and fever.    HENT: Negative for ear pain, postnasal drip and trouble swallowing. Eyes: Negative for pain and visual disturbance. Respiratory: Negative for cough and shortness of breath. Cardiovascular: Negative for chest pain and palpitations. Gastrointestinal: Negative for abdominal pain, blood in stool, constipation, diarrhea, nausea and vomiting. Genitourinary: Negative for dysuria. Skin: Negative for rash. Neurological: Negative for headaches. Psychiatric/Behavioral: Positive for dysphoric mood. Physical Exam  Vitals and nursing note reviewed. Constitutional:       General: She is not in acute distress. Appearance: She is well-developed. She is not diaphoretic. HENT:      Head: Normocephalic and atraumatic. Right Ear: External ear normal.      Left Ear: External ear normal.      Nose: Nose normal.   Eyes:      General: No scleral icterus. Right eye: No discharge. Left eye: No discharge. Conjunctiva/sclera: Conjunctivae normal.   Cardiovascular:      Rate and Rhythm: Normal rate and regular rhythm. Heart sounds: Normal heart sounds. No murmur heard. Pulmonary:      Effort: Pulmonary effort is normal.      Breath sounds: Normal breath sounds. Musculoskeletal:      Cervical back: Normal range of motion. Skin:     General: Skin is warm and dry. Findings: No erythema or rash. Neurological:      Mental Status: She is alert and oriented to person, place, and time. Psychiatric:      Comments: Depressed mood, anxious-appearing           Vitals:    11/16/21 1606   BP: 114/76   Pulse: 95   Resp: 16   Temp: 96.8 °F (36 °C)   SpO2: 98%   Weight: 282 lb 3.2 oz (128 kg)   Height: 5' 9\" (1.753 m)         An electronic signature was used to authenticate this note.     --Caroline Hurd MD

## 2021-11-16 NOTE — PROGRESS NOTES
Attending attestation:  I personally performed and participated key or critical portions of the evaluation and management including personally performing the exam and medical decision making. I verify the accuracy of the documentation by the resident with the following addition or changes: Recovered well from asthma exacerbation. Rare use of rescue inhaler. Will continue Dulera. Also interested in getting pregnant. Trying for the past year. Had a miscarriage and DNC in March. Is checking for ovulation with LH strips. Tracking cervical mucus. Finding this stressful. Causing tension with partner. Also worried about history of birth defects in her family in people getting pregnant after age 22. Regular menses. Noting cervical mucus. No other miscarriages. Miscarriage was early in 1st trimester. Depressed and anxious. Discussed options. Will add low dose Zoloft for mood, low dose metformin given BMI. Encouraged regular exercise, healthy diet, prenatal vitamin. Discussed that timing of intercourse with peak type stretchy clear cervical mucus is reasonable. Could also use clear blue fertility monitor to time with estrogen rise if desired. If everything is too stressful, okay to stop trying so hard for a time. Even when trying intercourse every other day not daily might actually be more effective. Will check progesterone 4 days after ovulation confirmed with LH test; could also use Proov strips if desired for this. Bring us more information on birth defects in family. Close follow-up.         Electronically signed by Grady Jones MD on 11/16/2021 at 4:29 PM

## 2021-11-30 ENCOUNTER — NURSE ONLY (OUTPATIENT)
Dept: LAB | Age: 21
End: 2021-11-30

## 2021-11-30 DIAGNOSIS — Z31.69 ENCOUNTER FOR PRECONCEPTION CONSULTATION: ICD-10-CM

## 2021-11-30 LAB
AVERAGE GLUCOSE: 81 MG/DL (ref 70–126)
HBA1C MFR BLD: 4.7 % (ref 4.4–6.4)
PROGESTERONE LEVEL: 2.28 NG/ML

## 2021-11-30 RX ORDER — METFORMIN HYDROCHLORIDE 500 MG/1
TABLET, EXTENDED RELEASE ORAL
Qty: 21 TABLET | Refills: 1 | Status: SHIPPED | OUTPATIENT
Start: 2021-11-30 | End: 2022-01-12 | Stop reason: SDUPTHER

## 2021-12-15 ENCOUNTER — OFFICE VISIT (OUTPATIENT)
Dept: FAMILY MEDICINE CLINIC | Age: 21
End: 2021-12-15
Payer: COMMERCIAL

## 2021-12-15 VITALS
WEIGHT: 280.8 LBS | RESPIRATION RATE: 20 BRPM | BODY MASS INDEX: 41.59 KG/M2 | HEIGHT: 69 IN | HEART RATE: 95 BPM | TEMPERATURE: 97.2 F | OXYGEN SATURATION: 97 % | DIASTOLIC BLOOD PRESSURE: 76 MMHG | SYSTOLIC BLOOD PRESSURE: 118 MMHG

## 2021-12-15 DIAGNOSIS — R79.89 LOW SERUM PROGESTERONE: ICD-10-CM

## 2021-12-15 DIAGNOSIS — N96 HISTORY OF MULTIPLE MISCARRIAGES: ICD-10-CM

## 2021-12-15 DIAGNOSIS — Z31.69 PRE-CONCEPTION COUNSELING: Primary | ICD-10-CM

## 2021-12-15 PROCEDURE — 99213 OFFICE O/P EST LOW 20 MIN: CPT | Performed by: STUDENT IN AN ORGANIZED HEALTH CARE EDUCATION/TRAINING PROGRAM

## 2021-12-15 ASSESSMENT — ENCOUNTER SYMPTOMS
DIARRHEA: 0
EYE PAIN: 0
VOMITING: 0
SHORTNESS OF BREATH: 0
CONSTIPATION: 0
NAUSEA: 0
BLOOD IN STOOL: 0
TROUBLE SWALLOWING: 0
COUGH: 0
ABDOMINAL PAIN: 0

## 2021-12-15 NOTE — PROGRESS NOTES
S: 24 y.o. female with   Chief Complaint   Patient presents with    1 Month Follow-Up     review labs completed 11/30/2021       HPI: please see resident note for HPI and ROS. BP Readings from Last 3 Encounters:   12/15/21 118/76   11/16/21 114/76   11/12/21 130/86     Wt Readings from Last 3 Encounters:   12/15/21 280 lb 12.8 oz (127.4 kg)   11/16/21 282 lb 3.2 oz (128 kg)   10/25/21 279 lb 3.2 oz (126.6 kg)       O: VS:  height is 5' 9\" (1.753 m) and weight is 280 lb 12.8 oz (127.4 kg). Her temporal temperature is 97.2 °F (36.2 °C). Her blood pressure is 118/76 and her pulse is 95. Her respiration is 20 and oxygen saturation is 97%. Diagnosis Orders   1. Pre-conception counseling  Cleveland Ortiz DO, [facility], [facility] [place]    DISCONTINUED: clomiPHENE (CLOMID) 50 MG tablet   2. Low serum progesterone  Cleveland Ortiz DO, [address] [facility], [facility] [place]    DISCONTINUED: clomiPHENE (CLOMID) 50 MG tablet   3. History of multiple miscarriages  Cardiolipin Antibodies IgG & IgM    Miscellaneous Sendout 1       Plan:  Refer to Dr. Jenn Montes De Oca. Obtain anticardiolipin antibodies and lupus anticoagulant.  needs sperm studies. Order estrogen level. Penelope Christy Health Maintenance Due   Topic Date Due    Pneumococcal 0-64 years Vaccine (1 of 2 - PPSV23) Never done    Chlamydia screen  03/19/2021    Flu vaccine (1) 09/01/2021       Attending Physician Statement  I have discussed the case, including pertinent history and exam findings with the resident. I agree with the documented assessment and plan as documented by the resident.         Mirella Lund DO 12/15/2021 5:04 PM

## 2021-12-15 NOTE — PROGRESS NOTES
Health Maintenance Due   Topic Date Due    Pneumococcal 0-64 years Vaccine (1 of 2 - PPSV23) Never done    Chlamydia screen  03/19/2021    Flu vaccine (1) 09/01/2021 Declined

## 2021-12-15 NOTE — PATIENT INSTRUCTIONS
Thank you   1. Thank you for trusting us with your healthcare needs. You may receive a survey regarding today's visit. It would help us out if you would take a few moments to provide your feedback. We value your input. 2. Please bring in ALL medications BOTTLES, including inhalers, herbal supplements, over the counter, prescribed & non-prescribed medicine. The office would like actual medication bottles and a list.   3. Please note our OFFICE POLICIES:   a. Prior to getting your labs drawn, please check with your insurance company for benefits and eligibility of lab services. Often, insurance companies cover certain tests for preventative visits only. It is patient's responsibility to see what is covered. b. We are unable to change a diagnosis after the test has been performed. c. Lab orders will not be re-printed. Please hold onto your original lab orders and take them to your lab to be completed. d. If you no show your scheduled appointment three times, you will be dismissed from this practice. e. Hung Jake must be completed 24 hours prior to your schedule appointment. 4. If the list below has been completed, PLEASE FAX RECORDS TO OUR OFFICE @ [phone].  Once the records have been received we will update your records at our office:  Health Maintenance Due   Topic Date Due    Pneumococcal 0-64 years Vaccine (1 of 2 - PPSV23) Never done    Chlamydia screen  03/19/2021    Flu vaccine (1) 09/01/2021

## 2021-12-15 NOTE — PROGRESS NOTES
[address] [facility] [address]  [address] [facility]  Dept: [phone]  Loc: [phone]      Lizbeth Saucedo (:  2000) is a 24 y.o. female,Established patient, here for evaluation of the following chief complaint(s):  1 Month Follow-Up (review labs completed 2021)      ASSESSMENT/PLAN:  1. Pre-conception counseling  -     Chris Coy DO, [address] [facility], [address] [place] [address] Road  2. Low serum progesterone  -     Chris Coy DO, [address] [facility], [address] [place] [address] Road  3. History of multiple miscarriages  -     Cardiolipin Antibodies IgG & IgM; Future  -     Miscellaneous Sendout 1; Future    Will complete coagulation workup as above considering she has had 2 known miscarriages and has had multiple pulmonary embolisms in the past.   Continue aspirin, will do the workup before starting any medications like clomiphene. Referral placed to OBGYN for further TTC management. Continue the zoloft for depression and anxiety and will see her back in a month. Return in about 1 month (around 1/15/2022) for TTC. SUBJECTIVE/OBJECTIVE:  HPI     Mood is still low, is taking the zoloft. Did get progesterone done.  is planning to get testing done as well. Review of Systems   Constitutional: Negative for chills, fatigue and fever. HENT: Negative for ear pain, postnasal drip and trouble swallowing. Eyes: Negative for pain and visual disturbance. Respiratory: Negative for cough and shortness of breath. Cardiovascular: Negative for chest pain and palpitations. Gastrointestinal: Negative for abdominal pain, blood in stool, constipation, diarrhea, nausea and vomiting. Genitourinary: Negative for dysuria. Skin: Negative for rash. Neurological: Negative for headaches. Psychiatric/Behavioral: The patient is nervous/anxious. Physical Exam  Vitals and nursing note reviewed.    Constitutional:       General: She is not in acute distress. Appearance: She is well-developed. She is obese. She is not diaphoretic. HENT:      Head: Normocephalic and atraumatic. Right Ear: External ear normal.      Left Ear: External ear normal.      Nose: Nose normal.   Eyes:      General: No scleral icterus. Right eye: No discharge. Left eye: No discharge. Conjunctiva/sclera: Conjunctivae normal.   Cardiovascular:      Rate and Rhythm: Normal rate and regular rhythm. Heart sounds: Normal heart sounds. No murmur heard. Pulmonary:      Effort: Pulmonary effort is normal.      Breath sounds: Normal breath sounds. Musculoskeletal:      Cervical back: Normal range of motion. Skin:     General: Skin is warm and dry. Findings: No erythema or rash. Neurological:      Mental Status: She is alert and oriented to person, place, and time. Cranial Nerves: No cranial nerve deficit. Psychiatric:      Comments: Depressed and anxious appearing           Vitals:    12/15/21 1609   BP: 118/76   Site: Right Upper Arm   Position: Sitting   Cuff Size: Large Adult   Pulse: 95   Resp: 20   Temp: 97.2 °F (36.2 °C)   TempSrc: Temporal   SpO2: 97%   Weight: 280 lb 12.8 oz (127.4 kg)   Height: 5' 9\" (1.753 m)         An electronic signature was used to authenticate this note.     --Howie Butler MD

## 2021-12-29 ENCOUNTER — NURSE ONLY (OUTPATIENT)
Dept: LAB | Age: 21
End: 2021-12-29

## 2021-12-29 ENCOUNTER — TELEPHONE (OUTPATIENT)
Dept: FAMILY MEDICINE CLINIC | Age: 21
End: 2021-12-29

## 2021-12-29 DIAGNOSIS — N96 HISTORY OF MULTIPLE MISCARRIAGES: ICD-10-CM

## 2021-12-29 NOTE — TELEPHONE ENCOUNTER
----- Message from Antonino Garcia sent at 12/29/2021 12:42 PM EST -----  Subject: Message to Provider    QUESTIONS  Information for Provider? patient needs a call back by today at 4 30 to   know if she needs lab work done   ---------------------------------------------------------------------------  --------------  [address]  What is the best way for the office to contact you? OK to leave message on   voicemail  Preferred Call Back Phone Number? [phone]  ---------------------------------------------------------------------------  --------------  SCRIPT ANSWERS  Relationship to Patient?  Self

## 2021-12-30 ENCOUNTER — TELEPHONE (OUTPATIENT)
Dept: FAMILY MEDICINE CLINIC | Age: 21
End: 2021-12-30

## 2021-12-30 RX ORDER — PROCHLORPERAZINE MALEATE 10 MG
10 TABLET ORAL EVERY 6 HOURS PRN
Qty: 30 TABLET | Refills: 0 | Status: SHIPPED | OUTPATIENT
Start: 2021-12-30 | End: 2022-01-03

## 2021-12-30 RX ORDER — ONDANSETRON 4 MG/1
4 TABLET, ORALLY DISINTEGRATING ORAL 3 TIMES DAILY PRN
Qty: 30 TABLET | Refills: 0 | Status: SHIPPED | OUTPATIENT
Start: 2021-12-30 | End: 2022-07-19 | Stop reason: SDUPTHER

## 2021-12-30 NOTE — TELEPHONE ENCOUNTER
Pt informed and verbalized understanding. She said the reason she doesn't want to go to the ED or UC is because she gets billed for that. I mentioned Ange Lambert, but their last walk in has to be checked in at [phone], and the pt works until [employer].

## 2021-12-30 NOTE — TELEPHONE ENCOUNTER
Spoke to pt. She is only having N&V, denied an other issue/sxs This started Monday. She has tried Zofran and phenergan with no relief. Please advise. Pt does not want to go to UC or ED.

## 2021-12-30 NOTE — TELEPHONE ENCOUNTER
----- Message from Roxane Norris sent at 12/30/2021 12:52 PM EST -----  Subject: Appointment Request    Reason for Call: Urgent Abdominal Pain    QUESTIONS  Type of Appointment? Established Patient  Reason for appointment request? No appointments available during search  Additional Information for Provider? Pt is calling due to abdominal pain   and vomiting she has been vomiting for 2 day and is trying to schedule an   appointment with Troy Mehta MD nothing was available during my search   please contact pt asap   ---------------------------------------------------------------------------  --------------  [address]  What is the best way for the office to contact you? OK to leave message on   [employer], OK to respond with electronic message via [employer] portal (only   for patients who have registered [employer] account)  Preferred Call Back Phone Number? [phone]  ---------------------------------------------------------------------------  --------------  SCRIPT ANSWERS  Relationship to Patient? Self  Do you have pain that has started or worsened within the past 24 hours? No  Are you vomiting blood or have bloody or black stool? No  Have you recently (14 days) seen a provider for this pain? No  Have you been diagnosed with, awaiting test results for, or told that you   are suspected of having COVID-19 (Coronavirus)? (If patient has tested   negative or was tested as a requirement for work, school, or travel and   not based on symptoms, answer no)? No  Within the past two weeks have you developed any of the following symptoms   (answer no if symptoms have been present longer than 2 weeks or began   more than 2 weeks ago)? Fever or Chills, Cough, Shortness of breath or   difficulty breathing, Loss of taste or smell, Sore throat, Nasal   congestion, Sneezing or runny nose, Fatigue or generalized body aches   (answer no if pain is specific to a body part e.g. back pain), Diarrhea,   Headache?  No  Have you had close contact with someone with COVID-19 in the last 14 days? No  (Service Expert  click yes below to proceed with [employer] As Usual   Scheduling)?  Yes

## 2021-12-30 NOTE — TELEPHONE ENCOUNTER
Sent compazine 10mg and zofran ODT (she previously had zofran pills) to the [facility] in [facility]. She can take them together, but not the compazine with phenergan.       Sammi Buckley, DO

## 2021-12-31 NOTE — TELEPHONE ENCOUNTER
She needs to be seen in person regarding her acute complaints so that any workup can be decided upon. Thank you.

## 2022-01-01 LAB
CARDIOLIPIN AB IGM: < 10 MPL
CARDIOLIPIN ANTIBODY, IGG: < 10 GPL
DRVVT 1:1 MIX: ABNORMAL SEC (ref 33–44)
DRVVT CONFIRMATION TEST: ABNORMAL RATIO
DRVVT SCREEN: 26 SEC (ref 33–44)
HEXAGONAL PHOSPHOLIPID NEUTRALIZAT TEST: ABNORMAL
LUPUS ANTICOAG INTERP: ABNORMAL
PLATELET NEUTRALIZATION: ABNORMAL
PROTHROMBIN TIME: 11.7 SEC (ref 12–15.5)
PTT 1:1 MIX: ABNORMAL SEC (ref 32–48)
PTT LUPUS ANTICOAGULANT: 39 SEC (ref 32–48)
PTT-HEPARIN NEUTRALIZED: ABNORMAL SEC (ref 32–48)
REPTILASE TIME: ABNORMAL SEC
THROMBIN TIME: ABNORMAL SEC (ref 14.7–19.5)

## 2022-01-03 ENCOUNTER — OFFICE VISIT (OUTPATIENT)
Dept: FAMILY MEDICINE CLINIC | Age: 22
End: 2022-01-03
Payer: COMMERCIAL

## 2022-01-03 ENCOUNTER — NURSE ONLY (OUTPATIENT)
Dept: LAB | Age: 22
End: 2022-01-03

## 2022-01-03 VITALS
BODY MASS INDEX: 42.78 KG/M2 | RESPIRATION RATE: 14 BRPM | TEMPERATURE: 97.9 F | WEIGHT: 288.8 LBS | HEIGHT: 69 IN | HEART RATE: 87 BPM | DIASTOLIC BLOOD PRESSURE: 70 MMHG | SYSTOLIC BLOOD PRESSURE: 112 MMHG | OXYGEN SATURATION: 98 %

## 2022-01-03 DIAGNOSIS — Z86.711 HISTORY OF PULMONARY EMBOLUS (PE): ICD-10-CM

## 2022-01-03 DIAGNOSIS — N96 HISTORY OF MULTIPLE MISCARRIAGES: ICD-10-CM

## 2022-01-03 DIAGNOSIS — R11.0 NAUSEA: ICD-10-CM

## 2022-01-03 DIAGNOSIS — K31.84 GASTROPARESIS: ICD-10-CM

## 2022-01-03 DIAGNOSIS — Z31.69 PRE-CONCEPTION COUNSELING: Primary | ICD-10-CM

## 2022-01-03 LAB
CONTROL: NORMAL
HCG,BETA SUBUNIT,QUAL,SERUM: < 0.1 MIU/ML (ref 0–5)
PREGNANCY TEST URINE, POC: NEGATIVE

## 2022-01-03 PROCEDURE — 99214 OFFICE O/P EST MOD 30 MIN: CPT | Performed by: STUDENT IN AN ORGANIZED HEALTH CARE EDUCATION/TRAINING PROGRAM

## 2022-01-03 PROCEDURE — 81025 URINE PREGNANCY TEST: CPT | Performed by: STUDENT IN AN ORGANIZED HEALTH CARE EDUCATION/TRAINING PROGRAM

## 2022-01-03 ASSESSMENT — ENCOUNTER SYMPTOMS
EYE PAIN: 0
CONSTIPATION: 0
TROUBLE SWALLOWING: 0
DIARRHEA: 0
VOMITING: 1
COUGH: 0
SHORTNESS OF BREATH: 0
NAUSEA: 0
BLOOD IN STOOL: 0
ABDOMINAL PAIN: 0

## 2022-01-03 ASSESSMENT — PATIENT HEALTH QUESTIONNAIRE - PHQ9
7. TROUBLE CONCENTRATING ON THINGS, SUCH AS READING THE NEWSPAPER OR WATCHING TELEVISION: 1
SUM OF ALL RESPONSES TO PHQ QUESTIONS 1-9: 15
2. FEELING DOWN, DEPRESSED OR HOPELESS: 1
1. LITTLE INTEREST OR PLEASURE IN DOING THINGS: 1
SUM OF ALL RESPONSES TO PHQ9 QUESTIONS 1 & 2: 2
SUM OF ALL RESPONSES TO PHQ QUESTIONS 1-9: 15
8. MOVING OR SPEAKING SO SLOWLY THAT OTHER PEOPLE COULD HAVE NOTICED. OR THE OPPOSITE, BEING SO FIGETY OR RESTLESS THAT YOU HAVE BEEN MOVING AROUND A LOT MORE THAN USUAL: 0
9. THOUGHTS THAT YOU WOULD BE BETTER OFF DEAD, OR OF HURTING YOURSELF: 0
4. FEELING TIRED OR HAVING LITTLE ENERGY: 3
SUM OF ALL RESPONSES TO PHQ QUESTIONS 1-9: 15
5. POOR APPETITE OR OVEREATING: 3
10. IF YOU CHECKED OFF ANY PROBLEMS, HOW DIFFICULT HAVE THESE PROBLEMS MADE IT FOR YOU TO DO YOUR WORK, TAKE CARE OF THINGS AT HOME, OR GET ALONG WITH OTHER PEOPLE: 1
6. FEELING BAD ABOUT YOURSELF - OR THAT YOU ARE A FAILURE OR HAVE LET YOURSELF OR YOUR FAMILY DOWN: 3
SUM OF ALL RESPONSES TO PHQ QUESTIONS 1-9: 15
3. TROUBLE FALLING OR STAYING ASLEEP: 3

## 2022-01-03 NOTE — PROGRESS NOTES
S: 24 y.o. female with   Chief Complaint   Patient presents with    Nausea     Pt presents c/o nausea. Nausea x 1 week  Had compazine at home and took once     Has had 2 miscarriages in past and wants to get pregnant  Took home preg test and wasn't sure if positive or negative  Urine preg negative today  Wants to get blood pregnancy test levels    BP Readings from Last 3 Encounters:   01/03/22 112/70   12/15/21 118/76   11/16/21 114/76     Wt Readings from Last 3 Encounters:   01/03/22 288 lb 12.8 oz (131 kg)   12/15/21 280 lb 12.8 oz (127.4 kg)   11/16/21 282 lb 3.2 oz (128 kg)           O: VS:   Vitals:    01/03/22 1443   BP: 112/70   Pulse: 87   Resp: 14   Temp: 97.9 °F (36.6 °C)   SpO2: 98%   Weight: 288 lb 12.8 oz (131 kg)   Height: 5' 9\" (1.753 m)     Body mass index is 42.65 kg/m². AAO/NAD, appropriate affect for mood  Normocephalic, atraumatic, eyes - conjunctiva and sclera normal,   skin no rashes on exposed areas   Insight, judgement normal and in no acute distress      Lab Results   Component Value Date    WBC 6.5 06/09/2021    HGB 13.7 06/09/2021    HCT 41.1 06/09/2021     06/09/2021    CHOL 159 02/13/2020    TRIG 85 02/13/2020    HDL 46 02/13/2020    LDLCALC 96 02/13/2020    AST 27 02/25/2021     02/25/2021    K 4.4 02/25/2021     02/25/2021    CREATININE 0.6 02/25/2021    BUN 14 02/25/2021    CO2 26 02/25/2021    TSH 0.935 02/13/2020    INR 1.00 05/25/2020    GLUF 96 02/25/2020    LABA1C 4.7 11/30/2021    LABGLOM >90 02/25/2021    MG 2.0 01/14/2021    CALCIUM 9.1 02/25/2021    VITD25 16 (L) 01/14/2021       No results found. Diagnosis Orders   1. Pre-conception counseling  POCT urine pregnancy   2. Nausea  HCG, Quantitative, Pregnancy   3. History of pulmonary embolus (PE)  Obdulia Damon MD, Oncology, [address] [place] [address]   4. History of multiple miscarriages  Obdulia Damon MD, Oncology, [address] [place] [address]   5.  Gastroparesis  JEISON - Dev Ruelas MD, Gastroenterology, [address] [place] [address] Plan      Check blood pregnancy level   If positive, plan to recheck in approx 48 hrs  Given 2 miscarriages, h/o PE, and wanting to get pregnant, will get another opinion from another hematologist.  Suspect some underlying clotting disorder. Progesterone level done possibly around time of ovulation in past was very low, as well. Waiting to hear from OB yet about appt from previous referral.     Establish with GI for chronic nausea and gastroparesis. Return in about 8 weeks (around 2/28/2022) for [facility]. Orders Placed:  Orders Placed This Encounter   Procedures    HCG, Quantitative, Pregnancy   Waqasroyce Ramirez MD, Oncology, Kim Che MD, Gastroenterology, [place] POCT urine pregnancy     Medications Prescribed:  No orders of the defined types were placed in this encounter. Future Appointments   Date Time Provider Jerome Gomezi   2/28/2022 11:20 AM Julito Charlton MD SRPX  RES [facility] Maintenance Due   Topic Date Due    Pneumococcal 0-64 years Vaccine (1 of 2 - PPSV23) Never done    Depression Monitoring  Never done    Chlamydia screen  03/19/2021    Flu vaccine (1) 09/01/2021         Attending Physician Statement  I have discussed the case, including pertinent history and exam findings with the resident. I also have seen the patient and performed key portions of the examination. I agree with the documented assessment and plan as documented by the resident.   GC modifier added to this encounter      Etelvina Brink MD 1/3/2022 3:56 PM

## 2022-01-03 NOTE — PROGRESS NOTES
[address] [facility] [address] [facility]  Dept: [phone]  Loc: [phone]      Kris Oliveros (:  2000) is a 24 y.o. female,Established patient, here for evaluation of the following chief complaint(s):  Nausea (Pt presents c/o nausea. )      ASSESSMENT/PLAN:  1. Pre-conception counseling  -     POCT urine pregnancy  2. Nausea  -     HCG, Quantitative, Pregnancy; Future  3. History of pulmonary embolus (PE)  -     Darryle Setters, MD, Oncology, [place]  4. History of multiple miscarriages  -     Darryle Setters, MD, Oncology, [place]  5. Gastroparesis  -     AFL - Ethan Ventura MD, Gastroenterology, [place]    Referrals to GI for gastroparesis and Heme/Onc for further evaluation and management of recurrent blood clots. Patient did se heme/once in [address] [place] [address] in 2021 but was advised to follow up PRN only. Referral to local heme/onc today for second opinion. Urine pregnancy test in office is negative. Will get a quantitative serum HCG today as well. IF positive, repeat in 48 hours. If negative, will go forward with plan for reglan for nausea/vomiting/gastroparesis. Return in about 8 weeks (around 2022) for [facility]. SUBJECTIVE/OBJECTIVE:  HPI     Thinks she might be pregnant. Last Tuesday she started vomiting, happens sometimes after she eats or smells certain scents. 1-2 times per day. Feels sensitive to smells. Zofran doesn't help. Took compazine once last week which helped but stopped it because she read it can cause blood clots. Has a history of gastroparesis but this is not like her usual gastroparesis symptoms. Wants to be referred to GI if not pregnant. Constipation since Friday. Mood is okay right now. Anxiety has been higher since she hasn't been feeling well. Review of Systems   Constitutional: Negative for chills, fatigue and fever.    HENT: Negative for ear pain, postnasal drip and trouble swallowing. Eyes: Negative for pain and visual disturbance. Respiratory: Negative for cough and shortness of breath. Cardiovascular: Negative for chest pain and palpitations. Gastrointestinal: Positive for vomiting. Negative for abdominal pain, blood in stool, constipation, diarrhea and nausea. Genitourinary: Negative for dysuria. Skin: Negative for rash. Neurological: Negative for headaches. Physical Exam  Vitals and nursing note reviewed. Constitutional:       General: She is not in acute distress. Appearance: She is well-developed. She is not diaphoretic. HENT:      Head: Normocephalic and atraumatic. Right Ear: External ear normal.      Left Ear: External ear normal.      Nose: Nose normal.   Eyes:      General: No scleral icterus. Right eye: No discharge. Left eye: No discharge. Conjunctiva/sclera: Conjunctivae normal.   Cardiovascular:      Rate and Rhythm: Normal rate and regular rhythm. Heart sounds: Normal heart sounds. No murmur heard. Pulmonary:      Effort: Pulmonary effort is normal.      Breath sounds: Normal breath sounds. Musculoskeletal:      Cervical back: Normal range of motion. Skin:     General: Skin is warm and dry. Findings: No erythema or rash. Neurological:      Mental Status: She is alert and oriented to person, place, and time. Cranial Nerves: No cranial nerve deficit. Psychiatric:      Comments: Flat, depressed appearing           Vitals:    01/03/22 1443   BP: 112/70   Pulse: 87   Resp: 14   Temp: 97.9 °F (36.6 °C)   SpO2: 98%   Weight: 288 lb 12.8 oz (131 kg)   Height: 5' 9\" (1.753 m)         An electronic signature was used to authenticate this note.     --Kelle Ruiz MD

## 2022-01-03 NOTE — PROGRESS NOTES
S: 24 y.o. female with   Chief Complaint   Patient presents with    Nausea     Pt presents c/o nausea. HPI: please see resident note for HPI and ROS. Nausea for 1 week  Took compazine at home x1 which was helpful  Stopped it due to hx of DVTs  Hx of DVTs, workup was negative  Hx 2 miscarriages     Currently things she's pregnant  Home test +/-  Urine test here neg  Wants blood test     BP Readings from Last 3 Encounters:   01/03/22 112/70   12/15/21 118/76   11/16/21 114/76     Wt Readings from Last 3 Encounters:   01/03/22 288 lb 12.8 oz (131 kg)   12/15/21 280 lb 12.8 oz (127.4 kg)   11/16/21 282 lb 3.2 oz (128 kg)       O: VS:  height is 5' 9\" (1.753 m) and weight is 288 lb 12.8 oz (131 kg). Her temperature is 97.9 °F (36.6 °C). Her blood pressure is 112/70 and her pulse is 87. Her respiration is 14 and oxygen saturation is 98%. AAO/NAD, appropriate affect for mood     Diagnosis Orders   1. Pre-conception counseling  POCT urine pregnancy   2. Nausea  HCG, Quantitative, Pregnancy   3. History of pulmonary embolus (PE)  Shrey[facility] Petty [facility], [facility], [facility] [place]   4. History of multiple miscarriages  Shreya Petty MD, Oncology, [place]   5. Gastroparesis  AFL - Joselito Umana MD, Gastroenterology, [facility] [place]       Plan:  Would benefit from heme referral due to multiple blood clots, possibly relating to her miscarriages as well   Follow up with OB for recurrent miscarriages   GI for recurrent nausea and hx of gastroparesis   HCG quant for possible pregnancy     Health Maintenance Due   Topic Date Due    Pneumococcal 0-64 years Vaccine (1 of 2 - PPSV23) Never done    Depression Monitoring  Never done    Chlamydia screen  03/19/2021    Flu vaccine (1) 09/01/2021         I have discussed the case, including pertinent history and exam findings with the resident and attending physician. I agree with the documented assessment and plan as documented by the resident.         Gerard Zaidi MD 1/3/2022 3:42 PM

## 2022-01-05 ENCOUNTER — TELEPHONE (OUTPATIENT)
Dept: FAMILY MEDICINE CLINIC | Age: 22
End: 2022-01-05

## 2022-01-05 NOTE — TELEPHONE ENCOUNTER
----- Message from Julito Charlton MD sent at 1/4/2022  8:06 AM EST -----  Negative serum hcg which is negative for pregnancy. So sorry about this. Would you like a prescription for reglan to help with your symptoms?

## 2022-01-12 ENCOUNTER — OFFICE VISIT (OUTPATIENT)
Dept: FAMILY MEDICINE CLINIC | Age: 22
End: 2022-01-12
Payer: COMMERCIAL

## 2022-01-12 VITALS
HEART RATE: 78 BPM | DIASTOLIC BLOOD PRESSURE: 74 MMHG | OXYGEN SATURATION: 98 % | TEMPERATURE: 96.9 F | HEIGHT: 69 IN | BODY MASS INDEX: 42.18 KG/M2 | RESPIRATION RATE: 16 BRPM | WEIGHT: 284.8 LBS | SYSTOLIC BLOOD PRESSURE: 118 MMHG

## 2022-01-12 DIAGNOSIS — Z86.718 HISTORY OF DVT (DEEP VEIN THROMBOSIS): ICD-10-CM

## 2022-01-12 DIAGNOSIS — N96 HISTORY OF MULTIPLE MISCARRIAGES: ICD-10-CM

## 2022-01-12 DIAGNOSIS — Z31.69 PRE-CONCEPTION COUNSELING: ICD-10-CM

## 2022-01-12 DIAGNOSIS — R10.9 ABDOMINAL CRAMPING: ICD-10-CM

## 2022-01-12 DIAGNOSIS — F32.89 OTHER DEPRESSION: ICD-10-CM

## 2022-01-12 DIAGNOSIS — N92.6 LATE PERIOD: Primary | ICD-10-CM

## 2022-01-12 LAB
BILIRUBIN URINE: NEGATIVE
BLOOD URINE, POC: ABNORMAL
CHARACTER, URINE: CLEAR
COLOR, URINE: YELLOW
CONTROL: NORMAL
GLUCOSE URINE: NEGATIVE MG/DL
KETONES, URINE: NEGATIVE
LEUKOCYTE CLUMPS, URINE: NEGATIVE
NITRITE, URINE: NEGATIVE
PH, URINE: 6.5 (ref 5–9)
PREGNANCY TEST URINE, POC: NEGATIVE
PROTEIN, URINE: NEGATIVE MG/DL
SPECIFIC GRAVITY, URINE: 1.01 (ref 1–1.03)
UROBILINOGEN, URINE: 0.2 EU/DL (ref 0–1)

## 2022-01-12 PROCEDURE — 81003 URINALYSIS AUTO W/O SCOPE: CPT | Performed by: STUDENT IN AN ORGANIZED HEALTH CARE EDUCATION/TRAINING PROGRAM

## 2022-01-12 PROCEDURE — 99214 OFFICE O/P EST MOD 30 MIN: CPT | Performed by: STUDENT IN AN ORGANIZED HEALTH CARE EDUCATION/TRAINING PROGRAM

## 2022-01-12 PROCEDURE — 81025 URINE PREGNANCY TEST: CPT | Performed by: STUDENT IN AN ORGANIZED HEALTH CARE EDUCATION/TRAINING PROGRAM

## 2022-01-12 RX ORDER — METFORMIN HYDROCHLORIDE 500 MG/1
500 TABLET, EXTENDED RELEASE ORAL
Qty: 90 TABLET | Refills: 1 | Status: SHIPPED | OUTPATIENT
Start: 2022-01-12 | End: 2022-01-25

## 2022-01-12 ASSESSMENT — ENCOUNTER SYMPTOMS
VOMITING: 0
NAUSEA: 1
DIARRHEA: 0
CRAMPS: 1

## 2022-01-12 NOTE — PROGRESS NOTES
Attending attestation:  I personally performed and participated key or critical portions of the evaluation and management including personally performing the exam and medical decision making. I verify the accuracy of the documentation by the resident with the following addition or changes: Female with history of recurrent early miscarriage and pulmonary embolism here with abdominal cramping. 1st PE provoked on OCP. Second unclear cause. 3rd after move from [place] to [place]. Not currently on anticoagulation. Bloodwork from hematology/oncology unremarkable. Started Friday night. Was 3 days late for menses. Took a clear blue pregnancy test that she read as positive. [employer] store test the next day was negative. Had vaginal bleeding x 2 days Saturday and Sunday that stopped. Usual menses is 7-8 days. Reports irregular cycles 28-36 days in length. Has been using clear blue fertility monitor at home to try to get pregnant. Abdominal exam benign. Cramping is still present intermittently. 1st provoked on OCP. Second unclear cause. 3rd after move from [place] to [place]. Pregnancy test negative today. Will check abdominal ultrasound. Refer to maternal fetal medicine for preconception planning appointment. I do believe patient should be on anticoagulation prior to and during pregnancy given risk.        Electronically signed by Marielos Campbell MD on 1/12/2022 at 2:52 PM

## 2022-01-12 NOTE — PROGRESS NOTES
Sahra Pearson is a 24 y.o. female who presents today for:  Chief Complaint   Patient presents with    Abdominal Pain     cramping       Goals      Exercise 3x per week (30 min per time)           HPI:     Abdominal Cramping  This is a new problem. The current episode started in the past 7 days (Fri or Sat). The onset quality is sudden. The problem occurs constantly. The problem has been waxing and waning. The pain is located in the suprapubic region. The pain is at a severity of 2/10 (max 11/10). Pain severity now: Wishes she had gall bladder pain back. The quality of the pain is cramping. The abdominal pain radiates to the back. Associated symptoms include nausea. Pertinent negatives include no diarrhea, dysuria, fever or vomiting. The pain is aggravated by movement. Relieved by: heating pad. She has tried acetaminophen for the symptoms. The treatment provided no relief. Only bled for two day, Saturday and Sunday, light pink spotting today. Trying to get pregnant, period was three days late  Cycles are irregular,  Starts period 11 days after ovulation  Cycle 28-36 is days long   Home pregnancy test was positive on Friday and cheap test was negative on Saturday. LMP: 12/10/21  Has history of multiple miscarriages      She has a history of DVT/PE  First DVT/PE while on birth control  2nd due stress?   3rd developed right after moving from [place]      Current Outpatient Medications   Medication Sig Dispense Refill    sertraline (ZOLOFT) 50 MG tablet Take 1 tablet by mouth daily 90 tablet 1    metFORMIN (GLUCOPHAGE-XR) 500 MG extended release tablet Take 1 tablet by mouth daily (with breakfast) 90 tablet 1    ondansetron (ZOFRAN-ODT) 4 MG disintegrating tablet Take 1 tablet by mouth 3 times daily as needed for Nausea or Vomiting 30 tablet 0    OMEPRAZOLE PO Take by mouth as needed       albuterol sulfate HFA (VENTOLIN HFA) 108 (90 Base) MCG/ACT inhaler Inhale 2 puffs into the lungs 4 times daily as needed for Wheezing 1 each 1    mometasone-formoterol (DULERA) 100-5 MCG/ACT inhaler Inhale 2 puffs into the lungs every 12 hours 1 each 2    Spacer/Aero-Holding Chambers SHAWANDA 1 Device by Does not apply route daily 1 each 0    butalbital-acetaminophen-caffeine (FIORICET, ESGIC) -40 MG per tablet Take 1 tablet by mouth every 4 hours as needed for Headaches 10 tablet 0    magnesium (MAGNESIUM-OXIDE) 250 MG TABS tablet Take 2 tablets by mouth 4 times daily (with meals and nightly) 240 tablet 1    Prenatal Vit-Fe Fumarate-FA (PRENATAL VITAMIN) 27-0.8 MG TABS Take 1 tablet by mouth daily 90 tablet 3    aspirin 81 MG EC tablet Take 81 mg by mouth daily       naproxen sodium (ALEVE) 220 MG tablet Take 220 mg by mouth as needed       vitamin D3 (CHOLECALCIFEROL) 25 MCG (1000 UT) TABS tablet Take 2 tablets by mouth daily 90 tablet 3    ondansetron (ZOFRAN) 4 MG tablet Take 1 tablet by mouth every 8 hours as needed for Nausea or Vomiting 30 tablet 0    Blood Pressure Monitor KIT Take blood pressure daily 1 kit 0     No current facility-administered medications for this visit.           Food Insecurity: Food Insecurity Present    Worried About Running Out of Food in the Last Year: Sometimes true    Ran Out of Food in the Last Year: Sometimes true       Health Maintenance   Topic Date Due    Pneumococcal 0-64 years Vaccine (1 of 2 - PPSV23) Never done    Chlamydia screen  03/19/2021    Flu vaccine (1) 09/01/2021    HPV vaccine (1 - 2-dose series) 01/28/2022 (Originally 11/12/2010)    DTaP/Tdap/Td vaccine (1 - Tdap) 01/28/2022 (Originally 11/12/2018)    COVID-19 Vaccine (3 - Booster for Moderna series) 04/02/2022    Depression Monitoring  01/03/2023    Pap smear  03/05/2024    Hepatitis C screen  Completed    HIV screen  Completed    Hepatitis A vaccine  Aged Out    Hepatitis B vaccine  Aged Out    Hib vaccine  Aged Out    Meningococcal (ACWY) vaccine  Aged Out    Varicella vaccine  Discontinued ROS:      Review of Systems   Constitutional: Negative for fever. Gastrointestinal: Positive for nausea. Negative for diarrhea and vomiting. Genitourinary: Positive for pelvic pain and vaginal bleeding. Negative for difficulty urinating, dysuria and vaginal pain. Objective:     Vitals:    01/12/22 1327   BP: 118/74   Site: Right Lower Arm   Position: Sitting   Cuff Size: Large Adult   Pulse: 78   Resp: 16   Temp: 96.9 °F (36.1 °C)   TempSrc: Skin   SpO2: 98%   Weight: 284 lb 12.8 oz (129.2 kg)   Height: 5' 9\" (1.753 m)       Body mass index is 42.06 kg/m². Wt Readings from Last 3 Encounters:   01/12/22 284 lb 12.8 oz (129.2 kg)   01/03/22 288 lb 12.8 oz (131 kg)   12/15/21 280 lb 12.8 oz (127.4 kg)     BP Readings from Last 3 Encounters:   01/12/22 118/74   01/03/22 112/70   12/15/21 118/76       Physical Exam  Vitals and nursing note reviewed. Constitutional:       General: She is not in acute distress. Appearance: Normal appearance. She is obese. She is not ill-appearing. HENT:      Head: Normocephalic and atraumatic. Right Ear: External ear normal.      Left Ear: External ear normal.      Nose: Nose normal.      Mouth/Throat:      Mouth: Mucous membranes are moist.   Eyes:      General: No scleral icterus. Right eye: No discharge. Left eye: No discharge. Extraocular Movements: Extraocular movements intact. Conjunctiva/sclera: Conjunctivae normal.      Pupils: Pupils are equal, round, and reactive to light. Cardiovascular:      Rate and Rhythm: Normal rate and regular rhythm. Pulses: Normal pulses. Heart sounds: Normal heart sounds. No murmur heard. Pulmonary:      Effort: Pulmonary effort is normal. No respiratory distress. Breath sounds: Normal breath sounds. No wheezing, rhonchi or rales. Abdominal:      General: Bowel sounds are normal. There is no distension. Palpations: Abdomen is soft. Tenderness:  There is no abdominal tenderness. There is no guarding or rebound. Musculoskeletal:         General: No swelling. Normal range of motion. Cervical back: Normal range of motion and neck supple. Right lower leg: No edema. Left lower leg: No edema. Skin:     General: Skin is warm and dry. Capillary Refill: Capillary refill takes less than 2 seconds. Findings: No erythema or rash. Neurological:      General: No focal deficit present. Mental Status: She is alert and oriented to person, place, and time. Mental status is at baseline. Cranial Nerves: No cranial nerve deficit, dysarthria or facial asymmetry. Psychiatric:         Mood and Affect: Mood and affect normal.         Speech: Speech normal. She is communicative. Behavior: Behavior normal. Behavior is cooperative. Assessment / Plan:     1. Late period  Patient presents clinic for evaluation late period and abdominal cramping. Patient was 3 daily for period on Friday when she started having abdominal cramping. She took an at home pregnancy test which was positive. Follow-up pregnancy test was negative. Today in clinic her pregnancy test was negative. Patient continues to report that she is having some abdominal cramping. We will order an transvaginal ultrasound. She was told to make sure that she has follow-up with OB/GYN and to call the clinic if she were to have worsening cramping or fevers. She will follow-up in clinic in 2 months or earlier as needed for this problem. - POCT urine pregnancy  - POCT Urinalysis No Micro (Auto)  - US NON OB TRANSVAGINAL; Future    2. Abdominal cramping  Patient's abdominal cramping may be the result of the miscarriage. We will order transvaginal ultrasound to evaluate further.  - US NON OB TRANSVAGINAL; Future    3. Pre-conception counseling  Patient has a history of DVT and PE multiple miscarriages.   Today we will for tomorrow for internal fetal medicine for further evaluation.  - [facility] Maternal Fetal Medicine    4. History of multiple miscarriages  Patient has a history of multiple miscarriages. Is unclear etiology. May be related to a clotting disorder. Her clotting disorder work-up has been negative thus far. Will refer to internal fetal medicine for this problem. - [facility] Maternal Fetal Medicine    5. History of DVT (deep vein thrombosis)  Patient has a history of multiple DVTs and PEs. She is not currently on anticoagulation. Given that she has had at least 3 pulmonary embolisms I would recommend that she go on anticoagulation. Patient was encouraged to talked about this with maternal-fetal medicine and with her hematologist.  Patient will follow up in clinic in 2 months for this problem. - [facility] Maternal Fetal Medicine    6. BMI 40.0-44.9, adult [facility])  This is a chronic problem. We will continue with her current medications at this time. - metFORMIN (GLUCOPHAGE-XR) 500 MG extended release tablet; Take 1 tablet by mouth daily (with breakfast)  Dispense: 90 tablet; Refill: 1    7. Other depression  Her depression is stable today. We will continue with her current medications at this time. We will continue to evaluate and next appointment. - sertraline (ZOLOFT) 50 MG tablet; Take 1 tablet by mouth daily  Dispense: 90 tablet; Refill: 1         Return in about 7 weeks (around 3/2/2022). Medications Prescribed:  Orders Placed This Encounter   Medications    sertraline (ZOLOFT) 50 MG tablet     Sig: Take 1 tablet by mouth daily     Dispense:  90 tablet     Refill:  1    metFORMIN (GLUCOPHAGE-XR) 500 MG extended release tablet     Sig: Take 1 tablet by mouth daily (with breakfast)     Dispense:  90 tablet     Refill:  1       Future Appointments   Date Time Provider Jerome Vasquez   2/28/2022 11:20 AM Cuco Mendoza MD SRPX  RES [facility] [place]       Patient given educational materials - see patient instructions.   Discussed use, benefit, and side effects of prescribed medications. All patient questions answered. Patient voiced understanding. Reviewed health maintenance. Instructed to continue current medications, diet and exercise. Patient agreed with treatment plan. Follow up as directed. Electronically signed by Nati Marie MD on 1/12/2022 at 3:34 PM    This note was electronically signed. Parts of this note may have been dictated by use of voice recognition software and electronically transcribed. The note may contain errors not detected in proofreading. Please refer to my supervising physician's documentation if my documentation differs.

## 2022-01-12 NOTE — LETTER
[address] [place] [address],[address] [facility] [address] [place], [place] [address]  Phone: [phone]  Fax: [phone]    Wen Herbert MD        January 12, 2022     Patient: Karol Ambrocio   YOB: 2000   Date of Visit: 1/12/2022       To Whom it May Concern:    Susannah Berger was seen in my clinic on 1/12/2022. She may return to work on 1/13/2022. If you have any questions or concerns, please don't hesitate to call.     Sincerely,         Wen Herbert MD

## 2022-01-14 ENCOUNTER — HOSPITAL ENCOUNTER (OUTPATIENT)
Dept: ULTRASOUND IMAGING | Age: 22
Discharge: HOME | End: 2022-01-14
Payer: COMMERCIAL

## 2022-01-14 DIAGNOSIS — R10.9 ABDOMINAL CRAMPING: ICD-10-CM

## 2022-01-14 DIAGNOSIS — N92.6 LATE PERIOD: ICD-10-CM

## 2022-01-14 PROCEDURE — 76830 TRANSVAGINAL US NON-OB: CPT

## 2022-01-15 ENCOUNTER — HOSPITAL ENCOUNTER (EMERGENCY)
Age: 22
Discharge: HOME | End: 2022-01-15
Attending: FAMILY MEDICINE
Payer: COMMERCIAL

## 2022-01-15 ENCOUNTER — TELEPHONE (OUTPATIENT)
Dept: FAMILY MEDICINE CLINIC | Age: 22
End: 2022-01-15

## 2022-01-15 ENCOUNTER — APPOINTMENT (OUTPATIENT)
Dept: CT IMAGING | Age: 22
End: 2022-01-15
Payer: COMMERCIAL

## 2022-01-15 VITALS
SYSTOLIC BLOOD PRESSURE: 127 MMHG | TEMPERATURE: 98.7 F | OXYGEN SATURATION: 98 % | RESPIRATION RATE: 16 BRPM | DIASTOLIC BLOOD PRESSURE: 68 MMHG | HEART RATE: 83 BPM

## 2022-01-15 DIAGNOSIS — R10.31 ABDOMINAL PAIN, RIGHT LOWER QUADRANT: Primary | ICD-10-CM

## 2022-01-15 LAB
ALBUMIN SERPL-MCNC: 4.6 G/DL (ref 3.5–5.1)
ALP BLD-CCNC: 75 U/L (ref 38–126)
ALT SERPL-CCNC: 30 U/L (ref 11–66)
ANION GAP SERPL CALCULATED.3IONS-SCNC: 9 MEQ/L (ref 8–16)
AST SERPL-CCNC: 20 U/L (ref 5–40)
BASOPHILS # BLD: 0.6 %
BASOPHILS ABSOLUTE: 0 THOU/MM3 (ref 0–0.1)
BILIRUB SERPL-MCNC: 0.5 MG/DL (ref 0.3–1.2)
BILIRUBIN URINE: NEGATIVE
BLOOD, URINE: NEGATIVE
BUN BLDV-MCNC: 13 MG/DL (ref 7–22)
CALCIUM SERPL-MCNC: 9.5 MG/DL (ref 8.5–10.5)
CHARACTER, URINE: CLEAR
CHLORIDE BLD-SCNC: 103 MEQ/L (ref 98–111)
CO2: 25 MEQ/L (ref 23–33)
COLOR: YELLOW
CREAT SERPL-MCNC: 0.6 MG/DL (ref 0.4–1.2)
EOSINOPHIL # BLD: 1.3 %
EOSINOPHILS ABSOLUTE: 0.1 THOU/MM3 (ref 0–0.4)
ERYTHROCYTE [DISTWIDTH] IN BLOOD BY AUTOMATED COUNT: 12.2 % (ref 11.5–14.5)
ERYTHROCYTE [DISTWIDTH] IN BLOOD BY AUTOMATED COUNT: 41.2 FL (ref 35–45)
GFR SERPL CREATININE-BSD FRML MDRD: > 90 ML/MIN/1.73M2
GLUCOSE BLD-MCNC: 103 MG/DL (ref 70–108)
GLUCOSE URINE: NEGATIVE MG/DL
HCT VFR BLD CALC: 43.7 % (ref 37–47)
HEMOGLOBIN: 14.2 GM/DL (ref 12–16)
IMMATURE GRANS (ABS): 0.04 THOU/MM3 (ref 0–0.07)
IMMATURE GRANULOCYTES: 0.6 %
KETONES, URINE: NEGATIVE
LEUKOCYTE ESTERASE, URINE: NEGATIVE
LYMPHOCYTES # BLD: 27.2 %
LYMPHOCYTES ABSOLUTE: 1.9 THOU/MM3 (ref 1–4.8)
MCH RBC QN AUTO: 30 PG (ref 26–33)
MCHC RBC AUTO-ENTMCNC: 32.5 GM/DL (ref 32.2–35.5)
MCV RBC AUTO: 92.2 FL (ref 81–99)
MONOCYTES # BLD: 5.2 %
MONOCYTES ABSOLUTE: 0.4 THOU/MM3 (ref 0.4–1.3)
NITRITE, URINE: NEGATIVE
NUCLEATED RED BLOOD CELLS: 0 /100 WBC
OSMOLALITY CALCULATION: 274.2 MOSMOL/KG (ref 275–300)
PH UA: 7 (ref 5–9)
PLATELET # BLD: 251 THOU/MM3 (ref 130–400)
PMV BLD AUTO: 10 FL (ref 9.4–12.4)
POTASSIUM REFLEX MAGNESIUM: 4.2 MEQ/L (ref 3.5–5.2)
PREGNANCY, SERUM: NEGATIVE
PROTEIN UA: NEGATIVE
RBC # BLD: 4.74 MILL/MM3 (ref 4.2–5.4)
SEG NEUTROPHILS: 65.1 %
SEGMENTED NEUTROPHILS ABSOLUTE COUNT: 4.6 THOU/MM3 (ref 1.8–7.7)
SODIUM BLD-SCNC: 137 MEQ/L (ref 135–145)
SPECIFIC GRAVITY, URINE: 1.02 (ref 1–1.03)
TOTAL PROTEIN: 7.4 G/DL (ref 6.1–8)
UROBILINOGEN, URINE: 0.2 EU/DL (ref 0–1)
WBC # BLD: 7.1 THOU/MM3 (ref 4.8–10.8)

## 2022-01-15 PROCEDURE — 36415 COLL VENOUS BLD VENIPUNCTURE: CPT

## 2022-01-15 PROCEDURE — 80053 COMPREHEN METABOLIC PANEL: CPT

## 2022-01-15 PROCEDURE — 6360000002 HC RX W HCPCS: Performed by: PHYSICIAN ASSISTANT

## 2022-01-15 PROCEDURE — 81003 URINALYSIS AUTO W/O SCOPE: CPT

## 2022-01-15 PROCEDURE — 6360000004 HC RX CONTRAST MEDICATION: Performed by: PHYSICIAN ASSISTANT

## 2022-01-15 PROCEDURE — 96374 THER/PROPH/DIAG INJ IV PUSH: CPT

## 2022-01-15 PROCEDURE — 74177 CT ABD & PELVIS W/CONTRAST: CPT

## 2022-01-15 PROCEDURE — 84703 CHORIONIC GONADOTROPIN ASSAY: CPT

## 2022-01-15 PROCEDURE — 6370000000 HC RX 637 (ALT 250 FOR IP): Performed by: PHYSICIAN ASSISTANT

## 2022-01-15 PROCEDURE — 85025 COMPLETE CBC W/AUTO DIFF WBC: CPT

## 2022-01-15 PROCEDURE — 99283 EMERGENCY DEPT VISIT LOW MDM: CPT

## 2022-01-15 RX ORDER — ONDANSETRON 4 MG/1
4 TABLET, ORALLY DISINTEGRATING ORAL ONCE
Status: COMPLETED | OUTPATIENT
Start: 2022-01-15 | End: 2022-01-15

## 2022-01-15 RX ORDER — KETOROLAC TROMETHAMINE 30 MG/ML
30 INJECTION, SOLUTION INTRAMUSCULAR; INTRAVENOUS ONCE
Status: COMPLETED | OUTPATIENT
Start: 2022-01-15 | End: 2022-01-15

## 2022-01-15 RX ADMIN — IOPAMIDOL 80 ML: 755 INJECTION, SOLUTION INTRAVENOUS at 13:15

## 2022-01-15 RX ADMIN — KETOROLAC TROMETHAMINE 30 MG: 30 INJECTION, SOLUTION INTRAMUSCULAR; INTRAVENOUS at 13:01

## 2022-01-15 RX ADMIN — ONDANSETRON 4 MG: 4 TABLET, ORALLY DISINTEGRATING ORAL at 13:02

## 2022-01-15 ASSESSMENT — ENCOUNTER SYMPTOMS
RECTAL PAIN: 0
ANAL BLEEDING: 0
BLOOD IN STOOL: 0
ABDOMINAL PAIN: 1
EYE PAIN: 0
VOMITING: 0
COUGH: 0
NAUSEA: 1
STRIDOR: 0
SHORTNESS OF BREATH: 0
CONSTIPATION: 0
DIARRHEA: 0
ABDOMINAL DISTENTION: 0

## 2022-01-15 ASSESSMENT — PAIN SCALES - GENERAL: PAINLEVEL_OUTOF10: 9

## 2022-01-15 NOTE — ED PROVIDER NOTES
[facility] [address] [address] EMERGENCY DEPT    EMERGENCY MEDICINE     Pt Name: Kris Oliveros  MRN: [id number]  [place] 2000  Date of evaluation: 1/15/2022  Provider: Yuli Shoemaker PA-C    CHIEF COMPLAINT       Chief Complaint   Patient presents with    Abdominal Pain     right upper       HISTORY OF PRESENT ILLNESS    Kris Oliveros is a pleasant 24 y.o. female who presents to the emergency department for right lower quadrant pain. Patient states approximately 1.5 weeks ago she tested positive at home test for pregnancy. She then states a couple days later she started having heavy vaginal bleeding and clotting along with left and right sided adnexal pain. She states vaginal bleeding stopped a few days ago and the pain less in the left side but had worsened pain on the right side adnexal region as well as right lower quadrant. Patient went to her PCP 2 days ago and had pregnancy test that came back negative and ordered transvaginal ultrasound. She had the ultrasound done yesterday which came back negative. She states the pain to the right lower quadrant really worsened 2 days ago and was exacerbated further during the transvaginal ultrasound. She states the pain ranges from 3-6/10 dull ache and is mostly to the right lower quadrant but also can radiate to the right flank and right adnexal region. She has history of PCOS and ruptured ovarian cyst. She has history of 2 miscarriages in the past. She had a D&C done March 2021. She has history of cholecystectomy 2019. She does have some complaints of nausea at this time along with the right lower quadrant pain. She is concerned for appendicitis. She has no current complaints of dysuria, hematuria, vaginal bleeding or discharge. She has no issues with her bowel or bladder otherwise. Triage notes and Nursing notes were reviewed by myself. Any discrepancies are addressed above.     PAST MEDICAL HISTORY     Past Medical History:   Diagnosis Date    Anxiety     Chronic GERD     Chronic migraine     takes verapamil    Depression     Gallstones 10/2019    Gastroparesis     diagnosis as a child    Hx of blood clots     PE     Hypertension     Obesity     Pulmonary embolism ([address]     8 months ago-was on Xarelto-sees Dr Елена Arroyo in [place]    Splenomegaly        SURGICAL HISTORY       Past Surgical History:   Procedure Laterality Date    CHOLECYSTECTOMY, LAPAROSCOPIC N/A 10/30/2019    ROBOTIC LAP BRANNON performed by Lissette [facility] MD [facility]  03/16/2021    ELBOW SURGERY Right     TONSILLECTOMY      WISDOM TOOTH EXTRACTION         CURRENT MEDICATIONS       Discharge Medication List as of 1/15/2022  2:42 PM      CONTINUE these medications which have NOT CHANGED    Details   sertraline (ZOLOFT) 50 MG tablet Take 1 tablet by mouth daily, Disp-90 tablet, R-1Normal      metFORMIN (GLUCOPHAGE-XR) 500 MG extended release tablet Take 1 tablet by mouth daily (with breakfast), Disp-90 tablet, R-1Normal      ondansetron (ZOFRAN-ODT) 4 MG disintegrating tablet Take 1 tablet by mouth 3 times daily as needed for Nausea or Vomiting, Disp-30 tablet, R-0Normal      OMEPRAZOLE PO Take by mouth as needed Historical Med      albuterol sulfate HFA (VENTOLIN HFA) 108 (90 Base) MCG/ACT inhaler Inhale 2 puffs into the lungs 4 times daily as needed for Wheezing, Disp-1 each, R-1Normal      mometasone-formoterol (DULERA) 100-5 MCG/ACT inhaler Inhale 2 puffs into the lungs every 12 hours, Disp-1 each, R-2Normal      Spacer/Aero-Holding Chambers SHAWANDA DAILY Starting Mon 10/25/2021, Disp-1 each, R-0, Normal      butalbital-acetaminophen-caffeine (FIORICET, ESGIC) -40 MG per tablet Take 1 tablet by mouth every 4 hours as needed for Headaches, Disp-10 tablet, R-0Normal      magnesium (MAGNESIUM-OXIDE) 250 MG TABS tablet Take 2 tablets by mouth 4 times daily (with meals and nightly), Disp-240 tablet, R-1Normal      Prenatal Vit-Fe Fumarate-FA (PRENATAL VITAMIN) 27-0.8 MG TABS Take 1 tablet by mouth daily, Disp-90 tablet, R-3Normal      aspirin 81 MG EC tablet Take 81 mg by mouth daily Historical Med      naproxen sodium (ALEVE) 220 MG tablet Take 220 mg by mouth as needed Historical Med      vitamin D3 (CHOLECALCIFEROL) 25 MCG (1000 UT) TABS tablet Take 2 tablets by mouth daily, Disp-90 tablet, R-3Normal      ondansetron (ZOFRAN) 4 MG tablet Take 1 tablet by mouth every 8 hours as needed for Nausea or Vomiting, Disp-30 tablet, R-0Normal      Blood Pressure Monitor KIT Disp-1 kit, R-0, NormalTake blood pressure daily             ALLERGIES     Imitrex [sumatriptan] and Mucinex [guaifenesin er]    FAMILY HISTORY       Family History   Problem Relation Age of Onset    Migraines Mother     Lupus Mother     High Blood Pressure Father     Thyroid Disease Father     Other Father         gerd    Cancer Father         skin    No Known Problems Brother     Colon Polyps Maternal Grandmother     No Known Problems Maternal Grandfather     No Known Problems Paternal Grandmother     Colon Polyps Paternal Grandfather     No Known Problems Brother     Colon Cancer Neg Hx     Esophageal Cancer Neg Hx         SOCIAL HISTORY       Social History     Socioeconomic History    Marital status: Single     Spouse name: None    Number of children: 0    Years of education: 12    Highest education level: High school graduate   Occupational History     Employer: [employer]   Tobacco Use    Smoking status: Never Smoker    Smokeless tobacco: Never Used   Vaping Use    Vaping Use: Every day    Last attempt to quit: 8/5/2021    Substances: Nicotine   Substance and Sexual Activity    Alcohol use: Never    Drug use: Never    Sexual activity: None   Other Topics Concern    None   Social History Narrative    None     Social Determinants of Health     Financial Resource Strain: Medium Risk    Difficulty of Paying Living Expenses: Somewhat hard   Food Insecurity: Food Insecurity Present    Worried About Running Out of Food in the Last Year: Sometimes true    Ede of Food in the Last Year: Sometimes true   Transportation Needs:     Lack of Transportation (Medical): Not on file    Lack of Transportation (Non-Medical): Not on file   Physical Activity:     Days of Exercise per Week: Not on file    Minutes of Exercise per Session: Not on file   Stress:     Feeling of Stress : Not on file   Social Connections:     Frequency of Communication with Friends and Family: Not on file    Frequency of Social Gatherings with Friends and Family: Not on file    Attends Latter day Services: Not on file    Active Member of [address] [place] [address] or Organizations: Not on file    Attends Club or Organization Meetings: Not on file    Marital Status: Not on file   Intimate Partner Violence:     Fear of Current or Ex-Partner: Not on file    Emotionally Abused: Not on file    Physically Abused: Not on file    Sexually Abused: Not on file   Housing Stability:     Unable to Pay for Housing in the Last Year: Not on file    Number of [place] in the Last Year: Not on file    Unstable Housing in the Last Year: Not on file       REVIEW OF SYSTEMS     Review of Systems   Constitutional: Negative for activity change, appetite change, chills, fatigue, fever and unexpected weight change. HENT: Negative for congestion and tinnitus. Eyes: Negative for pain. Respiratory: Negative for cough, shortness of breath and stridor. Cardiovascular: Negative for chest pain and palpitations. Gastrointestinal: Positive for abdominal pain and nausea. Negative for abdominal distention, anal bleeding, blood in stool, constipation, diarrhea, rectal pain and vomiting. Genitourinary: Positive for flank pain and pelvic pain. Negative for decreased urine volume, difficulty urinating, dyspareunia, dysuria, frequency, genital sores, hematuria, menstrual problem, urgency, vaginal bleeding, vaginal discharge and vaginal pain. Musculoskeletal: Negative for myalgias and neck pain. Skin: Negative. Negative for rash. Neurological: Negative for dizziness and headaches. Psychiatric/Behavioral: Negative for suicidal ideas. All other systems reviewed and are negative. Except as noted above the remainder of the review of systems was reviewed and is. PHYSICAL EXAM     INITIAL VITALS:  oral temperature is 98.7 °F (37.1 °C). Her blood pressure is 127/68 and her pulse is 83. Her respiration is 16 and oxygen saturation is 98%. Physical Exam  Vitals and nursing note reviewed. Exam conducted with a chaperone present. Constitutional:       General: She is not in acute distress. Appearance: Normal appearance. She is normal weight. She is not ill-appearing, toxic-appearing or diaphoretic. HENT:      Head: Normocephalic and atraumatic. Right Ear: External ear normal.      Left Ear: External ear normal.      Nose: Nose normal.      Mouth/Throat:      Mouth: Mucous membranes are moist.   Eyes:      Pupils: Pupils are equal, round, and reactive to light. Cardiovascular:      Rate and Rhythm: Normal rate and regular rhythm. Pulses: Normal pulses. Pulmonary:      Effort: Pulmonary effort is normal. No respiratory distress. Abdominal:      General: Bowel sounds are normal. There is no distension. Palpations: Abdomen is soft. Tenderness: There is abdominal tenderness in the right lower quadrant. There is right CVA tenderness. There is no left CVA tenderness, guarding or rebound. Positive signs include McBurney's sign. Negative signs include Zeng's sign, Rovsing's sign, psoas sign and obturator sign. Hernia: No hernia is present. Musculoskeletal:         General: Normal range of motion. Cervical back: Normal range of motion and neck supple. Skin:     General: Skin is warm and dry. Neurological:      Mental Status: She is alert and oriented to person, place, and time.    Psychiatric: Mood and Affect: Mood normal.         Behavior: Behavior normal.         Thought Content: Thought content normal.         DIFFERENTIAL DIAGNOSIS:   Differential diagnoses are discussed    DIAGNOSTIC RESULTS     EKG: All EKG's are interpreted by the Emergency Department Physician who either signs or Co-signsthis chart in the absence of a cardiologist.    None    RADIOLOGY: non-plain film images(s) such as CT, Ultrasound and MRI are read by the radiologist.    CT ABDOMEN PELVIS W IV CONTRAST Additional Contrast? None   Final Result   1. No acute bowel obstruction or acute inflammatory bowel process   2. The gallbladder is surgically absent. 3. No obstructing ureteral calculus            **This report has been created using voice recognition software. It may contain minor errors which are inherent in voice recognition technology. **      Final report electronically signed by Dr Bruce Form on 1/15/2022 1:44 PM          LABS:   Labs Reviewed   OSMOLALITY - Abnormal; Notable for the following components:       Result Value    Osmolality Calc 274.2 (*)     All other components within normal limits   CBC WITH AUTO DIFFERENTIAL   COMPREHENSIVE METABOLIC PANEL W/ REFLEX TO MG FOR LOW K   HCG, SERUM, QUALITATIVE   URINE RT REFLEX TO CULTURE   ANION GAP   GLOMERULAR FILTRATION RATE, ESTIMATED       EMERGENCY DEPARTMENT COURSE:   Vitals:    Vitals:    01/15/22 1212 01/15/22 1320   BP: 135/78 127/68   Pulse: 91 83   Resp: 17 16   Temp: 98.7 °F (37.1 °C)    TempSrc: Oral    SpO2: 98% 98%     3:04 PM EST: The patient was seen and evaluated. MDM:  Patient is 56-year-old female with history of PCOS that has had worsening right lower quadrant pain in the past 2 days. Transvaginal ultrasound was done yesterday that came back negative. Patient states the pain is worse in the right lower quadrant than right flank and right adnexal region. No left abdominal pain.  Patient was given oral Zofran and Toradol which helped improve the nausea and mildly helped the pain. Blood work of CBC, CMP was done that showed no acute findings. Urinalysis demonstrated no acute findings. Patient was negative hCG qualitative. Based on patient's complaints and physical exam recommend CT abdomen/pelvis with contrast to rule out appendicitis. CT demonstrated no acute findings or emergent findings. Case was reviewed and staffed with Dr. Megan Snider attending ED physician. Recommend at this time no emergent findings noted and follow-up with PCP in the next 2 to 3 days for reevaluation. Recommend follow-up to OB/GYN in which patient states she has a new OB/GYN and will set up an appointment with them. If worsening symptoms of abdominal pain, fevers, dysuria may follow-up to the ED for reevaluation the next 1 to 2 days. I have given the patient strict written and verbal instructions about care at home, follow-up, and signs and symptoms of worsening of condition and they did verbalize understanding. Patient understands and agrees to the treatment plan. I spoke with Dr. Megan Snider. We thoroughly discussed the history, physical exam, laboratory and imaging studies, as well as, emergency department course. Based upon that discussion, we've decided to discharge Alla Lockwood home. We've agreed upon prompt follow in their office for a re-assessment. CRITICAL CARE:   None    CONSULTS:  None    PROCEDURES:  None    FINAL IMPRESSION      1.  Abdominal pain, right lower quadrant          DISPOSITION/PLAN   Discharge home    PATIENT REFERRED TO:  Robert Marquez, [address] [facility] [phone]    In 3 days  For re-evaluation    [facility]  [address]  [address] [place] [address]  [phone]  In 3 days  If not improving    [facility] EMERGENCY DEPT  [address] [facility] [address]  In 2 days  If symptoms worsen      DISCHARGEMEDICATIONS:  Discharge Medication List as of 1/15/2022  2:42 PM              (Please note that portions of this note were completed with a voice recognition program.  Efforts were made to edit the dictations but occasionallywords are mis-transcribed.)      Sabina Szymanski PA-C(electronically signed)  Physician Associate, Emergency Department       Sabina Szymanski PA-C  01/15/22 5635

## 2022-01-15 NOTE — ED NOTES
Pt to ED via intake with c/o right upper quadrant pain. Pt reports their pain began last week. Pt reports they had a + pregnancy test and then Friday began having vaginal bleeding. Pt reports they have had two miscarriages previous. Pt reports they had a trans vaginal ultrasound.  Pt reports they have also been feeling nauseas and having a decreased appetite       Shannan Olivo RN  01/15/22 6583

## 2022-01-15 NOTE — TELEPHONE ENCOUNTER
----- Message from Joycelyn Schilder, MD sent at 1/14/2022  8:43 PM EST -----  The ultrasound was reported as being normal.  If you are still having pain, or have any other questions please let me know.

## 2022-01-19 ENCOUNTER — PATIENT MESSAGE (OUTPATIENT)
Dept: FAMILY MEDICINE CLINIC | Age: 22
End: 2022-01-19

## 2022-01-19 ENCOUNTER — APPOINTMENT (OUTPATIENT)
Dept: CT IMAGING | Age: 22
End: 2022-01-19
Payer: COMMERCIAL

## 2022-01-19 ENCOUNTER — HOSPITAL ENCOUNTER (EMERGENCY)
Age: 22
Discharge: HOME | End: 2022-01-19
Payer: COMMERCIAL

## 2022-01-19 VITALS
HEART RATE: 78 BPM | RESPIRATION RATE: 16 BRPM | OXYGEN SATURATION: 99 % | DIASTOLIC BLOOD PRESSURE: 87 MMHG | SYSTOLIC BLOOD PRESSURE: 144 MMHG | TEMPERATURE: 98 F

## 2022-01-19 DIAGNOSIS — R07.9 CHEST PAIN, UNSPECIFIED TYPE: Primary | ICD-10-CM

## 2022-01-19 LAB
ANION GAP SERPL CALCULATED.3IONS-SCNC: 11 MEQ/L (ref 8–16)
BASOPHILS # BLD: 0.5 %
BASOPHILS ABSOLUTE: 0 THOU/MM3 (ref 0–0.1)
BUN BLDV-MCNC: 11 MG/DL (ref 7–22)
CALCIUM SERPL-MCNC: 9.8 MG/DL (ref 8.5–10.5)
CHLORIDE BLD-SCNC: 102 MEQ/L (ref 98–111)
CO2: 25 MEQ/L (ref 23–33)
CREAT SERPL-MCNC: 0.6 MG/DL (ref 0.4–1.2)
EKG ATRIAL RATE: 83 BPM
EKG P AXIS: 48 DEGREES
EKG P-R INTERVAL: 138 MS
EKG Q-T INTERVAL: 346 MS
EKG QRS DURATION: 72 MS
EKG QTC CALCULATION (BAZETT): 406 MS
EKG R AXIS: 48 DEGREES
EKG T AXIS: 64 DEGREES
EKG VENTRICULAR RATE: 83 BPM
EOSINOPHIL # BLD: 1 %
EOSINOPHILS ABSOLUTE: 0.1 THOU/MM3 (ref 0–0.4)
ERYTHROCYTE [DISTWIDTH] IN BLOOD BY AUTOMATED COUNT: 12.2 % (ref 11.5–14.5)
ERYTHROCYTE [DISTWIDTH] IN BLOOD BY AUTOMATED COUNT: 40.9 FL (ref 35–45)
GFR SERPL CREATININE-BSD FRML MDRD: > 90 ML/MIN/1.73M2
GLUCOSE BLD-MCNC: 91 MG/DL (ref 70–108)
HCT VFR BLD CALC: 42 % (ref 37–47)
HEMOGLOBIN: 13.9 GM/DL (ref 12–16)
IMMATURE GRANS (ABS): 0.03 THOU/MM3 (ref 0–0.07)
IMMATURE GRANULOCYTES: 0.3 %
LYMPHOCYTES # BLD: 25.1 %
LYMPHOCYTES ABSOLUTE: 2.3 THOU/MM3 (ref 1–4.8)
MAGNESIUM: 2.1 MG/DL (ref 1.6–2.4)
MCH RBC QN AUTO: 30.2 PG (ref 26–33)
MCHC RBC AUTO-ENTMCNC: 33.1 GM/DL (ref 32.2–35.5)
MCV RBC AUTO: 91.3 FL (ref 81–99)
MONOCYTES # BLD: 5.6 %
MONOCYTES ABSOLUTE: 0.5 THOU/MM3 (ref 0.4–1.3)
NUCLEATED RED BLOOD CELLS: 0 /100 WBC
OSMOLALITY CALCULATION: 274.7 MOSMOL/KG (ref 275–300)
PLATELET # BLD: 268 THOU/MM3 (ref 130–400)
PMV BLD AUTO: 10 FL (ref 9.4–12.4)
POTASSIUM SERPL-SCNC: 4.1 MEQ/L (ref 3.5–5.2)
PREGNANCY, SERUM: NEGATIVE
RBC # BLD: 4.6 MILL/MM3 (ref 4.2–5.4)
SEG NEUTROPHILS: 67.5 %
SEGMENTED NEUTROPHILS ABSOLUTE COUNT: 6.1 THOU/MM3 (ref 1.8–7.7)
SODIUM BLD-SCNC: 138 MEQ/L (ref 135–145)
TROPONIN T: < 0.01 NG/ML
WBC # BLD: 9.1 THOU/MM3 (ref 4.8–10.8)

## 2022-01-19 PROCEDURE — 93005 ELECTROCARDIOGRAM TRACING: CPT | Performed by: PHYSICIAN ASSISTANT

## 2022-01-19 PROCEDURE — 36415 COLL VENOUS BLD VENIPUNCTURE: CPT

## 2022-01-19 PROCEDURE — 96374 THER/PROPH/DIAG INJ IV PUSH: CPT

## 2022-01-19 PROCEDURE — 6360000002 HC RX W HCPCS: Performed by: PHYSICIAN ASSISTANT

## 2022-01-19 PROCEDURE — 84703 CHORIONIC GONADOTROPIN ASSAY: CPT

## 2022-01-19 PROCEDURE — 83735 ASSAY OF MAGNESIUM: CPT

## 2022-01-19 PROCEDURE — 93010 ELECTROCARDIOGRAM REPORT: CPT | Performed by: NUCLEAR MEDICINE

## 2022-01-19 PROCEDURE — 84484 ASSAY OF TROPONIN QUANT: CPT

## 2022-01-19 PROCEDURE — 6360000004 HC RX CONTRAST MEDICATION: Performed by: PHYSICIAN ASSISTANT

## 2022-01-19 PROCEDURE — 85025 COMPLETE CBC W/AUTO DIFF WBC: CPT

## 2022-01-19 PROCEDURE — 99283 EMERGENCY DEPT VISIT LOW MDM: CPT

## 2022-01-19 PROCEDURE — 99282 EMERGENCY DEPT VISIT SF MDM: CPT

## 2022-01-19 PROCEDURE — 80048 BASIC METABOLIC PNL TOTAL CA: CPT

## 2022-01-19 PROCEDURE — 71275 CT ANGIOGRAPHY CHEST: CPT

## 2022-01-19 RX ORDER — KETOROLAC TROMETHAMINE 30 MG/ML
30 INJECTION, SOLUTION INTRAMUSCULAR; INTRAVENOUS ONCE
Status: COMPLETED | OUTPATIENT
Start: 2022-01-19 | End: 2022-01-19

## 2022-01-19 RX ORDER — KETOROLAC TROMETHAMINE 10 MG/1
10 TABLET, FILM COATED ORAL EVERY 6 HOURS PRN
Qty: 20 TABLET | Refills: 0 | Status: SHIPPED | OUTPATIENT
Start: 2022-01-19 | End: 2022-02-10

## 2022-01-19 RX ADMIN — IOPAMIDOL 80 ML: 755 INJECTION, SOLUTION INTRAVENOUS at 19:35

## 2022-01-19 RX ADMIN — KETOROLAC TROMETHAMINE 30 MG: 30 INJECTION, SOLUTION INTRAMUSCULAR; INTRAVENOUS at 17:50

## 2022-01-19 ASSESSMENT — ENCOUNTER SYMPTOMS
BACK PAIN: 0
VOMITING: 0
DIARRHEA: 0
EYE DISCHARGE: 0
NAUSEA: 0
COLOR CHANGE: 0
EYE ITCHING: 0
SORE THROAT: 0
COUGH: 0
WHEEZING: 0
EYE PAIN: 0
SHORTNESS OF BREATH: 1
RHINORRHEA: 0
ABDOMINAL PAIN: 0

## 2022-01-19 ASSESSMENT — PAIN DESCRIPTION - LOCATION: LOCATION: CHEST

## 2022-01-19 ASSESSMENT — PAIN SCALES - GENERAL: PAINLEVEL_OUTOF10: 4

## 2022-01-19 ASSESSMENT — PAIN DESCRIPTION - PAIN TYPE: TYPE: ACUTE PAIN

## 2022-01-19 NOTE — TELEPHONE ENCOUNTER
We would have to order this stat and do a PA for this. No guarantee we can get it approved or authed soon.

## 2022-01-19 NOTE — TELEPHONE ENCOUNTER
From: Sahra Pearson  To: Dr. Lieutenant Purvis: 1/19/2022 10:19 AM EST  Subject: I think I threw another PE    I think theres a possibility I threw another clot because Ive been having a tingling sensation between my right arm up to my lips with a little lightheaded ness in between and Ive been having a bad cough with nothing coming up each time. [place] also been having chest pains from my right side of my lungs up to the center. So I was wondering if you could order a D-dimer to double check to make sure its nothing too serious, I just cant afford to go to the er anymore this month.

## 2022-01-19 NOTE — TELEPHONE ENCOUNTER
Your history of clots in the past, your D-dimer result will be positive and you will go to the emergency department anyways. I do not want to wait on the results of the D-dimer before sending you to treatment because pulmonary embolism could be life-threatening. Please go to the emergency department, your employer must give you time off work for legitimate medical emergencies. If you have any problems with them I will help you take care of them.

## 2022-01-19 NOTE — ED NOTES
Pt to ED c/o tingling in arm and chest pain. pt states that she thinks she has another PE. Pt denies any other symptoms. Pt respirations unlabored. EKG complete.       Marlen RODRIGUES, RN  01/19/22 6127

## 2022-01-19 NOTE — ED PROVIDER NOTES
[facility] [address] COMPLAINT       Chief Complaint   Patient presents with    Chest Pain    Tingling       Nurses Notes reviewed and I agree except as notedin the HPI. HISTORY OF PRESENT ILLNESS    Mayra Carey is a 24 y.o. female who presents has had chest pain started last night. She states that she has having pain when she breathes. She has been short of breath. She has had 3 pulmonary emboli in the past.  She not currently anticoagulated. Denies any runny nose or cough. She said her first PE was secondary to her IUD. She states that 2 after that she does not know the cause. She states that she is not currently anticoagulated. REVIEW OF SYSTEMS     Review of Systems   Constitutional: Negative for activity change, appetite change, chills and fever. HENT: Negative for congestion, ear pain, rhinorrhea and sore throat. Eyes: Negative for pain, discharge and itching. Respiratory: Positive for shortness of breath. Negative for cough and wheezing. Cardiovascular: Positive for chest pain. Gastrointestinal: Negative for abdominal pain, diarrhea, nausea and vomiting. Genitourinary: Negative for difficulty urinating and dysuria. Musculoskeletal: Negative for arthralgias, back pain and myalgias. Skin: Negative for color change and rash. Neurological: Negative for dizziness, seizures, light-headedness and headaches. Psychiatric/Behavioral: Negative for agitation, confusion, self-injury and suicidal ideas. All other systems reviewed and are negative. PAST MEDICAL HISTORY    has a past medical history of Anxiety, Chronic GERD, Chronic migraine, Depression, Gallstones, Gastroparesis, Hx of blood clots, Hypertension, Obesity, Pulmonary embolism ([address] and Splenomegaly. SURGICAL HISTORY      has a past surgical history that includes Tonsillectomy; [place] tooth extraction; Elbow surgery (Right);  Cholecystectomy, laparoscopic (N/A, 10/30/2019); and Dilation and curettage of uterus (03/16/2021).     CURRENT MEDICATIONS       Discharge Medication List as of 1/19/2022  8:38 PM      CONTINUE these medications which have NOT CHANGED    Details   sertraline (ZOLOFT) 50 MG tablet Take 1 tablet by mouth daily, Disp-90 tablet, R-1Normal      metFORMIN (GLUCOPHAGE-XR) 500 MG extended release tablet Take 1 tablet by mouth daily (with breakfast), Disp-90 tablet, R-1Normal      ondansetron (ZOFRAN-ODT) 4 MG disintegrating tablet Take 1 tablet by mouth 3 times daily as needed for Nausea or Vomiting, Disp-30 tablet, R-0Normal      OMEPRAZOLE PO Take by mouth as needed Historical Med      albuterol sulfate HFA (VENTOLIN HFA) 108 (90 Base) MCG/ACT inhaler Inhale 2 puffs into the lungs 4 times daily as needed for Wheezing, Disp-1 each, R-1Normal      mometasone-formoterol (DULERA) 100-5 MCG/ACT inhaler Inhale 2 puffs into the lungs every 12 hours, Disp-1 each, R-2Normal      Spacer/Aero-Holding Chambers SHAWANDA DAILY Starting Mon 10/25/2021, Disp-1 each, R-0, Normal      butalbital-acetaminophen-caffeine (FIORICET, ESGIC) -40 MG per tablet Take 1 tablet by mouth every 4 hours as needed for Headaches, Disp-10 tablet, R-0Normal      magnesium (MAGNESIUM-OXIDE) 250 MG TABS tablet Take 2 tablets by mouth 4 times daily (with meals and nightly), Disp-240 tablet, R-1Normal      Prenatal Vit-Fe Fumarate-FA (PRENATAL VITAMIN) 27-0.8 MG TABS Take 1 tablet by mouth daily, Disp-90 tablet, R-3Normal      aspirin 81 MG EC tablet Take 81 mg by mouth daily Historical Med      vitamin D3 (CHOLECALCIFEROL) 25 MCG (1000 UT) TABS tablet Take 2 tablets by mouth daily, Disp-90 tablet, R-3Normal      ondansetron (ZOFRAN) 4 MG tablet Take 1 tablet by mouth every 8 hours as needed for Nausea or Vomiting, Disp-30 tablet, R-0Normal      Blood Pressure Monitor KIT Disp-1 kit, R-0, NormalTake blood pressure daily             ALLERGIES     is allergic to imitrex [sumatriptan] and mucinex [guaifenesin er]. HISTORY     She indicated that her mother is alive. She indicated that her father is alive. She indicated that both of her brothers are alive. She indicated that her maternal grandmother is alive. She indicated that her maternal grandfather is alive. She indicated that her paternal grandmother is alive. She indicated that her paternal grandfather is alive. She indicated that the status of her neg hx is unknown.   family history includes Cancer in her father; Colon Polyps in her maternal grandmother and paternal grandfather; High Blood Pressure in her father; Lupus in her mother; Migraines in her mother; No Known Problems in her brother, brother, maternal grandfather, and paternal grandmother; Other in her father; Thyroid Disease in her father. SOCIALHISTORY      reports that she has never smoked. She has never used smokeless tobacco. She reports that she does not drink alcohol and does not use drugs. PHYSICAL EXAM     INITIAL VITALS:  oral temperature is 98 °F (36.7 °C). Her blood pressure is 144/87 (abnormal) and her pulse is 78. Her respiration is 16 and oxygen saturation is 99%. Physical Exam  Vitals and nursing note reviewed. Constitutional:       Comments: Well Developed Well Nourished Appearing     HENT:      Head: Normocephalic and atraumatic. Eyes:      Pupils: Pupils are equal, round, and reactive to light. Cardiovascular:      Rate and Rhythm: Normal rate and regular rhythm. Heart sounds: Normal heart sounds. Pulmonary:      Effort: Pulmonary effort is normal. No respiratory distress. Breath sounds: Normal breath sounds. No wheezing. Abdominal:      General: Bowel sounds are normal. There is no distension. Palpations: Abdomen is soft. Musculoskeletal:      Cervical back: Normal range of motion and neck supple.          DIFFERENTIAL DIAGNOSIS:   Chest pain    DIAGNOSTIC RESULTS     EKG: All EKG's are interpreted by the Emergency Department Physician who either signs or Co-signs this chart in the absence of a cardiologist.      RADIOLOGY: non-plain film images(s) such as CT, Ultrasound and MRI are read by the radiologist.  [address]   Final Result      1. No CT evidence of pulmonary embolus. **This report has been created using voice recognition software. It may contain minor errors which are inherent in voice recognition technology. **      Final report electronically signed by Dr Hemal Ma on 1/19/2022 8:30 PM            LABS:   Labs Reviewed   OSMOLALITY - Abnormal; Notable for the following components:       Result Value    Osmolality Calc 274.7 (*)     All other components within normal limits   CBC WITH AUTO DIFFERENTIAL   BASIC METABOLIC PANEL   TROPONIN   MAGNESIUM   HCG, SERUM, QUALITATIVE   ANION GAP   GLOMERULAR FILTRATION RATE, ESTIMATED       EMERGENCY DEPARTMENT COURSE:   :    Vitals:    01/19/22 1652   BP: (!) 144/87   Pulse: 78   Resp: 16   Temp: 98 °F (36.7 °C)   TempSrc: Oral   SpO2: 99%     Patient was seen history physical exam was performed. CT scan is negative for PE. Will discharge home and treat for musculoskeletal pain. We will follow-up as below. See disposition below    CRITICAL CARE:  None    CONSULTS:  None    PROCEDURES:  None    FINAL IMPRESSION      1.  Chest pain, unspecified type          DISPOSITION/PLAN   Discharge    PATIENT REFERRED TO:  Angelina Hickman DO  [address] [place], [place] [id number]    In 2 days        DISCHARGE MEDICATIONS:  Discharge Medication List as of 1/19/2022  8:38 PM      START taking these medications    Details   ketorolac (TORADOL) 10 MG tablet Take 1 tablet by mouth every 6 hours as needed for Pain, Disp-20 tablet, R-0Print             (Please note that portions of this note were completed with a voice recognitionprogram.  Efforts were made to edit the dictations but occasionally words are mis-transcribed.)    Chris Rosas [address], [address] [facility] [address], [address]  01/19/22 2050

## 2022-01-24 NOTE — TELEPHONE ENCOUNTER
Last visit- 1/12/2022  Next visit- 2/28/2022  Last fill date- 1/12/22 but to [facility], this request is to local pharmacy [facility]

## 2022-01-25 RX ORDER — METFORMIN HYDROCHLORIDE 500 MG/1
TABLET, EXTENDED RELEASE ORAL
Qty: 21 TABLET | Refills: 1 | Status: SHIPPED | OUTPATIENT
Start: 2022-01-25

## 2022-02-10 ENCOUNTER — OFFICE VISIT (OUTPATIENT)
Dept: ONCOLOGY | Age: 22
End: 2022-02-10
Payer: COMMERCIAL

## 2022-02-10 ENCOUNTER — HOSPITAL ENCOUNTER (OUTPATIENT)
Dept: INFUSION THERAPY | Age: 22
Discharge: HOME | End: 2022-02-10
Payer: COMMERCIAL

## 2022-02-10 VITALS
HEART RATE: 78 BPM | RESPIRATION RATE: 18 BRPM | DIASTOLIC BLOOD PRESSURE: 79 MMHG | BODY MASS INDEX: 41.77 KG/M2 | TEMPERATURE: 98.6 F | OXYGEN SATURATION: 97 % | HEIGHT: 69 IN | WEIGHT: 282 LBS | SYSTOLIC BLOOD PRESSURE: 130 MMHG

## 2022-02-10 DIAGNOSIS — D68.59 HYPERCOAGULABLE STATE (HCC): ICD-10-CM

## 2022-02-10 DIAGNOSIS — D68.59 HYPERCOAGULABLE STATE (HCC): Primary | ICD-10-CM

## 2022-02-10 LAB
APTT: 34.9 SECONDS (ref 22–38)
INR BLD: 0.93 (ref 0.85–1.13)
PREGNANCY, SERUM: NEGATIVE

## 2022-02-10 PROCEDURE — 99205 OFFICE O/P NEW HI 60 MIN: CPT | Performed by: INTERNAL MEDICINE

## 2022-02-10 PROCEDURE — 85610 PROTHROMBIN TIME: CPT

## 2022-02-10 PROCEDURE — 84703 CHORIONIC GONADOTROPIN ASSAY: CPT

## 2022-02-10 PROCEDURE — 99211 OFF/OP EST MAY X REQ PHY/QHP: CPT

## 2022-02-10 PROCEDURE — 86147 CARDIOLIPIN ANTIBODY EA IG: CPT

## 2022-02-10 PROCEDURE — 85613 RUSSELL VIPER VENOM DILUTED: CPT

## 2022-02-10 PROCEDURE — 85302 CLOT INHIBIT PROT C ANTIGEN: CPT

## 2022-02-10 PROCEDURE — 81241 F5 GENE: CPT

## 2022-02-10 PROCEDURE — 86146 BETA-2 GLYCOPROTEIN ANTIBODY: CPT

## 2022-02-10 PROCEDURE — 81240 F2 GENE: CPT

## 2022-02-10 PROCEDURE — 85730 THROMBOPLASTIN TIME PARTIAL: CPT

## 2022-02-10 PROCEDURE — 85240 CLOT FACTOR VIII AHG 1 STAGE: CPT

## 2022-02-10 PROCEDURE — 85303 CLOT INHIBIT PROT C ACTIVITY: CPT

## 2022-02-10 PROCEDURE — 85306 CLOT INHIBIT PROT S FREE: CPT

## 2022-02-10 PROCEDURE — 85300 ANTITHROMBIN III ACTIVITY: CPT

## 2022-02-10 PROCEDURE — 36415 COLL VENOUS BLD VENIPUNCTURE: CPT

## 2022-02-10 NOTE — PATIENT INSTRUCTIONS
Hypercoagulable panel including expanded lupus anticoagulant and antiphospholipid antibody panel  Start Xarelto 20 mg p.o. daily  Follow-up with me in 3 weeks to review lab data.

## 2022-02-10 NOTE — PROGRESS NOTES
[address] [facility] [address] [place] [address]  Dept: [phone]  Loc: [phone]   Hematology/Oncology Consult (Clinic)        2/10/22     Pecolia Sat   2000     MD Yudy Ordaz DO       Reason: History of multiple pulmonary emboli and multiple miscarriages. Chief Complaint   Patient presents with    New Patient     pulmonary embolus (PE)          HPI: Latanya Savage is a 34-year-old female states that at the age of 11 she was diagnosed with her first pulmonary embolism. This was an unprovoked event although at that time she had been on an estrogen-based IUD. She presented with a cough and pleuritic chest pain was told that it was a small PE. IUD was removed and she was placed on Xarelto for 6 months. This first event was at [facility] in [place]. No family history of thrombosis. She is a vague recollection but believes she had her second unprovoked PE sometime in 2017 in [place]. Anticoagulation regarding this event not recollected. Patient then moved to [place] and on 2020 she had a CT angiogram done in the ER that revealed a small filling defect of a small subsegmental right lower lobe. She was given 3 weeks prescription of Xarelto. After that , she was new to the area and had no PCP and ran out of the Xarelto and has remained off anticoagulation since then. No subsequent PE. She has had multiple ER visits for chest pain with no documented pulmonary emboli. Patient has no recollection of any calf pain or swelling or documented DVT in her lower extremities. She is  3 para 0 with 3 miscarriages including most recent in 2022. All miscarriages occurring within the first 6 weeks of pregnancy.   Chart review shows that 2021 she had a limited work-up including negative factor V Leiden, prothrombin mutation and beta-2 glycoprotein IgA and IgM were not elevated. She has no history of lupus although her mother and maternal grandfather have lupus but no history of thrombosis. Recently, patient  seen by her OB physician who plans to \"induce menopause\" temporarily as she may have endometriosis as well. ROS:  Review of Systems 14 point negative except as above. PMH:   Past Medical History:   Diagnosis Date    Anxiety     Chronic GERD     Chronic migraine     takes verapamil    Depression     Gallstones 10/2019    Gastroparesis     diagnosis as a child    Hx of blood clots     PE     Hypertension     Miscarriage     multiple    Obesity     Pulmonary embolism (HCC)     8 months ago-was on Xarelto-sees Dr Fahad Arrington in [place]    Splenomegaly         Social HX:   Social History     Socioeconomic History    Marital status: Single     Spouse name: Not on file    Number of children: 0    Years of education: 15    Highest education level: High school graduate   Occupational History     Employer: [employer]   Tobacco Use    Smoking status: Never Smoker    Smokeless tobacco: Never Used    Tobacco comment: vapes-no nicotine   Vaping Use    Vaping Use: Every day    Last attempt to quit: 8/5/2021    Substances: Nicotine   Substance and Sexual Activity    Alcohol use: Never    Drug use: Never    Sexual activity: Not on file   Other Topics Concern    Not on file   Social History Narrative    Not on file     Social Determinants of Health     Financial Resource Strain: Medium Risk    Difficulty of Paying Living Expenses: Somewhat hard   Food Insecurity: Food Insecurity Present    Worried About Running Out of Food in the Last Year: Sometimes true    Ede of Food in the Last Year: Sometimes true   Transportation Needs:     Lack of Transportation (Medical): Not on file    Lack of Transportation (Non-Medical):  Not on file   Physical Activity:     Days of Exercise per Week: Not on file    Minutes of Exercise per Session: Not on file   Stress:     Feeling of Stress : Not on file   Social Connections:     Frequency of Communication with Friends and Family: Not on file    Frequency of Social Gatherings with Friends and Family: Not on file    Attends Buddhist Services: Not on file    Active Member of Clubs or Organizations: Not on file    Attends Club or Organization Meetings: Not on file    Marital Status: Not on file   Intimate Partner Violence:     Fear of Current or Ex-Partner: Not on file    Emotionally Abused: Not on file    Physically Abused: Not on file    Sexually Abused: Not on file   Housing Stability:     Unable to Pay for Housing in the Last Year: Not on file    Number of [place] in the Last Year: Not on file    Unstable Housing in the Last Year: Not on file        Spouse: engaged    Phone: Dewey Sumnermin     Employment:  Office job    Immunizations:  Immunization History   Administered Date(s) Administered    COVID-19, Bertin Matias, Primary or Immunocompromised, PF, 100mcg/0.5mL 2021, 10/02/2021    Influenza Virus Vaccine 10/05/2020        Health Screenings:      Gyn HX:   GPA: A3 3 MISCARRIAGES ALL WITHIN 6 WEEKS OF PREGNANCY. MOST RECENT 2020. AMANDA/BSO: Not applicable  LMP: No LMP recorded. (Menstrual status: Irregular periods). Health Maintenance Due   Topic Date Due    Pneumococcal 0-64 years Vaccine (1 of 2 - PPSV23) Never done    HPV vaccine (1 - 2-dose series) Never done    DTaP/Tdap/Td vaccine (1 - Tdap) Never done    Chlamydia screen  2021    Flu vaccine (1) 2021        Interests: Not reviewed      Fam HX:   No family history of thrombosis.   Mother and m grandfather with lupus  Family History   Problem Relation Age of Onset   Steele Migraines Mother     Lupus Mother     High Blood Pressure Father     Thyroid Disease Father     Other Father         gerd    Cancer Father         skin    No Known Problems Brother     Colon Polyps Maternal Grandmother     No Known Problems Maternal Grandfather     No Known Problems Paternal Grandmother     Colon Polyps Paternal Grandfather     No Known Problems Brother     Colon Cancer Neg Hx     Esophageal Cancer Neg Hx         Hospitalizations:   None recent    Allergies: Allergies   Allergen Reactions    Imitrex [Sumatriptan] Shortness Of Breath     Pt reports hot flashes and chest pain also.      Mucinex [Guaifenesin Er]      Dizziness, Heart Racing, and Extreme Drowsy        Adult Illness:  Patient Active Problem List   Diagnosis    Cholelithiasis without obstruction    Chronic migraine    Pulmonary embolism (HCC)    Anxiety    Depression    Gastroparesis    Obesity    Chronic GERD    Hypertension    Hx of blood clots    Costochondral chest pain    Muscle strain    Chest pain, atypical    History of pulmonary embolism    Mild intermittent asthma        Surgery:  Past Surgical History:   Procedure Laterality Date    CHOLECYSTECTOMY, LAPAROSCOPIC N/A 10/30/2019    ROBOTIC LAP BRANNON performed by Danielle Bledsoe MD at [place] [address]  03/16/2021    ELBOW SURGERY Right     TONSILLECTOMY      WISDOM TOOTH EXTRACTION          Medications:  Current Outpatient Medications   Medication Sig Dispense Refill    rivaroxaban (XARELTO) 20 MG TABS tablet Take 1 tablet by mouth daily (with breakfast) 30 tablet 5    metFORMIN (GLUCOPHAGE-XR) 500 MG extended release tablet TAKE 1 TABLET BY MOUTH EVERY DAY WITH BREAKFAST 21 tablet 1    ondansetron (ZOFRAN-ODT) 4 MG disintegrating tablet Take 1 tablet by mouth 3 times daily as needed for Nausea or Vomiting 30 tablet 0    OMEPRAZOLE PO Take by mouth as needed       albuterol sulfate HFA (VENTOLIN HFA) 108 (90 Base) MCG/ACT inhaler Inhale 2 puffs into the lungs 4 times daily as needed for Wheezing 1 each 1    mometasone-formoterol (DULERA) 100-5 MCG/ACT inhaler Inhale 2 puffs into the lungs every 12 hours 1 each 2    Spacer/Aero-Holding Barbara Dies SHAWANDA 1 Device by Does not apply route daily 1 each 0    magnesium (MAGNESIUM-OXIDE) 250 MG TABS tablet Take 2 tablets by mouth 4 times daily (with meals and nightly) 240 tablet 1    Prenatal Vit-Fe Fumarate-FA (PRENATAL VITAMIN) 27-0.8 MG TABS Take 1 tablet by mouth daily 90 tablet 3    aspirin 81 MG EC tablet Take 81 mg by mouth daily       vitamin D3 (CHOLECALCIFEROL) 25 MCG (1000 UT) TABS tablet Take 2 tablets by mouth daily 90 tablet 3    Blood Pressure Monitor KIT Take blood pressure daily 1 kit 0    sertraline (ZOLOFT) 50 MG tablet Take 1 tablet by mouth daily (Patient not taking: Reported on 2/10/2022) 90 tablet 1    butalbital-acetaminophen-caffeine (FIORICET, ESGIC) -40 MG per tablet Take 1 tablet by mouth every 4 hours as needed for Headaches (Patient not taking: Reported on 2/10/2022) 10 tablet 0     No current facility-administered medications for this visit. EXAM:   height is 5' 9\" (1.753 m) and weight is 282 lb (127.9 kg). Her oral temperature is 98.6 °F (37 °C). Her blood pressure is 130/79 and her pulse is 78. Her respiration is 18 and oxygen saturation is 97%. Estimated body surface area is 2.5 meters squared as calculated from the following:    Height as of this encounter: 5' 9\" (1.753 m). Weight as of this encounter: 282 lb (127.9 kg). ECO  General: Non-ill appearing. Appears comfortable in no distress  HEENT: NC/AT,nonicteric,   Neck: normal thyroid, no masses. pulses nl, no bruits,   Nodes: No adenopathy  Lungs/chest: clear, no rales,rhonchi or wheezing, lung bases clear  CV: rrr, no rubs ,gallops or murmurs  Breasts: Not examined  Abd/Rectal: soft, non-tender,bowel sounds normal , no HSM,no masses, mild to moderately obese  Back: normal curvature, No midline tenderness. flanks nontender  : Not Examined  Extremities: no cyanosis,clubbing or edema.   Skin: unremarkable  Neuro: A and O x 4, CN exam nonfocal, Motor- no deficits, Sensory- no deficits, gait-nl, speech- fluent, no ataxia. Devices: none      DATA:    LAB:     CBC with Differential:      Lab Results   Component Value Date    WBC 9.1 01/19/2022    RBC 4.60 01/19/2022    HGB 13.9 01/19/2022    HCT 42.0 01/19/2022     01/19/2022    MCV 91.3 01/19/2022    MCH 30.2 01/19/2022    MCHC 33.1 01/19/2022    RDW 12.3 06/09/2021    NRBC 0 01/19/2022    SEGSPCT 67.5 01/19/2022    MONOPCT 5.6 01/19/2022    MONOSABS 0.5 01/19/2022    LYMPHSABS 2.3 01/19/2022    EOSABS 0.1 01/19/2022    BASOSABS 0.0 01/19/2022     Lab Results   Component Value Date/Time    SEGSABS 6.1 01/19/2022 05:46 PM       CMP:    Lab Results   Component Value Date     01/19/2022    K 4.1 01/19/2022    K 4.2 01/15/2022     01/19/2022    CO2 25 01/19/2022    BUN 11 01/19/2022    CREATININE 0.6 01/19/2022    LABGLOM >90 01/19/2022    GLUCOSE 91 01/19/2022    PROT 7.4 01/15/2022    LABALBU 4.6 01/15/2022    CALCIUM 9.8 01/19/2022    BILITOT 0.5 01/15/2022    ALKPHOS 75 01/15/2022    AST 20 01/15/2022    ALT 30 01/15/2022       Magnesium:    Lab Results   Component Value Date    MG 2.1 01/19/2022     PT/INR:    Lab Results   Component Value Date    PROTIME 11.7 12/29/2021    INR 1.00 05/25/2020     TSH:    Lab Results   Component Value Date    TSH 0.935 02/13/2020     VITAMIN B12: No components found for: B12  FOLATE:  No results found for: FOLATE  IRON:    Lab Results   Component Value Date    IRON 70 01/14/2021     Iron Saturation:  No components found for: PERCENTFE  TIBC:    Lab Results   Component Value Date    TIBC 271 01/14/2021     FERRITIN:  No results found for: FERRITIN    6/9/2021  Factor V Leiden-no mutation  Prothrombin mutation-negative  Beta-2 glycoprotein 1 IgG-0 and IgM-4      IMAGING:    CTA CHEST W WO CONTRAST  Result Date: 1/19/2022  1. No CT evidence of pulmonary embolus. **This report has been created using voice recognition software.   It may contain minor errors which are inherent in voice recognition technology. ** Final report electronically signed by Dr Demian Yung on 1/19/2022 8:30 PM    US NON OB TRANSVAGINAL  Result Date: 1/14/2022  Ultrasound pelvis is essentially normal. A fluid-filled tubular structure seen extending the right adnexa which are believed to the appendix. It is normal caliber. **This report has been created using voice recognition software. It may contain minor errors which are inherent in voice recognition technology. ** Final report electronically signed by Dr. Alvaro Garsia on 1/14/2022 9:08 AM    CT ABDOMEN PELVIS W IV CONTRAST Additional Contrast? None  Result Date: 1/15/2022  1. No acute bowel obstruction or acute inflammatory bowel process 2. The gallbladder is surgically absent. 3. No obstructing ureteral calculus **This report has been created using voice recognition software. It may contain minor errors which are inherent in voice recognition technology. ** Final report electronically signed by Dr Demian Yung on 1/15/2022 1:44 PM    PROCEDURE: CTA CHEST W WO CONTRAST 4/23/2020       CLINICAL INFORMATION: Chest pain PE study       COMPARISON: Chest x-ray dated 3/23/2020       TECHNIQUE: 1.5 mm axial images were obtained through the chest after the administration of IV contrast.  A non-contrast localizer was obtained.  3D reconstructions were performed on the scanner to include coronal and sagittal reformatted images through    both the right and left pulmonary arteries.  All CT scans at this facility use dose modulation, iterative reconstruction, and/or weight-based dosing when appropriate to reduce radiation dose to as low as reasonably achievable.       FINDINGS:       The central airways appear patent.       No focal pleural effusion or pneumothorax is seen.       There is mild infiltrate demonstrated within the perihilar region on axial image 75 which may represent mild atelectasis or pneumonia.       There is a small filling defect demonstrated within a posterior subsegmental branch of the right lower lobe pulmonary artery. No other filling defects are identified.       No mediastinal, hilar or axillary lymphadenopathy is seen.       Limited evaluation of the upper abdomen appears unremarkable.        No acute osseous findings are seen.           Impression   1.  Small filling defect consistent with small subsegmental pulmonary embolus within a posterior subsegmental branch of the right lower lobe pulmonary artery.       2. There is mild infiltrate demonstrated within the perihilar region on axial image 75 which may represent mild atelectasis or pneumonia.       **This report has been created using voice recognition software.  It may contain minor errors which are inherent in voice recognition technology. **           Final report electronically signed by Dr. Krystina Weinstein on 4/23/2020 8:01 AM          PROCEDURES:  None    PATHOLOGY:   None    GENETICS:  None    MOLECULAR:  None    ASSESSMENT/PLAN:    1: Diagnosis: Nancy Flynn is a 56-year-old female with a hypercoagulable history. It appears that by history in [place] she had one and possibly 2 pulmonary emboli that were unprovoked other than the initial 1 having been at the age of 11 on an IUD. See narrative above. Since moving to [place], she has 1 documented small filling defect i.e. PE on the April 23 of 2020 CTA study. In the ER given 3-week supply of Xarelto. She ran out as she had no PCP and has been off anticoagulation since then. She has had 3 miscarriages including 1 last month. Unclear how complete her hypercoagulable evaluation was although documented factor V Leiden and prothrombin mutation not detected beta-2 glycoprotein 1 not elevated. No family history of hypercoagulability. She certainly has a history suggesting hypercoagulable state; undefined thus far. She needs indefinite anticoagulation. 2) Prognosis / Disease Status: Good overall as long she stays on anticoagulation indefinitely.     3) Work-up:    Labs: I will order a complete hypercoagulable panel including lupus anticoagulant and antiphospholipid antibody   Imaging: None needed   Procedures: None   Consults: None   Other: Patient cautioned about the risk and that she cannot get pregnant on Xarelto because of the risk of bleeding and infant mortality. Patient understands and states that her OB doctor plan to say oral treatment to make her amenorrheic at least for a short period of time. If she wish to become pregnant, then the alternative therapy such as Lovenox that would be reasonable. 4) Symptom Management: None needed      5) Supportive care provided. Level of care is appropriate. Teaching done today. Reviewed the fact that she has had PE x3 and 3 miscarriages which is highly suggestive of a hypercoagulable condition. Work-up may or may not define a hypercoagulable state but she is to clear herself as hypercoagulable needs indefinite anticoagulation. Possible that Lovenox anticoagulation with pregnancy could increase the chance of a full-term pregnancy. 6) Treatment goal:      Treatment plan:      Xarelto 20 mg p.o. daily. Indefinite anticoagulation      7) Medications reviewed. Prescriptions today: Xarelto 20 mg. Orders Placed This Encounter   Medications    rivaroxaban (XARELTO) 20 MG TABS tablet     Sig: Take 1 tablet by mouth daily (with breakfast)     Dispense:  30 tablet     Refill:  5        [place]:  Controlled Substance Monitoring:    Acute and Chronic Pain Monitoring:   No flowsheet data found. 8) Research Options:      Not applicable      9) Other:      Strongly cautioned against pregnancy on Xarelto. 10) Follow Up:  Return in about 3 weeks (around 3/3/2022).          Etelvina Tang MD

## 2022-02-11 ENCOUNTER — HOSPITAL ENCOUNTER (OUTPATIENT)
Age: 22
Discharge: HOME | End: 2022-02-11

## 2022-02-13 LAB
CARDIOLIPIN AB IGM: < 10 MPL
CARDIOLIPIN ANTIBODY, IGG: < 10 GPL
MISC. #1 REFERENCE GROUP TEST: NORMAL

## 2022-02-16 LAB
PROTEIN C ANTIGEN: > 95 % (ref 63–153)
PROTEIN S ANTIGEN, FREE: NORMAL

## 2022-02-17 LAB
DRVVT 1:1 MIX: NORMAL SEC (ref 33–44)
DRVVT CONFIRMATION TEST: NORMAL RATIO
DRVVT SCREEN: 35 SEC (ref 33–44)
HEXAGONAL PHOSPHOLIPID NEUTRALIZAT TEST: NORMAL
LUPUS ANTICOAG INTERP: NORMAL
PLATELET NEUTRALIZATION: NORMAL
PROTEIN C FUNCTIONAL: 182 % (ref 83–168)
PROTEIN S, FUNCTIONAL: 104 % (ref 57–131)
PROTHROMBIN TIME: 12.3 SEC (ref 12–15.5)
PTT 1:1 MIX: NORMAL SEC (ref 32–48)
PTT LUPUS ANTICOAGULANT: 46 SEC (ref 32–48)
PTT-HEPARIN NEUTRALIZED: NORMAL SEC (ref 32–48)
REPTILASE TIME: NORMAL SEC
THROMBIN TIME: NORMAL SEC (ref 14.7–19.5)

## 2022-02-18 ENCOUNTER — HOSPITAL ENCOUNTER (OUTPATIENT)
Age: 22
Discharge: HOME | End: 2022-02-18
Payer: COMMERCIAL

## 2022-02-18 ENCOUNTER — HOSPITAL ENCOUNTER (OUTPATIENT)
Dept: GENERAL RADIOLOGY | Age: 22
Discharge: HOME | End: 2022-02-18
Payer: COMMERCIAL

## 2022-02-18 DIAGNOSIS — S99.921A INJURY OF RIGHT FOOT, INITIAL ENCOUNTER: Primary | ICD-10-CM

## 2022-02-18 DIAGNOSIS — S99.921A INJURY OF RIGHT FOOT, INITIAL ENCOUNTER: ICD-10-CM

## 2022-02-18 PROCEDURE — 73630 X-RAY EXAM OF FOOT: CPT

## 2022-02-25 DIAGNOSIS — D68.59 HYPERCOAGULABLE STATE (HCC): ICD-10-CM

## 2022-03-03 ENCOUNTER — OFFICE VISIT (OUTPATIENT)
Dept: ONCOLOGY | Age: 22
End: 2022-03-03
Payer: COMMERCIAL

## 2022-03-03 ENCOUNTER — HOSPITAL ENCOUNTER (OUTPATIENT)
Dept: INFUSION THERAPY | Age: 22
Discharge: HOME | End: 2022-03-03
Payer: COMMERCIAL

## 2022-03-03 ENCOUNTER — TELEPHONE (OUTPATIENT)
Dept: FAMILY MEDICINE CLINIC | Age: 22
End: 2022-03-03

## 2022-03-03 VITALS
OXYGEN SATURATION: 100 % | SYSTOLIC BLOOD PRESSURE: 136 MMHG | WEIGHT: 282 LBS | HEIGHT: 69 IN | BODY MASS INDEX: 41.77 KG/M2 | DIASTOLIC BLOOD PRESSURE: 83 MMHG | RESPIRATION RATE: 16 BRPM | HEART RATE: 86 BPM | TEMPERATURE: 97.7 F

## 2022-03-03 VITALS
RESPIRATION RATE: 16 BRPM | HEART RATE: 86 BPM | SYSTOLIC BLOOD PRESSURE: 136 MMHG | DIASTOLIC BLOOD PRESSURE: 83 MMHG | OXYGEN SATURATION: 100 %

## 2022-03-03 DIAGNOSIS — D68.59 HYPERCOAGULABLE STATE (HCC): Primary | ICD-10-CM

## 2022-03-03 DIAGNOSIS — R51.9 CHRONIC NONINTRACTABLE HEADACHE, UNSPECIFIED HEADACHE TYPE: Primary | ICD-10-CM

## 2022-03-03 DIAGNOSIS — G89.29 CHRONIC NONINTRACTABLE HEADACHE, UNSPECIFIED HEADACHE TYPE: Primary | ICD-10-CM

## 2022-03-03 PROCEDURE — 99211 OFF/OP EST MAY X REQ PHY/QHP: CPT

## 2022-03-03 PROCEDURE — 99213 OFFICE O/P EST LOW 20 MIN: CPT | Performed by: INTERNAL MEDICINE

## 2022-03-03 NOTE — PATIENT INSTRUCTIONS
Additional follow-up needed here.   Continue anticoagulation indefinitely currently on Xarelto 20 mg daily

## 2022-03-03 NOTE — TELEPHONE ENCOUNTER
----- Message from NAKIA BUTCH [facility] sent at 3/3/2022  8:12 AM EST -----  Subject: Message to Provider    QUESTIONS  Information for Provider? pt calling in and would like to know when she   can have a referral sent over to the neurologist please advise thank you   ---------------------------------------------------------------------------  --------------  CALL BACK INFO  What is the best way for the office to contact you? OK to leave message on   voicemail  Preferred Call Back Phone Number?  [phone]  ---------------------------------------------------------------------------  --------------  SCRIPT ANSWERS  undefined

## 2022-03-03 NOTE — PROGRESS NOTES
[address] CANCER [address] [place] [address]  Dept: [phone]  Loc: [phone]   Hematology/Oncology Consult (Clinic)        2/10/22     Jessica Kerrie   2000     No ref. provider found   VALDEZ MCCAIN DO       DIAGNOSIS:   -History  pulmonary emboli x3. Multiple miscarriages.  -Hypercoagulable work-up is negative    TREATMENT:  -Remain on Xarelto 20 mg daily. (Indefinite anticoagulation)     SUBJECTIVE: No new complaints. Here today for results of her recent hypercoagulable work-up. HPI: Odilon Foss is a 79-year-old female states that at the age of 11 she was diagnosed with her first pulmonary embolism. This was an unprovoked event although at that time she had been on an estrogen-based IUD. She presented with a cough and pleuritic chest pain was told that it was a small PE. IUD was removed and she was placed on Xarelto for 6 months. This first event was at [facility] in [place]. No family history of thrombosis. She is a vague recollection but believes she had her second unprovoked PE sometime in 2017 in [place]. Anticoagulation regarding this event not recollected. Patient then moved to [place] and on 2020 she had a CT angiogram done in the ER that revealed a small filling defect of a small subsegmental right lower lobe. She was given 3 weeks prescription of Xarelto. After that , she was new to the area and had no PCP and ran out of the Xarelto and has remained off anticoagulation since then. No subsequent PE. She has had multiple ER visits for chest pain with no documented pulmonary emboli. Patient has no recollection of any calf pain or swelling or documented DVT in her lower extremities. She is  3 para 0 with 3 miscarriages including most recent in 2022. All miscarriages occurring within the first 6 weeks of pregnancy.   Chart review shows that 2021 she had a limited work-up including negative factor V Leiden, prothrombin mutation and beta-2 glycoprotein IgA and IgM were not elevated. She has no history of lupus although her mother and maternal grandfather have lupus but no history of thrombosis. Recently, patient  seen by her OB physician who plans to \"induce menopause\" temporarily as she may have endometriosis as well. ROS:  Review of Systems 14 point negative except as above. PMH:   Past Medical History:   Diagnosis Date    Anxiety     Chronic GERD     Chronic migraine     takes verapamil    Depression     Gallstones 10/2019    Gastroparesis     diagnosis as a child    Hx of blood clots     PE     Hypertension     Miscarriage     multiple    Obesity     Pulmonary embolism (HCC)     8 months ago-was on Xarelto-sees Dr Kia Grant in [place]    Splenomegaly         Social HX:   Social History     Socioeconomic History    Marital status: Single     Spouse name: Not on file    Number of children: 0    Years of education: 15    Highest education level: High school graduate   Occupational History     Employer: [employer]   Tobacco Use    Smoking status: Never Smoker    Smokeless tobacco: Never Used    Tobacco comment: vapes-no nicotine   Vaping Use    Vaping Use: Every day    Last attempt to quit: 8/5/2021    Substances: Nicotine   Substance and Sexual Activity    Alcohol use: Never    Drug use: Never    Sexual activity: Not on file   Other Topics Concern    Not on file   Social History Narrative    Not on file     Social Determinants of Health     Financial Resource Strain: Medium Risk    Difficulty of Paying Living Expenses: Somewhat hard   Food Insecurity: Food Insecurity Present    Worried About Running Out of Food in the Last Year: Sometimes true    Ede of Food in the Last Year: Sometimes true   Transportation Needs:     Lack of Transportation (Medical): Not on file    Lack of Transportation (Non-Medical):  Not on file   Physical Activity:     Days of Exercise per Week: Not on file    Minutes of Exercise per Session: Not on file   Stress:     Feeling of Stress : Not on file   Social Connections:     Frequency of Communication with Friends and Family: Not on file    Frequency of Social Gatherings with Friends and Family: Not on file    Attends Denominational Services: Not on file    Active Member of [address] [place] [address] [employer] or Organizations: Not on file    Attends Club or Organization Meetings: Not on file    Marital Status: Not on file   Intimate Partner Violence:     Fear of Current or Ex-Partner: Not on file    Emotionally Abused: Not on file    Physically Abused: Not on file    Sexually Abused: Not on file   Housing Stability:     Unable to Pay for Housing in the Last Year: Not on file    Number of [place] in the Last Year: Not on file    Unstable Housing in the Last Year: Not on file        Spouse: engaged    Phone: Dewey Vargas     Employment:  Office job    Immunizations:  Immunization History   Administered Date(s) Administered    COVID-19, Tej Gallus, Primary or Immunocompromised, PF, 100mcg/0.5mL 2021, 10/02/2021    Influenza Virus Vaccine 10/05/2020        Health Screenings:      Gyn HX:   GPA: A3 3 MISCARRIAGES ALL WITHIN 6 WEEKS OF PREGNANCY. MOST RECENT 2020. AMANDA/BSO: Not applicable  LMP: No LMP recorded. (Menstrual status: Irregular periods). Health Maintenance Due   Topic Date Due    Pneumococcal 0-64 years Vaccine (1 of 2 - PPSV23) Never done    HPV vaccine (1 - 2-dose series) Never done    DTaP/Tdap/Td vaccine (1 - Tdap) Never done    Chlamydia screen  2021    Flu vaccine (1) 2021    COVID-19 Vaccine (3 - Booster for Tej Gallus series) 2022        Interests: Not reviewed      Fam HX:   No family history of thrombosis.   Mother and m grandfather with lupus  Family History   Problem Relation Age of Onset    Migraines Mother     Lupus Mother    Cedric High Blood Pressure Father     Thyroid Disease Father     Other Father         gerd    Cancer Father         skin    No Known Problems Brother     Colon Polyps Maternal Grandmother     No Known Problems Maternal Grandfather     No Known Problems Paternal Grandmother     Colon Polyps Paternal Grandfather     No Known Problems Brother     Colon Cancer Neg Hx     Esophageal Cancer Neg Hx         Hospitalizations:   None recent    Allergies: Allergies   Allergen Reactions    Imitrex [Sumatriptan] Shortness Of Breath     Pt reports hot flashes and chest pain also.      Mucinex [Guaifenesin Er]      Dizziness, Heart Racing, and Extreme Drowsy        Adult Illness:  Patient Active Problem List   Diagnosis    Cholelithiasis without obstruction    Chronic migraine    Pulmonary embolism (HCC)    Anxiety    Depression    Gastroparesis    Obesity    Chronic GERD    Hypertension    Hx of blood clots    Costochondral chest pain    Muscle strain    Chest pain, atypical    History of pulmonary embolism    Mild intermittent asthma        Surgery:  Past Surgical History:   Procedure Laterality Date    CHOLECYSTECTOMY, LAPAROSCOPIC N/A 10/30/2019    ROBOTIC LAP BRANNNO performed by Yoan Peter MD at [facility]  03/16/2021    ELBOW SURGERY Right     TONSILLECTOMY      WISDOM TOOTH EXTRACTION          Medications:  Current Outpatient Medications   Medication Sig Dispense Refill    rivaroxaban (XARELTO) 20 MG TABS tablet Take 1 tablet by mouth daily (with breakfast) 30 tablet 5    metFORMIN (GLUCOPHAGE-XR) 500 MG extended release tablet TAKE 1 TABLET BY MOUTH EVERY DAY WITH BREAKFAST 21 tablet 1    sertraline (ZOLOFT) 50 MG tablet Take 1 tablet by mouth daily (Patient not taking: Reported on 2/10/2022) 90 tablet 1    ondansetron (ZOFRAN-ODT) 4 MG disintegrating tablet Take 1 tablet by mouth 3 times daily as needed for Nausea or Vomiting 30 tablet 0    OMEPRAZOLE PO Take by mouth as needed       albuterol sulfate HFA (VENTOLIN HFA) 108 (90 Base) MCG/ACT inhaler Inhale 2 puffs into the lungs 4 times daily as needed for Wheezing 1 each 1    mometasone-formoterol (DULERA) 100-5 MCG/ACT inhaler Inhale 2 puffs into the lungs every 12 hours 1 each 2    Spacer/Aero-Holding Chambers SHAWANDA 1 Device by Does not apply route daily 1 each 0    butalbital-acetaminophen-caffeine (FIORICET, ESGIC) -40 MG per tablet Take 1 tablet by mouth every 4 hours as needed for Headaches (Patient not taking: Reported on 2/10/2022) 10 tablet 0    magnesium (MAGNESIUM-OXIDE) 250 MG TABS tablet Take 2 tablets by mouth 4 times daily (with meals and nightly) 240 tablet 1    Prenatal Vit-Fe Fumarate-FA (PRENATAL VITAMIN) 27-0.8 MG TABS Take 1 tablet by mouth daily 90 tablet 3    aspirin 81 MG EC tablet Take 81 mg by mouth daily       vitamin D3 (CHOLECALCIFEROL) 25 MCG (1000 UT) TABS tablet Take 2 tablets by mouth daily 90 tablet 3    Blood Pressure Monitor KIT Take blood pressure daily 1 kit 0     No current facility-administered medications for this visit. EXAM:   vitals were not taken for this visit. Estimated body surface area is 2.5 meters squared as calculated from the following:    Height as of 2/10/22: 5' 9\" (1.753 m). Weight as of 2/10/22: 282 lb (127.9 kg). ECO  General: Non-ill appearing. Appears comfortable in no distress  Note: Not reexamined today 3/3/2022  HEENT: NC/AT,nonicteric,   Neck: normal thyroid, no masses. pulses nl, no bruits,   Nodes: No adenopathy  Lungs/chest: clear, no rales,rhonchi or wheezing, lung bases clear  CV: rrr, no rubs ,gallops or murmurs  Breasts: Not examined  Abd/Rectal: soft, non-tender,bowel sounds normal , no HSM,no masses, mild to moderately obese  Back: normal curvature, No midline tenderness. flanks nontender  : Not Examined  Extremities: no cyanosis,clubbing or edema.   Skin: unremarkable  Neuro: A and O x 4, CN exam nonfocal, Motor- no deficits, Sensory- no deficits, gait-nl, speech- fluent, no ataxia. Devices: none      DATA:    LAB:     CBC with Differential:      Lab Results   Component Value Date    WBC 9.1 01/19/2022    RBC 4.60 01/19/2022    HGB 13.9 01/19/2022    HCT 42.0 01/19/2022     01/19/2022    MCV 91.3 01/19/2022    MCH 30.2 01/19/2022    MCHC 33.1 01/19/2022    RDW 12.3 06/09/2021    NRBC 0 01/19/2022    SEGSPCT 67.5 01/19/2022    MONOPCT 5.6 01/19/2022    MONOSABS 0.5 01/19/2022    LYMPHSABS 2.3 01/19/2022    EOSABS 0.1 01/19/2022    BASOSABS 0.0 01/19/2022     Lab Results   Component Value Date/Time    SEGSABS 6.1 01/19/2022 05:46 PM       CMP:    Lab Results   Component Value Date     01/19/2022    K 4.1 01/19/2022    K 4.2 01/15/2022     01/19/2022    CO2 25 01/19/2022    BUN 11 01/19/2022    CREATININE 0.6 01/19/2022    LABGLOM >90 01/19/2022    GLUCOSE 91 01/19/2022    PROT 7.4 01/15/2022    LABALBU 4.6 01/15/2022    CALCIUM 9.8 01/19/2022    BILITOT 0.5 01/15/2022    ALKPHOS 75 01/15/2022    AST 20 01/15/2022    ALT 30 01/15/2022       Magnesium:    Lab Results   Component Value Date    MG 2.1 01/19/2022     PT/INR:    Lab Results   Component Value Date    PROTIME 12.3 02/10/2022    INR 0.93 02/10/2022     TSH:    Lab Results   Component Value Date    TSH 0.935 02/13/2020     VITAMIN B12: No components found for: B12  FOLATE:  No results found for: FOLATE  IRON:    Lab Results   Component Value Date    IRON 70 01/14/2021     Iron Saturation:  No components found for: PERCENTFE  TIBC:    Lab Results   Component Value Date    TIBC 271 01/14/2021     FERRITIN:  No results found for: FERRITIN    6/9/2021  Factor V Leiden-no mutation  Prothrombin mutation-negative  Beta-2 glycoprotein 1 IgG-0 and IgM-4    Hypercoagulable work-up completed and remains negative including protein C and S antigen activity Antithrombin III and lupus anticoagulant.     IMAGING:    CTA CHEST W WO CONTRAST  Result Date: 1/19/2022  1. No CT evidence of pulmonary embolus. **This report has been created using voice recognition software. It may contain minor errors which are inherent in voice recognition technology. ** Final report electronically signed by Dr Su Goodman on 1/19/2022 8:30 PM    US NON OB TRANSVAGINAL  Result Date: 1/14/2022  Ultrasound pelvis is essentially normal. A fluid-filled tubular structure seen extending the right adnexa which are believed to the appendix. It is normal caliber. **This report has been created using voice recognition software. It may contain minor errors which are inherent in voice recognition technology. ** Final report electronically signed by Dr. Emmett Lewis on 1/14/2022 9:08 AM    CT ABDOMEN PELVIS W IV CONTRAST Additional Contrast? None  Result Date: 1/15/2022  1. No acute bowel obstruction or acute inflammatory bowel process 2. The gallbladder is surgically absent. 3. No obstructing ureteral calculus **This report has been created using voice recognition software. It may contain minor errors which are inherent in voice recognition technology. ** Final report electronically signed by Dr Su Goodman on 1/15/2022 1:44 PM    PROCEDURE: CTA CHEST W WO CONTRAST 4/23/2020       CLINICAL INFORMATION: Chest pain PE study       COMPARISON: Chest x-ray dated 3/23/2020       TECHNIQUE: 1.5 mm axial images were obtained through the chest after the administration of IV contrast.  A non-contrast localizer was obtained.  3D reconstructions were performed on the scanner to include coronal and sagittal reformatted images through    both the right and left pulmonary arteries.  All CT scans at this facility use dose modulation, iterative reconstruction, and/or weight-based dosing when appropriate to reduce radiation dose to as low as reasonably achievable.       FINDINGS:       The central airways appear patent.       No focal pleural effusion or pneumothorax is seen.       There is mild infiltrate demonstrated within the perihilar region on axial image 75 which may represent mild atelectasis or pneumonia.       There is a small filling defect demonstrated within a posterior subsegmental branch of the right lower lobe pulmonary artery. No other filling defects are identified.       No mediastinal, hilar or axillary lymphadenopathy is seen.       Limited evaluation of the upper abdomen appears unremarkable.        No acute osseous findings are seen.           Impression   1.  Small filling defect consistent with small subsegmental pulmonary embolus within a posterior subsegmental branch of the right lower lobe pulmonary artery.       2. There is mild infiltrate demonstrated within the perihilar region on axial image 75 which may represent mild atelectasis or pneumonia.       **This report has been created using voice recognition software.  It may contain minor errors which are inherent in voice recognition technology. **           Final report electronically signed by Dr. Lanre Elena on 4/23/2020 8:01 AM          PROCEDURES:  None    PATHOLOGY:   None    GENETICS:  None    MOLECULAR:  None    ASSESSMENT/PLAN:    1: Diagnosis: Mike Duong is a 76-year-old female with a hypercoagulable history. It appears that by history in [place] she had one and possibly 2 pulmonary emboli that were unprovoked other than the initial 1 having been at the age of 11 on an IUD. See narrative above. Since moving to [place], she has 1 documented small filling defect i.e. PE on the April 23 of 2020 CTA study. In the ER given 3-week supply of Xarelto. She ran out as she had no PCP and has been off anticoagulation since then. She has had 3 miscarriages including 1 last month. Unclear how complete her hypercoagulable evaluation was although documented factor V Leiden and prothrombin mutation not detected beta-2 glycoprotein 1 not elevated. No family history of hypercoagulability.   She certainly has a history suggesting hypercoagulable state; undefined thus far. She needs indefinite anticoagulation. 2) Prognosis / Disease Status: Good overall as long she stays on anticoagulation indefinitely. 3) Work-up:    Labs: Complete hypercoagulable panel is negative. Imaging: None needed   Procedures: None   Consults: None   Other: Patient cautioned about the risk and that she cannot get pregnant on Xarelto because of the risk of bleeding and infant mortality. Patient understands and states that her OB doctor plan to say oral treatment to make her amenorrheic at least for a short period of time. If she wish to become pregnant, then the alternative therapy such as Lovenox that would be reasonable. 4) Symptom Management: None needed      5) Supportive care provided. Level of care is appropriate. Teaching done today. Reviewed the fact that she has had PE x3 and 3 miscarriages which is highly suggestive of a hypercoagulable condition. Work-up may or may not define a hypercoagulable state but she is to clear herself as hypercoagulable needs indefinite anticoagulation. Possible that Lovenox anticoagulation with pregnancy could increase the chance of a full-term pregnancy. 6) Treatment goal:      Treatment plan:      Xarelto 20 mg p.o. daily. Indefinite anticoagulation      7) Medications reviewed. Prescriptions today: None but recently   Xarelto 20 mg.  Structured to get the Xarelto from now on through her PCP indefinitely. No orders of the defined types were placed in this encounter. [place]ARRS:  Controlled Substance Monitoring:    Acute and Chronic Pain Monitoring:   No flowsheet data found. 8) Research Options:      Not applicable      9) Other:      Strongly cautioned against pregnancy on Xarelto. 10) Follow Up:  No follow-up needed here.         Mir Resendez MD

## 2022-03-04 NOTE — TELEPHONE ENCOUNTER
I checked the last few visits and didn't seen anything relating to a neuro problem  Can patient come in to talk about it?

## 2022-03-04 NOTE — TELEPHONE ENCOUNTER
Spoke with pt, Pt states that her and Dr Aguilar Moore had discussed this issue in the past and she will wait for Dr Zacarias Larkin to return on 3/14 for Dr Aguilar Moore to place the referral to neurology.

## 2022-03-14 NOTE — TELEPHONE ENCOUNTER
I called and spoke with pt, she states that she would like the neuro referral to go to [facility]-Dr. Genaro Ring

## 2022-03-14 NOTE — TELEPHONE ENCOUNTER
Silverio Lyons, I'm back in the office today. Will you remind me what the neuro referral is for? Thanks.

## 2022-03-14 NOTE — TELEPHONE ENCOUNTER
Called and spoke with pt, pt states that the neuro referral is for her chronic migraines. Pt also states that she does not want to go through a [facility] facility for her referral. Please advise.

## 2022-04-28 ENCOUNTER — OFFICE VISIT (OUTPATIENT)
Dept: FAMILY MEDICINE CLINIC | Age: 22
End: 2022-04-28
Payer: COMMERCIAL

## 2022-04-28 VITALS
RESPIRATION RATE: 16 BRPM | SYSTOLIC BLOOD PRESSURE: 136 MMHG | OXYGEN SATURATION: 98 % | TEMPERATURE: 97.1 F | BODY MASS INDEX: 42.75 KG/M2 | DIASTOLIC BLOOD PRESSURE: 74 MMHG | WEIGHT: 288.6 LBS | HEIGHT: 69 IN

## 2022-04-28 DIAGNOSIS — L03.311 CELLULITIS OF ABDOMINAL WALL: Primary | ICD-10-CM

## 2022-04-28 PROCEDURE — 99213 OFFICE O/P EST LOW 20 MIN: CPT | Performed by: STUDENT IN AN ORGANIZED HEALTH CARE EDUCATION/TRAINING PROGRAM

## 2022-04-28 RX ORDER — SULFAMETHOXAZOLE AND TRIMETHOPRIM 800; 160 MG/1; MG/1
1 TABLET ORAL 2 TIMES DAILY
Qty: 20 TABLET | Refills: 0 | Status: SHIPPED | OUTPATIENT
Start: 2022-04-28 | End: 2022-05-08

## 2022-04-28 RX ORDER — ELAGOLIX 150 MG/1
TABLET, FILM COATED ORAL
COMMUNITY
Start: 2022-04-23 | End: 2022-06-09 | Stop reason: SINTOL

## 2022-04-28 ASSESSMENT — ENCOUNTER SYMPTOMS: COLOR CHANGE: 1

## 2022-04-28 NOTE — PROGRESS NOTES
UMER Weinstein is a 24 y. o.female    Chief Complaint   Patient presents with    Other     belly button piercing got infected     Chief complaint, [place], and all pertinent details of the case reviewed with the resident. Please see resident's note for specific details discussed at today's visit.     Patient Active Problem List   Diagnosis    Cholelithiasis without obstruction    Chronic migraine    Pulmonary embolism (HCC)    Anxiety    Depression    Gastroparesis    Obesity    Chronic GERD    Hypertension    Hx of blood clots    Costochondral chest pain    Muscle strain    Chest pain, atypical    History of pulmonary embolism    Mild intermittent asthma       Current Outpatient Medications   Medication Sig Dispense Refill    sulfamethoxazole-trimethoprim (BACTRIM DS;SEPTRA DS) 800-160 MG per tablet Take 1 tablet by mouth 2 times daily for 10 days 20 tablet 0    metFORMIN (GLUCOPHAGE-XR) 500 MG extended release tablet TAKE 1 TABLET BY MOUTH EVERY DAY WITH BREAKFAST 21 tablet 1    sertraline (ZOLOFT) 50 MG tablet Take 1 tablet by mouth daily 90 tablet 1    ondansetron (ZOFRAN-ODT) 4 MG disintegrating tablet Take 1 tablet by mouth 3 times daily as needed for Nausea or Vomiting 30 tablet 0    OMEPRAZOLE PO Take by mouth as needed       albuterol sulfate HFA (VENTOLIN HFA) 108 (90 Base) MCG/ACT inhaler Inhale 2 puffs into the lungs 4 times daily as needed for Wheezing 1 each 1    mometasone-formoterol (DULERA) 100-5 MCG/ACT inhaler Inhale 2 puffs into the lungs every 12 hours 1 each 2    Spacer/Aero-Holding Chambers SHAWANDA 1 Device by Does not apply route daily 1 each 0    Prenatal Vit-Fe Fumarate-FA (PRENATAL VITAMIN) 27-0.8 MG TABS Take 1 tablet by mouth daily 90 tablet 3    aspirin 81 MG EC tablet Take 81 mg by mouth daily       vitamin D3 (CHOLECALCIFEROL) 25 MCG (1000 UT) TABS tablet Take 2 tablets by mouth daily 90 tablet 3    Blood Pressure Monitor KIT Take blood pressure daily 1 kit 0    ORILISSA 150 MG TABS       rivaroxaban (XARELTO) 20 MG TABS tablet Take 1 tablet by mouth daily (with breakfast) 30 tablet 5    butalbital-acetaminophen-caffeine (FIORICET, ESGIC) -40 MG per tablet Take 1 tablet by mouth every 4 hours as needed for Headaches (Patient not taking: Reported on 2/10/2022) 10 tablet 0    magnesium (MAGNESIUM-OXIDE) 250 MG TABS tablet Take 2 tablets by mouth 4 times daily (with meals and nightly) 240 tablet 1     No current facility-administered medications for this visit. Review of Systems per Dr. Demetra Milan     /74 (Site: Right Upper Arm, Position: Sitting, Cuff Size: Medium Adult)   Temp 97.1 °F (36.2 °C)   Resp 16   Ht 5' 9\" (1.753 m)   Wt 288 lb 9.6 oz (130.9 kg)   SpO2 98%   BMI 42.62 kg/m²   BP Readings from Last 3 Encounters:   04/28/22 136/74   03/03/22 136/83   03/03/22 136/83       Wt Readings from Last 3 Encounters:   04/28/22 288 lb 9.6 oz (130.9 kg)   03/03/22 282 lb (127.9 kg)   02/10/22 282 lb (127.9 kg)     Body mass index is 42.62 kg/m². Physical Exam per Dr. Gladys Sawyer    No results found for this visit on 04/28/22. Lab Results   Component Value Date    LABA1C 4.7 11/30/2021       Lab Results   Component Value Date    CHOL 159 02/13/2020    TRIG 85 02/13/2020    HDL 46 02/13/2020    LDLCALC 96 02/13/2020       The ASCVD Risk score (Brandin Burrell, et al., 2013) failed to calculate for the following reasons:     The 2013 ASCVD risk score is only valid for ages 36 to 78    Lab Results   Component Value Date     01/19/2022    K 4.1 01/19/2022     01/19/2022    CO2 25 01/19/2022    BUN 11 01/19/2022    CREATININE 0.6 01/19/2022    GLUCOSE 91 01/19/2022    CALCIUM 9.8 01/19/2022    PROT 7.4 01/15/2022    LABALBU 4.6 01/15/2022    BILITOT 0.5 01/15/2022    ALKPHOS 75 01/15/2022    AST 20 01/15/2022    ALT 30 01/15/2022    LABGLOM >90 01/19/2022     estimated creatinine clearance is 214 mL/min

## 2022-04-28 NOTE — PROGRESS NOTES
S: 24 y.o. female with   Chief Complaint   Patient presents with    Other     belly button piercing got infected       HPI: please see resident note for HPI and ROS. Belly Button pierced in February  States that it looked infected and it has been worsening  Had some drainage      BP Readings from Last 3 Encounters:   04/28/22 136/74   03/03/22 136/83   03/03/22 136/83     Wt Readings from Last 3 Encounters:   04/28/22 288 lb 9.6 oz (130.9 kg)   03/03/22 282 lb (127.9 kg)   02/10/22 282 lb (127.9 kg)       O: VS:  height is 5' 9\" (1.753 m) and weight is 288 lb 9.6 oz (130.9 kg). Her temperature is 97.1 °F (36.2 °C). Her blood pressure is 136/74. Her respiration is 16 and oxygen saturation is 98%. Diagnosis Orders   1.  Cellulitis of abdominal wall         Plan:  Bactrim for infection  Wash with soap and water  Keep area clean and dry  Follow up PRN if things worsen    Health Maintenance Due   Topic Date Due    Pneumococcal 0-64 years Vaccine (1 - PCV) Never done    HPV vaccine (1 - 2-dose series) Never done    DTaP/Tdap/Td vaccine (1 - Tdap) Never done    Chlamydia screen  03/19/2021    COVID-19 Vaccine (3 - Booster for Climmie Messier series) 03/02/2022         Willy Salazar MD 4/28/2022 4:14 PM

## 2022-05-24 ENCOUNTER — NURSE ONLY (OUTPATIENT)
Dept: LAB | Age: 22
End: 2022-05-24

## 2022-05-24 LAB
HCG,BETA SUBUNIT,QUAL,SERUM: < 1 MIU/ML (ref 0–5)
PROGESTERONE LEVEL: 0.38 NG/ML

## 2022-06-09 ENCOUNTER — HOSPITAL ENCOUNTER (EMERGENCY)
Age: 22
Discharge: HOME | End: 2022-06-09
Payer: COMMERCIAL

## 2022-06-09 VITALS
OXYGEN SATURATION: 96 % | SYSTOLIC BLOOD PRESSURE: 149 MMHG | DIASTOLIC BLOOD PRESSURE: 82 MMHG | TEMPERATURE: 98 F | HEART RATE: 87 BPM | RESPIRATION RATE: 16 BRPM

## 2022-06-09 DIAGNOSIS — H15.102 EPISCLERITIS OF LEFT EYE: Primary | ICD-10-CM

## 2022-06-09 PROCEDURE — 99213 OFFICE O/P EST LOW 20 MIN: CPT

## 2022-06-09 PROCEDURE — 99212 OFFICE O/P EST SF 10 MIN: CPT | Performed by: EMERGENCY MEDICINE

## 2022-06-09 RX ORDER — DULOXETIN HYDROCHLORIDE 60 MG/1
60 CAPSULE, DELAYED RELEASE ORAL DAILY
COMMUNITY

## 2022-06-09 ASSESSMENT — VISUAL ACUITY
OD: 20/20
OU: 20/12
OS: 20/15

## 2022-06-09 ASSESSMENT — ENCOUNTER SYMPTOMS
SHORTNESS OF BREATH: 0
SINUS PAIN: 0
RHINORRHEA: 0
PHOTOPHOBIA: 0
EYE DISCHARGE: 0
EYE PAIN: 1
COUGH: 0
EYE REDNESS: 1
SINUS PRESSURE: 0
EYE ITCHING: 0

## 2022-06-09 ASSESSMENT — PAIN - FUNCTIONAL ASSESSMENT: PAIN_FUNCTIONAL_ASSESSMENT: 0-10

## 2022-06-09 ASSESSMENT — PAIN SCALES - GENERAL: PAINLEVEL_OUTOF10: 3

## 2022-06-09 ASSESSMENT — PAIN DESCRIPTION - LOCATION: LOCATION: EYE

## 2022-06-09 NOTE — ED TRIAGE NOTES
started 4 days ago with a migraine and pain in left eye, which has gotten worse, has tried soothing eye drops/and saline, now has pain in left cheek

## 2022-06-09 NOTE — ED PROVIDER NOTES
[facility]  Urgent Care Encounter       CHIEF COMPLAINT       Chief Complaint   Patient presents with    Eye Problem       Nurses Notes reviewed and I agree except as noted in the HPI. HISTORY OF PRESENT ILLNESS   Denisa Uriostegui is a 24 y.o. female who presents for complaints of left eye pain that has been present for 4 days. Patient does have a history of migraines and states was having a migraine headache at the time of onset. The headache has since improved but the eye pain remains. Patient has redness to the outside of the left eye. She states the pain is now starting to spread into her lower eyelid and facial cheek. The lateral aspect of her eye is reddened but she has no visual disturbances. No discharge. No watering. She rates her pain a 3 on a 10 scale. Patient denies any injury to the eye. No history of glaucoma. States she has been tested for lupus in the past and this was negative. She has had pulmonary embolism in the past.  She is no longer on blood thinners. HPI    REVIEW OF SYSTEMS     Review of Systems   Constitutional: Negative for chills, fatigue and fever. HENT: Negative for congestion, rhinorrhea, sinus pressure and sinus pain. Eyes: Positive for pain and redness. Negative for photophobia, discharge, itching and visual disturbance. Respiratory: Negative for cough and shortness of breath. Neurological: Positive for headaches. Psychiatric/Behavioral: Negative for behavioral problems.        PAST MEDICAL HISTORY         Diagnosis Date    Anxiety     Chronic GERD     Chronic migraine     takes verapamil    Depression     Gallstones 10/2019    Gastroparesis     diagnosis as a child    Hx of blood clots     PE     Hypertension     Miscarriage     multiple    Obesity     Pulmonary embolism (HCC)     8 months ago-was on Xarelto-sees Dr Pia Degroot in [place]    Splenomegaly        SURGICALHISTORY     Patient  has a past surgical history that includes Tonsillectomy; [place] tooth extraction; Elbow surgery (Right); Cholecystectomy, laparoscopic (N/A, 10/30/2019); and Dilation and curettage of uterus (03/16/2021).     CURRENT MEDICATIONS       Discharge Medication List as of 6/9/2022  4:35 PM      CONTINUE these medications which have NOT CHANGED    Details   DULoxetine (CYMBALTA) 60 MG extended release capsule Take 60 mg by mouth dailyHistorical Med      rivaroxaban (XARELTO) 20 MG TABS tablet Take 1 tablet by mouth daily (with breakfast), Disp-30 tablet, R-5Normal      metFORMIN (GLUCOPHAGE-XR) 500 MG extended release tablet TAKE 1 TABLET BY MOUTH EVERY DAY WITH BREAKFAST, Disp-21 tablet, R-1Normal      sertraline (ZOLOFT) 50 MG tablet Take 1 tablet by mouth daily, Disp-90 tablet, R-1Normal      ondansetron (ZOFRAN-ODT) 4 MG disintegrating tablet Take 1 tablet by mouth 3 times daily as needed for Nausea or Vomiting, Disp-30 tablet, R-0Normal      albuterol sulfate HFA (VENTOLIN HFA) 108 (90 Base) MCG/ACT inhaler Inhale 2 puffs into the lungs 4 times daily as needed for Wheezing, Disp-1 each, R-1Normal      mometasone-formoterol (DULERA) 100-5 MCG/ACT inhaler Inhale 2 puffs into the lungs every 12 hours, Disp-1 each, R-2Normal      Spacer/Aero-Holding Chambers SHAWANDA DAILY Starting Mon 10/25/2021, Disp-1 each, R-0, Normal      butalbital-acetaminophen-caffeine (FIORICET, ESGIC) -40 MG per tablet Take 1 tablet by mouth every 4 hours as needed for Headaches, Disp-10 tablet, R-0Normal      magnesium (MAGNESIUM-OXIDE) 250 MG TABS tablet Take 2 tablets by mouth 4 times daily (with meals and nightly), Disp-240 tablet, R-1Normal      Prenatal Vit-Fe Fumarate-FA (PRENATAL VITAMIN) 27-0.8 MG TABS Take 1 tablet by mouth daily, Disp-90 tablet, R-3Normal      aspirin 81 MG EC tablet Take 81 mg by mouth daily Historical Med      vitamin D3 (CHOLECALCIFEROL) 25 MCG (1000 UT) TABS tablet Take 2 tablets by mouth daily, Disp-90 tablet, R-3Normal      Blood Pressure Monitor KIT Disp-1 kit, R-0, NormalTake blood pressure daily             ALLERGIES     Patient is is allergic to imitrex [sumatriptan], mucinex [guaifenesin er], and orilissa [elagolix]. Patients   Immunization History   Administered Date(s) Administered    COVID-19, Moderna, Primary or Immunocompromised, PF, 100mcg/0.5mL 09/04/2021, 10/02/2021    Influenza Virus Vaccine 10/05/2020       FAMILY HISTORY     Patient's family history includes Cancer in her father; Colon Polyps in her maternal grandmother and paternal grandfather; High Blood Pressure in her father; Lupus in her mother; Migraines in her mother; No Known Problems in her brother, brother, maternal grandfather, and paternal grandmother; Other in her father; Thyroid Disease in her father. SOCIAL HISTORY     Patient  reports that she has never smoked. She has never used smokeless tobacco. She reports that she does not drink alcohol and does not use drugs. PHYSICAL EXAM     ED TRIAGE VITALS  BP: (!) 149/82, Temp: 98 °F (36.7 °C), Heart Rate: 87, Resp: 16, SpO2: 96 %,Estimated body mass index is 42.62 kg/m² as calculated from the following:    Height as of 4/28/22: 5' 9\" (1.753 m). Weight as of 4/28/22: 288 lb 9.6 oz (130.9 kg). ,Patient's last menstrual period was 05/25/2022. Physical Exam  Constitutional:       Appearance: Normal appearance. She is not ill-appearing. HENT:      Head: Normocephalic and atraumatic. Right Ear: Tympanic membrane, ear canal and external ear normal.      Left Ear: Tympanic membrane, ear canal and external ear normal.      Nose: Nose normal. No congestion or rhinorrhea. Right Sinus: No maxillary sinus tenderness or frontal sinus tenderness. Left Sinus: No maxillary sinus tenderness or frontal sinus tenderness. Mouth/Throat:      Pharynx: No pharyngeal swelling or posterior oropharyngeal erythema. Tonsils: No tonsillar exudate.    Eyes:        Comments: Localized redness and inflammation of the episcleral vessels of the lateral aspect, lower two thirds of the left eye. Vessels are very pronounced. No conjunctival injection or chemosis    Pupils PERRL. Accommodation present. EOMs intact. No exudate or drainage   Cardiovascular:      Rate and Rhythm: Normal rate and regular rhythm. Pulses: Normal pulses. Heart sounds: Normal heart sounds. Pulmonary:      Effort: Pulmonary effort is normal.      Breath sounds: Normal breath sounds. Musculoskeletal:      Cervical back: Normal range of motion. Lymphadenopathy:      Cervical: No cervical adenopathy. Skin:     General: Skin is warm and dry. Neurological:      General: No focal deficit present. Mental Status: She is alert. Psychiatric:         Mood and Affect: Mood normal.         Behavior: Behavior normal.         DIAGNOSTIC RESULTS     Labs:No results found for this visit on 06/09/22. IMAGING:    No orders to display         EKG:      URGENT CARE COURSE:     Vitals:    06/09/22 1601 06/09/22 1638   BP:  (!) 149/82   Pulse: 87    Resp: 16    Temp: 98 °F (36.7 °C)    TempSrc: Infrared    SpO2: 96%        Medications - No data to display         PROCEDURES:  None    FINAL IMPRESSION      1. Episcleritis of left eye          DISPOSITION/ PLAN     Patient presents for his likely episcleritis of the left eye. Patient will be instructed to use lubricating eyedrops. I did advise the patient I cannot rule out glaucoma. However, the patient is not having severe pain to the left eye. The eye is not significantly swollen or diffusely tender. It is not firm. The patient is advised to call ophthalmologist or optometrist as soon as possible tomorrow for evaluation and measurement of IOP's. She is advised to go to the emergency department immediately for worsening pain, loss of vision of the left eye, increasing redness or any new concerns. Patient verbalized understanding of all instructions.       PATIENT REFERRED TO:  Karyle Spikes DO ADÁN  [address] [address]      DISCHARGE MEDICATIONS:  Discharge Medication List as of 6/9/2022  4:35 PM          Discharge Medication List as of 6/9/2022  4:35 PM      STOP taking these medications       OMEPRAZOLE PO Comments:   Reason for Stopping:               Discharge Medication List as of 6/9/2022  4:35 PM          KARLA Najera CNP    (Please note that portions of this note were completed with a voice recognition program. Efforts were made to edit the dictations but occasionally words are mis-transcribed.)          KARLA Najera CNP  06/09/22 5823

## 2022-06-17 ENCOUNTER — OFFICE VISIT (OUTPATIENT)
Dept: FAMILY MEDICINE CLINIC | Age: 22
End: 2022-06-17
Payer: COMMERCIAL

## 2022-06-17 VITALS
WEIGHT: 279.4 LBS | TEMPERATURE: 97.4 F | SYSTOLIC BLOOD PRESSURE: 132 MMHG | HEART RATE: 68 BPM | OXYGEN SATURATION: 99 % | DIASTOLIC BLOOD PRESSURE: 78 MMHG | BODY MASS INDEX: 41.38 KG/M2 | RESPIRATION RATE: 16 BRPM | HEIGHT: 69 IN

## 2022-06-17 DIAGNOSIS — Z20.822 LAB TEST NEGATIVE FOR COVID-19 VIRUS: ICD-10-CM

## 2022-06-17 DIAGNOSIS — J02.9 SORE THROAT: Primary | ICD-10-CM

## 2022-06-17 PROCEDURE — 87635 SARS-COV-2 COVID-19 AMP PRB: CPT | Performed by: STUDENT IN AN ORGANIZED HEALTH CARE EDUCATION/TRAINING PROGRAM

## 2022-06-17 PROCEDURE — 99213 OFFICE O/P EST LOW 20 MIN: CPT | Performed by: STUDENT IN AN ORGANIZED HEALTH CARE EDUCATION/TRAINING PROGRAM

## 2022-06-17 RX ORDER — CETIRIZINE HYDROCHLORIDE 10 MG/1
10 TABLET ORAL DAILY
Qty: 30 TABLET | Refills: 1 | Status: SHIPPED | OUTPATIENT
Start: 2022-06-17 | End: 2022-07-17

## 2022-06-17 SDOH — ECONOMIC STABILITY: FOOD INSECURITY: WITHIN THE PAST 12 MONTHS, YOU WORRIED THAT YOUR FOOD WOULD RUN OUT BEFORE YOU GOT MONEY TO BUY MORE.: NEVER TRUE

## 2022-06-17 SDOH — ECONOMIC STABILITY: FOOD INSECURITY: WITHIN THE PAST 12 MONTHS, THE FOOD YOU BOUGHT JUST DIDN'T LAST AND YOU DIDN'T HAVE MONEY TO GET MORE.: NEVER TRUE

## 2022-06-17 ASSESSMENT — ENCOUNTER SYMPTOMS
EYE PAIN: 0
DIARRHEA: 0
TROUBLE SWALLOWING: 0
COUGH: 0
NAUSEA: 0
CONSTIPATION: 0
VOMITING: 0
ABDOMINAL PAIN: 0
SHORTNESS OF BREATH: 0
BLOOD IN STOOL: 0
SORE THROAT: 1

## 2022-06-17 ASSESSMENT — SOCIAL DETERMINANTS OF HEALTH (SDOH): HOW HARD IS IT FOR YOU TO PAY FOR THE VERY BASICS LIKE FOOD, HOUSING, MEDICAL CARE, AND HEATING?: NOT HARD AT ALL

## 2022-06-17 NOTE — PROGRESS NOTES
[id number] [facility] [address] [facility]  Dept: [phone]  Loc: [phone]      Bernadette Jaeger (:  2000) is a 24 y.o. female,Established patient, here for evaluation of the following chief complaint(s):  Pharyngitis      ASSESSMENT/PLAN:  1. Sore throat  -     POCT COVID-19 Rapid, NAAT  -     cetirizine (ZYRTEC) 10 MG tablet; Take 1 tablet by mouth daily, Disp-30 tablet, R-1Normal  2. Lab test negative for COVID-19 virus    Negative rapid COVID test in the office today. She is advised she could still have or get COVID especially considering close contact with her boyfriend who is having sore throat and fevers and testing positive for COVID. She is having sensation of postnasal drip - advised to use her home flonase daily, start zyrtec, and drink at least 80 ounces of water daily. If still having symptoms or if symptoms are worse, she is advised to repeat COVID test. She can call us for this or can come in for a nursing visit. Supportive care: fluids, PRN tylenol/ibuprofen, maintain good hand hygiene, wear a mask, stay away from immunocompromised persons or those at high risk for respiratory disease. Follow up in the next 3-6 months for chronic conditions. Return in about 6 months (around 2022) for chronic conditions - can follow up with Dr. Rosalio Hilliard. SUBJECTIVE/OBJECTIVE:  HPI     Sore throat. Boyfriend has similar symptoms and tested positive for COVID. Needs a COVID test today. Review of Systems   Constitutional: Negative for chills, fatigue and fever. HENT: Positive for sore throat. Negative for ear pain, postnasal drip and trouble swallowing. Eyes: Negative for pain and visual disturbance. Respiratory: Negative for cough and shortness of breath. Cardiovascular: Negative for chest pain and palpitations.    Gastrointestinal: Negative for abdominal pain, blood in stool, constipation, diarrhea, nausea and vomiting. Genitourinary: Negative for dysuria. Skin: Negative for rash. Neurological: Negative for headaches. Physical Exam  Vitals and nursing note reviewed. Constitutional:       General: She is not in acute distress. Appearance: She is well-developed. She is not diaphoretic. HENT:      Head: Normocephalic and atraumatic. Right Ear: External ear normal.      Left Ear: External ear normal.      Nose: Nose normal.   Eyes:      General: No scleral icterus. Right eye: No discharge. Left eye: No discharge. Conjunctiva/sclera: Conjunctivae normal.   Cardiovascular:      Rate and Rhythm: Normal rate and regular rhythm. Heart sounds: Normal heart sounds. No murmur heard. Pulmonary:      Effort: Pulmonary effort is normal.      Breath sounds: Normal breath sounds. Musculoskeletal:      Cervical back: Normal range of motion. Skin:     General: Skin is warm and dry. Findings: No erythema or rash. Neurological:      Mental Status: She is alert and oriented to person, place, and time. Cranial Nerves: No cranial nerve deficit. Psychiatric:         Behavior: Behavior normal.         Thought Content: Thought content normal.         Judgment: Judgment normal.           Vitals:    06/17/22 1104   BP: 132/78   Site: Right Upper Arm   Position: Sitting   Cuff Size: Medium Adult   Pulse: 68   Resp: 16   Temp: 97.4 °F (36.3 °C)   SpO2: 99%   Weight: 279 lb 6.4 oz (126.7 kg)   Height: 5' 9\" (1.753 m)         An electronic signature was used to authenticate this note.     --Jason Mcconnell MD

## 2022-06-17 NOTE — PROGRESS NOTES
S: 24 y.o. female with   Chief Complaint   Patient presents with    Pharyngitis       HPI: please see resident note for HPI and ROS. BP Readings from Last 3 Encounters:   06/17/22 132/78   06/09/22 (!) 149/82   04/28/22 136/74     Wt Readings from Last 3 Encounters:   06/17/22 279 lb 6.4 oz (126.7 kg)   04/28/22 288 lb 9.6 oz (130.9 kg)   03/03/22 282 lb (127.9 kg)       O: VS:  height is 5' 9\" (1.753 m) and weight is 279 lb 6.4 oz (126.7 kg). Her temperature is 97.4 °F (36.3 °C). Her blood pressure is 132/78 and her pulse is 68. Her respiration is 16 and oxygen saturation is 99%. Diagnosis Orders   1. Sore throat  POCT COVID-19 Rapid, NAAT    cetirizine (ZYRTEC) 10 MG tablet   2.  Lab test negative for COVID-19 virus         Plan:  COVID negative, monitor symptoms, test if worsening   Treat symptomatically   Start Zyrtec,   follow up PRN    Health Maintenance Due   Topic Date Due    Pneumococcal 0-64 years Vaccine (1 - PCV) Never done    HPV vaccine (1 - 2-dose series) Never done    DTaP/Tdap/Td vaccine (1 - Tdap) Never done    Chlamydia screen  03/19/2021    COVID-19 Vaccine (3 - Booster for Climmie Messier series) 03/02/2022         Willy Salazar MD 6/17/2022 12:01 PM

## 2022-06-19 ENCOUNTER — HOSPITAL ENCOUNTER (EMERGENCY)
Age: 22
Discharge: HOME | End: 2022-06-19
Payer: COMMERCIAL

## 2022-06-19 VITALS
WEIGHT: 275 LBS | HEART RATE: 84 BPM | TEMPERATURE: 98.7 F | HEIGHT: 69 IN | OXYGEN SATURATION: 99 % | BODY MASS INDEX: 40.73 KG/M2 | RESPIRATION RATE: 16 BRPM | DIASTOLIC BLOOD PRESSURE: 79 MMHG | SYSTOLIC BLOOD PRESSURE: 137 MMHG

## 2022-06-19 DIAGNOSIS — J02.8 SORE THROAT (VIRAL): Primary | ICD-10-CM

## 2022-06-19 DIAGNOSIS — B97.89 SORE THROAT (VIRAL): Primary | ICD-10-CM

## 2022-06-19 LAB — SARS-COV-2, NAA: NOT  DETECTED

## 2022-06-19 PROCEDURE — 87635 SARS-COV-2 COVID-19 AMP PRB: CPT

## 2022-06-19 PROCEDURE — 99213 OFFICE O/P EST LOW 20 MIN: CPT

## 2022-06-19 PROCEDURE — 99213 OFFICE O/P EST LOW 20 MIN: CPT | Performed by: EMERGENCY MEDICINE

## 2022-06-19 ASSESSMENT — ENCOUNTER SYMPTOMS
SORE THROAT: 1
RHINORRHEA: 1
COUGH: 0
SHORTNESS OF BREATH: 0

## 2022-06-19 NOTE — ED TRIAGE NOTES
Pt states that her significant other has tested positive for covid last week. Pt states she has \"allergy like symptoms\" x 4 days and wants to rule out covid. Rapid covid swab collected and sent to lab. Pt tolerated.

## 2022-06-19 NOTE — ED PROVIDER NOTES
[facility]  [facility]       CHIEF COMPLAINT       Chief Complaint   Patient presents with    Other     exposed to covid allergy like symptoms x 4 days       Nurses Notes reviewed and I agree except as noted in the HPI. HISTORY OF PRESENT ILLNESS   Mar Vaca is a 24 y.o. female who presents for complaints of sore throat for 3 to 4 days. Patient reports that her boyfriend recently tested positive for COVID-19. His symptoms started off with a sore throat. She is here for COVID testing. Patient has had previous tonsillectomy. She denies any fevers, body aches or chills. She does have mild rhinorrhea and postnasal drip. HPI    REVIEW OF SYSTEMS     Review of Systems   Constitutional: Negative for activity change, fatigue and fever. HENT: Positive for postnasal drip, rhinorrhea and sore throat. Respiratory: Negative for cough and shortness of breath. Cardiovascular: Negative for chest pain. Neurological: Negative for dizziness and headaches. Psychiatric/Behavioral: Negative for behavioral problems. PAST MEDICAL HISTORY         Diagnosis Date    Anxiety     Chronic GERD     Chronic migraine     takes verapamil    Depression     Gallstones 10/2019    Gastroparesis     diagnosis as a child    Hx of blood clots     PE     Hypertension     Miscarriage     multiple    Obesity     Pulmonary embolism (HCC)     8 months ago-was on Xarelto-sees Dr Jennifer Chand in [place]    Splenomegaly        SURGICALHISTORY     Patient  has a past surgical history that includes Tonsillectomy; [place] tooth extraction; Elbow surgery (Right); Cholecystectomy, laparoscopic (N/A, 10/30/2019); and Dilation and curettage of uterus (03/16/2021).     CURRENT MEDICATIONS       Discharge Medication List as of 6/19/2022  5:15 PM      CONTINUE these medications which have NOT CHANGED    Details   cetirizine (ZYRTEC) 10 MG tablet Take 1 tablet by mouth daily, Disp-30 tablet, R-1Normal      DULoxetine (CYMBALTA) 60 MG extended release capsule Take 60 mg by mouth dailyHistorical Med      rivaroxaban (XARELTO) 20 MG TABS tablet Take 1 tablet by mouth daily (with breakfast), Disp-30 tablet, R-5Normal      metFORMIN (GLUCOPHAGE-XR) 500 MG extended release tablet TAKE 1 TABLET BY MOUTH EVERY DAY WITH BREAKFAST, Disp-21 tablet, R-1Normal      sertraline (ZOLOFT) 50 MG tablet Take 1 tablet by mouth daily, Disp-90 tablet, R-1Normal      ondansetron (ZOFRAN-ODT) 4 MG disintegrating tablet Take 1 tablet by mouth 3 times daily as needed for Nausea or Vomiting, Disp-30 tablet, R-0Normal      albuterol sulfate HFA (VENTOLIN HFA) 108 (90 Base) MCG/ACT inhaler Inhale 2 puffs into the lungs 4 times daily as needed for Wheezing, Disp-1 each, R-1Normal      mometasone-formoterol (DULERA) 100-5 MCG/ACT inhaler Inhale 2 puffs into the lungs every 12 hours, Disp-1 each, R-2Normal      Spacer/Aero-Holding Chambers SHAWANDA DAILY Starting Mon 10/25/2021, Disp-1 each, R-0, Normal      butalbital-acetaminophen-caffeine (FIORICET, ESGIC) -40 MG per tablet Take 1 tablet by mouth every 4 hours as needed for Headaches, Disp-10 tablet, R-0Normal      magnesium (MAGNESIUM-OXIDE) 250 MG TABS tablet Take 2 tablets by mouth 4 times daily (with meals and nightly), Disp-240 tablet, R-1Normal      Prenatal Vit-Fe Fumarate-FA (PRENATAL VITAMIN) 27-0.8 MG TABS Take 1 tablet by mouth daily, Disp-90 tablet, R-3Normal      aspirin 81 MG EC tablet Take 81 mg by mouth daily Historical Med      vitamin D3 (CHOLECALCIFEROL) 25 MCG (1000 UT) TABS tablet Take 2 tablets by mouth daily, Disp-90 tablet, R-3Normal      Blood Pressure Monitor KIT Disp-1 kit, R-0, NormalTake blood pressure daily             ALLERGIES     Patient is is allergic to imitrex [sumatriptan], mucinex [guaifenesin er], and orilissa [elagolix].     Patients   Immunization History   Administered Date(s) Administered    COVID-19, [place], Primary or Immunocompromised, PF, 100mcg/0.5mL 09/04/2021, 10/02/2021    Influenza Virus Vaccine 10/05/2020       FAMILY HISTORY     Patient's family history includes Cancer in her father; Colon Polyps in her maternal grandmother and paternal grandfather; High Blood Pressure in her father; Lupus in her mother; Migraines in her mother; No Known Problems in her brother, brother, maternal grandfather, and paternal grandmother; Other in her father; Thyroid Disease in her father. SOCIAL HISTORY     Patient  reports that she has never smoked. She has never used smokeless tobacco. She reports that she does not drink alcohol and does not use drugs. PHYSICAL EXAM     ED TRIAGE VITALS  BP: 137/79, Temp: 98.7 °F (37.1 °C), Heart Rate: 84, Resp: 16, SpO2: 99 %,Estimated body mass index is 40.61 kg/m² as calculated from the following:    Height as of this encounter: 5' 9\" (1.753 m). Weight as of this encounter: 275 lb (124.7 kg). ,Patient's last menstrual period was 05/25/2022. Physical Exam  Constitutional:       General: She is not in acute distress. Appearance: She is normal weight. She is not ill-appearing. HENT:      Head: Normocephalic. Nose: Rhinorrhea present. Mouth/Throat:      Mouth: Mucous membranes are moist.      Pharynx: Oropharynx is clear. No oropharyngeal exudate. Cardiovascular:      Rate and Rhythm: Normal rate. Pulses: Normal pulses. Heart sounds: Normal heart sounds. Pulmonary:      Effort: Pulmonary effort is normal.      Breath sounds: Normal breath sounds. Musculoskeletal:      Cervical back: Normal range of motion. Lymphadenopathy:      Cervical: No cervical adenopathy. Skin:     General: Skin is warm and dry. Neurological:      General: No focal deficit present. Mental Status: She is alert.    Psychiatric:         Mood and Affect: Mood normal.         Behavior: Behavior normal.         DIAGNOSTIC RESULTS     Labs:  Results for orders placed or performed during the hospital encounter of 06/19/22   COVID-19, Rapid   Result Value Ref Range    SARS-CoV-2, LINNEA NOT  DETECTED NOT DETECTED       IMAGING:    No orders to display         EKG:      URGENT CARE COURSE:     Vitals:    06/19/22 1652   BP: 137/79   Pulse: 84   Resp: 16   Temp: 98.7 °F (37.1 °C)   TempSrc: Oral   SpO2: 99%   Weight: 275 lb (124.7 kg)   Height: 5' 9\" (1.753 m)       Medications - No data to display         PROCEDURES:  None    FINAL IMPRESSION      1. Sore throat (viral)          DISPOSITION/ PLAN     Patient presents for sore throat. Rapid COVID test negative. She has negative Centor criteria. She be discharged and advised to take Flonase and Claritin for treatment of symptoms. Chloraseptic spray or lozenges, salt water gargles, Tylenol/ibuprofen. Return for new or worsening symptoms.       PATIENT REFERRED TO:  Luis Felipe Bartholomew DO  [address] [address]      DISCHARGE MEDICATIONS:  Discharge Medication List as of 6/19/2022  5:15 PM          Discharge Medication List as of 6/19/2022  5:15 PM          Discharge Medication List as of 6/19/2022  5:15 PM          KARLA Fowler CNP    (Please note that portions of this note were completed with a voice recognition program. Efforts were made to edit the dictations but occasionally words are mis-transcribed.)          KARLA Fowler CNP  06/19/22 [id number]

## 2022-06-20 ENCOUNTER — OFFICE VISIT (OUTPATIENT)
Dept: FAMILY MEDICINE CLINIC | Age: 22
End: 2022-06-20
Payer: COMMERCIAL

## 2022-06-20 ENCOUNTER — TELEPHONE (OUTPATIENT)
Dept: FAMILY MEDICINE CLINIC | Age: 22
End: 2022-06-20

## 2022-06-20 VITALS
HEART RATE: 87 BPM | SYSTOLIC BLOOD PRESSURE: 132 MMHG | BODY MASS INDEX: 41.47 KG/M2 | TEMPERATURE: 97.1 F | OXYGEN SATURATION: 99 % | DIASTOLIC BLOOD PRESSURE: 74 MMHG | RESPIRATION RATE: 16 BRPM | HEIGHT: 69 IN | WEIGHT: 280 LBS

## 2022-06-20 DIAGNOSIS — J02.9 SORE THROAT: Primary | ICD-10-CM

## 2022-06-20 LAB — STREPTOCOCCUS A RNA: NORMAL

## 2022-06-20 PROCEDURE — 99213 OFFICE O/P EST LOW 20 MIN: CPT | Performed by: STUDENT IN AN ORGANIZED HEALTH CARE EDUCATION/TRAINING PROGRAM

## 2022-06-20 PROCEDURE — 87651 STREP A DNA AMP PROBE: CPT | Performed by: STUDENT IN AN ORGANIZED HEALTH CARE EDUCATION/TRAINING PROGRAM

## 2022-06-20 ASSESSMENT — ENCOUNTER SYMPTOMS
WHEEZING: 0
SORE THROAT: 1
SHORTNESS OF BREATH: 0

## 2022-06-20 NOTE — TELEPHONE ENCOUNTER
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 HR is now also wanting her to get tested for strep now since a few other employees have been positive recently. She is wondering if she could just come in for a NV and get swabbed for step. Please advise.

## 2022-06-20 NOTE — PROGRESS NOTES
Marbella Hampton is a 24 y.o. female who presents today for:  Chief Complaint   Patient presents with    Pharyngitis       HPI:   4 days ago, fiance diagnosed with covid and strep  Patient called work and was required to get a covid test which was neg  Today was told she also needed a strep test before she could return to work    Symptoms include sore throat     Hx of allergies that are currently acting up        Food Insecurity: No Food Insecurity    Worried About Running Out of Food in the Last Year: Never true    Ran Out of Food in the Last Year: Never true     Health Maintenance reviewed -   Health Maintenance   Topic Date Due    Pneumococcal 0-64 years Vaccine (1 - PCV) Never done    HPV vaccine (1 - 2-dose series) Never done    DTaP/Tdap/Td vaccine (1 - Tdap) Never done    Chlamydia screen  03/19/2021    COVID-19 Vaccine (3 - Booster for Moderna series) 03/02/2022    Flu vaccine (Season Ended) 09/01/2022    Depression Monitoring  01/03/2023    Pap smear  03/05/2024    Hepatitis C screen  Completed    HIV screen  Completed    Hepatitis A vaccine  Aged Out    Hepatitis B vaccine  Aged Out    Hib vaccine  Aged Out    Meningococcal (ACWY) vaccine  Aged Out    Varicella vaccine  Discontinued     Current Outpatient Medications   Medication Sig Dispense Refill    cetirizine (ZYRTEC) 10 MG tablet Take 1 tablet by mouth daily 30 tablet 1    DULoxetine (CYMBALTA) 60 MG extended release capsule Take 60 mg by mouth daily      metFORMIN (GLUCOPHAGE-XR) 500 MG extended release tablet TAKE 1 TABLET BY MOUTH EVERY DAY WITH BREAKFAST 21 tablet 1    sertraline (ZOLOFT) 50 MG tablet Take 1 tablet by mouth daily 90 tablet 1    ondansetron (ZOFRAN-ODT) 4 MG disintegrating tablet Take 1 tablet by mouth 3 times daily as needed for Nausea or Vomiting 30 tablet 0    albuterol sulfate HFA (VENTOLIN HFA) 108 (90 Base) MCG/ACT inhaler Inhale 2 puffs into the lungs 4 times daily as needed for Wheezing 1 each 1    mometasone-formoterol (DULERA) 100-5 MCG/ACT inhaler Inhale 2 puffs into the lungs every 12 hours 1 each 2    Spacer/Aero-Holding Chambers SHAWANDA 1 Device by Does not apply route daily 1 each 0    butalbital-acetaminophen-caffeine (FIORICET, ESGIC) -40 MG per tablet Take 1 tablet by mouth every 4 hours as needed for Headaches 10 tablet 0    Prenatal Vit-Fe Fumarate-FA (PRENATAL VITAMIN) 27-0.8 MG TABS Take 1 tablet by mouth daily 90 tablet 3    aspirin 81 MG EC tablet Take 81 mg by mouth daily       vitamin D3 (CHOLECALCIFEROL) 25 MCG (1000 UT) TABS tablet Take 2 tablets by mouth daily 90 tablet 3    Blood Pressure Monitor KIT Take blood pressure daily 1 kit 0    rivaroxaban (XARELTO) 20 MG TABS tablet Take 1 tablet by mouth daily (with breakfast) 30 tablet 5    magnesium (MAGNESIUM-OXIDE) 250 MG TABS tablet Take 2 tablets by mouth 4 times daily (with meals and nightly) 240 tablet 1     No current facility-administered medications for this visit. Social History     Tobacco Use    Smoking status: Never Smoker    Smokeless tobacco: Never Used    Tobacco comment: vapes-no nicotine   Substance Use Topics    Alcohol use: Never      Subjective:   Review of Systems   HENT: Positive for sore throat. Respiratory: Negative for shortness of breath and wheezing. Cardiovascular: Negative for chest pain and palpitations. Objective:     Vitals:    06/20/22 1307   BP: 132/74   Site: Right Upper Arm   Cuff Size: Medium Adult   Pulse: 87   Resp: 16   Temp: 97.1 °F (36.2 °C)   SpO2: 99%   Weight: 280 lb (127 kg)   Height: 5' 9\" (1.753 m)     Body mass index is 41.35 kg/m². Wt Readings from Last 3 Encounters:   06/20/22 280 lb (127 kg)   06/19/22 275 lb (124.7 kg)   06/17/22 279 lb 6.4 oz (126.7 kg)     BP Readings from Last 3 Encounters:   06/20/22 132/74   06/19/22 137/79   06/17/22 132/78     Physical Exam  Vitals and nursing note reviewed.    Constitutional:       General: She is not in acute distress. Appearance: She is well-developed. She is not diaphoretic. HENT:      Mouth/Throat:      Mouth: Mucous membranes are moist.      Pharynx: No oropharyngeal exudate or posterior oropharyngeal erythema. Cardiovascular:      Rate and Rhythm: Normal rate and regular rhythm. Heart sounds: Normal heart sounds. No murmur heard. Pulmonary:      Effort: Pulmonary effort is normal.      Breath sounds: Normal breath sounds. No wheezing. Abdominal:      General: There is no distension. Palpations: Abdomen is soft. Tenderness: There is no abdominal tenderness. Neurological:      Mental Status: She is alert. Psychiatric:         Thought Content: Thought content normal.         Judgment: Judgment normal.         Assessment / Plan:   Sarah Wadsworth was seen today for pharyngitis. Diagnoses and all orders for this visit:    Sore throat  -     POCT Rapid Strep A DNA      Patient presents today for a strep test    Sore throat- acute. Stable. Nontoxic. Rapid strep today was negative. Patient ok to return to work     Patient to follow-up PRN     Return if symptoms worsen or fail to improve. Medications Prescribed:  No orders of the defined types were placed in this encounter. No future appointments. Patient given educational materials - see patient instructions. Discussed use, benefit, and side effects of prescribed medications. All patient questions answered. Pt voiced understanding. Instructed to continue current medications, diet and exercise. Patient agreed with treatment plan. Follow up as directed. Part of this visit was documented via [id number] technology, please excuse any errors.      Electronically signed by Jeffrey Gonzalez MD on 6/20/2022 at 1:40 PM

## 2022-06-20 NOTE — TELEPHONE ENCOUNTER
Yes she can come in for nursing visit for rapid strep testing as we discussed this at her last appt. Work note will be written and available in her chart. Okay to return to work today as long as she is not having any fevers. Thanks.

## 2022-06-20 NOTE — LETTER
[address] [facility] [address]  [place] [address]  Phone: [phone]  Fax: Denise [phone], [phone] [facility] Asha Joshua        June 20, 2022     Patient: Ileana Vaz   YOB: 2000   Date of Visit: 6/20/2022       To Whom it May Concern:    Jadon Dc was seen in my clinic on 6/17/2022. Please excuse her from work from 6/17-6/19 2022. She may return to work on 6/20/2022. If you have any questions or concerns, please don't hesitate to call.     Sincerely,         Luna Louise CMA (AAMA)

## 2022-06-20 NOTE — PROGRESS NOTES
S: 24 y.o. female with   Chief Complaint   Patient presents with    Pharyngitis     HPI per Dr. Shilpi Erickson. BP Readings from Last 3 Encounters:   06/20/22 132/74   06/19/22 137/79   06/17/22 132/78     Wt Readings from Last 3 Encounters:   06/20/22 280 lb (127 kg)   06/19/22 275 lb (124.7 kg)   06/17/22 279 lb 6.4 oz (126.7 kg)           O: VS:  height is 5' 9\" (1.753 m) and weight is 280 lb (127 kg). Her temperature is 97.1 °F (36.2 °C). Her blood pressure is 132/74 and her pulse is 87. Her respiration is 16 and oxygen saturation is 99%. Diagnosis Orders   1. Sore throat  POCT Rapid Strep A DNA       Plan  Negative strep in office today. Okay to return to work. Health Maintenance Due   Topic Date Due    Pneumococcal 0-64 years Vaccine (1 - PCV) Never done    HPV vaccine (1 - 2-dose series) Never done    DTaP/Tdap/Td vaccine (1 - Tdap) Never done    Chlamydia screen  03/19/2021    COVID-19 Vaccine (3 - Booster for [facility] series) 03/02/2022       I have discussed the case, including pertinent history and exam findings with the resident. I agree with the documented assessment and plan as documented by the resident.         Jacques Stack,  6/20/2022 1:36 PM

## 2022-06-20 NOTE — PROGRESS NOTES
S: 24 y.o. female with   Chief Complaint   Patient presents with    Pharyngitis       HPI: please see resident note for HPI and ROS. BP Readings from Last 3 Encounters:   06/20/22 132/74   06/19/22 137/79   06/17/22 132/78     Wt Readings from Last 3 Encounters:   06/20/22 280 lb (127 kg)   06/19/22 275 lb (124.7 kg)   06/17/22 279 lb 6.4 oz (126.7 kg)       O: VS:  height is 5' 9\" (1.753 m) and weight is 280 lb (127 kg). Her temperature is 97.1 °F (36.2 °C). Her blood pressure is 132/74 and her pulse is 87. Her respiration is 16 and oxygen saturation is 99%. Diagnosis Orders   1. Sore throat  POCT Rapid Strep A DNA       Plan:  Neg strep. Neg UC covid. May return to work. Health Maintenance Due   Topic Date Due    Pneumococcal 0-64 years Vaccine (1 - PCV) Never done    HPV vaccine (1 - 2-dose series) Never done    DTaP/Tdap/Td vaccine (1 - Tdap) Never done    Chlamydia screen  03/19/2021    COVID-19 Vaccine (3 - Booster for [employer] series) 03/02/2022       Attending Physician Statement  I have discussed the case, including pertinent history and exam findings with the resident. I agree with the documented assessment and plan as documented by the resident.         Lalita Briceño DO 6/20/2022 1:36 PM

## 2022-07-19 ENCOUNTER — OFFICE VISIT (OUTPATIENT)
Dept: FAMILY MEDICINE CLINIC | Age: 22
End: 2022-07-19
Payer: COMMERCIAL

## 2022-07-19 VITALS
WEIGHT: 279.6 LBS | HEIGHT: 69 IN | TEMPERATURE: 97.9 F | DIASTOLIC BLOOD PRESSURE: 80 MMHG | RESPIRATION RATE: 18 BRPM | HEART RATE: 104 BPM | OXYGEN SATURATION: 97 % | SYSTOLIC BLOOD PRESSURE: 120 MMHG | BODY MASS INDEX: 41.41 KG/M2

## 2022-07-19 DIAGNOSIS — R11.0 NAUSEA: Primary | ICD-10-CM

## 2022-07-19 LAB
CONTROL: NORMAL
PREGNANCY TEST URINE, POC: NORMAL

## 2022-07-19 PROCEDURE — 99214 OFFICE O/P EST MOD 30 MIN: CPT | Performed by: STUDENT IN AN ORGANIZED HEALTH CARE EDUCATION/TRAINING PROGRAM

## 2022-07-19 PROCEDURE — 81025 URINE PREGNANCY TEST: CPT | Performed by: STUDENT IN AN ORGANIZED HEALTH CARE EDUCATION/TRAINING PROGRAM

## 2022-07-19 RX ORDER — DOXYLAMINE SUCCINATE AND PYRIDOXINE HYDROCHLORIDE, DELAYED RELEASE TABLETS 10 MG/10 MG 10; 10 MG/1; MG/1
1 TABLET, DELAYED RELEASE ORAL EVERY EVENING
Qty: 60 TABLET | Refills: 0 | Status: SHIPPED | OUTPATIENT
Start: 2022-07-19

## 2022-07-19 RX ORDER — ONDANSETRON 4 MG/1
4 TABLET, ORALLY DISINTEGRATING ORAL 3 TIMES DAILY PRN
Qty: 30 TABLET | Refills: 0 | Status: SHIPPED | OUTPATIENT
Start: 2022-07-19

## 2022-07-19 ASSESSMENT — ENCOUNTER SYMPTOMS
COUGH: 0
CONSTIPATION: 1
DIARRHEA: 0
VOMITING: 1
BLOOD IN STOOL: 0
NAUSEA: 1
ABDOMINAL DISTENTION: 0
ABDOMINAL PAIN: 1

## 2022-07-19 NOTE — PROGRESS NOTES
Deondre Contreras (:  2000) is a 24 y.o. female,Established patient, here for evaluation of the following chief complaint(s):  Nausea         ASSESSMENT/PLAN:  1. Nausea  -     ondansetron (ZOFRAN-ODT) 4 MG disintegrating tablet; Take 1 tablet by mouth 3 times daily as needed for Nausea or Vomiting, Disp-30 tablet, R-0Normal  -     POCT urine pregnancy  -     doxylamine-pyridoxine 10-10 MG TBEC; Take 1 tablet by mouth every evening, Disp-60 tablet, R-0Normal    Ok to take Zofran 4mg po tid prn for nausea  Additionally will add doxylamine-pyridoxine 10-10mg to take at bedtime prn for nausea. Pregnancy test was negative, although too early to r/o pregnancy. Continue prenatal vitamin, small frequent meals. Consider follow up to GI for hx of gastroparesis (last seen in 2019)    Follow up as needed if symptoms worsen or do not improve. No follow-ups on file. Subjective   SUBJECTIVE/OBJECTIVE:  KING Michel Rather presents today for acute visit for nausea. Nausea started . Yesterday certain foods made her start throwing up. Works in [employer]. Smells/certain foods makes her vomit. Was vomiting up the food and now acid/bile. No blood, coffee ground emesis. Hx of gastroparesis and PCOS. Been constipated. Last bowel movement yesterday, was hard. Hasn't had vomiting to this extent before. Also endorsing some breast tenderness. Ob is Dr. Gin Sanchez. LMP was . Periods irregular, although she is tracking via [place]. Range is every 26-35 days. Review of Systems   Constitutional:  Negative for chills, fatigue and fever. Respiratory:  Negative for cough. Cardiovascular:  Negative for chest pain and leg swelling. Gastrointestinal:  Positive for abdominal pain, constipation, nausea and vomiting. Negative for abdominal distention, blood in stool and diarrhea. Genitourinary:  Negative for difficulty urinating, dysuria and frequency.    Musculoskeletal:  Negative for

## 2022-08-01 ENCOUNTER — HOSPITAL ENCOUNTER (EMERGENCY)
Age: 22
Discharge: HOME | End: 2022-08-01
Payer: COMMERCIAL

## 2022-08-01 VITALS
DIASTOLIC BLOOD PRESSURE: 85 MMHG | RESPIRATION RATE: 18 BRPM | OXYGEN SATURATION: 97 % | HEIGHT: 69 IN | SYSTOLIC BLOOD PRESSURE: 136 MMHG | HEART RATE: 92 BPM | WEIGHT: 270 LBS | BODY MASS INDEX: 39.99 KG/M2 | TEMPERATURE: 97.8 F

## 2022-08-01 DIAGNOSIS — U07.1 COVID-19 VIRUS INFECTION: Primary | ICD-10-CM

## 2022-08-01 LAB — SARS-COV-2, NAA: DETECTED

## 2022-08-01 PROCEDURE — 99213 OFFICE O/P EST LOW 20 MIN: CPT

## 2022-08-01 PROCEDURE — 87635 SARS-COV-2 COVID-19 AMP PRB: CPT

## 2022-08-01 PROCEDURE — 99213 OFFICE O/P EST LOW 20 MIN: CPT | Performed by: NURSE PRACTITIONER

## 2022-08-01 ASSESSMENT — PAIN - FUNCTIONAL ASSESSMENT: PAIN_FUNCTIONAL_ASSESSMENT: NONE - DENIES PAIN

## 2022-08-01 ASSESSMENT — ENCOUNTER SYMPTOMS
NAUSEA: 0
SHORTNESS OF BREATH: 0
RHINORRHEA: 0
DIARRHEA: 0
EYE DISCHARGE: 0
VOMITING: 0
SORE THROAT: 0
TROUBLE SWALLOWING: 0
EYE REDNESS: 0
COUGH: 1

## 2022-08-01 NOTE — Clinical Note
Kathy Ngo was seen and treated in our emergency department on 8/1/2022. COVID19 virus is suspected. Per the CDC guidelines we recommend home isolation until the following conditions are all met:    1. At least five days have passed since symptoms first appeared and/or had a close exposure,   2. After home isolation for five days, wearing a mask around others for the next five days,  3. At least 24 have passed since last fever without the use of fever-reducing medications and  4. Symptoms (eg cough, shortness of breath) have improved    If you have any questions or concerns, please don't hesitate to call.     She may return to work/school on 08/04/2022        Wayne Kaur, KARLA - CNP

## 2022-08-01 NOTE — ED PROVIDER NOTES
10/30/2019); and Dilation and curettage of uterus (03/16/2021).     CURRENT MEDICATIONS       Discharge Medication List as of 8/1/2022  8:37 AM        CONTINUE these medications which have NOT CHANGED    Details   ondansetron (ZOFRAN-ODT) 4 MG disintegrating tablet Take 1 tablet by mouth 3 times daily as needed for Nausea or Vomiting, Disp-30 tablet, R-0Normal      doxylamine-pyridoxine 10-10 MG TBEC Take 1 tablet by mouth every evening, Disp-60 tablet, R-0Normal      DULoxetine (CYMBALTA) 60 MG extended release capsule Take 60 mg by mouth dailyHistorical Med      rivaroxaban (XARELTO) 20 MG TABS tablet Take 1 tablet by mouth daily (with breakfast), Disp-30 tablet, R-5Normal      metFORMIN (GLUCOPHAGE-XR) 500 MG extended release tablet TAKE 1 TABLET BY MOUTH EVERY DAY WITH BREAKFAST, Disp-21 tablet, R-1Normal      sertraline (ZOLOFT) 50 MG tablet Take 1 tablet by mouth daily, Disp-90 tablet, R-1Normal      albuterol sulfate HFA (VENTOLIN HFA) 108 (90 Base) MCG/ACT inhaler Inhale 2 puffs into the lungs 4 times daily as needed for Wheezing, Disp-1 each, R-1Normal      mometasone-formoterol (DULERA) 100-5 MCG/ACT inhaler Inhale 2 puffs into the lungs every 12 hours, Disp-1 each, R-2Normal      Spacer/Aero-Holding Chambers SHAWANDA DAILY Starting Mon 10/25/2021, Disp-1 each, R-0, Normal      butalbital-acetaminophen-caffeine (FIORICET, ESGIC) -40 MG per tablet Take 1 tablet by mouth every 4 hours as needed for Headaches, Disp-10 tablet, R-0Normal      magnesium (MAGNESIUM-OXIDE) 250 MG TABS tablet Take 2 tablets by mouth 4 times daily (with meals and nightly), Disp-240 tablet, R-1Normal      Prenatal Vit-Fe Fumarate-FA (PRENATAL VITAMIN) 27-0.8 MG TABS Take 1 tablet by mouth daily, Disp-90 tablet, R-3Normal      aspirin 81 MG EC tablet Take 81 mg by mouth daily Historical Med      vitamin D3 (CHOLECALCIFEROL) 25 MCG (1000 UT) TABS tablet Take 2 tablets by mouth daily, Disp-90 tablet, R-3Normal      Blood Pressure Monitor KIT Disp-1 kit, R-0, NormalTake blood pressure daily             ALLERGIES     Patient is is allergic to imitrex [sumatriptan], mucinex [guaifenesin er], and orilissa [elagolix]. FAMILY HISTORY     Patient'sfamily history includes Cancer in her father; Colon Polyps in her maternal grandmother and paternal grandfather; High Blood Pressure in her father; Lupus in her mother; Migraines in her mother; No Known Problems in her brother, brother, maternal grandfather, and paternal grandmother; Other in her father; Thyroid Disease in her father. SOCIAL HISTORY     Patient  reports that she has never smoked. She has never used smokeless tobacco. She reports that she does not drink alcohol and does not use drugs. PHYSICAL EXAM     ED TRIAGE VITALS  BP: 136/85, Temp: 97.8 °F (36.6 °C), Heart Rate: 92, Resp: 18, SpO2: 97 %  Physical Exam  Vitals and nursing note reviewed. Constitutional:       General: She is not in acute distress. Appearance: Normal appearance. She is well-developed. She is not ill-appearing, toxic-appearing or diaphoretic. HENT:      Head: Normocephalic and atraumatic. Right Ear: Hearing, tympanic membrane, ear canal and external ear normal. No mastoid tenderness. No hemotympanum. Tympanic membrane is not perforated, erythematous or bulging. Left Ear: Hearing, tympanic membrane, ear canal and external ear normal. No mastoid tenderness. No hemotympanum. Tympanic membrane is not perforated, erythematous or bulging. Nose: Nose normal.      Mouth/Throat:      Mouth: Mucous membranes are moist.      Pharynx: Oropharynx is clear. Uvula midline. Tonsils: 0 on the right. 0 on the left. Eyes:      General: No scleral icterus. Conjunctiva/sclera: Conjunctivae normal.      Right eye: Right conjunctiva is not injected. No hemorrhage. Left eye: Left conjunctiva is not injected. No hemorrhage. Neck:      Thyroid: No thyromegaly.       Trachea: Trachea normal. Cardiovascular:      Rate and Rhythm: Normal rate and regular rhythm. No extrasystoles are present. Chest Wall: PMI is not displaced. Heart sounds: Normal heart sounds. No murmur heard. No friction rub. No gallop. Pulmonary:      Effort: Pulmonary effort is normal. No respiratory distress. Breath sounds: Normal breath sounds. Chest:   Breasts:     Right: No supraclavicular adenopathy. Left: No supraclavicular adenopathy. Musculoskeletal:      Cervical back: Normal range of motion and neck supple. Lymphadenopathy:      Head:      Right side of head: No submental, submandibular, tonsillar or occipital adenopathy. Left side of head: No submental, submandibular, tonsillar or occipital adenopathy. Cervical: No cervical adenopathy. Upper Body:      Right upper body: No supraclavicular adenopathy. Left upper body: No supraclavicular adenopathy. Skin:     General: Skin is warm and dry. Capillary Refill: Capillary refill takes less than 2 seconds. Coloration: Skin is not pale. Findings: No rash. Comments: Skin warm and dry to touch, no rashes noted on exposed surfaces. Neurological:      Mental Status: She is alert and oriented to person, place, and time. She is not disoriented. Psychiatric:         Mood and Affect: Mood normal.         Behavior: Behavior is cooperative.        DIAGNOSTIC RESULTS   Labs:   Results for orders placed or performed during the hospital encounter of 08/01/22   COVID-19, Rapid   Result Value Ref Range    SARS-CoV-2, LINNEA DETECTED (AA) NOT DETECTED       IMAGING:  No orders to display     URGENT CARE COURSE:     Vitals:    08/01/22 0810   BP: 136/85   Pulse: 92   Resp: 18   Temp: 97.8 °F (36.6 °C)   TempSrc: Temporal   SpO2: 97%   Weight: 270 lb (122.5 kg)   Height: 5' 9\" (1.753 m)       Medications - No data to display  PROCEDURES:  None  FINALIMPRESSION      1. COVID-19 virus infection        DISPOSITION/PLAN   DISPOSITION Decision To Discharge 08/01/2022 08:36:56 AM  Nontoxic, no distress. COVID-19 positive. Over-the-counter treatment as needed. Increase fluids, rest.  If any distress go to ER. PATIENT REFERRED TO:  Gilberto Platt MD  [place] [address]  [phone]      Follow up as needed. Continue OTC treatment. If worse go to ER.   DISCHARGE MEDICATIONS:  Discharge Medication List as of 8/1/2022  8:37 AM        Discharge Medication List as of 8/1/2022  8:37 AM          KARLA Harvey - KARLA Pappas - CNP  08/01/22 7081

## 2022-09-27 ENCOUNTER — HOSPITAL ENCOUNTER (EMERGENCY)
Age: 22
Discharge: HOME | End: 2022-09-27
Payer: COMMERCIAL

## 2022-09-27 VITALS
BODY MASS INDEX: 40.58 KG/M2 | DIASTOLIC BLOOD PRESSURE: 74 MMHG | OXYGEN SATURATION: 98 % | WEIGHT: 274 LBS | RESPIRATION RATE: 16 BRPM | HEIGHT: 69 IN | TEMPERATURE: 97.8 F | SYSTOLIC BLOOD PRESSURE: 136 MMHG | HEART RATE: 90 BPM

## 2022-09-27 DIAGNOSIS — J06.9 VIRAL URI WITH COUGH: Primary | ICD-10-CM

## 2022-09-27 DIAGNOSIS — Z20.822 LAB TEST NEGATIVE FOR COVID-19 VIRUS: ICD-10-CM

## 2022-09-27 LAB — SARS-COV-2, NAA: NOT  DETECTED

## 2022-09-27 PROCEDURE — 87635 SARS-COV-2 COVID-19 AMP PRB: CPT

## 2022-09-27 PROCEDURE — 99213 OFFICE O/P EST LOW 20 MIN: CPT | Performed by: NURSE PRACTITIONER

## 2022-09-27 PROCEDURE — 99213 OFFICE O/P EST LOW 20 MIN: CPT

## 2022-09-27 ASSESSMENT — ENCOUNTER SYMPTOMS
DIARRHEA: 0
RHINORRHEA: 0
VOMITING: 0
SORE THROAT: 0
EYE DISCHARGE: 0
TROUBLE SWALLOWING: 0
NAUSEA: 0
SHORTNESS OF BREATH: 0
COUGH: 1
EYE REDNESS: 0

## 2022-09-27 ASSESSMENT — PAIN - FUNCTIONAL ASSESSMENT: PAIN_FUNCTIONAL_ASSESSMENT: NONE - DENIES PAIN

## 2022-09-27 NOTE — ED PROVIDER NOTES
[facility]  Urgent Care Encounter      CHIEF COMPLAINT       Chief Complaint   Patient presents with    Cough       Nurses Notes reviewed and I agree except as noted in the HPI. HISTORY OF PRESENT ILLNESS   Harry Saba is a 24 y.o. female who presents for evaluation of cough. Onset of symptoms yesterday, unchanged. Cough is intermittent, dry. No fever, wheezing, shortness of breath. Patient diagnosed with COVID-19 on 8/1/2022. Patient recently exposed. Patient a positive home test.  No current treatment. No additional complaints. REVIEW OF SYSTEMS     Review of Systems   Constitutional:  Negative for chills, diaphoresis, fatigue and fever. HENT:  Negative for congestion, ear pain, rhinorrhea, sore throat and trouble swallowing. Eyes:  Negative for discharge and redness. Respiratory:  Positive for cough. Negative for shortness of breath. Cardiovascular:  Negative for chest pain. Gastrointestinal:  Negative for diarrhea, nausea and vomiting. Genitourinary:  Negative for decreased urine volume. Musculoskeletal:  Negative for neck pain and neck stiffness. Skin:  Negative for rash. Neurological:  Negative for headaches. Hematological:  Negative for adenopathy. Psychiatric/Behavioral:  Negative for sleep disturbance. PAST MEDICAL HISTORY         Diagnosis Date    Anxiety     Chronic GERD     Chronic migraine     takes verapamil    Depression     Gallstones 10/2019    Gastroparesis     diagnosis as a child    Hx of blood clots     PE     Hypertension     Miscarriage     multiple    Obesity     Pulmonary embolism ([facility] [address]     8 months ago-was on Xarelto-sees Dr Janes Maki in [place]    Splenomegaly        SURGICAL HISTORY     Patient  has a past surgical history that includes Tonsillectomy; Jean tooth extraction; Elbow surgery (Right); Cholecystectomy, laparoscopic (N/A, 10/30/2019); and Dilation and curettage of uterus (03/16/2021).     CURRENT MEDICATIONS Discharge Medication List as of 9/27/2022  1:21 PM        CONTINUE these medications which have NOT CHANGED    Details   ondansetron (ZOFRAN-ODT) 4 MG disintegrating tablet Take 1 tablet by mouth 3 times daily as needed for Nausea or Vomiting, Disp-30 tablet, R-0Normal      doxylamine-pyridoxine 10-10 MG TBEC Take 1 tablet by mouth every evening, Disp-60 tablet, R-0Normal      DULoxetine (CYMBALTA) 60 MG extended release capsule Take 60 mg by mouth dailyHistorical Med      rivaroxaban (XARELTO) 20 MG TABS tablet Take 1 tablet by mouth daily (with breakfast), Disp-30 tablet, R-5Normal      metFORMIN (GLUCOPHAGE-XR) 500 MG extended release tablet TAKE 1 TABLET BY MOUTH EVERY DAY WITH BREAKFAST, Disp-21 tablet, R-1Normal      sertraline (ZOLOFT) 50 MG tablet Take 1 tablet by mouth daily, Disp-90 tablet, R-1Normal      albuterol sulfate HFA (VENTOLIN HFA) 108 (90 Base) MCG/ACT inhaler Inhale 2 puffs into the lungs 4 times daily as needed for Wheezing, Disp-1 each, R-1Normal      mometasone-formoterol (DULERA) 100-5 MCG/ACT inhaler Inhale 2 puffs into the lungs every 12 hours, Disp-1 each, R-2Normal      Spacer/Aero-Holding Chambers SHAWANDA DAILY Starting Mon 10/25/2021, Disp-1 each, R-0, Normal      butalbital-acetaminophen-caffeine (FIORICET, ESGIC) -40 MG per tablet Take 1 tablet by mouth every 4 hours as needed for Headaches, Disp-10 tablet, R-0Normal      magnesium (MAGNESIUM-OXIDE) 250 MG TABS tablet Take 2 tablets by mouth 4 times daily (with meals and nightly), Disp-240 tablet, R-1Normal      Prenatal Vit-Fe Fumarate-FA (PRENATAL VITAMIN) 27-0.8 MG TABS Take 1 tablet by mouth daily, Disp-90 tablet, R-3Normal      aspirin 81 MG EC tablet Take 81 mg by mouth daily Historical Med      vitamin D3 (CHOLECALCIFEROL) 25 MCG (1000 UT) TABS tablet Take 2 tablets by mouth daily, Disp-90 tablet, R-3Normal      Blood Pressure Monitor KIT Disp-1 kit, R-0, NormalTake blood pressure daily             ALLERGIES Patient is is allergic to imitrex [sumatriptan], mucinex [guaifenesin er], and orilissa [elagolix]. FAMILY HISTORY     Patient'sfamily history includes Cancer in her father; Colon Polyps in her maternal grandmother and paternal grandfather; High Blood Pressure in her father; Lupus in her mother; Migraines in her mother; No Known Problems in her brother, brother, maternal grandfather, and paternal grandmother; Other in her father; Thyroid Disease in her father. SOCIAL HISTORY     Patient  reports that she has never smoked. She has never used smokeless tobacco. She reports that she does not drink alcohol and does not use drugs. PHYSICAL EXAM     ED TRIAGE VITALS  BP: 136/74, Temp: 97.8 °F (36.6 °C), Heart Rate: 90, Resp: 16, SpO2: 98 %  Physical Exam  Vitals and nursing note reviewed. Constitutional:       General: She is not in acute distress. Appearance: Normal appearance. She is well-developed. She is not ill-appearing, toxic-appearing or diaphoretic. HENT:      Head: Normocephalic and atraumatic. Right Ear: Hearing, tympanic membrane, ear canal and external ear normal. No mastoid tenderness. No hemotympanum. Tympanic membrane is not perforated, erythematous or bulging. Left Ear: Hearing, tympanic membrane, ear canal and external ear normal. No mastoid tenderness. No hemotympanum. Tympanic membrane is not perforated, erythematous or bulging. Nose: Nose normal.      Mouth/Throat:      Mouth: Mucous membranes are moist.      Pharynx: Oropharynx is clear. Uvula midline. Tonsils: 0 on the right. 0 on the left. Eyes:      General: No scleral icterus. Conjunctiva/sclera: Conjunctivae normal.      Right eye: Right conjunctiva is not injected. No hemorrhage. Left eye: Left conjunctiva is not injected. No hemorrhage. Neck:      Thyroid: No thyromegaly. Trachea: Trachea normal.   Cardiovascular:      Rate and Rhythm: Normal rate and regular rhythm.  No extrasystoles are present. Chest Wall: PMI is not displaced. Heart sounds: Normal heart sounds. No murmur heard. No friction rub. No gallop. Pulmonary:      Effort: Pulmonary effort is normal. No respiratory distress. Breath sounds: Normal breath sounds. Musculoskeletal:      Cervical back: Normal range of motion and neck supple. Lymphadenopathy:      Head:      Right side of head: No submental, submandibular, tonsillar or occipital adenopathy. Left side of head: No submental, submandibular, tonsillar or occipital adenopathy. Cervical: No cervical adenopathy. Upper Body:      Right upper body: No supraclavicular adenopathy. Left upper body: No supraclavicular adenopathy. Skin:     General: Skin is warm and dry. Capillary Refill: Capillary refill takes less than 2 seconds. Coloration: Skin is not pale. Findings: No rash. Comments: Skin warm and dry to touch, no rashes noted on exposed surfaces. Neurological:      Mental Status: She is alert and oriented to person, place, and time. She is not disoriented. Psychiatric:         Mood and Affect: Mood normal.         Behavior: Behavior is cooperative. DIAGNOSTIC RESULTS   Labs:   Results for orders placed or performed during the hospital encounter of 09/27/22   COVID-19, Rapid   Result Value Ref Range    SARS-CoV-2, LINNEA NOT  DETECTED NOT DETECTED       IMAGING:    URGENT CARE COURSE:     Vitals:    09/27/22 1256   BP: 136/74   Pulse: 90   Resp: 16   Temp: 97.8 °F (36.6 °C)   TempSrc: Infrared   SpO2: 98%   Weight: 274 lb (124.3 kg)   Height: 5' 9\" (1.753 m)       Medications - No data to display  PROCEDURES:  None  FINAL IMPRESSION      1. Viral URI with cough    2. Lab test negative for COVID-19 virus        DISPOSITION/PLAN   DISPOSITION Decision To Discharge 09/27/2022 01:20:26 PM  Nontoxic, no distress. COVID-19 negative. Exam consistent with viral URI cough. Over-the-counter treatment as needed.   Increase fluids, rest.  If any distress go to ER. PATIENT REFERRED TO:  Gianni Wade MD  [address]  [phone]      Follow-up as needed. Over-the-counter treatment as needed. Increase fluids, rest.  If any distress go to ER.     DISCHARGE MEDICATIONS:  Discharge Medication List as of 9/27/2022  1:21 PM        Discharge Medication List as of 9/27/2022  1:21 PM          [address] [facility], APRN [facility]  09/27/22 6619

## 2022-09-27 NOTE — Clinical Note
Emelina Castañeda was seen and treated in our emergency department on 9/27/2022. She may return to work on 09/27/2022. If you have any questions or concerns, please don't hesitate to call.       Olivier Blanco, APRN - CNP

## 2022-09-27 NOTE — ED TRIAGE NOTES
Has had a cough and a sore throat since yesterday, had a positive covid test at home, and also has a brother who also tested positive for covid, needs confirmed for work

## 2022-10-12 ENCOUNTER — HOSPITAL ENCOUNTER (OUTPATIENT)
Dept: PHYSICAL THERAPY | Age: 22
Setting detail: THERAPIES SERIES
Discharge: HOME | End: 2022-10-12
Payer: COMMERCIAL

## 2022-10-12 PROCEDURE — 97166 OT EVAL MOD COMPLEX 45 MIN: CPT

## 2022-10-12 PROCEDURE — 97530 THERAPEUTIC ACTIVITIES: CPT

## 2022-10-12 PROCEDURE — 97110 THERAPEUTIC EXERCISES: CPT
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
## 2022-12-05 ENCOUNTER — OFFICE VISIT (OUTPATIENT)
Dept: FAMILY MEDICINE CLINIC | Age: 22
End: 2022-12-05
Payer: COMMERCIAL

## 2022-12-05 VITALS
SYSTOLIC BLOOD PRESSURE: 126 MMHG | HEART RATE: 89 BPM | DIASTOLIC BLOOD PRESSURE: 84 MMHG | WEIGHT: 283.6 LBS | BODY MASS INDEX: 42 KG/M2 | RESPIRATION RATE: 16 BRPM | HEIGHT: 69 IN | TEMPERATURE: 97.7 F

## 2022-12-05 DIAGNOSIS — J06.9 VIRAL UPPER RESPIRATORY ILLNESS: Primary | ICD-10-CM

## 2022-12-05 PROBLEM — R48.1 AGNOSIA FOR TASTE: Status: ACTIVE | Noted: 2022-12-05

## 2022-12-05 PROBLEM — R43.0 LOSS OF SENSE OF SMELL: Status: ACTIVE | Noted: 2022-12-05

## 2022-12-05 PROBLEM — N92.0 MENORRHAGIA: Status: ACTIVE | Noted: 2021-05-03

## 2022-12-05 PROBLEM — N93.8 UTERINE BLEEDING, DYSFUNCTIONAL: Status: ACTIVE | Noted: 2021-05-03

## 2022-12-05 PROBLEM — R10.30 LOWER ABDOMINAL PAIN: Status: ACTIVE | Noted: 2021-05-03

## 2022-12-05 PROBLEM — N92.6 IRREGULAR MENSES: Status: ACTIVE | Noted: 2022-12-05

## 2022-12-05 PROCEDURE — 3078F DIAST BP <80 MM HG: CPT | Performed by: STUDENT IN AN ORGANIZED HEALTH CARE EDUCATION/TRAINING PROGRAM

## 2022-12-05 PROCEDURE — 3074F SYST BP LT 130 MM HG: CPT | Performed by: STUDENT IN AN ORGANIZED HEALTH CARE EDUCATION/TRAINING PROGRAM

## 2022-12-05 PROCEDURE — 99213 OFFICE O/P EST LOW 20 MIN: CPT | Performed by: STUDENT IN AN ORGANIZED HEALTH CARE EDUCATION/TRAINING PROGRAM

## 2022-12-05 ASSESSMENT — ENCOUNTER SYMPTOMS
NAUSEA: 1
SINUS PRESSURE: 1
COUGH: 1
SORE THROAT: 1
VOMITING: 1

## 2022-12-05 NOTE — PROGRESS NOTES
S: 20 y.o. female with   Chief Complaint   Patient presents with    Chest Congestion     Patient has been experiencing chest congestion, head congestion, cough, and sore throat. Patient has been sent home from work twice Friday and today 12/5/2022. Patient needs note to be excused from work. Patient has taken 3 home covid test which were all negative. HPI: please see resident note for HPI and ROS. BP Readings from Last 3 Encounters:   12/05/22 126/84   09/27/22 136/74   08/01/22 136/85     Wt Readings from Last 3 Encounters:   12/05/22 283 lb 9.6 oz (128.6 kg)   09/27/22 274 lb (124.3 kg)   08/01/22 270 lb (122.5 kg)       O: VS:  height is 5' 9\" (1.753 m) and weight is 283 lb 9.6 oz (128.6 kg). Her temporal temperature is 97.7 °F (36.5 °C). Her blood pressure is 126/84 and her pulse is 89. Her respiration is 16. Physical exam performed by resident physician. Diagnosis Orders   1. Viral upper respiratory illness            Plan:  Please refer to resident note for full plan. 26-year-old female presents the office for a multiple day history of head congestion, chest congestion, cough, runny nose, nausea, fevers. Patient tested negative for COVID-19 3 times is interesting what she should do about this. Patient symptoms are most likely related to a viral upper restaurant tract infection. No concern for acute bacterial etiology or need for antibiotics at this time. No need for additional testing at this time. Patient was educated on conservative measures with rest, hydration, Tylenol/ibuprofen as needed. Can use over-the-counter decongestants if needed. Follow-up if symptoms worsen, do not prove. Patient verbalized understanding of plan.       Health Maintenance Due   Topic Date Due    Pneumococcal 0-64 years Vaccine (1 - PCV) Never done    HPV vaccine (1 - 2-dose series) Never done    DTaP/Tdap/Td vaccine (1 - Tdap) Never done    Chlamydia/GC screen  03/19/2021    COVID-19 Vaccine (3 - Booster for Deborah Smoke series) 11/27/2021    Flu vaccine (1) 08/01/2022    Depression Monitoring  01/03/2023       Attending Physician Statement  I have discussed the case, including pertinent history and exam findings with the resident. I agree with the documented assessment and plan as documented by the resident.   [address] [facility],  12/5/2022 2:51 PM

## 2022-12-05 NOTE — LETTER
[address] [address],[address] [id number]  Phone: [phone]  Fax: [phone]    Phares Holstein, MD        December 5, 2022     Patient: Tanja Berry   YOB: 2000   Date of Visit: 12/5/2022       To Whom It May Concern:    Tanja Berry was seen in the office 12/5/2022. Please excuse her absence from work 12/2/22 - 12/7/22. If you have any questions or concerns, please don't hesitate to call.     Sincerely,        Phares Holstein, MD

## 2022-12-05 NOTE — PROGRESS NOTES
[id number] [facility] Stephanie W. [address] [facility] [address]  Dept: [phone]  Loc: [phone]     Rocky Brandt is a 20 y.o. female who presents today for:  Chief Complaint   Patient presents with    Chest Congestion     Patient has been experiencing chest congestion, head congestion, cough, and sore throat. Patient has been sent home from work twice Friday and today 12/5/2022. Patient needs note to be excused from work. Patient has taken 3 home covid test which were all negative. Goals        Exercise 3x per week (30 min per time)             HPI:     18-year-old female with history of recurrent pulmonary embolisms, anxiety, depression, hypertension here for sick visit. Chest Congestion  This is a new problem. The current episode started in the past 7 days. The problem occurs constantly. The problem has been unchanged. Associated symptoms include chills, congestion, coughing, fatigue, a fever, nausea, a sore throat, vomiting and weakness. The symptoms are aggravated by coughing and swallowing. She has tried acetaminophen, rest and sleep for the symptoms. The treatment provided mild relief. She is also reporting mild intermittent fever 100 and chills. Endorses bilateral otalgia with some sinus congestion. She has been sent home from work twice - Friday 12/2/22 and today 12/5/2022. She reports some nausea, diarrhea. Does have good PO fluid intake. 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 She has had 3 negative home Covid tests - Thursday, Friday, Sunday.      Current Outpatient Medications   Medication Sig Dispense Refill    ondansetron (ZOFRAN-ODT) 4 MG disintegrating tablet Take 1 tablet by mouth 3 times daily as needed for Nausea or Vomiting 30 tablet 0    DULoxetine (CYMBALTA) 60 MG extended release capsule Take 60 mg by mouth daily      sertraline (ZOLOFT) 50 MG tablet Take 1 tablet by mouth daily 90 tablet 1    albuterol sulfate HFA (VENTOLIN HFA) 108 (90 Base) MCG/ACT inhaler Inhale 2 puffs into the lungs 4 times daily as needed for Wheezing 1 each 1    mometasone-formoterol (DULERA) 100-5 MCG/ACT inhaler Inhale 2 puffs into the lungs every 12 hours 1 each 2    Spacer/Aero-Holding Chambers SHAWANDA 1 Device by Does not apply route daily 1 each 0    Prenatal Vit-Fe Fumarate-FA (PRENATAL VITAMIN) 27-0.8 MG TABS Take 1 tablet by mouth daily 90 tablet 3    Blood Pressure Monitor KIT Take blood pressure daily 1 kit 0    rivaroxaban (XARELTO) 20 MG TABS tablet Take 1 tablet by mouth daily (with breakfast) 30 tablet 5    aspirin 81 MG EC tablet Take 81 mg by mouth daily  (Patient not taking: Reported on 12/5/2022)      vitamin D3 (CHOLECALCIFEROL) 25 MCG (1000 UT) TABS tablet Take 2 tablets by mouth daily (Patient not taking: Reported on 12/5/2022) 90 tablet 3     No current facility-administered medications for this visit. Food Insecurity: No Food Insecurity    Worried About Running Out of Food in the Last Year: Never true    Ran Out of Food in the Last Year: Never true       Health Maintenance   Topic Date Due    Pneumococcal 0-64 years Vaccine (1 - PCV) Never done    HPV vaccine (1 - 2-dose series) Never done    DTaP/Tdap/Td vaccine (1 - Tdap) Never done    Chlamydia/GC screen  03/19/2021    COVID-19 Vaccine (3 - Booster for Moderna series) 11/27/2021    Flu vaccine (1) 08/01/2022    Depression Monitoring  01/03/2023    Pap smear  03/05/2024    Hepatitis C screen  Completed    HIV screen  Completed    Hepatitis A vaccine  Aged Out    Hib vaccine  Aged Out    Meningococcal (ACWY) vaccine  Aged Out    Varicella vaccine  Discontinued       ROS:      Review of Systems   Constitutional:  Positive for chills, fatigue and fever. HENT:  Positive for congestion, ear pain, sinus pressure and sore throat. Respiratory:  Positive for cough. Gastrointestinal:  Positive for nausea and vomiting.    Neurological: Positive for weakness. Objective:     Vitals:    12/05/22 1123   BP: 126/84   Site: Right Upper Arm   Position: Sitting   Cuff Size: Large Adult   Pulse: 89   Resp: 16   Temp: 97.7 °F (36.5 °C)   TempSrc: Temporal   Weight: 283 lb 9.6 oz (128.6 kg)   Height: 5' 9\" (1.753 m)       Body mass index is 41.88 kg/m². Wt Readings from Last 3 Encounters:   12/05/22 283 lb 9.6 oz (128.6 kg)   09/27/22 274 lb (124.3 kg)   08/01/22 270 lb (122.5 kg)     BP Readings from Last 3 Encounters:   12/05/22 126/84   09/27/22 136/74   08/01/22 136/85       Physical Exam  Vitals reviewed. Constitutional:       General: She is not in acute distress. Appearance: Normal appearance. She is obese. She is ill-appearing. Comments: Mildly ill-appearing    HENT:      Head: Normocephalic and atraumatic. Right Ear: Tympanic membrane, ear canal and external ear normal. There is no impacted cerumen. Left Ear: Tympanic membrane, ear canal and external ear normal. There is no impacted cerumen. Nose: Nose normal. No congestion. Mouth/Throat:      Mouth: Mucous membranes are moist.      Pharynx: Oropharynx is clear. No oropharyngeal exudate or posterior oropharyngeal erythema. Eyes:      General:         Right eye: No discharge. Left eye: No discharge. Conjunctiva/sclera: Conjunctivae normal.   Cardiovascular:      Rate and Rhythm: Normal rate and regular rhythm. Pulses: Normal pulses. Heart sounds: Normal heart sounds. No murmur heard. Pulmonary:      Effort: Pulmonary effort is normal. No respiratory distress. Breath sounds: Normal breath sounds. No wheezing, rhonchi or rales. Abdominal:      General: Abdomen is flat. Bowel sounds are normal. There is no distension. Palpations: Abdomen is soft. Tenderness: There is no abdominal tenderness. Musculoskeletal:         General: Normal range of motion. Cervical back: Normal range of motion and neck supple.  No tenderness. Right lower leg: No edema. Left lower leg: No edema. Lymphadenopathy:      Cervical: No cervical adenopathy. Skin:     General: Skin is warm and dry. Capillary Refill: Capillary refill takes less than 2 seconds. Findings: No rash. Neurological:      General: No focal deficit present. Mental Status: She is alert and oriented to person, place, and time. Mental status is at baseline. Psychiatric:         Mood and Affect: Mood normal.         Behavior: Behavior normal.         Thought Content: Thought content normal.         Judgment: Judgment normal.       Assessment / Plan:     1. Viral upper respiratory illness  - Patient presenting with symptoms most likely related with viral URI with 3 negative home Covid tests. No concerns at this time for acute bacterial etiology. No indication for antibiotics at this time. No indications for additional testing at this time. Patient educated on conservative management, including rest, hydration, OTC decongestants and Tylenol PRN. RTC if symptoms worsen or persist. Work note provided. Health Maintenance Due   Topic Date Due    Pneumococcal 0-64 years Vaccine (1 - PCV) Never done    HPV vaccine (1 - 2-dose series) Never done    DTaP/Tdap/Td vaccine (1 - Tdap) Never done    Chlamydia/GC screen  03/19/2021    COVID-19 Vaccine (3 - Booster for Wallie Salon series) 11/27/2021    Flu vaccine (1) 08/01/2022    Depression Monitoring  01/03/2023           Return if symptoms worsen or fail to improve. Medications Prescribed:  No orders of the defined types were placed in this encounter.       Future Appointments   Date Time Provider Jerome Vasquez   12/7/2022  2:00 PM Brisa Green   12/21/2022  4:00 PM BRIDGET Green   1/4/2023  4:00 PM Brisa Green   1/18/2023  4:00 PM Leonard Carbone [address]       Patient given educational materials - see patient instructions. Discussed use, benefit, and side effects of prescribed medications. All patient questions answered. Patient voiced understanding. Reviewed health maintenance. Instructed to continue current medications, diet and exercise. Patient agreed with treatment plan. Follow up as directed.      Electronically signed by Moncho Rahman MD on 12/5/2022 at 2:43 PM

## 2022-12-21 ENCOUNTER — HOSPITAL ENCOUNTER (OUTPATIENT)
Dept: PHYSICAL THERAPY | Age: 22
Setting detail: THERAPIES SERIES
Discharge: HOME | End: 2022-12-21
Payer: COMMERCIAL

## 2022-12-21 PROCEDURE — 97140 MANUAL THERAPY 1/> REGIONS: CPT

## 2022-12-21 PROCEDURE — 97530 THERAPEUTIC ACTIVITIES: CPT
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
## 2023-01-04 ENCOUNTER — HOSPITAL ENCOUNTER (OUTPATIENT)
Dept: PHYSICAL THERAPY | Age: 23
Setting detail: THERAPIES SERIES
Discharge: HOME | End: 2023-01-04
Payer: COMMERCIAL

## 2023-01-04 PROCEDURE — 97140 MANUAL THERAPY 1/> REGIONS: CPT

## 2023-01-04 PROCEDURE — 97530 THERAPEUTIC ACTIVITIES: CPT
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
## 2023-01-18 ENCOUNTER — HOSPITAL ENCOUNTER (OUTPATIENT)
Dept: PHYSICAL THERAPY | Age: 23
Setting detail: THERAPIES SERIES
Discharge: HOME | End: 2023-01-18
Payer: COMMERCIAL

## 2023-01-18 PROCEDURE — 97140 MANUAL THERAPY 1/> REGIONS: CPT
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
## 2023-02-03 ENCOUNTER — APPOINTMENT (OUTPATIENT)
Dept: GENERAL RADIOLOGY | Age: 23
End: 2023-02-03
Payer: COMMERCIAL

## 2023-02-03 ENCOUNTER — HOSPITAL ENCOUNTER (EMERGENCY)
Age: 23
Discharge: HOME | End: 2023-02-03
Payer: COMMERCIAL

## 2023-02-03 VITALS
RESPIRATION RATE: 14 BRPM | OXYGEN SATURATION: 99 % | TEMPERATURE: 98.2 F | BODY MASS INDEX: 42.09 KG/M2 | WEIGHT: 285 LBS | SYSTOLIC BLOOD PRESSURE: 126 MMHG | HEART RATE: 80 BPM | DIASTOLIC BLOOD PRESSURE: 64 MMHG

## 2023-02-03 DIAGNOSIS — S40.021A CONTUSION OF MULTIPLE SITES OF RIGHT SHOULDER AND UPPER ARM, INITIAL ENCOUNTER: Primary | ICD-10-CM

## 2023-02-03 DIAGNOSIS — S40.011A CONTUSION OF MULTIPLE SITES OF RIGHT SHOULDER AND UPPER ARM, INITIAL ENCOUNTER: Primary | ICD-10-CM

## 2023-02-03 PROCEDURE — 73090 X-RAY EXAM OF FOREARM: CPT

## 2023-02-03 PROCEDURE — 99213 OFFICE O/P EST LOW 20 MIN: CPT

## 2023-02-03 PROCEDURE — 99212 OFFICE O/P EST SF 10 MIN: CPT | Performed by: NURSE PRACTITIONER

## 2023-02-03 ASSESSMENT — ENCOUNTER SYMPTOMS
CONSTIPATION: 0
EYES NEGATIVE: 1
VOMITING: 0
WHEEZING: 0
DIARRHEA: 0
SINUS PAIN: 0
SORE THROAT: 0
SHORTNESS OF BREATH: 0
RHINORRHEA: 0
NAUSEA: 0
SINUS PRESSURE: 0
ABDOMINAL PAIN: 0

## 2023-02-03 ASSESSMENT — PAIN DESCRIPTION - LOCATION: LOCATION: ARM

## 2023-02-03 ASSESSMENT — PAIN DESCRIPTION - ORIENTATION: ORIENTATION: RIGHT

## 2023-02-03 ASSESSMENT — PAIN - FUNCTIONAL ASSESSMENT: PAIN_FUNCTIONAL_ASSESSMENT: 0-10

## 2023-02-03 ASSESSMENT — PAIN SCALES - GENERAL: PAINLEVEL_OUTOF10: 5

## 2023-02-03 NOTE — ED TRIAGE NOTES
Si Holiday arrives to room with complaint of  fall, right arm hit the railing yesterday, then large pail hit the same spot on right arm.   Now with bruising and swelling

## 2023-02-03 NOTE — Clinical Note
Leticia Pizarro was seen and treated in our emergency department on 2/3/2023. She may return to work on 02/05/2023. If you have any questions or concerns, please don't hesitate to call.       Jewel Madera, KARLA - CNP

## 2023-02-03 NOTE — ED PROVIDER NOTES
[facility] Encounter      CHIEFCOMPLAINT       Chief Complaint   Patient presents with    Fall       Nurses Notes reviewed and I agree except as noted in the HPI. HISTORY OF PRESENT ILLNESS   Ed Nery is a 20 y.o. female who presents today complaining of pain to the right forearm. States that she had a fall yesterday while coming down the steps and hit her arm on the railing quite hard. Complains of some tenderness and bruising. REVIEW OF SYSTEMS     Review of Systems   Constitutional:  Negative for chills, fatigue, fever and unexpected weight change. HENT:  Negative for congestion, postnasal drip, rhinorrhea, sinus pressure, sinus pain, sneezing and sore throat. Eyes: Negative. Respiratory:  Negative for shortness of breath and wheezing. Cardiovascular:  Negative for chest pain. Gastrointestinal:  Negative for abdominal pain, constipation, diarrhea, nausea and vomiting. Endocrine: Negative. Genitourinary:  Negative for difficulty urinating, dyspareunia, dysuria, hematuria, urgency, vaginal bleeding, vaginal discharge and vaginal pain. Musculoskeletal:  Negative for myalgias. Pain to right forearm. Skin:  Negative for rash. Neurological:  Negative for dizziness, light-headedness and headaches. Hematological:  Negative for adenopathy. Psychiatric/Behavioral:  Negative for agitation. PAST MEDICAL HISTORY         Diagnosis Date    Anxiety     Chronic GERD     Chronic migraine     takes verapamil    Depression     Gallstones 10/2019    Gastroparesis     diagnosis as a child    Hx of blood clots     PE     Hypertension     Miscarriage     multiple    Obesity     Pulmonary embolism ([address]     8 months ago-was on Xarelto-sees Dr Nathan Sellers in [place]    Splenomegaly        SURGICAL HISTORY     Patient  has a past surgical history that includes Tonsillectomy; Castaner tooth extraction; Elbow surgery (Right);  Cholecystectomy, laparoscopic (N/A, 10/30/2019); and Dilation and curettage of uterus (03/16/2021). CURRENT MEDICATIONS       Discharge Medication List as of 2/3/2023  4:21 PM        CONTINUE these medications which have NOT CHANGED    Details   ondansetron (ZOFRAN-ODT) 4 MG disintegrating tablet Take 1 tablet by mouth 3 times daily as needed for Nausea or Vomiting, Disp-30 tablet, R-0Normal      DULoxetine (CYMBALTA) 60 MG extended release capsule Take 60 mg by mouth dailyHistorical Med      rivaroxaban (XARELTO) 20 MG TABS tablet Take 1 tablet by mouth daily (with breakfast), Disp-30 tablet, R-5Normal      sertraline (ZOLOFT) 50 MG tablet Take 1 tablet by mouth daily, Disp-90 tablet, R-1Normal      albuterol sulfate HFA (VENTOLIN HFA) 108 (90 Base) MCG/ACT inhaler Inhale 2 puffs into the lungs 4 times daily as needed for Wheezing, Disp-1 each, R-1Normal      mometasone-formoterol (DULERA) 100-5 MCG/ACT inhaler Inhale 2 puffs into the lungs every 12 hours, Disp-1 each, R-2Normal      Spacer/Aero-Holding Chambers SHAWANDA DAILY Starting Mon 10/25/2021, Disp-1 each, R-0, Normal      Prenatal Vit-Fe Fumarate-FA (PRENATAL VITAMIN) 27-0.8 MG TABS Take 1 tablet by mouth daily, Disp-90 tablet, R-3Normal      Blood Pressure Monitor KIT Disp-1 kit, R-0, NormalTake blood pressure daily             ALLERGIES     Patient is is allergic to imitrex [sumatriptan], mucinex [guaifenesin er], and orilissa [elagolix]. FAMILY HISTORY     Patient'sfamily history includes Cancer in her father; Colon Polyps in her maternal grandmother and paternal grandfather; High Blood Pressure in her father; Lupus in her mother; Migraines in her mother; No Known Problems in her brother, brother, maternal grandfather, and paternal grandmother; Other in her father; Thyroid Disease in her father. SOCIAL HISTORY     Patient  reports that she has never smoked. She has never used smokeless tobacco. She reports that she does not drink alcohol and does not use drugs.     PHYSICAL EXAM ED TRIAGE VITALS  BP: 126/64, Temp: 98.2 °F (36.8 °C), Heart Rate: 80, Resp: 14, SpO2: 99 %  Physical Exam  Vitals and nursing note reviewed. Constitutional:       General: She is not in acute distress. Appearance: Normal appearance. She is not ill-appearing or toxic-appearing. HENT:      Head: Normocephalic and atraumatic. Nose: No congestion or rhinorrhea. Mouth/Throat:      Mouth: Mucous membranes are moist.      Pharynx: Oropharynx is clear. Eyes:      Extraocular Movements: Extraocular movements intact. Conjunctiva/sclera: Conjunctivae normal.      Pupils: Pupils are equal, round, and reactive to light. Cardiovascular:      Rate and Rhythm: Normal rate and regular rhythm. Pulses: Normal pulses. Heart sounds: Normal heart sounds. No murmur heard. Pulmonary:      Effort: Pulmonary effort is normal. No respiratory distress. Breath sounds: Normal breath sounds. No wheezing. Abdominal:      General: Abdomen is flat. Palpations: Abdomen is soft. Tenderness: There is no abdominal tenderness. Musculoskeletal:         General: No swelling or deformity. Normal range of motion. Right forearm: Tenderness present. Arms:       Cervical back: Normal range of motion and neck supple. Comments: Approximate 4 x 4 centimeter area; contusion to the right forearm. Some mild tenderness. She has full range of motion. Skin:     General: Skin is warm and dry. Capillary Refill: Capillary refill takes less than 2 seconds. Findings: No rash. Neurological:      General: No focal deficit present. Mental Status: She is alert and oriented to person, place, and time. Mental status is at baseline. Psychiatric:         Mood and Affect: Mood normal.         Behavior: Behavior normal.         Thought Content:  Thought content normal.         Judgment: Judgment normal.       DIAGNOSTIC RESULTS   Labs:No results found for this visit on 02/03/23. IMAGING:  XR RADIUS ULNA RIGHT (2 VIEWS)   Final Result   Soft tissue swelling no fracture            **This report has been created using voice recognition software. It may contain minor errors which are inherent in voice recognition technology. **      Final report electronically signed by Dr. Luis Felipe Chow on 2/3/2023 3:54 PM        URGENT CARE COURSE:         Medications - No data to display  PROCEDURES:  FINALIMPRESSION      1. Contusion of multiple sites of right shoulder and upper arm, initial encounter        DISPOSITION/PLAN   DISPOSITION Decision To Discharge 02/03/2023 04:20:00 PM    X-ray of the right arm shows soft tissue swelling no fracture. Will recommend rest, ice, compression, elevation. Will Ace wrap. Follow-up with primary care provider as needed. She denies any questions.     PATIENT REFERRED TO:  Ambrose Duke MD  [address] [place] [address]  [phone]      As needed, If symptoms worsen  DISCHARGE MEDICATIONS:  Discharge Medication List as of 2/3/2023  4:21 PM        Discharge Medication List as of 2/3/2023  4:21 PM          KARLA Randolph - KARLA Smith - ADOLFO  02/03/23 1831

## 2023-02-07 ENCOUNTER — HOSPITAL ENCOUNTER (EMERGENCY)
Age: 23
Discharge: HOME | End: 2023-02-07
Payer: COMMERCIAL

## 2023-02-07 VITALS
OXYGEN SATURATION: 97 % | HEART RATE: 112 BPM | RESPIRATION RATE: 16 BRPM | SYSTOLIC BLOOD PRESSURE: 142 MMHG | BODY MASS INDEX: 41.79 KG/M2 | DIASTOLIC BLOOD PRESSURE: 79 MMHG | WEIGHT: 283 LBS | TEMPERATURE: 98.9 F

## 2023-02-07 DIAGNOSIS — N30.01 ACUTE CYSTITIS WITH HEMATURIA: ICD-10-CM

## 2023-02-07 DIAGNOSIS — J06.9 VIRAL UPPER RESPIRATORY TRACT INFECTION: Primary | ICD-10-CM

## 2023-02-07 LAB
BILIRUB UR STRIP.AUTO-MCNC: NEGATIVE MG/DL
CHARACTER UR: CLEAR
COLOR: YELLOW
FLUAV AG SPEC QL: NEGATIVE
FLUBV AG SPEC QL: NEGATIVE
GLUCOSE UR QL STRIP.AUTO: NEGATIVE MG/DL
KETONES UR QL STRIP.AUTO: NEGATIVE
NITRITE UR QL STRIP.AUTO: NEGATIVE
PH UR STRIP.AUTO: 6.5 [PH] (ref 5–9)
PROT UR STRIP.AUTO-MCNC: NEGATIVE MG/DL
RBC #/AREA URNS HPF: ABNORMAL /[HPF]
S PYO AG THROAT QL: NEGATIVE
SARS-COV-2 RDRP RESP QL NAA+PROBE: NOT  DETECTED
SP GR UR STRIP.AUTO: 1.01 (ref 1–1.03)
UROBILINOGEN, URINE: 0.2 EU/DL (ref 0.2–1)
WBC #/AREA URNS HPF: ABNORMAL /[HPF]

## 2023-02-07 PROCEDURE — 87804 INFLUENZA ASSAY W/OPTIC: CPT

## 2023-02-07 PROCEDURE — 99213 OFFICE O/P EST LOW 20 MIN: CPT | Performed by: NURSE PRACTITIONER

## 2023-02-07 PROCEDURE — 99213 OFFICE O/P EST LOW 20 MIN: CPT

## 2023-02-07 PROCEDURE — 87077 CULTURE AEROBIC IDENTIFY: CPT

## 2023-02-07 PROCEDURE — 87651 STREP A DNA AMP PROBE: CPT

## 2023-02-07 PROCEDURE — 87086 URINE CULTURE/COLONY COUNT: CPT

## 2023-02-07 PROCEDURE — 81003 URINALYSIS AUTO W/O SCOPE: CPT

## 2023-02-07 PROCEDURE — 87186 SC STD MICRODIL/AGAR DIL: CPT

## 2023-02-07 PROCEDURE — 87635 SARS-COV-2 COVID-19 AMP PRB: CPT

## 2023-02-07 RX ORDER — BENZONATATE 200 MG/1
200 CAPSULE ORAL 3 TIMES DAILY PRN
Qty: 30 CAPSULE | Refills: 0 | Status: SHIPPED | OUTPATIENT
Start: 2023-02-07

## 2023-02-07 RX ORDER — NITROFURANTOIN 25; 75 MG/1; MG/1
100 CAPSULE ORAL 2 TIMES DAILY
Qty: 14 CAPSULE | Refills: 0 | Status: SHIPPED | OUTPATIENT
Start: 2023-02-07 | End: 2023-02-08

## 2023-02-07 ASSESSMENT — ENCOUNTER SYMPTOMS
DIARRHEA: 0
CONSTIPATION: 0
ABDOMINAL PAIN: 0
COUGH: 1
SORE THROAT: 1
NAUSEA: 0
EYE PAIN: 0
RHINORRHEA: 0
BLOOD IN STOOL: 0
WHEEZING: 0
SHORTNESS OF BREATH: 0
VOMITING: 0

## 2023-02-07 ASSESSMENT — PAIN - FUNCTIONAL ASSESSMENT: PAIN_FUNCTIONAL_ASSESSMENT: 0-10

## 2023-02-07 NOTE — ED NOTES
Discharge instructions and prescriptions reviewed with pt. Pt verbalized understanding. Pt ambulated out in stable condition. Assessment unchanged upon discharge.      Gagan Covington RN  02/07/23 [id number]

## 2023-02-07 NOTE — DISCHARGE INSTRUCTIONS
Go to ER for worsening symptoms, inability to swallow, inability to keep liquids down, inability to urinate for greater than 8 hours or difficulty breathing. Follow-up with your primary care provider. Increase oral intake. Warm salt water gargles after meals and at bedtime to help with sore throat. May take tylenol or ibuprofen as needed for pain or fever. Wear cotton underwear and loose fitting garments. Increase oral fluid intake.

## 2023-02-07 NOTE — ED TRIAGE NOTES
Pain with urination x3 days and yesterday started with congestion and sore throat with cough- exposure to strep flu and covid

## 2023-02-07 NOTE — Clinical Note
Josefa Vincent was seen and treated in our emergency department on 2/7/2023. She may return to work on 02/09/2023. If you have any questions or concerns, please don't hesitate to call.       Edwin Ornelas, APRN - CNP

## 2023-02-07 NOTE — ED PROVIDER NOTES
[phone]       Chief Complaint   Patient presents with    Nasal Congestion    Urinary Frequency    Fever          Nurses Notes reviewed and I agree except as noted in the HPI. HISTORY OF PRESENT ILLNESS   Gisselle Taylor is a 20 y.o. female who presents to urgent care with complaint of nasal congestion, cough, sore throat that started yesterday and dysuria and low back pain that started 3 days ago. Patient also states that she has had a fever intermittently. Patient denies other symptoms including chest pain, shortness of breath, vomiting or diarrhea. REVIEW OF SYSTEMS     Review of Systems   Constitutional:  Negative for appetite change, chills, fatigue, fever and unexpected weight change. HENT:  Positive for congestion and sore throat. Negative for ear pain and rhinorrhea. Eyes:  Negative for pain and visual disturbance. Respiratory:  Positive for cough. Negative for shortness of breath and wheezing. Cardiovascular:  Negative for chest pain, palpitations and leg swelling. Gastrointestinal:  Negative for abdominal pain, blood in stool, constipation, diarrhea, nausea and vomiting. Genitourinary:  Positive for dysuria. Negative for frequency and hematuria. Musculoskeletal:  Negative for arthralgias, joint swelling and neck stiffness. Skin:  Negative for rash. Neurological:  Negative for dizziness, syncope, weakness, light-headedness and headaches. Hematological:  Does not bruise/bleed easily.      PAST MEDICAL HISTORY         Diagnosis Date    Anxiety     Chronic GERD     Chronic migraine     takes verapamil    Depression     Gallstones 10/2019    Gastroparesis     diagnosis as a child    Hx of blood clots     PE     Hypertension     Miscarriage     multiple    Obesity     Pulmonary embolism ([facility] [address]     8 months ago-was on Xarelto-sees Dr Jhonny Pittman in [place]    Splenomegaly        SURGICAL HISTORY     Patient  has a past surgical history that includes Tonsillectomy; [place] tooth extraction; Elbow surgery (Right); Cholecystectomy, laparoscopic (N/A, 10/30/2019); and Dilation and curettage of uterus (03/16/2021). CURRENT MEDICATIONS       Discharge Medication List as of 2/7/2023  1:03 PM        CONTINUE these medications which have NOT CHANGED    Details   ondansetron (ZOFRAN-ODT) 4 MG disintegrating tablet Take 1 tablet by mouth 3 times daily as needed for Nausea or Vomiting, Disp-30 tablet, R-0Normal      DULoxetine (CYMBALTA) 60 MG extended release capsule Take 60 mg by mouth dailyHistorical Med      rivaroxaban (XARELTO) 20 MG TABS tablet Take 1 tablet by mouth daily (with breakfast), Disp-30 tablet, R-5Normal      sertraline (ZOLOFT) 50 MG tablet Take 1 tablet by mouth daily, Disp-90 tablet, R-1Normal      albuterol sulfate HFA (VENTOLIN HFA) 108 (90 Base) MCG/ACT inhaler Inhale 2 puffs into the lungs 4 times daily as needed for Wheezing, Disp-1 each, R-1Normal      mometasone-formoterol (DULERA) 100-5 MCG/ACT inhaler Inhale 2 puffs into the lungs every 12 hours, Disp-1 each, R-2Normal      Spacer/Aero-Holding Chambers SHAWANDA DAILY Starting Mon 10/25/2021, Disp-1 each, R-0, Normal      Prenatal Vit-Fe Fumarate-FA (PRENATAL VITAMIN) 27-0.8 MG TABS Take 1 tablet by mouth daily, Disp-90 tablet, R-3Normal      Blood Pressure Monitor KIT Disp-1 kit, R-0, NormalTake blood pressure daily             ALLERGIES     Patient is is allergic to imitrex [sumatriptan], mucinex [guaifenesin er], and orilissa [elagolix]. FAMILY HISTORY     Patient'sfamily history includes Cancer in her father; Colon Polyps in her maternal grandmother and paternal grandfather; High Blood Pressure in her father; Lupus in her mother; Migraines in her mother; No Known Problems in her brother, brother, maternal grandfather, and paternal grandmother; Other in her father; Thyroid Disease in her father.     SOCIAL HISTORY     Patient  reports that she has never smoked. She has never used smokeless tobacco. She reports that she does not drink alcohol and does not use drugs. PHYSICAL EXAM     ED TRIAGE VITALS  BP: (!) 142/79, Temp: 98.9 °F (37.2 °C), Heart Rate: (!) 112, Resp: 16, SpO2: 97 %  Physical Exam  Vitals and nursing note reviewed. Constitutional:       Appearance: She is well-developed. HENT:      Head: Normocephalic and atraumatic. Mouth/Throat:      Mouth: Mucous membranes are moist.      Pharynx: Posterior oropharyngeal erythema present. No oropharyngeal exudate. Tonsils: No tonsillar exudate. 0 on the right. 0 on the left. Eyes:      Conjunctiva/sclera: Conjunctivae normal.   Cardiovascular:      Rate and Rhythm: Normal rate and regular rhythm. Heart sounds: Normal heart sounds. No murmur heard. No gallop. Pulmonary:      Effort: Pulmonary effort is normal. No respiratory distress. Breath sounds: Normal breath sounds. No stridor. No decreased breath sounds, wheezing, rhonchi or rales. Chest:      Chest wall: No tenderness. Abdominal:      General: There is no distension. Palpations: There is no mass. Tenderness: There is no abdominal tenderness. There is no right CVA tenderness, left CVA tenderness, guarding or rebound. Hernia: No hernia is present. Musculoskeletal:         General: Normal range of motion. Cervical back: Normal range of motion and neck supple. Skin:     General: Skin is warm and dry. Neurological:      Mental Status: She is alert and oriented to person, place, and time.        DIAGNOSTIC RESULTS   Labs:  Results for orders placed or performed during the hospital encounter of 02/07/23   Strep Screen Group A Throat   Result Value Ref Range    Rapid Strep A Screen NEGATIVE    COVID-19, Rapid   Result Value Ref Range    SARS-CoV-2, LINNEA NOT  DETECTED NOT DETECTED   Rapid influenza A/B antigens   Result Value Ref Range    Flu A Antigen Negative NEGATIVE    Flu B Antigen Negative NEGATIVE Urinalysis   Result Value Ref Range    Glucose, Ur Negative NEGATIVE mg/dl    Bilirubin Urine Negative NEGATIVE    Ketones, Urine Negative NEGATIVE    Specific Gravity, UA 1.015 1.002 - 1.030    Blood, Urine Moderate (A) NEGATIVE    pH, UA 6.50 5.0 - 9.0    Protein, UA Negative NEGATIVE mg/dl    Urobilinogen, Urine 0.20 0.2 - 1.0 eu/dl    Nitrite, Urine Negative NEGATIVE    Leukocyte Esterase, Urine Moderate (A) NEGATIVE    Color, UA Yellow STRAW-YELLOW    Character, Urine Clear CLEAR-SL CLOUD       IMAGING:  No orders to display     URGENT CARE COURSE:        MDM      Patient presents to urgent care with complaint of nasal congestion, cough, sore throat that started yesterday and dysuria and low back pain that started 3 days ago. Differential diagnosis include not limited to  uti, strep throat, covid 19, influenza or viral illness. Rapid strep, influenza and COVID test are all negative. Urine is positive for leukoesterase. Will treat with Macrobid and send urine on for culture. Patient to follow-up with primary care provider. Patient instructed to go to ER for worsening symptoms, inability to swallow, inability to keep liquids down, inability to urinate for greater than 8 hours or difficulty breathing. Follow-up with your primary care provider. Increase oral intake. Warm salt water gargles after meals and at bedtime to help with sore throat. May take tylenol or ibuprofen as needed for pain or fever. Wear cotton underwear and loose fitting garments. Increase oral fluid intake. Medications - No data to display  PROCEDURES:    Procedures    FINALIMPRESSION      1. Viral upper respiratory tract infection    2.  Acute cystitis with hematuria        DISPOSITION/PLAN   DISPOSITION Decision To Discharge 02/07/2023 01:02:39 PM    PATIENT REFERRED TO:  Jeanenne Fothergill, MD  [address]    In 2 days    DISCHARGE MEDICATIONS:  Discharge Medication List as of 2/7/2023  1:03 PM        START taking these medications    Details   nitrofurantoin, macrocrystal-monohydrate, (MACROBID) 100 MG capsule Take 1 capsule by mouth 2 times daily for 7 days, Disp-14 capsule, R-0Normal      benzonatate (TESSALON) 200 MG capsule Take 1 capsule by mouth 3 times daily as needed for Cough, Disp-30 capsule, R-0Normal           Discharge Medication List as of 2/7/2023  1:03 PM          KARLA Copeland CNP, APRN - CNP  02/07/23 1312

## 2023-02-08 ENCOUNTER — APPOINTMENT (OUTPATIENT)
Dept: CT IMAGING | Age: 23
End: 2023-02-08
Payer: COMMERCIAL

## 2023-02-08 ENCOUNTER — HOSPITAL ENCOUNTER (EMERGENCY)
Age: 23
Discharge: HOME | End: 2023-02-08
Payer: COMMERCIAL

## 2023-02-08 VITALS
SYSTOLIC BLOOD PRESSURE: 118 MMHG | DIASTOLIC BLOOD PRESSURE: 75 MMHG | RESPIRATION RATE: 18 BRPM | TEMPERATURE: 98.3 F | OXYGEN SATURATION: 98 % | HEART RATE: 85 BPM

## 2023-02-08 DIAGNOSIS — Z87.442 HISTORY OF KIDNEY STONES: ICD-10-CM

## 2023-02-08 DIAGNOSIS — N39.0 URINARY TRACT INFECTION WITH HEMATURIA, SITE UNSPECIFIED: Primary | ICD-10-CM

## 2023-02-08 DIAGNOSIS — R31.9 URINARY TRACT INFECTION WITH HEMATURIA, SITE UNSPECIFIED: Primary | ICD-10-CM

## 2023-02-08 DIAGNOSIS — J06.9 VIRAL URI: ICD-10-CM

## 2023-02-08 LAB
ANION GAP SERPL CALC-SCNC: 15 MEQ/L (ref 8–16)
B-HCG SERPL QL: NEGATIVE
BACTERIA UR CULT: ABNORMAL
BACTERIA URNS QL MICRO: ABNORMAL /HPF
BASOPHILS ABSOLUTE: 0 THOU/MM3 (ref 0–0.1)
BASOPHILS NFR BLD AUTO: 0.4 %
BILIRUB UR QL STRIP.AUTO: NEGATIVE
BUN SERPL-MCNC: 9 MG/DL (ref 7–22)
CALCIUM SERPL-MCNC: 9.3 MG/DL (ref 8.5–10.5)
CASTS #/AREA URNS LPF: ABNORMAL /LPF
CASTS 2: ABNORMAL /LPF
CHARACTER UR: ABNORMAL
CHLORIDE SERPL-SCNC: 102 MEQ/L (ref 98–111)
CO2 SERPL-SCNC: 21 MEQ/L (ref 23–33)
COLOR: YELLOW
CREAT SERPL-MCNC: 0.7 MG/DL (ref 0.4–1.2)
CRYSTALS URNS MICRO: ABNORMAL
DEPRECATED RDW RBC AUTO: 40.6 FL (ref 35–45)
EOSINOPHIL NFR BLD AUTO: 0.7 %
EOSINOPHILS ABSOLUTE: 0.1 THOU/MM3 (ref 0–0.4)
EPITHELIAL CELLS, UA: ABNORMAL /HPF
ERYTHROCYTE [DISTWIDTH] IN BLOOD BY AUTOMATED COUNT: 12.4 % (ref 11.5–14.5)
GFR SERPL CREATININE-BSD FRML MDRD: > 60 ML/MIN/1.73M2
GLUCOSE SERPL-MCNC: 104 MG/DL (ref 70–108)
GLUCOSE UR QL STRIP.AUTO: NEGATIVE MG/DL
HCT VFR BLD AUTO: 42 % (ref 37–47)
HGB BLD-MCNC: 13.8 GM/DL (ref 12–16)
HGB UR QL STRIP.AUTO: ABNORMAL
IMM GRANULOCYTES # BLD AUTO: 0.03 THOU/MM3 (ref 0–0.07)
IMM GRANULOCYTES NFR BLD AUTO: 0.4 %
KETONES UR QL STRIP.AUTO: NEGATIVE
LYMPHOCYTES ABSOLUTE: 1.8 THOU/MM3 (ref 1–4.8)
LYMPHOCYTES NFR BLD AUTO: 21.7 %
MCH RBC QN AUTO: 29.3 PG (ref 26–33)
MCHC RBC AUTO-ENTMCNC: 32.9 GM/DL (ref 32.2–35.5)
MCV RBC AUTO: 89.2 FL (ref 81–99)
MISCELLANEOUS 2: ABNORMAL
MONOCYTES ABSOLUTE: 0.6 THOU/MM3 (ref 0.4–1.3)
MONOCYTES NFR BLD AUTO: 6.5 %
NEUTROPHILS NFR BLD AUTO: 70.3 %
NITRITE UR QL STRIP: NEGATIVE
NRBC BLD AUTO-RTO: 0 /100 WBC
ORGANISM: ABNORMAL
OSMOLALITY SERPL CALC.SUM OF ELEC: 274.7 MOSMOL/KG (ref 275–300)
PH UR STRIP.AUTO: 7 [PH] (ref 5–9)
PLATELET # BLD AUTO: 240 THOU/MM3 (ref 130–400)
PMV BLD AUTO: 10.6 FL (ref 9.4–12.4)
POTASSIUM SERPL-SCNC: 3.7 MEQ/L (ref 3.5–5.2)
PROT UR STRIP.AUTO-MCNC: 100 MG/DL
RBC # BLD AUTO: 4.71 MILL/MM3 (ref 4.2–5.4)
RBC URINE: ABNORMAL /HPF
RENAL EPI CELLS #/AREA URNS HPF: ABNORMAL /[HPF]
SEGMENTED NEUTROPHILS ABSOLUTE COUNT: 6 THOU/MM3 (ref 1.8–7.7)
SODIUM SERPL-SCNC: 138 MEQ/L (ref 135–145)
SP GR UR REFRACT.AUTO: 1.01 (ref 1–1.03)
UROBILINOGEN, URINE: 0.2 EU/DL (ref 0–1)
WBC # BLD AUTO: 8.5 THOU/MM3 (ref 4.8–10.8)
WBC #/AREA URNS HPF: > 200 /HPF
WBC #/AREA URNS HPF: ABNORMAL /[HPF]
YEAST LIKE FUNGI URNS QL MICRO: ABNORMAL

## 2023-02-08 PROCEDURE — 85025 COMPLETE CBC W/AUTO DIFF WBC: CPT

## 2023-02-08 PROCEDURE — 2580000003 HC RX 258: Performed by: NURSE PRACTITIONER

## 2023-02-08 PROCEDURE — 74176 CT ABD & PELVIS W/O CONTRAST: CPT

## 2023-02-08 PROCEDURE — 96365 THER/PROPH/DIAG IV INF INIT: CPT

## 2023-02-08 PROCEDURE — 99284 EMERGENCY DEPT VISIT MOD MDM: CPT

## 2023-02-08 PROCEDURE — 87077 CULTURE AEROBIC IDENTIFY: CPT

## 2023-02-08 PROCEDURE — 84703 CHORIONIC GONADOTROPIN ASSAY: CPT

## 2023-02-08 PROCEDURE — 87086 URINE CULTURE/COLONY COUNT: CPT

## 2023-02-08 PROCEDURE — 87186 SC STD MICRODIL/AGAR DIL: CPT

## 2023-02-08 PROCEDURE — 80048 BASIC METABOLIC PNL TOTAL CA: CPT

## 2023-02-08 PROCEDURE — 6370000000 HC RX 637 (ALT 250 FOR IP): Performed by: NURSE PRACTITIONER

## 2023-02-08 PROCEDURE — 96375 TX/PRO/DX INJ NEW DRUG ADDON: CPT

## 2023-02-08 PROCEDURE — 81001 URINALYSIS AUTO W/SCOPE: CPT

## 2023-02-08 PROCEDURE — 36415 COLL VENOUS BLD VENIPUNCTURE: CPT

## 2023-02-08 PROCEDURE — 6360000002 HC RX W HCPCS: Performed by: NURSE PRACTITIONER

## 2023-02-08 RX ORDER — OXYMETAZOLINE HYDROCHLORIDE 0.05 G/100ML
2 SPRAY NASAL ONCE
Status: COMPLETED | OUTPATIENT
Start: 2023-02-08 | End: 2023-02-08

## 2023-02-08 RX ORDER — PHENAZOPYRIDINE HYDROCHLORIDE 100 MG/1
100 TABLET, FILM COATED ORAL 3 TIMES DAILY PRN
Qty: 9 TABLET | Refills: 0 | Status: SHIPPED | OUTPATIENT
Start: 2023-02-08 | End: 2023-02-11

## 2023-02-08 RX ORDER — CIPROFLOXACIN 500 MG/1
500 TABLET, FILM COATED ORAL 2 TIMES DAILY
Qty: 20 TABLET | Refills: 0 | Status: SHIPPED | OUTPATIENT
Start: 2023-02-08 | End: 2023-02-18

## 2023-02-08 RX ORDER — KETOROLAC TROMETHAMINE 10 MG/1
10 TABLET, FILM COATED ORAL EVERY 6 HOURS PRN
Qty: 20 TABLET | Refills: 0 | Status: SHIPPED | OUTPATIENT
Start: 2023-02-08

## 2023-02-08 RX ORDER — PHENAZOPYRIDINE HYDROCHLORIDE 100 MG/1
200 TABLET, FILM COATED ORAL ONCE
Status: COMPLETED | OUTPATIENT
Start: 2023-02-08 | End: 2023-02-08

## 2023-02-08 RX ORDER — KETOROLAC TROMETHAMINE 30 MG/ML
30 INJECTION, SOLUTION INTRAMUSCULAR; INTRAVENOUS ONCE
Status: COMPLETED | OUTPATIENT
Start: 2023-02-08 | End: 2023-02-08

## 2023-02-08 RX ORDER — 0.9 % SODIUM CHLORIDE 0.9 %
1000 INTRAVENOUS SOLUTION INTRAVENOUS ONCE
Status: COMPLETED | OUTPATIENT
Start: 2023-02-08 | End: 2023-02-08

## 2023-02-08 RX ORDER — ONDANSETRON 2 MG/ML
4 INJECTION INTRAMUSCULAR; INTRAVENOUS ONCE
Status: COMPLETED | OUTPATIENT
Start: 2023-02-08 | End: 2023-02-08

## 2023-02-08 RX ORDER — TAMSULOSIN HYDROCHLORIDE 0.4 MG/1
0.4 CAPSULE ORAL ONCE
Status: COMPLETED | OUTPATIENT
Start: 2023-02-08 | End: 2023-02-08

## 2023-02-08 RX ORDER — MORPHINE SULFATE 4 MG/ML
4 INJECTION, SOLUTION INTRAMUSCULAR; INTRAVENOUS ONCE
Status: COMPLETED | OUTPATIENT
Start: 2023-02-08 | End: 2023-02-08

## 2023-02-08 RX ORDER — TAMSULOSIN HYDROCHLORIDE 0.4 MG/1
0.4 CAPSULE ORAL DAILY
Qty: 10 CAPSULE | Refills: 0 | Status: SHIPPED | OUTPATIENT
Start: 2023-02-08 | End: 2023-02-18

## 2023-02-08 RX ADMIN — TAMSULOSIN HYDROCHLORIDE 0.4 MG: 0.4 CAPSULE ORAL at 02:52

## 2023-02-08 RX ADMIN — KETOROLAC TROMETHAMINE 30 MG: 30 INJECTION, SOLUTION INTRAMUSCULAR; INTRAVENOUS at 01:19

## 2023-02-08 RX ADMIN — ONDANSETRON 4 MG: 2 INJECTION INTRAMUSCULAR; INTRAVENOUS at 01:19

## 2023-02-08 RX ADMIN — PHENAZOPYRIDINE 200 MG: 100 TABLET ORAL at 02:52

## 2023-02-08 RX ADMIN — CEFTRIAXONE SODIUM 1000 MG: 1 INJECTION, POWDER, FOR SOLUTION INTRAMUSCULAR; INTRAVENOUS at 02:57

## 2023-02-08 RX ADMIN — MORPHINE SULFATE 4 MG: 4 INJECTION, SOLUTION INTRAMUSCULAR; INTRAVENOUS at 02:54

## 2023-02-08 RX ADMIN — OXYMETAZOLINE HCL 2 SPRAY: 0.05 SPRAY NASAL at 02:57

## 2023-02-08 RX ADMIN — SODIUM CHLORIDE 1000 ML: 9 INJECTION, SOLUTION INTRAVENOUS at 01:18

## 2023-02-08 ASSESSMENT — PAIN SCALES - GENERAL
PAINLEVEL_OUTOF10: 7
PAINLEVEL_OUTOF10: 10
PAINLEVEL_OUTOF10: 10
PAINLEVEL_OUTOF10: 7

## 2023-02-08 ASSESSMENT — PAIN DESCRIPTION - LOCATION
LOCATION: BACK
LOCATION: BACK

## 2023-02-08 ASSESSMENT — PAIN - FUNCTIONAL ASSESSMENT: PAIN_FUNCTIONAL_ASSESSMENT: 0-10

## 2023-02-08 NOTE — ED TRIAGE NOTES
Comes in tonight for c/o back pain at a  states was seen at a urgent care yesterday and was dx with a uti. She started on her antibiotics but is having a lot of  back pain.

## 2023-02-08 NOTE — DISCHARGE INSTRUCTIONS
Please follow-up with your primary care provider within one week for recheck of your urine. His return to the emergency room or to family doctor if you develop fever, flank pain, unable to urinate, or mary ellen hematuria. Increase your fluid intake. Take all of the antibiotic as prescribed.

## 2023-02-09 LAB
BACTERIA UR CULT: ABNORMAL
ORGANISM: ABNORMAL

## 2023-02-10 ENCOUNTER — TELEPHONE (OUTPATIENT)
Dept: FAMILY MEDICINE CLINIC | Age: 23
End: 2023-02-10

## 2023-02-10 NOTE — ED PROVIDER NOTES
[facility] [address] [address] EMERGENCY DEPT      EMERGENCY MEDICINE     Pt Name: Valencia Rajan  MRN: [id number]  [place] 2000  Date of evaluation: 2/8/2023  Provider: KARLA Saleem - ADOLFO    CHIEF COMPLAINT       Chief Complaint   Patient presents with    Back Pain    Urinary Tract Infection     HISTORY OF PRESENT ILLNESS   Valencia Rajan is a pleasant 20 y.o. female who presents to the emergency department from home with c/o flank pain and a UTI. She was seen at [facility] yesterday and started on abx. Patient states pain has gotten much worse and she is very uncomfortable. No fever. Nausea, no vomtiing      History is obtained from:  patient  PASTMEDICAL HISTORY     Past Medical History:   Diagnosis Date    Anxiety     Chronic GERD     Chronic migraine     takes verapamil    Depression     Gallstones 10/2019    Gastroparesis     diagnosis as a child    Hx of blood clots     PE     Hypertension     Miscarriage     multiple    Obesity     Pulmonary embolism ([address]     8 months ago-was on Xarelto-sees Dr Monica Her in [place]    Splenomegaly        Patient Active Problem List   Diagnosis Code    Cholelithiasis without obstruction K80.20    Chronic migraine TRS0696    Pulmonary embolism (HCC) I26.99    Anxiety F41.9    Depression F32. A    Gastroparesis K31.84    Obesity E66.9    Chronic GERD K21.9    Hypertension I10    Hx of blood clots Z86.718    Costochondral chest pain R07.89    Muscle strain T14. 8XXA    Chest pain, atypical R07.89    History of pulmonary embolism Z86.711    Mild intermittent asthma J45.20    Uterine bleeding, dysfunctional N93.8    Agnosia for taste R48.1    Irregular menses N92.6    Loss of sense of smell R43.0    Lower abdominal pain R10.30    Menorrhagia N92.0     SURGICAL HISTORY       Past Surgical History:   Procedure Laterality Date    CHOLECYSTECTOMY, LAPAROSCOPIC N/A 10/30/2019    ROBOTIC LAP BRANNON performed by Antonino Gonzalez MD at [address] [place] [address]  03/16/2021 ELBOW SURGERY Right     TONSILLECTOMY      WISDOM TOOTH EXTRACTION         CURRENT MEDICATIONS       Discharge Medication List as of 2/8/2023  3:13 AM        CONTINUE these medications which have NOT CHANGED    Details   benzonatate (TESSALON) 200 MG capsule Take 1 capsule by mouth 3 times daily as needed for Cough, Disp-30 capsule, R-0Normal      ondansetron (ZOFRAN-ODT) 4 MG disintegrating tablet Take 1 tablet by mouth 3 times daily as needed for Nausea or Vomiting, Disp-30 tablet, R-0Normal      DULoxetine (CYMBALTA) 60 MG extended release capsule Take 60 mg by mouth dailyHistorical Med      rivaroxaban (XARELTO) 20 MG TABS tablet Take 1 tablet by mouth daily (with breakfast), Disp-30 tablet, R-5Normal      sertraline (ZOLOFT) 50 MG tablet Take 1 tablet by mouth daily, Disp-90 tablet, R-1Normal      albuterol sulfate HFA (VENTOLIN HFA) 108 (90 Base) MCG/ACT inhaler Inhale 2 puffs into the lungs 4 times daily as needed for Wheezing, Disp-1 each, R-1Normal      mometasone-formoterol (DULERA) 100-5 MCG/ACT inhaler Inhale 2 puffs into the lungs every 12 hours, Disp-1 each, R-2Normal      Spacer/Aero-Holding Chambers SHAWANDA DAILY Starting Mon 10/25/2021, Disp-1 each, R-0, Normal      Prenatal Vit-Fe Fumarate-FA (PRENATAL VITAMIN) 27-0.8 MG TABS Take 1 tablet by mouth daily, Disp-90 tablet, R-3Normal      Blood Pressure Monitor KIT Disp-1 kit, R-0, NormalTake blood pressure daily             ALLERGIES     is allergic to imitrex [sumatriptan], mucinex [guaifenesin er], and orilissa [elagolix]. FAMILY HISTORY     She indicated that her mother is alive. She indicated that her father is alive. She indicated that both of her brothers are alive. She indicated that her maternal grandmother is alive. She indicated that her maternal grandfather is alive. She indicated that her paternal grandmother is alive. She indicated that her paternal grandfather is alive. She indicated that the status of her neg hx is unknown. SOCIAL HISTORY       Social History     Tobacco Use    Smoking status: Never    Smokeless tobacco: Never    Tobacco comments:     vapes-no nicotine   Vaping Use    Vaping Use: Every day    Last attempt to quit: 8/5/2021    Substances: Nicotine   Substance Use Topics    Alcohol use: Never    Drug use: Never       PHYSICAL EXAM       ED Triage Vitals [02/08/23 0025]   BP Temp Temp Source Heart Rate Resp SpO2 Height Weight   (!) 153/95 98.3 °F (36.8 °C) Oral 100 18 100 % -- --       Physical Exam  Vitals and nursing note reviewed. Constitutional:       General: She is not in acute distress. Appearance: She is well-developed. She is not diaphoretic. HENT:      Head: Normocephalic and atraumatic. Nose: Nose normal.      Mouth/Throat:      Mouth: Mucous membranes are moist.      Pharynx: Oropharynx is clear. Eyes:      General:         Right eye: No discharge. Left eye: No discharge. Conjunctiva/sclera: Conjunctivae normal.   Neck:      Trachea: No tracheal deviation. Cardiovascular:      Rate and Rhythm: Normal rate and regular rhythm. Pulses: Normal pulses. Heart sounds: Normal heart sounds. No murmur heard. No gallop. Comments: Normal capillary refill  Pulmonary:      Effort: Pulmonary effort is normal. No respiratory distress. Breath sounds: Normal breath sounds. No stridor. Abdominal:      General: Bowel sounds are normal.      Palpations: Abdomen is soft. Tenderness: There is left CVA tenderness. Musculoskeletal:         General: No tenderness or deformity. Normal range of motion. Cervical back: Normal range of motion. Skin:     General: Skin is warm and dry. Capillary Refill: Capillary refill takes less than 2 seconds. Coloration: Skin is not pale. Findings: No erythema or rash. Neurological:      General: No focal deficit present. Mental Status: She is alert and oriented to person, place, and time.       Cranial Nerves: No cranial nerve deficit. Psychiatric:         Mood and Affect: Mood normal.         Behavior: Behavior normal.       FORMAL DIAGNOSTIC RESULTS     RADIOLOGY: Interpretation per the Radiologist below, if available at the time of this note (none if blank):    CT ABDOMEN PELVIS WO CONTRAST Additional Contrast? None   Final Result   Impression:      Right periureteral stranding without stone or dilation      Question recently passed stone versus infectious etiologies      This document has been electronically signed by: Jerod Mason MD on    02/08/2023 02:02 AM      All CTs at this facility use dose modulation techniques and iterative    reconstructions, and/or weight-based dosing   when appropriate to reduce radiation to a low as reasonably achievable.           LABS: (none if blank)  Labs Reviewed   CULTURE, REFLEXED, URINE - Abnormal; Notable for the following components:       Result Value    Organism Proteus mirabilis (*)     All other components within normal limits    Narrative:     Source: urine, clean catch       Site: clean void          Current Antibiotics: not stated   BASIC METABOLIC PANEL - Abnormal; Notable for the following components:    CO2 21 (*)     All other components within normal limits   URINE WITH REFLEXED MICRO - Abnormal; Notable for the following components:    Blood, Urine LARGE (*)     Protein,  (*)     Leukocyte Esterase, Urine MODERATE (*)     Character, Urine CLOUDY (*)     All other components within normal limits   OSMOLALITY - Abnormal; Notable for the following components:    Osmolality Calc 274.7 (*)     All other components within normal limits   CBC WITH AUTO DIFFERENTIAL   HCG, SERUM, QUALITATIVE   ANION GAP   GLOMERULAR FILTRATION RATE, ESTIMATED       (Any cultures that may have been sent were not resulted at the time of this patient visit)    [address] / ED COURSE:     Summary of Patient Presentation:      1) Number and Complexity of Problems Problem List This Visit:         Chief Complaint   Patient presents with    Back Pain    Urinary Tract Infection             Differential Diagnosis includes (but not limited to):  Uti, stone, pyelonephritis             2)  Data Reviewed (none if left blank, additional information can be found in the ED course)          My Independent interpretations:     EKG:           Imaging: Right periureteral stranding. No stone noted. CT advises there might be a recently passed stone. Infection could also cause the stranding    Labs:      Normal white count. Urine does show infection                 Decision Rules/Clinical Scores utilized:                           External Documentation Reviewed:         Previous patient encounter documents & history available on EMR was reviewed              See Formal Diagnostic Results above for the lab and radiology tests and orders. 3)  Treatment and Disposition         ED Reassessment:   Improved after medication         Case discussed with consulting clinician/attending physician:            Shared Decision-Making was performed and disposition discussed with the       Patient/Family and questions answered          Social determinants of health impacting treatment or disposition:            Code Status:  Full        MDM    Vitals Reviewed:    Vitals:    02/08/23 0025 02/08/23 0303   BP: (!) 153/95 118/75   Pulse: 100 85   Resp: 18 18   Temp: 98.3 °F (36.8 °C)    TempSrc: Oral    SpO2: 100% 98%       The patient was seen and examined. Appropriate diagnostic testing was performed and results reviewed with the patient. Patient has a urinary tract infection. CT scan is obtained and shows possible recently passed stone versus infection in the ureters. Patient's been hydrated medicated for pain. She is given a dose of Rocephin. Patient did have URI symptoms. Therefore she is been treated with Afrin.   Patient verbalized understanding about home care and increasing hydration. She is comfortable plan of care to be discharged home with close follow-up. She is advised to finish all the antibiotic she has been provided. They are changing the antibiotic to Cipro to better cover the urine. Patient verbalized understanding      The results of pertinent diagnostic studies and exam findings were discussed. The patients provisional diagnosis and plan of care were discussed with the patient and present family who expressed understanding and agreement with the POC. Any medications were reviewed and indications and risks of medications were discussed with the patient /family present. Strict verbal and written return precautions, instructions and appropriate follow-up provided to  the patient. Patient was DISCHARGED from the hospital. Based on the reassuring ED workup and patient's stable vital signs, I feel the patient may be safely discharged home. At this point in time, I believe the patient has the mental capacity to make medical decisions. No notes of EC Admission Criteria type on file. Please note that the patient was evaluated during a pandemic. All efforts were made for HIPPA compliance as well as provision of appropriate care. Patient was seen independently by myself. The patient's final impression and disposition and plan was determined by myself. Strict return precautions and follow up instructions were discussed with the patient prior to discharge, with which the patient agrees. Physical assessment findings, diagnostic testing(s) if applicable, and vital signs reviewed with patient/patient representative. Questions answered. Medications asdirected, including OTC medications for supportive care. Education provided on medications. Differential diagnosis(s) discussed with patient/patient representative. Home care/self care instructions reviewed withpatient/patient representative.   Patient is to follow-up with family care provider in 2-3 days if no improvement. Patient is to go to the emergency department if symptoms worsen. Patient/patient representative isaware of care plan, questions answered, verbalizes understanding and is in agreement. ED Medications administered this visit:  (None if blank)  Medications   0.9 % sodium chloride bolus (0 mLs IntraVENous Stopped 2/8/23 0254)   ketorolac (TORADOL) injection 30 mg (30 mg IntraVENous Given 2/8/23 0119)   ondansetron (ZOFRAN) injection 4 mg (4 mg IntraVENous Given 2/8/23 0119)   cefTRIAXone (ROCEPHIN) 1,000 mg in sodium chloride 0.9 % 50 mL IVPB (mini-bag) (0 mg IntraVENous Stopped 2/8/23 0327)   morphine injection 4 mg (4 mg IntraVENous Given 2/8/23 0254)   tamsulosin (FLOMAX) capsule 0.4 mg (0.4 mg Oral Given 2/8/23 0252)   oxymetazoline (AFRIN) 0.05 % nasal spray 2 spray (2 sprays Each Nostril Given 2/8/23 0257)   phenazopyridine (PYRIDIUM) tablet 200 mg (200 mg Oral Given 2/8/23 0252)         CONSULTS:  None    PROCEDURES: (None if blank)  Procedures:     CRITICAL CARE: (None if blank)      DISCHARGE PRESCRIPTIONS: (None if blank)  Discharge Medication List as of 2/8/2023  3:13 AM        START taking these medications    Details   ciprofloxacin (CIPRO) 500 MG tablet Take 1 tablet by mouth 2 times daily for 10 days, Disp-20 tablet, R-0Normal      phenazopyridine (PYRIDIUM) 100 MG tablet Take 1 tablet by mouth 3 times daily as needed for Pain, Disp-9 tablet, R-0Normal      tamsulosin (FLOMAX) 0.4 MG capsule Take 1 capsule by mouth daily for 10 doses, Disp-10 capsule, R-0Normal      ketorolac (TORADOL) 10 MG tablet Take 1 tablet by mouth every 6 hours as needed for Pain, Disp-20 tablet, R-0Normal             FINAL IMPRESSION      1. Urinary tract infection with hematuria, site unspecified    2. History of kidney stones    3.  Viral URI          DISPOSITION/PLAN   DISPOSITION Decision To Discharge 02/08/2023 03:27:45 AM      OUTPATIENT FOLLOW UP THE PATIENT:  MD Prabhakar Saenz [address]  [phone]    Schedule an appointment as soon as possible for a visit in 2 days  For follow up      KARLA Perry CNP, APRN - CNP  02/10/23 1140

## 2023-02-10 NOTE — PROGRESS NOTES
Pharmacy Note  ED Culture Follow-up    Gisselle Taylor is a 20 y.o. female. Allergies: Imitrex [sumatriptan], Mucinex [guaifenesin er], and Orilissa [elagolix]     Current antimicrobials:   Reviewed patient's urine culture - culture positive for Proteus mirabilis. Patient was discharged on ciprofloxacin 500 mg BID x 10 days, and culture is sensitive to prescribed medication. Antibiotic prescribed at discharge is appropriate - no changes made to antibiotic regimen. Please call with any questions.  Ext. [phone]    Marilu Fernandez, [address] [place] [address], PharmD 3:00 PM 2/10/2023

## 2023-02-11 NOTE — TELEPHONE ENCOUNTER
Received after hours call for patient stating that the ED told her to follow up in our clinic after recently passed kidney stone and UTI. She was started on Cipro, and given pyridium, flomax, as well as Toradol. I instructed patient to follow up in our clinic early next week. Pain is controlled at this time, patient had no other concerns and was agreeable to calling office early next week for appointment.      Electronically signed by Kristal Mccall MD on 2/10/23 at 8:15 PM EST

## 2023-02-16 ENCOUNTER — OFFICE VISIT (OUTPATIENT)
Dept: FAMILY MEDICINE CLINIC | Age: 23
End: 2023-02-16

## 2023-02-16 VITALS
BODY MASS INDEX: 42.6 KG/M2 | OXYGEN SATURATION: 97 % | SYSTOLIC BLOOD PRESSURE: 110 MMHG | HEART RATE: 90 BPM | WEIGHT: 287.6 LBS | HEIGHT: 69 IN | DIASTOLIC BLOOD PRESSURE: 78 MMHG | RESPIRATION RATE: 18 BRPM | TEMPERATURE: 98 F

## 2023-02-16 DIAGNOSIS — N39.0 URINARY TRACT INFECTION WITH HEMATURIA, SITE UNSPECIFIED: Primary | ICD-10-CM

## 2023-02-16 DIAGNOSIS — R31.9 URINARY TRACT INFECTION WITH HEMATURIA, SITE UNSPECIFIED: Primary | ICD-10-CM

## 2023-02-16 DIAGNOSIS — R11.0 NAUSEA: ICD-10-CM

## 2023-02-16 RX ORDER — ONDANSETRON 4 MG/1
4 TABLET, ORALLY DISINTEGRATING ORAL 3 TIMES DAILY PRN
Qty: 30 TABLET | Refills: 3 | Status: SHIPPED | OUTPATIENT
Start: 2023-02-16

## 2023-02-16 SDOH — ECONOMIC STABILITY: FOOD INSECURITY: WITHIN THE PAST 12 MONTHS, YOU WORRIED THAT YOUR FOOD WOULD RUN OUT BEFORE YOU GOT MONEY TO BUY MORE.: NEVER TRUE

## 2023-02-16 SDOH — ECONOMIC STABILITY: HOUSING INSECURITY
IN THE LAST 12 MONTHS, WAS THERE A TIME WHEN YOU DID NOT HAVE A STEADY PLACE TO SLEEP OR SLEPT IN A SHELTER (INCLUDING NOW)?: NO

## 2023-02-16 SDOH — ECONOMIC STABILITY: FOOD INSECURITY: WITHIN THE PAST 12 MONTHS, THE FOOD YOU BOUGHT JUST DIDN'T LAST AND YOU DIDN'T HAVE MONEY TO GET MORE.: NEVER TRUE

## 2023-02-16 SDOH — ECONOMIC STABILITY: INCOME INSECURITY: HOW HARD IS IT FOR YOU TO PAY FOR THE VERY BASICS LIKE FOOD, HOUSING, MEDICAL CARE, AND HEATING?: NOT HARD AT ALL

## 2023-02-16 ASSESSMENT — ENCOUNTER SYMPTOMS
RECTAL PAIN: 0
SINUS PRESSURE: 0
SHORTNESS OF BREATH: 0
CONSTIPATION: 0
NAUSEA: 1
ABDOMINAL DISTENTION: 0
VOMITING: 0
ABDOMINAL PAIN: 0
COUGH: 0
DIARRHEA: 0
BLOOD IN STOOL: 0
RHINORRHEA: 0
ANAL BLEEDING: 1
WHEEZING: 0
SINUS PAIN: 0
SORE THROAT: 0
BACK PAIN: 0

## 2023-02-16 ASSESSMENT — PATIENT HEALTH QUESTIONNAIRE - PHQ9
3. TROUBLE FALLING OR STAYING ASLEEP: 0
2. FEELING DOWN, DEPRESSED OR HOPELESS: 0
SUM OF ALL RESPONSES TO PHQ QUESTIONS 1-9: 0
6. FEELING BAD ABOUT YOURSELF - OR THAT YOU ARE A FAILURE OR HAVE LET YOURSELF OR YOUR FAMILY DOWN: 0
SUM OF ALL RESPONSES TO PHQ9 QUESTIONS 1 & 2: 0
SUM OF ALL RESPONSES TO PHQ QUESTIONS 1-9: 0
9. THOUGHTS THAT YOU WOULD BE BETTER OFF DEAD, OR OF HURTING YOURSELF: 0
4. FEELING TIRED OR HAVING LITTLE ENERGY: 0
SUM OF ALL RESPONSES TO PHQ QUESTIONS 1-9: 0
8. MOVING OR SPEAKING SO SLOWLY THAT OTHER PEOPLE COULD HAVE NOTICED. OR THE OPPOSITE, BEING SO FIGETY OR RESTLESS THAT YOU HAVE BEEN MOVING AROUND A LOT MORE THAN USUAL: 0
7. TROUBLE CONCENTRATING ON THINGS, SUCH AS READING THE NEWSPAPER OR WATCHING TELEVISION: 0
SUM OF ALL RESPONSES TO PHQ QUESTIONS 1-9: 0
10. IF YOU CHECKED OFF ANY PROBLEMS, HOW DIFFICULT HAVE THESE PROBLEMS MADE IT FOR YOU TO DO YOUR WORK, TAKE CARE OF THINGS AT HOME, OR GET ALONG WITH OTHER PEOPLE: 0
1. LITTLE INTEREST OR PLEASURE IN DOING THINGS: 0
5. POOR APPETITE OR OVEREATING: 0

## 2023-02-16 NOTE — PROGRESS NOTES
Kate Cannon (:  2000) is a 20 y.o. female,Established patient, here for evaluation of the following chief complaint(s):  Follow-up (Pt presents for an ED f/u from [facility] for a UTI. Pt states she still has been having dysuria, and the antibiotic they have but her on is causing GI upset. )         ASSESSMENT/PLAN:  1. Urinary tract infection with hematuria, site unspecified  -     Urinalysis with Microscopic; Future  -     Culture, Urine  2. Nausea  -     ondansetron (ZOFRAN-ODT) 4 MG disintegrating tablet; Take 1 tablet by mouth 3 times daily as needed for Nausea or Vomiting, Disp-30 tablet, R-3Normal    ?kidney stone resolved, will get UA to check for resolution of infection. If microscopic hematuria will repeat UA in 1 month. If gross hematuria, will refer to Urology. Not taking Xarelto currently, may need to see Hematology again, Dr. Femi Ellis stated Xarelto indefinitely. Will f/u in one month, discuss weight, get repeat UA, discuss Xarelto. Return in about 1 month (around 3/16/2023) for repeat u/a and f/u of possible kidney stones. Subjective   SUBJECTIVE/OBJECTIVE:  HPI  Overall feeling ok. Feels a little bloated today. Does have stomach cramping/nausea from antibiotic. Has two days left. Pain pills made her loopy at work so she's stopped taking it. Is drinking 24oz water bottle about 10x a day. Not really eating anything. Also thinks she has hemorrhoid now. Has had blood on toilet tissue. Dr. Ethel Stratton is OB    States Dr. Femi Ellis said regular doctor can take care of Shlomo Rocha. Has been off of it for months without any issues. States clots are minor and she doesn't think she needs it. On chart review Dr. Femi Ellis states patient needs to be on anticoagulation indefinitely. Review of Systems   Constitutional:  Negative for chills and fever. HENT:  Negative for congestion, rhinorrhea, sinus pressure, sinus pain and sore throat.     Respiratory:  Negative for cough, shortness of breath and wheezing. Cardiovascular:  Negative for chest pain and leg swelling. Gastrointestinal:  Positive for anal bleeding and nausea. Negative for abdominal distention, abdominal pain, blood in stool, constipation, diarrhea, rectal pain and vomiting. Genitourinary:  Negative for dysuria, frequency and urgency. Musculoskeletal:  Negative for arthralgias, back pain and myalgias. Skin:  Negative for rash. Neurological:  Negative for dizziness, light-headedness and headaches. Psychiatric/Behavioral:  Negative for dysphoric mood and sleep disturbance. Objective   Vitals:    02/16/23 1618   BP: 110/78   Pulse: 90   Resp: 18   Temp: 98 °F (36.7 °C)   SpO2: 97%   Weight: 287 lb 9.6 oz (130.5 kg)   Height: 5' 9\" (1.753 m)      Physical Exam  Constitutional:       General: She is not in acute distress. Appearance: Normal appearance. She is obese. She is not ill-appearing. HENT:      Head: Normocephalic and atraumatic. Right Ear: External ear normal.      Left Ear: External ear normal.      Nose: Nose normal.      Mouth/Throat:      Mouth: Mucous membranes are moist.      Pharynx: Oropharynx is clear. Eyes:      Conjunctiva/sclera: Conjunctivae normal.   Cardiovascular:      Rate and Rhythm: Normal rate and regular rhythm. Pulses: Normal pulses. Heart sounds: Normal heart sounds. No murmur heard. Pulmonary:      Effort: Pulmonary effort is normal. No respiratory distress. Breath sounds: Normal breath sounds. No wheezing. Abdominal:      General: Abdomen is flat. Bowel sounds are normal. There is no distension. Palpations: Abdomen is soft. Tenderness: There is no abdominal tenderness. There is no right CVA tenderness or left CVA tenderness. Musculoskeletal:      Right lower leg: No edema. Left lower leg: No edema. Skin:     General: Skin is warm. Neurological:      General: No focal deficit present. Mental Status: She is alert. Mental status is at baseline. Psychiatric:         Mood and Affect: Mood normal.                An electronic signature was used to authenticate this note.     --Reginald Burt MD

## 2023-02-16 NOTE — PROGRESS NOTES
S: 20 y.o. female with   Chief Complaint   Patient presents with    Follow-up     Pt presents for an ED f/u from [facility] for a UTI. Pt states she still has been having dysuria, and the antibiotic they have but her on is causing GI upset. HPI: please see resident note for HPI and ROS. BP Readings from Last 3 Encounters:   02/16/23 110/78   02/08/23 118/75   02/07/23 (!) 142/79     Wt Readings from Last 3 Encounters:   02/16/23 287 lb 9.6 oz (130.5 kg)   02/07/23 283 lb (128.4 kg)   02/03/23 285 lb (129.3 kg)       O: VS:  height is 5' 9\" (1.753 m) and weight is 287 lb 9.6 oz (130.5 kg). Her temperature is 98 °F (36.7 °C). Her blood pressure is 110/78 and her pulse is 90. Her respiration is 18 and oxygen saturation is 97%. Diagnosis Orders   1. Urinary tract infection with hematuria, site unspecified  Urinalysis with Microscopic      2. Nausea  ondansetron (ZOFRAN-ODT) 4 MG disintegrating tablet          Plan:  Please refer to resident note for full plan. 48-year-old female presenting for ER follow-up after suspected passed kidney stones and UTI with hematuria. No hydronephrosis seen on imaging. Patient was given 10 days of Flomax and ciprofloxacin to take for UTI and possible passed kidney stones. Patient is also having some nausea related to these medications. Plan to finish out course of Flomax and Cipro as prescribed and trial patient on Zofran as needed for nausea. Agree with rechecking urine to monitor for persistent hematuria vs infection after antibiotics are completed. Follow up in 1 month.     Health Maintenance Due   Topic Date Due    Pneumococcal 0-64 years Vaccine (1 - PCV) Never done    HPV vaccine (1 - 2-dose series) Never done    DTaP/Tdap/Td vaccine (1 - Tdap) Never done    Chlamydia/GC screen  03/19/2021    COVID-19 Vaccine (3 - Booster for [place] Jalyn series) 11/27/2021    Flu vaccine (1) 08/01/2022       Attending Physician Statement  I have discussed the case, including pertinent history and exam findings with the resident. I agree with the documented assessment and plan as documented by the resident.         Ethel See DO 2/17/2023 1:31 PM

## 2023-02-16 NOTE — PROGRESS NOTES
[id number] [facility] [address] [id number]  Dept: [phone]  Dept Fax: [phone]  Loc: [phone]    Brief Summary of Encounter: for full HPI - See Dr. Donell Hinojosa Note     Vlad Keita is a 20 y.o. female who presents today for   Chief Complaint   Patient presents with    Follow-up     Pt presents for an ED f/u from [facility] for a UTI. Pt states she still has been having dysuria, and the antibiotic they have but her on is causing GI upset. Vitals:    02/16/23 1618   BP: 110/78   Pulse: 90   Resp: 18   Temp: 98 °F (36.7 °C)   SpO2: 97%       1. Nausea      ED f/u of kidney stones. No hydronephrosis or kidney stones seen on CT but presumed because of hematuria. Was given cipro for empiric pyelo treatment and flomax. Has hx of PE, seeing heme/onc, on indefinite anticoagulation but not consistently compliant. - plan to repeat u/a to ensure hematuria is resolved  - plan to come back in 2 months for infertility and consider switching a/c to lovenox if patient plans to conceive.

## 2023-03-15 ENCOUNTER — HOSPITAL ENCOUNTER (OUTPATIENT)
Age: 23
Discharge: HOME | End: 2023-03-15
Payer: COMMERCIAL

## 2023-03-15 ENCOUNTER — HOSPITAL ENCOUNTER (OUTPATIENT)
Dept: PHYSICAL THERAPY | Age: 23
Setting detail: THERAPIES SERIES
Discharge: HOME | End: 2023-03-15
Payer: COMMERCIAL

## 2023-03-15 DIAGNOSIS — R31.9 URINARY TRACT INFECTION WITH HEMATURIA, SITE UNSPECIFIED: ICD-10-CM

## 2023-03-15 DIAGNOSIS — N39.0 URINARY TRACT INFECTION WITH HEMATURIA, SITE UNSPECIFIED: ICD-10-CM

## 2023-03-15 PROCEDURE — 97140 MANUAL THERAPY 1/> REGIONS: CPT

## 2023-03-15 PROCEDURE — 81001 URINALYSIS AUTO W/SCOPE: CPT

## 2023-03-15 PROCEDURE — 97530 THERAPEUTIC ACTIVITIES: CPT
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
## 2023-03-16 LAB
BACTERIA: NORMAL
BILIRUB UR QL STRIP: NEGATIVE
CASTS #/AREA URNS LPF: NORMAL /LPF
CASTS #/AREA URNS LPF: NORMAL /LPF
CHARACTER UR: CLEAR
CHARCOAL URNS QL MICRO: NORMAL
COLOR UR: YELLOW
CRYSTALS URNS QL MICRO: NORMAL
EPITHELIAL CELLS, UA: NORMAL /HPF
GLUCOSE UR QL STRIP.AUTO: NEGATIVE MG/DL
HGB UR QL STRIP.AUTO: NEGATIVE
KETONES UR QL STRIP.AUTO: NEGATIVE
LEUKOCYTE ESTERASE UR QL STRIP.AUTO: NEGATIVE
NITRITE UR QL STRIP.AUTO: NEGATIVE
PH UR STRIP.AUTO: 8.5 [PH] (ref 5–9)
PROT UR STRIP.AUTO-MCNC: NEGATIVE MG/DL
RBC #/AREA URNS HPF: NORMAL /HPF
RENAL EPI CELLS #/AREA URNS HPF: NORMAL /[HPF]
SPECIFIC GRAVITY UA: 1.02 (ref 1–1.03)
UROBILINOGEN, URINE: 1 EU/DL (ref 0–1)
WBC #/AREA URNS HPF: NORMAL /HPF
YEAST LIKE FUNGI URNS QL MICRO: NORMAL

## 2023-03-29 ENCOUNTER — HOSPITAL ENCOUNTER (OUTPATIENT)
Dept: PHYSICAL THERAPY | Age: 23
Setting detail: THERAPIES SERIES
Discharge: HOME | End: 2023-03-29
Payer: COMMERCIAL

## 2023-03-29 PROCEDURE — 97140 MANUAL THERAPY 1/> REGIONS: CPT
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
## 2023-05-25 ENCOUNTER — HOSPITAL ENCOUNTER (EMERGENCY)
Age: 23
Discharge: HOME | End: 2023-05-25
Payer: COMMERCIAL

## 2023-05-25 ENCOUNTER — APPOINTMENT (OUTPATIENT)
Dept: CT IMAGING | Age: 23
End: 2023-05-25
Payer: COMMERCIAL

## 2023-05-25 VITALS
SYSTOLIC BLOOD PRESSURE: 126 MMHG | OXYGEN SATURATION: 98 % | RESPIRATION RATE: 16 BRPM | HEART RATE: 79 BPM | DIASTOLIC BLOOD PRESSURE: 88 MMHG | TEMPERATURE: 98.7 F

## 2023-05-25 DIAGNOSIS — R10.13 EPIGASTRIC PAIN: Primary | ICD-10-CM

## 2023-05-25 DIAGNOSIS — K31.84 GASTROPARESIS: ICD-10-CM

## 2023-05-25 LAB
ALBUMIN SERPL BCG-MCNC: 4.3 G/DL (ref 3.5–5.1)
ALP SERPL-CCNC: 73 U/L (ref 38–126)
ALT SERPL W/O P-5'-P-CCNC: 39 U/L (ref 11–66)
ANION GAP SERPL CALC-SCNC: 10 MEQ/L (ref 8–16)
AST SERPL-CCNC: 33 U/L (ref 5–40)
B-HCG SERPL QL: NEGATIVE
BASOPHILS ABSOLUTE: 0 THOU/MM3 (ref 0–0.1)
BASOPHILS NFR BLD AUTO: 0.4 %
BILIRUB CONJ SERPL-MCNC: < 0.2 MG/DL (ref 0–0.3)
BILIRUB SERPL-MCNC: 0.3 MG/DL (ref 0.3–1.2)
BILIRUB UR QL STRIP.AUTO: NEGATIVE
BUN SERPL-MCNC: 12 MG/DL (ref 7–22)
CALCIUM SERPL-MCNC: 9.2 MG/DL (ref 8.5–10.5)
CHARACTER UR: CLEAR
CHLORIDE SERPL-SCNC: 107 MEQ/L (ref 98–111)
CO2 SERPL-SCNC: 23 MEQ/L (ref 23–33)
COLOR: YELLOW
CREAT SERPL-MCNC: 0.6 MG/DL (ref 0.4–1.2)
DEPRECATED RDW RBC AUTO: 43.2 FL (ref 35–45)
EOSINOPHIL NFR BLD AUTO: 1 %
EOSINOPHILS ABSOLUTE: 0.1 THOU/MM3 (ref 0–0.4)
ERYTHROCYTE [DISTWIDTH] IN BLOOD BY AUTOMATED COUNT: 12.4 % (ref 11.5–14.5)
GFR SERPL CREATININE-BSD FRML MDRD: > 60 ML/MIN/1.73M2
GLUCOSE SERPL-MCNC: 93 MG/DL (ref 70–108)
GLUCOSE UR QL STRIP.AUTO: NEGATIVE MG/DL
HCG UR QL: NEGATIVE
HCT VFR BLD AUTO: 44.1 % (ref 37–47)
HGB BLD-MCNC: 13.8 GM/DL (ref 12–16)
HGB UR QL STRIP.AUTO: NEGATIVE
IMM GRANULOCYTES # BLD AUTO: 0.04 THOU/MM3 (ref 0–0.07)
IMM GRANULOCYTES NFR BLD AUTO: 0.5 %
KETONES UR QL STRIP.AUTO: NEGATIVE
LACTATE SERPL-SCNC: 0.8 MMOL/L (ref 0.5–2)
LIPASE SERPL-CCNC: 32.7 U/L (ref 5.6–51.3)
LYMPHOCYTES ABSOLUTE: 1.8 THOU/MM3 (ref 1–4.8)
LYMPHOCYTES NFR BLD AUTO: 23.3 %
MCH RBC QN AUTO: 29.4 PG (ref 26–33)
MCHC RBC AUTO-ENTMCNC: 31.3 GM/DL (ref 32.2–35.5)
MCV RBC AUTO: 93.8 FL (ref 81–99)
MONOCYTES ABSOLUTE: 0.4 THOU/MM3 (ref 0.4–1.3)
MONOCYTES NFR BLD AUTO: 5.3 %
NEUTROPHILS NFR BLD AUTO: 69.5 %
NITRITE UR QL STRIP: NEGATIVE
NRBC BLD AUTO-RTO: 0 /100 WBC
PH UR STRIP.AUTO: 5.5 [PH] (ref 5–9)
PLATELET # BLD AUTO: 253 THOU/MM3 (ref 130–400)
PMV BLD AUTO: 10.4 FL (ref 9.4–12.4)
POTASSIUM SERPL-SCNC: 4.3 MEQ/L (ref 3.5–5.2)
PROT SERPL-MCNC: 7.4 G/DL (ref 6.1–8)
PROT UR STRIP.AUTO-MCNC: NEGATIVE MG/DL
RBC # BLD AUTO: 4.7 MILL/MM3 (ref 4.2–5.4)
SEGMENTED NEUTROPHILS ABSOLUTE COUNT: 5.5 THOU/MM3 (ref 1.8–7.7)
SODIUM SERPL-SCNC: 140 MEQ/L (ref 135–145)
SP GR UR REFRACT.AUTO: 1.02 (ref 1–1.03)
UROBILINOGEN, URINE: 1 EU/DL (ref 0–1)
WBC # BLD AUTO: 7.9 THOU/MM3 (ref 4.8–10.8)
WBC #/AREA URNS HPF: NEGATIVE /[HPF]

## 2023-05-25 PROCEDURE — 99285 EMERGENCY DEPT VISIT HI MDM: CPT

## 2023-05-25 PROCEDURE — 83690 ASSAY OF LIPASE: CPT

## 2023-05-25 PROCEDURE — 85025 COMPLETE CBC W/AUTO DIFF WBC: CPT

## 2023-05-25 PROCEDURE — 84703 CHORIONIC GONADOTROPIN ASSAY: CPT

## 2023-05-25 PROCEDURE — 2580000003 HC RX 258

## 2023-05-25 PROCEDURE — 81003 URINALYSIS AUTO W/O SCOPE: CPT

## 2023-05-25 PROCEDURE — 96375 TX/PRO/DX INJ NEW DRUG ADDON: CPT

## 2023-05-25 PROCEDURE — 96374 THER/PROPH/DIAG INJ IV PUSH: CPT

## 2023-05-25 PROCEDURE — 80048 BASIC METABOLIC PNL TOTAL CA: CPT

## 2023-05-25 PROCEDURE — 83605 ASSAY OF LACTIC ACID: CPT

## 2023-05-25 PROCEDURE — 80076 HEPATIC FUNCTION PANEL: CPT

## 2023-05-25 PROCEDURE — 81025 URINE PREGNANCY TEST: CPT

## 2023-05-25 PROCEDURE — 6360000002 HC RX W HCPCS

## 2023-05-25 PROCEDURE — 74177 CT ABD & PELVIS W/CONTRAST: CPT

## 2023-05-25 PROCEDURE — 6360000004 HC RX CONTRAST MEDICATION

## 2023-05-25 PROCEDURE — 36415 COLL VENOUS BLD VENIPUNCTURE: CPT

## 2023-05-25 RX ORDER — MORPHINE SULFATE 4 MG/ML
4 INJECTION, SOLUTION INTRAMUSCULAR; INTRAVENOUS ONCE
Status: DISCONTINUED | OUTPATIENT
Start: 2023-05-25 | End: 2023-05-25

## 2023-05-25 RX ORDER — MORPHINE SULFATE 2 MG/ML
2 INJECTION, SOLUTION INTRAMUSCULAR; INTRAVENOUS ONCE
Status: COMPLETED | OUTPATIENT
Start: 2023-05-25 | End: 2023-05-25

## 2023-05-25 RX ORDER — 0.9 % SODIUM CHLORIDE 0.9 %
1000 INTRAVENOUS SOLUTION INTRAVENOUS ONCE
Status: COMPLETED | OUTPATIENT
Start: 2023-05-25 | End: 2023-05-25

## 2023-05-25 RX ORDER — ONDANSETRON 2 MG/ML
4 INJECTION INTRAMUSCULAR; INTRAVENOUS ONCE
Status: COMPLETED | OUTPATIENT
Start: 2023-05-25 | End: 2023-05-25

## 2023-05-25 RX ORDER — SUCRALFATE 1 G/1
1 TABLET ORAL 4 TIMES DAILY
Qty: 120 TABLET | Refills: 3 | Status: SHIPPED | OUTPATIENT
Start: 2023-05-25

## 2023-05-25 RX ADMIN — IOPAMIDOL 80 ML: 755 INJECTION, SOLUTION INTRAVENOUS at 13:15

## 2023-05-25 RX ADMIN — MORPHINE SULFATE 2 MG: 2 INJECTION, SOLUTION INTRAMUSCULAR; INTRAVENOUS at 12:37

## 2023-05-25 RX ADMIN — SODIUM CHLORIDE 1000 ML: 9 INJECTION, SOLUTION INTRAVENOUS at 12:36

## 2023-05-25 RX ADMIN — ONDANSETRON 4 MG: 2 INJECTION INTRAMUSCULAR; INTRAVENOUS at 12:37

## 2023-05-25 ASSESSMENT — PAIN DESCRIPTION - LOCATION: LOCATION: ABDOMEN

## 2023-05-25 ASSESSMENT — PAIN - FUNCTIONAL ASSESSMENT: PAIN_FUNCTIONAL_ASSESSMENT: 0-10

## 2023-05-25 ASSESSMENT — PAIN SCALES - GENERAL
PAINLEVEL_OUTOF10: 7
PAINLEVEL_OUTOF10: 7

## 2023-05-25 NOTE — ED TRIAGE NOTES
Pt presents to the ED for abdominal pain. Pt states that she has an endoscopy done yesterday with dilation and a biopsy. Pt states she took tylenol with no relief.

## 2023-05-25 NOTE — DISCHARGE INSTRUCTIONS
Return to emergency department for worsening/uncontrolled or recurrence of symptoms. Return for fever/chills, difficulty breathing or pains in your chest.  Return for uncontrolled nausea/vomiting. Please follow-up with your gastroenterologist Arely DODSON for reevaluation. Continue to use your Zofran/Tylenol for nausea and pain control outpatient at home. Please attend the family [facility] resident appointment made for you for establishing a new primary care provider.

## 2023-05-25 NOTE — ED PROVIDER NOTES
[facility] [address] [address] EMERGENCY DEPT      EMERGENCY MEDICINE     Pt Name: Kel Doyle  MRN: [id number]  [place] 2000  Date of evaluation: 5/25/2023  Provider: KARLA Staton CNP    CHIEF COMPLAINT       Chief Complaint   Patient presents with    Abdominal Pain     HISTORY OF PRESENT ILLNESS   Kel Doyle is a pleasant 20 y.o. female who presents to the emergency department from home by personal transportation for epigastric and left upper quadrant pain. Patient describes the pain as a sharp, stabbing pain that is aggravated with eating. Rated 7/10, no improvement with OTC Tylenol. She is feeling nauseous and feverish at times. She recorded a temperature at home of 99.9 °F. Patient has had gastroparesis her entire life and has had multiple endoscopies for diagnosis in [place]. Yesterday she had an endoscopy performed with dilation and a biopsy of the stomach, performed by Dr. Shashi Schaffer. Patient started to have abdominal pain last night around 2100 and has continued throughout today. Patient states that this pain feels different than her normal gastroparesis pain, which is typically a dull ache after eating. [facility] and they instructed her to come to the ER. Patient states she is able to pass gas, her last bowel movement was Tuesday. Typically treats similar pain with Tylenol/Zofran at home. Admits to having ample supply of these home medications.     History obtained from patient  PASTMEDICAL HISTORY     Past Medical History:   Diagnosis Date    Anxiety     Chronic GERD     Chronic migraine     takes verapamil    Depression     Gallstones 10/2019    Gastroparesis     diagnosis as a child    Hx of blood clots     PE     Hypertension     Miscarriage     multiple    Obesity     Pulmonary embolism ([facility] [address]     8 months ago-was on Xarelto-sees Dr Randy Vasquez in [place]    Splenomegaly        Patient Active Problem List

## 2023-06-02 ENCOUNTER — OFFICE VISIT (OUTPATIENT)
Dept: BARIATRICS/WEIGHT MGMT | Age: 23
End: 2023-06-02
Payer: COMMERCIAL

## 2023-06-02 VITALS
BODY MASS INDEX: 43.4 KG/M2 | SYSTOLIC BLOOD PRESSURE: 114 MMHG | TEMPERATURE: 97.3 F | HEIGHT: 69 IN | WEIGHT: 293 LBS | DIASTOLIC BLOOD PRESSURE: 82 MMHG | HEART RATE: 72 BPM

## 2023-06-02 DIAGNOSIS — J45.909 UNCOMPLICATED ASTHMA, UNSPECIFIED ASTHMA SEVERITY, UNSPECIFIED WHETHER PERSISTENT: ICD-10-CM

## 2023-06-02 DIAGNOSIS — E28.2 POLYCYSTIC OVARIAN SYNDROME: ICD-10-CM

## 2023-06-02 DIAGNOSIS — K31.84 GASTROPARESIS: ICD-10-CM

## 2023-06-02 DIAGNOSIS — G43.809 OTHER MIGRAINE WITHOUT STATUS MIGRAINOSUS, NOT INTRACTABLE: ICD-10-CM

## 2023-06-02 DIAGNOSIS — F32.A DEPRESSION, UNSPECIFIED DEPRESSION TYPE: ICD-10-CM

## 2023-06-02 DIAGNOSIS — E66.01 MORBID OBESITY WITH BMI OF 40.0-44.9, ADULT (HCC): Primary | ICD-10-CM

## 2023-06-02 DIAGNOSIS — F41.9 ANXIETY: ICD-10-CM

## 2023-06-02 DIAGNOSIS — K21.9 GASTROESOPHAGEAL REFLUX DISEASE WITHOUT ESOPHAGITIS: ICD-10-CM

## 2023-06-02 DIAGNOSIS — I27.82 CHRONIC PULMONARY EMBOLISM, UNSPECIFIED PULMONARY EMBOLISM TYPE, UNSPECIFIED WHETHER ACUTE COR PULMONALE PRESENT (HCC): ICD-10-CM

## 2023-06-02 DIAGNOSIS — N97.0 INFERTILITY ASSOCIATED WITH ANOVULATION: ICD-10-CM

## 2023-06-02 DIAGNOSIS — I10 PRIMARY HYPERTENSION: ICD-10-CM

## 2023-06-02 PROCEDURE — 3078F DIAST BP <80 MM HG: CPT | Performed by: SURGERY

## 2023-06-02 PROCEDURE — 99203 OFFICE O/P NEW LOW 30 MIN: CPT | Performed by: SURGERY

## 2023-06-02 PROCEDURE — 3074F SYST BP LT 130 MM HG: CPT | Performed by: SURGERY

## 2023-06-02 RX ORDER — RIZATRIPTAN BENZOATE 10 MG/1
TABLET, ORALLY DISINTEGRATING ORAL
COMMUNITY
Start: 2023-06-01

## 2023-06-02 RX ORDER — OMEPRAZOLE 40 MG/1
CAPSULE, DELAYED RELEASE ORAL
COMMUNITY
Start: 2023-05-19

## 2023-06-02 RX ORDER — RIMEGEPANT SULFATE 75 MG/75MG
TABLET, ORALLY DISINTEGRATING ORAL
COMMUNITY
Start: 2023-06-01

## 2023-06-04 ENCOUNTER — HOSPITAL ENCOUNTER (EMERGENCY)
Age: 23
Discharge: HOME | End: 2023-06-04
Payer: COMMERCIAL

## 2023-06-04 ENCOUNTER — APPOINTMENT (OUTPATIENT)
Dept: GENERAL RADIOLOGY | Age: 23
End: 2023-06-04
Payer: COMMERCIAL

## 2023-06-04 VITALS
TEMPERATURE: 98.1 F | HEART RATE: 73 BPM | SYSTOLIC BLOOD PRESSURE: 147 MMHG | RESPIRATION RATE: 16 BRPM | DIASTOLIC BLOOD PRESSURE: 83 MMHG | OXYGEN SATURATION: 99 % | BODY MASS INDEX: 43.4 KG/M2 | HEIGHT: 69 IN | WEIGHT: 293 LBS

## 2023-06-04 DIAGNOSIS — S63.692A OTHER SPRAIN OF RIGHT MIDDLE FINGER, INITIAL ENCOUNTER: Primary | ICD-10-CM

## 2023-06-04 PROCEDURE — 73130 X-RAY EXAM OF HAND: CPT

## 2023-06-04 PROCEDURE — 99212 OFFICE O/P EST SF 10 MIN: CPT | Performed by: EMERGENCY MEDICINE

## 2023-06-04 ASSESSMENT — PAIN - FUNCTIONAL ASSESSMENT: PAIN_FUNCTIONAL_ASSESSMENT: 0-10

## 2023-06-04 ASSESSMENT — PAIN SCALES - GENERAL: PAINLEVEL_OUTOF10: 4

## 2023-06-04 ASSESSMENT — PAIN DESCRIPTION - LOCATION: LOCATION: FINGER (COMMENT WHICH ONE)

## 2023-06-04 ASSESSMENT — PAIN DESCRIPTION - ORIENTATION: ORIENTATION: RIGHT

## 2023-06-05 ASSESSMENT — ENCOUNTER SYMPTOMS
RHINORRHEA: 0
FACIAL SWELLING: 0
CHEST TIGHTNESS: 0
EYE REDNESS: 0
BLOOD IN STOOL: 0
BACK PAIN: 0
PHOTOPHOBIA: 0
NAUSEA: 1
CONSTIPATION: 1
ANAL BLEEDING: 0
STRIDOR: 0
ABDOMINAL DISTENTION: 0
RECTAL PAIN: 0
DIARRHEA: 1
SINUS PRESSURE: 0
ABDOMINAL PAIN: 0
WHEEZING: 0
APNEA: 0
SORE THROAT: 0
CHOKING: 0
EYE ITCHING: 0
VOMITING: 0
TROUBLE SWALLOWING: 0
COUGH: 1
COLOR CHANGE: 0
SHORTNESS OF BREATH: 0
VOICE CHANGE: 0
EYE PAIN: 0
EYE DISCHARGE: 0

## 2023-06-06 NOTE — PROGRESS NOTES
water). 2  I will aim for at least 64 oz of water each day (= 8 cups per day or four bottled carter)  3. I will use my food journal to record meal times, serving sizes and bring back to next dietitian visit. 4   I will increase my physical activity by going to [employer] at least 3-4 days a week  5. Initial weight loss goal of 3-5% is recommended over 6 month period. This is a minimum of: 9-15 lbs from your weight when initially met with physician of: 297 lbs. -Followup visit: 4 weeks with dietitian.        Shaji Nunez RD, LD   Dietitian- Weight Management [address] [place] [address]

## 2023-06-09 ENCOUNTER — OFFICE VISIT (OUTPATIENT)
Dept: BARIATRICS/WEIGHT MGMT | Age: 23
End: 2023-06-09

## 2023-06-09 VITALS — WEIGHT: 293 LBS | HEIGHT: 69 IN | BODY MASS INDEX: 43.4 KG/M2

## 2023-06-09 DIAGNOSIS — E66.01 MORBID OBESITY WITH BMI OF 40.0-44.9, ADULT (HCC): Primary | ICD-10-CM

## 2023-06-09 NOTE — PATIENT INSTRUCTIONS
Goals: 1. I will get the lab work done that is mailed to my mailing address before next office visit. You will need to fast 10-12 hours for this (no food or drink besides water). 2  I will aim for at least 64 oz of water each day (= 8 cups per day or four bottled carter)  3. I will use my food journal to record meal times, serving sizes and bring back to next dietitian visit. 4   I will increase my physical activity by going to [employer] at least 3-4 days a week  5. Initial weight loss goal of 3-5% is recommended over 6 month period. This is a minimum of: 9-15 lbs from your weight when initially met with physician of: 297 lbs.

## 2023-06-21 ENCOUNTER — HOSPITAL ENCOUNTER (OUTPATIENT)
Dept: PHYSICAL THERAPY | Age: 23
Setting detail: THERAPIES SERIES
End: 2023-06-21
Payer: COMMERCIAL

## 2023-06-26 PROBLEM — N93.9 VAGINAL BLEEDING, ABNORMAL: Status: ACTIVE | Noted: 2023-06-26

## 2023-06-26 PROBLEM — R43.2 LOSS OF TASTE: Status: ACTIVE | Noted: 2023-06-26

## 2023-06-26 PROBLEM — S63.612A SPRAIN OF RIGHT MIDDLE FINGER: Status: ACTIVE | Noted: 2023-06-26

## 2023-06-26 PROBLEM — S50.11XA CONTUSION OF RIGHT FOREARM: Status: ACTIVE | Noted: 2023-06-26

## 2023-06-26 PROBLEM — K14.3 TONGUE COATING: Status: ACTIVE | Noted: 2023-06-26

## 2023-06-26 PROBLEM — M54.81 CERVICO-OCCIPITAL NEURALGIA: Status: ACTIVE | Noted: 2023-06-26

## 2023-06-26 PROBLEM — N64.4 NIPPLE TENDERNESS: Status: ACTIVE | Noted: 2023-06-26

## 2023-06-26 PROBLEM — N89.8 VAGINAL DISCHARGE: Status: ACTIVE | Noted: 2023-06-26

## 2023-06-26 PROBLEM — J32.0 CHRONIC MAXILLARY SINUSITIS: Status: ACTIVE | Noted: 2023-06-26

## 2023-06-26 PROBLEM — N96 RECURRENT PREGNANCY LOSS: Status: ACTIVE | Noted: 2022-06-02

## 2023-06-27 ENCOUNTER — TELEMEDICINE (OUTPATIENT)
Dept: FAMILY MEDICINE CLINIC | Age: 23
End: 2023-06-27
Payer: COMMERCIAL

## 2023-06-27 DIAGNOSIS — N92.6 IRREGULAR PERIODS: Primary | ICD-10-CM

## 2023-06-27 DIAGNOSIS — F33.1 MAJOR DEPRESSIVE DISORDER, RECURRENT, MODERATE (HCC): ICD-10-CM

## 2023-06-27 DIAGNOSIS — F32.89 OTHER DEPRESSION: ICD-10-CM

## 2023-06-27 DIAGNOSIS — D68.59 HYPERCOAGULABLE STATE (HCC): ICD-10-CM

## 2023-06-27 PROCEDURE — 99213 OFFICE O/P EST LOW 20 MIN: CPT | Performed by: STUDENT IN AN ORGANIZED HEALTH CARE EDUCATION/TRAINING PROGRAM

## 2023-06-27 RX ORDER — SERTRALINE HYDROCHLORIDE 100 MG/1
100 TABLET, FILM COATED ORAL DAILY
Qty: 30 TABLET | Refills: 3 | Status: SHIPPED | OUTPATIENT
Start: 2023-06-27

## 2023-06-27 RX ORDER — BUPROPION HYDROCHLORIDE 150 MG/1
150 TABLET ORAL EVERY MORNING
Qty: 30 TABLET | Refills: 0 | Status: SHIPPED | OUTPATIENT
Start: 2023-06-27

## 2023-06-28 ENCOUNTER — HOSPITAL ENCOUNTER (OUTPATIENT)
Dept: PHYSICAL THERAPY | Age: 23
Setting detail: THERAPIES SERIES
End: 2023-06-28
Payer: COMMERCIAL

## 2023-06-28 ENCOUNTER — HOSPITAL ENCOUNTER (EMERGENCY)
Age: 23
Discharge: HOME | End: 2023-06-28
Attending: EMERGENCY MEDICINE
Payer: COMMERCIAL

## 2023-06-28 VITALS
HEART RATE: 76 BPM | TEMPERATURE: 97.6 F | OXYGEN SATURATION: 99 % | SYSTOLIC BLOOD PRESSURE: 148 MMHG | BODY MASS INDEX: 41.47 KG/M2 | RESPIRATION RATE: 18 BRPM | DIASTOLIC BLOOD PRESSURE: 89 MMHG | WEIGHT: 280 LBS | HEIGHT: 69 IN

## 2023-06-28 DIAGNOSIS — N92.1 METRORRHAGIA: ICD-10-CM

## 2023-06-28 DIAGNOSIS — N93.9 VAGINAL BLEEDING: Primary | ICD-10-CM

## 2023-06-28 LAB
ALBUMIN SERPL BCG-MCNC: 4.4 G/DL (ref 3.5–5.1)
ALP SERPL-CCNC: 62 U/L (ref 38–126)
ALT SERPL W/O P-5'-P-CCNC: 42 U/L (ref 11–66)
ANION GAP SERPL CALC-SCNC: 11 MEQ/L (ref 8–16)
AST SERPL-CCNC: 23 U/L (ref 5–40)
B-HCG SERPL QL: NEGATIVE
BASOPHILS ABSOLUTE: 0 THOU/MM3 (ref 0–0.1)
BASOPHILS NFR BLD AUTO: 0.4 %
BILIRUB CONJ SERPL-MCNC: < 0.2 MG/DL (ref 0–0.3)
BILIRUB SERPL-MCNC: 0.5 MG/DL (ref 0.3–1.2)
BUN SERPL-MCNC: 9 MG/DL (ref 7–22)
CALCIUM SERPL-MCNC: 9.1 MG/DL (ref 8.5–10.5)
CHLORIDE SERPL-SCNC: 105 MEQ/L (ref 98–111)
CO2 SERPL-SCNC: 22 MEQ/L (ref 23–33)
CREAT SERPL-MCNC: 0.7 MG/DL (ref 0.4–1.2)
DEPRECATED RDW RBC AUTO: 40.3 FL (ref 35–45)
EOSINOPHIL NFR BLD AUTO: 1.5 %
EOSINOPHILS ABSOLUTE: 0.1 THOU/MM3 (ref 0–0.4)
ERYTHROCYTE [DISTWIDTH] IN BLOOD BY AUTOMATED COUNT: 12.3 % (ref 11.5–14.5)
GFR SERPL CREATININE-BSD FRML MDRD: > 60 ML/MIN/1.73M2
GLUCOSE SERPL-MCNC: 99 MG/DL (ref 70–108)
HCT VFR BLD AUTO: 42.5 % (ref 37–47)
HGB BLD-MCNC: 13.7 GM/DL (ref 12–16)
IMM GRANULOCYTES # BLD AUTO: 0.02 THOU/MM3 (ref 0–0.07)
IMM GRANULOCYTES NFR BLD AUTO: 0.3 %
INR PPP: 0.93 (ref 0.85–1.13)
LYMPHOCYTES ABSOLUTE: 1.6 THOU/MM3 (ref 1–4.8)
LYMPHOCYTES NFR BLD AUTO: 23 %
MCH RBC QN AUTO: 29.3 PG (ref 26–33)
MCHC RBC AUTO-ENTMCNC: 32.2 GM/DL (ref 32.2–35.5)
MCV RBC AUTO: 90.8 FL (ref 81–99)
MONOCYTES ABSOLUTE: 0.4 THOU/MM3 (ref 0.4–1.3)
MONOCYTES NFR BLD AUTO: 6.3 %
NEUTROPHILS NFR BLD AUTO: 68.5 %
NRBC BLD AUTO-RTO: 0 /100 WBC
OSMOLALITY SERPL CALC.SUM OF ELEC: 274.4 MOSMOL/KG (ref 275–300)
PLATELET # BLD AUTO: 231 THOU/MM3 (ref 130–400)
PMV BLD AUTO: 10.6 FL (ref 9.4–12.4)
POTASSIUM SERPL-SCNC: 4.1 MEQ/L (ref 3.5–5.2)
PROT SERPL-MCNC: 7 G/DL (ref 6.1–8)
RBC # BLD AUTO: 4.68 MILL/MM3 (ref 4.2–5.4)
SEGMENTED NEUTROPHILS ABSOLUTE COUNT: 4.7 THOU/MM3 (ref 1.8–7.7)
SODIUM SERPL-SCNC: 138 MEQ/L (ref 135–145)
WBC # BLD AUTO: 6.8 THOU/MM3 (ref 4.8–10.8)

## 2023-06-28 PROCEDURE — 6370000000 HC RX 637 (ALT 250 FOR IP): Performed by: STUDENT IN AN ORGANIZED HEALTH CARE EDUCATION/TRAINING PROGRAM

## 2023-06-28 PROCEDURE — 85610 PROTHROMBIN TIME: CPT

## 2023-06-28 PROCEDURE — 99283 EMERGENCY DEPT VISIT LOW MDM: CPT

## 2023-06-28 PROCEDURE — 80053 COMPREHEN METABOLIC PANEL: CPT

## 2023-06-28 PROCEDURE — 36415 COLL VENOUS BLD VENIPUNCTURE: CPT

## 2023-06-28 PROCEDURE — 82248 BILIRUBIN DIRECT: CPT

## 2023-06-28 PROCEDURE — 84703 CHORIONIC GONADOTROPIN ASSAY: CPT

## 2023-06-28 PROCEDURE — 85025 COMPLETE CBC W/AUTO DIFF WBC: CPT

## 2023-06-28 RX ORDER — IBUPROFEN 200 MG
400 TABLET ORAL ONCE
Status: COMPLETED | OUTPATIENT
Start: 2023-06-28 | End: 2023-06-28

## 2023-06-28 RX ORDER — IBUPROFEN 400 MG/1
400 TABLET ORAL EVERY 8 HOURS
Qty: 15 TABLET | Refills: 0 | Status: SHIPPED | OUTPATIENT
Start: 2023-06-28 | End: 2023-07-03

## 2023-06-28 RX ADMIN — IBUPROFEN 400 MG: 200 TABLET, FILM COATED ORAL at 08:18

## 2023-06-28 ASSESSMENT — PAIN SCALES - GENERAL: PAINLEVEL_OUTOF10: 7

## 2023-06-28 ASSESSMENT — PAIN DESCRIPTION - LOCATION: LOCATION: ABDOMEN

## 2023-06-28 ASSESSMENT — PAIN DESCRIPTION - DESCRIPTORS: DESCRIPTORS: SHARP

## 2023-06-28 ASSESSMENT — ENCOUNTER SYMPTOMS
COUGH: 0
CONSTIPATION: 0
VOMITING: 0
NAUSEA: 0
SHORTNESS OF BREATH: 0
DIARRHEA: 0

## 2023-06-28 ASSESSMENT — PAIN DESCRIPTION - PAIN TYPE: TYPE: ACUTE PAIN

## 2023-06-28 ASSESSMENT — PAIN DESCRIPTION - FREQUENCY: FREQUENCY: CONTINUOUS

## 2023-06-28 ASSESSMENT — PAIN DESCRIPTION - ORIENTATION: ORIENTATION: LOWER

## 2023-06-28 ASSESSMENT — PAIN - FUNCTIONAL ASSESSMENT: PAIN_FUNCTIONAL_ASSESSMENT: 0-10

## 2023-07-10 ENCOUNTER — HOSPITAL ENCOUNTER (EMERGENCY)
Age: 23
Discharge: HOME | End: 2023-07-10
Payer: COMMERCIAL

## 2023-07-10 VITALS
DIASTOLIC BLOOD PRESSURE: 77 MMHG | SYSTOLIC BLOOD PRESSURE: 134 MMHG | HEART RATE: 97 BPM | OXYGEN SATURATION: 95 % | TEMPERATURE: 98.1 F | RESPIRATION RATE: 16 BRPM

## 2023-07-10 DIAGNOSIS — R19.7 NAUSEA VOMITING AND DIARRHEA: Primary | ICD-10-CM

## 2023-07-10 DIAGNOSIS — R10.13 ABDOMINAL PAIN, EPIGASTRIC: ICD-10-CM

## 2023-07-10 DIAGNOSIS — R11.2 NAUSEA VOMITING AND DIARRHEA: Primary | ICD-10-CM

## 2023-07-10 LAB
ALBUMIN SERPL BCG-MCNC: 4.6 G/DL (ref 3.5–5.1)
ALP SERPL-CCNC: 66 U/L (ref 38–126)
ALT SERPL W/O P-5'-P-CCNC: 26 U/L (ref 11–66)
ANION GAP SERPL CALC-SCNC: 13 MEQ/L (ref 8–16)
AST SERPL-CCNC: 20 U/L (ref 5–40)
B-HCG SERPL QL: NEGATIVE
BACTERIA: ABNORMAL
BASOPHILS ABSOLUTE: 0 THOU/MM3 (ref 0–0.1)
BASOPHILS NFR BLD AUTO: 0.3 %
BILIRUB CONJ SERPL-MCNC: < 0.2 MG/DL (ref 0–0.3)
BILIRUB SERPL-MCNC: 0.9 MG/DL (ref 0.3–1.2)
BILIRUB UR QL STRIP: NEGATIVE
BUN SERPL-MCNC: 12 MG/DL (ref 7–22)
CALCIUM SERPL-MCNC: 9 MG/DL (ref 8.5–10.5)
CASTS #/AREA URNS LPF: ABNORMAL /LPF
CASTS #/AREA URNS LPF: ABNORMAL /LPF
CHARACTER UR: CLEAR
CHARCOAL URNS QL MICRO: ABNORMAL
CHLORIDE SERPL-SCNC: 101 MEQ/L (ref 98–111)
CO2 SERPL-SCNC: 23 MEQ/L (ref 23–33)
COLOR UR: ABNORMAL
CREAT SERPL-MCNC: 0.6 MG/DL (ref 0.4–1.2)
CRYSTALS URNS QL MICRO: ABNORMAL
DEPRECATED RDW RBC AUTO: 42.5 FL (ref 35–45)
EOSINOPHIL NFR BLD AUTO: 0.9 %
EOSINOPHILS ABSOLUTE: 0.1 THOU/MM3 (ref 0–0.4)
EPITHELIAL CELLS, UA: ABNORMAL /HPF
ERYTHROCYTE [DISTWIDTH] IN BLOOD BY AUTOMATED COUNT: 12.5 % (ref 11.5–14.5)
GFR SERPL CREATININE-BSD FRML MDRD: > 60 ML/MIN/1.73M2
GLUCOSE SERPL-MCNC: 122 MG/DL (ref 70–108)
GLUCOSE UR QL STRIP.AUTO: NEGATIVE MG/DL
HCT VFR BLD AUTO: 45.7 % (ref 37–47)
HGB BLD-MCNC: 14.5 GM/DL (ref 12–16)
HGB UR QL STRIP.AUTO: NEGATIVE
IMM GRANULOCYTES # BLD AUTO: 0.05 THOU/MM3 (ref 0–0.07)
IMM GRANULOCYTES NFR BLD AUTO: 0.4 %
KETONES UR QL STRIP.AUTO: ABNORMAL
LEUKOCYTE ESTERASE UR QL STRIP.AUTO: ABNORMAL
LIPASE SERPL-CCNC: 30.7 U/L (ref 5.6–51.3)
LYMPHOCYTES ABSOLUTE: 0.4 THOU/MM3 (ref 1–4.8)
LYMPHOCYTES NFR BLD AUTO: 3.8 %
MCH RBC QN AUTO: 29.2 PG (ref 26–33)
MCHC RBC AUTO-ENTMCNC: 31.7 GM/DL (ref 32.2–35.5)
MCV RBC AUTO: 92.1 FL (ref 81–99)
MONOCYTES ABSOLUTE: 0.4 THOU/MM3 (ref 0.4–1.3)
MONOCYTES NFR BLD AUTO: 3.7 %
MUCOUS THREADS URNS QL MICRO: ABNORMAL
NEUTROPHILS NFR BLD AUTO: 90.9 %
NITRITE UR QL STRIP.AUTO: NEGATIVE
NRBC BLD AUTO-RTO: 0 /100 WBC
OSMOLALITY SERPL CALC.SUM OF ELEC: 274.9 MOSMOL/KG (ref 275–300)
PH UR STRIP.AUTO: 7 [PH] (ref 5–9)
PLATELET # BLD AUTO: 230 THOU/MM3 (ref 130–400)
PMV BLD AUTO: 10.3 FL (ref 9.4–12.4)
POTASSIUM SERPL-SCNC: 4.2 MEQ/L (ref 3.5–5.2)
PROT SERPL-MCNC: 7.7 G/DL (ref 6.1–8)
PROT UR STRIP.AUTO-MCNC: 30 MG/DL
RBC # BLD AUTO: 4.96 MILL/MM3 (ref 4.2–5.4)
RBC #/AREA URNS HPF: ABNORMAL /HPF
RENAL EPI CELLS #/AREA URNS HPF: ABNORMAL /[HPF]
SEGMENTED NEUTROPHILS ABSOLUTE COUNT: 10.3 THOU/MM3 (ref 1.8–7.7)
SODIUM SERPL-SCNC: 137 MEQ/L (ref 135–145)
SPECIFIC GRAVITY UA: 1.03 (ref 1–1.03)
UROBILINOGEN, URINE: 2 EU/DL (ref 0–1)
WBC # BLD AUTO: 11.3 THOU/MM3 (ref 4.8–10.8)
WBC #/AREA URNS HPF: ABNORMAL /HPF
YEAST LIKE FUNGI URNS QL MICRO: ABNORMAL

## 2023-07-10 PROCEDURE — 2580000003 HC RX 258: Performed by: NURSE PRACTITIONER

## 2023-07-10 PROCEDURE — 84703 CHORIONIC GONADOTROPIN ASSAY: CPT

## 2023-07-10 PROCEDURE — 83690 ASSAY OF LIPASE: CPT

## 2023-07-10 PROCEDURE — 82248 BILIRUBIN DIRECT: CPT

## 2023-07-10 PROCEDURE — 6370000000 HC RX 637 (ALT 250 FOR IP): Performed by: NURSE PRACTITIONER

## 2023-07-10 PROCEDURE — 80053 COMPREHEN METABOLIC PANEL: CPT

## 2023-07-10 PROCEDURE — 6360000002 HC RX W HCPCS: Performed by: NURSE PRACTITIONER

## 2023-07-10 PROCEDURE — 96374 THER/PROPH/DIAG INJ IV PUSH: CPT

## 2023-07-10 PROCEDURE — 85025 COMPLETE CBC W/AUTO DIFF WBC: CPT

## 2023-07-10 PROCEDURE — 99284 EMERGENCY DEPT VISIT MOD MDM: CPT

## 2023-07-10 PROCEDURE — 36415 COLL VENOUS BLD VENIPUNCTURE: CPT

## 2023-07-10 PROCEDURE — 81001 URINALYSIS AUTO W/SCOPE: CPT

## 2023-07-10 RX ORDER — 0.9 % SODIUM CHLORIDE 0.9 %
1000 INTRAVENOUS SOLUTION INTRAVENOUS ONCE
Status: COMPLETED | OUTPATIENT
Start: 2023-07-10 | End: 2023-07-10

## 2023-07-10 RX ORDER — METOCLOPRAMIDE HYDROCHLORIDE 5 MG/ML
10 INJECTION INTRAMUSCULAR; INTRAVENOUS ONCE
Status: COMPLETED | OUTPATIENT
Start: 2023-07-10 | End: 2023-07-10

## 2023-07-10 RX ORDER — DIPHENHYDRAMINE HYDROCHLORIDE 50 MG/ML
25 INJECTION INTRAMUSCULAR; INTRAVENOUS ONCE
Status: DISCONTINUED | OUTPATIENT
Start: 2023-07-10 | End: 2023-07-10 | Stop reason: HOSPADM

## 2023-07-10 RX ORDER — METOCLOPRAMIDE 10 MG/1
10 TABLET ORAL 4 TIMES DAILY
Qty: 20 TABLET | Refills: 0 | Status: SHIPPED | OUTPATIENT
Start: 2023-07-10 | End: 2023-07-15

## 2023-07-10 RX ADMIN — SODIUM CHLORIDE 1000 ML: 9 INJECTION, SOLUTION INTRAVENOUS at 04:59

## 2023-07-10 RX ADMIN — METOCLOPRAMIDE 10 MG: 5 INJECTION, SOLUTION INTRAMUSCULAR; INTRAVENOUS at 04:59

## 2023-07-10 RX ADMIN — Medication: at 05:00

## 2023-07-10 ASSESSMENT — PAIN SCALES - GENERAL: PAINLEVEL_OUTOF10: 9

## 2023-07-10 ASSESSMENT — PAIN DESCRIPTION - LOCATION: LOCATION: ABDOMEN

## 2023-07-10 ASSESSMENT — PAIN - FUNCTIONAL ASSESSMENT: PAIN_FUNCTIONAL_ASSESSMENT: 0-10

## 2023-07-10 NOTE — ED PROVIDER NOTES
[facility] Emergency Department    CHIEF COMPLAINT       Chief Complaint   Patient presents with    Abdominal Pain    Emesis       Nurses Notes reviewed and I agree except as noted in the HPI. HISTORY OF PRESENT ILLNESS   Darwin Mcclellan is a 20 y.o. female who presents to the ED for evaluation of abdominal pain, emesis. Patient notes symptoms started yesterday, pain is located in the upper abdomen, described as a constant cramping sensation. Worse with movement. She tried taking Zofran with no significant improvement. She notes some diarrhea, with nausea and vomiting. She reports a sore throat associate with vomiting. She notes decreased urination associated with vomiting. She denies fevers or chills, denies chest pain or shortness of breath. She reports a history of migraines, gastroparesis, gastritis, takes omeprazole. She follows with Dr. Virgen Chavez for gastroenterology. She denies any specific testing for gastroparesis. HPI was provided by the patient. PAST MEDICAL HISTORY     Past Medical History:   Diagnosis Date    Anxiety     Chronic GERD     Chronic migraine     takes verapamil    Depression     Gallstones 10/2019    Gastroparesis     diagnosis as a child    Hx of blood clots     PE     Hypertension     Miscarriage     multiple    Obesity     Pulmonary embolism ([address])     8 months ago-was on Xarelto-sees Dr Tapan Vieira in [place]    Splenomegaly        SURGICALHISTORY      has a past surgical history that includes Tonsillectomy; [place] tooth extraction; Elbow surgery (Right); Cholecystectomy, laparoscopic (N/A, 10/30/2019); and Dilation and curettage of uterus (03/16/2021).     CURRENT MEDICATIONS       Discharge Medication List as of 7/10/2023  5:30 AM        CONTINUE these medications which have NOT CHANGED    Details   ibuprofen (IBU) 400 MG tablet Take 1 tablet by mouth every 8 (eight) hours for 5 days, Disp-15 tablet, R-0Normal      sertraline (ZOLOFT) 100 MG tablet Take 1 tablet by

## 2023-07-10 NOTE — ED TRIAGE NOTES
Patient presents to ED with chief complaints of abdominal pain and emesis that started yesterday. Patient states she is unable to keep any food or drink down. Abdominal pain rated 9/10. Patient resting in bed. Respirations easy and unlabored. No distress noted. Call light within reach.

## 2023-07-26 ENCOUNTER — TELEPHONE (OUTPATIENT)
Dept: FAMILY MEDICINE CLINIC | Age: 23
End: 2023-07-26

## 2023-07-26 DIAGNOSIS — N92.6 IRREGULAR PERIODS: Primary | ICD-10-CM

## 2023-07-26 NOTE — TELEPHONE ENCOUNTER
Jael Nixon DO  Spoke with Alicia Ramon and she would still like to have the referral to Dr. Myesha Hagan that was ordered by Dr. Oralia Packer 06/27/2023. A new referral will need to be placed. Please Advise.

## 2023-10-20 NOTE — PROGRESS NOTES
Subjective   History of Present Illness  Patient is a 28-year-old female with a history of mental illness and chronic abdominal pain who presents to the ER with abdominal pain and nausea and vomiting.  Patient states her lower abdomen began hurting last night and is identical to her previous episodes of abdominal pain.  She describes it as an achy pain.  Nothing really makes it better or worse.  She does have associated nausea and vomiting.  She denies any fever, chest pain, shortness of air, diarrhea, urinary changes, neurologic changes.  Patient has no other concerns.      Review of Systems   Constitutional: Negative.    HENT: Negative.     Eyes: Negative.    Respiratory: Negative.     Cardiovascular: Negative.    Gastrointestinal:  Positive for abdominal pain, nausea and vomiting.   Endocrine: Negative.    Genitourinary: Negative.    Musculoskeletal: Negative.    Skin: Negative.    Allergic/Immunologic: Negative.    Neurological: Negative.    Hematological: Negative.    Psychiatric/Behavioral: Negative.     All other systems reviewed and are negative.      Past Medical History:   Diagnosis Date    Anxiety     Anxiety     Depression     HSV (herpes simplex virus) infection     Type I and Type 2    Pneumomediastinum     Psychosis     Traumatic perforation of pharynx        Allergies   Allergen Reactions    Nickel Hives and Itching    Penicillins Hives       Past Surgical History:   Procedure Laterality Date     SECTION N/A 2021    Procedure:  SECTION PRIMARY;  Surgeon: Lizbeth Lopes MD;  Location: [facility] LABOR DELIVERY;  Service: Gynecology;  Laterality: N/A;     SECTION N/A 2022    Procedure:  SECTION REPEAT;  Surgeon: Perico Craig MD;  Location: [facility] LABOR DELIVERY;  Service: Obstetrics/Gynecology;  Laterality: N/A;       Family History   Problem Relation Age of Onset    Mental illness Mother     Arthritis Mother        Social History     Socioeconomic History     Marital status: Single   Tobacco Use    Smoking status: Former     Types: Cigarettes    Smokeless tobacco: Never    Tobacco comments:     Patient used to smoke 1-2 cig per day.   Vaping Use    Vaping Use: Every day    Last attempt to quit: 10/1/2021    Substances: Nicotine    Devices: Disposable   Substance and Sexual Activity    Alcohol use: Not Currently     Comment: beer    Drug use: Not Currently    Sexual activity: Yes     Partners: Male     Birth control/protection: None           Objective   Physical Exam  Vitals and nursing note reviewed.   Constitutional:       Appearance: She is well-developed.   HENT:      Head: Normocephalic and atraumatic.   Eyes:      Conjunctiva/sclera: Conjunctivae normal.      Pupils: Pupils are equal, round, and reactive to light.   Cardiovascular:      Rate and Rhythm: Regular rhythm. Tachycardia present.      Heart sounds: Normal heart sounds.   Pulmonary:      Effort: Pulmonary effort is normal.      Breath sounds: Normal breath sounds.   Abdominal:      Palpations: Abdomen is soft.      Tenderness: There is abdominal tenderness in the right lower quadrant, suprapubic area and left lower quadrant. There is no right CVA tenderness, left CVA tenderness, guarding or rebound.   Musculoskeletal:         General: No deformity. Normal range of motion.      Cervical back: Normal range of motion.   Skin:     General: Skin is warm.   Neurological:      Mental Status: She is alert and oriented to person, place, and time.   Psychiatric:         Behavior: Behavior normal.         Procedures           ED Course           See MDM                                  Medical Decision Making  Patient is a 28-year-old female with a history of mental illness and chronic abdominal pain who presents to the ER with abdominal pain and nausea and vomiting.  Patient states her lower abdomen began hurting last night and is identical to her previous episodes of abdominal pain.  She describes it as an achy  Hepatitis A vaccine  Aged Out    Hepatitis B vaccine  Aged Out    Hib vaccine  Aged Out    Meningococcal (ACWY) vaccine  Aged Out       Subjective:      Review of Systems   Constitutional: Negative for chills, fatigue and fever. HENT: Negative for ear pain, postnasal drip and trouble swallowing. Eyes: Negative for pain and visual disturbance. Respiratory: Negative for cough and shortness of breath. Cardiovascular: Negative for chest pain and palpitations. Gastrointestinal: Positive for abdominal pain. Negative for blood in stool, constipation, diarrhea, nausea and vomiting. Genitourinary: Negative for difficulty urinating. Skin: Negative for rash. Neurological: Negative for headaches. Psychiatric/Behavioral: Negative for agitation. Objective:     Vitals:    10/19/20 1007   BP: 128/70   Pulse: 81   Temp: 97.5 °F (36.4 °C)   SpO2: 99%   Weight: 258 lb (117 kg)   Height: 5' 8.11\" (1.73 m)       Body mass index is 39.1 kg/m². Wt Readings from Last 3 Encounters:   10/19/20 258 lb (117 kg)   10/16/20 267 lb (121.1 kg)   09/27/20 270 lb (122.5 kg)     BP Readings from Last 3 Encounters:   10/19/20 128/70   09/27/20 (!) 159/81   09/21/20 130/89       Physical Exam  Vitals signs and nursing note reviewed. Constitutional:       General: She is not in acute distress. Appearance: She is well-developed. She is not diaphoretic. HENT:      Head: Normocephalic and atraumatic. Right Ear: External ear normal.      Left Ear: External ear normal.   Eyes:      General: No scleral icterus. Right eye: No discharge. Left eye: No discharge. Conjunctiva/sclera: Conjunctivae normal.   Neck:      Musculoskeletal: Normal range of motion. Cardiovascular:      Rate and Rhythm: Normal rate and regular rhythm. Heart sounds: Normal heart sounds. No murmur. Pulmonary:      Effort: Pulmonary effort is normal.      Breath sounds: Normal breath sounds.    Abdominal:       Skin: pain.  Nothing really makes it better or worse.  She does have associated nausea and vomiting.  She denies any fever, chest pain, shortness of air, diarrhea, urinary changes, neurologic changes.  Patient has no other concerns.    Differential diagnosis: Chronic abdominal pain, irritable bowel syndrome, intra-abdominal infection, UTI    Labs and imaging were ordered.  Patient refused an IV.  She states she has somewhere she has to be.  Patient was given p.o. Zofran.  Patient understands the risk of missing an intra-abdominal infection that could lead to permanent disability or death but still decided to leave AMA.  No further care could be given since the patient left AMA.  She was advised to return if she changes her mind about treatment or if her symptoms worsen.  Patient then left AMA.    Problems Addressed:  Chronic bilateral lower abdominal pain: chronic illness or injury  Nausea and vomiting, unspecified vomiting type: complicated acute illness or injury    Amount and/or Complexity of Data Reviewed  Labs: ordered.    Risk  Prescription drug management.        Final diagnoses:   Chronic bilateral lower abdominal pain   Nausea and vomiting, unspecified vomiting type       ED Disposition  ED Disposition       ED Disposition   AMA    Condition   --    Comment   --               Che Linda, APRN  [address] [place] [address]  [phone]    Schedule an appointment as soon as possible for a visit            Medication List      No changes were made to your prescriptions during this visit.            Lelia Alan MD  10/20/23 8368     Panchito Vidal MD                                    Physician      The procedure was explained in detail to the patient. Risks,   complications and alternative treatments were reviewed. Written consent   was obtained. Procedure     Type of Study      Stress procedure:Exercise, ECHOCARDIOGRAM STRESS TEST. Procedure Date  Date: 10/16/2020 Start: 10:03 AM     Indications:Chest pain.     Height: 68.9 inches Weight: 267.01 pounds BSA: 2.33 m^2 BMI: 39.55 kg/m^2     HR: 86 bpm BP: 110/70 mmHg      Conclusions      Summary   No chest pain   No stress Induced Arrhythmia   Fair exercise capacity with METS of 10   Exercise time was 9 minutes   Exercise EKG is negative for ischemia   no stress-induced segmental wall motion abnormality. Proper augmentation of the left ventricular with exercise   no stress induced cavity dilation. Exercise stress echo is not suggestive for cardiac ischemia   Appropriate hemodynamic response to exercise. Signature      ----------------------------------------------------------------   Electronically signed by Panchito Vidal MD (Interpreting   physician) on 10/16/2020 at 08:23 PM   ----------------------------------------------------------------      Rest         Assessment:      Diagnosis Orders   1. Lower abdominal pain  HCG, Quantitative, Pregnancy    Varicella Zoster Antibody, IgM    Varicella Zoster Antibody, IgG    POCT urine pregnancy    POCT Urinalysis No Micro (Auto)    Basic Metabolic Panel    CBC Auto Differential    Vitamin D 25 Hydroxy    Magnesium    Iron and TIBC    Hepatic Function Panel   2. Chronic migraine  HCG, Quantitative, Pregnancy    Varicella Zoster Antibody, IgM    Varicella Zoster Antibody, IgG    POCT urine pregnancy    POCT Urinalysis No Micro (Auto)    Basic Metabolic Panel    CBC Auto Differential    Vitamin D 25 Hydroxy    Magnesium    Iron and TIBC    Hepatic Function Panel   3.  Gastroparesis  HCG, Quantitative, Pregnancy    Varicella Zoster Antibody, IgM    Varicella Zoster Antibody, IgG    POCT urine pregnancy    POCT Urinalysis No Micro (Auto)    Basic Metabolic Panel    CBC Auto Differential    Vitamin D 25 Hydroxy    Magnesium    Iron and TIBC    Hepatic Function Panel   4. Cyst of right ovary  HCG, Quantitative, Pregnancy    Varicella Zoster Antibody, IgM    Varicella Zoster Antibody, IgG    POCT urine pregnancy    POCT Urinalysis No Micro (Auto)    Basic Metabolic Panel    CBC Auto Differential    Vitamin D 25 Hydroxy    Magnesium    Iron and TIBC    Hepatic Function Panel       Plan: Will do a pregnancy test and a urine today    Will see janelle 11-2 to go over the test    If the cramping is worse - can do an US and look at the ovaries    Will see you in 6 months and recheck the vit d and mag and the cbc at that time    If the abd pain gets worse - can get the labs before you comeback to see us       No follow-ups on file. Orders Placed:  Orders Placed This Encounter   Procedures    HCG, Quantitative, Pregnancy    Varicella Zoster Antibody, IgM    Varicella Zoster Antibody, IgG    Basic Metabolic Panel    CBC Auto Differential    Vitamin D 25 Hydroxy    Magnesium    Iron and TIBC    Hepatic Function Panel    POCT urine pregnancy    POCT Urinalysis No Micro (Auto)     Medications Prescribed:  No orders of the defined types were placed in this encounter.       Future Appointments   Date Time Provider Jerome Vasquez   11/2/2020 11:00 AM Denice Hernandez MD [address] [facility]   1/7/2021  8:30 AM PhD Cathryn Deanf [facility] [place]   1/14/2021  8:30 AM PhD Cathryn Dean Hof [facility] [place]   1/21/2021  8:30 AM PhD Cathryn Deanf [facility]   1/28/2021  8:30 AM PhD Cathryn Dean [facility]   2/4/2021  8:30 AM PhD Cathryn Deanf [facility] [place]   2/11/2021  8:30 AM PhD Cathryn Dean [facility]   2/18/2021  8:30 AM PhD Cathryn Dean [facility] [place]   2/25/2021  8:30 AM Martinez Sa, PhD [facility] [place]   3/4/2021  8:30 AM Martinez Sa, PhD [facility]   3/11/2021  8:30 AM Martinez Sa, PhD [facility]   3/18/2021  8:30 AM Martinez Sa, PhD [facility] [address] [facility]   3/25/2021  8:30 AM Martinez Sa, PhD [address]       Patient given educational materials - see patient instructions. Discussed use, benefit, and sideeffects of prescribed medications. All patient questions answered. Pt voiced understanding. Reviewed health maintenance. Instructed to continue current medications, diet and exercise. Patient agreed with treatment plan. Follow up as directed. I was present for the key portions of the exam and history and confirmed all areas of the note with the patient, staff and the student.       Electronically signed by Brayden Dolan DO on 10/19/2020 at 10:43 AM

## 2023-11-15 ENCOUNTER — HOSPITAL ENCOUNTER (OUTPATIENT)
Dept: PHYSICAL THERAPY | Age: 23
Setting detail: THERAPIES SERIES
Discharge: HOME | End: 2023-11-15
Payer: COMMERCIAL

## 2023-11-15 PROCEDURE — 97140 MANUAL THERAPY 1/> REGIONS: CPT

## 2023-11-15 NOTE — PROGRESS NOTES
Tummy tuck quick/hold       Pelvic Brace Quick       Pelvic Brace Hold       Double voiding  8 min             PFM anatomy and function  5 min      PFM awareness/relaxation (mirror, towel, hand) 5 min   Guided relaxation and diaphragmatic breathing    Reverse Kegel       Lubrication vaginal moisturizer       Manual therapy pt education 10 min      Cupping  10 min  X    External STM  15 min  X Low back     Diaphragmatic breathing       Internal exam  30 min  X    Kegel with Ball Squeeze       Kegel with Band       IDN  5 min   On hold    Bowel massage  5 min  X    Pelvic Tilt       Pelvic Tilt with arm lift       Pelvic Tilt with leg march       Pelvic Tilt with opposites       Bridge       Pelvic Tilt SLR       Clamshell       Reverse Clamshell       Guided relaxation and diaphragmatic breathing  10 min      Bowel education  10 min   Probiotic and gastroparesis diet    Standing Kegel       Kegel Mini Squat       Standing Hip 3-way                        Specific Interventions Next Treatment: Internal PFM release, abdominal STM    Activity/Treatment Tolerance:  [x]  Patient tolerated treatment well  []  Patient limited by fatigue  []  Patient limited by pain   []  Patient limited by medical complications  []  Other:     Patient Education:   [x]  HEP/Education Completed: guided relaxation   []  No new Education completed  []  Reviewed Prior HEP      [x]  Patient verbalized and/or demonstrated understanding of education provided. []  Patient unable to verbalize and/or demonstrate understanding of education provided. Will continue education. []  Barriers to learning:     Assessment: Pt tolerated session well this date.  With completion of the internal exam to assess the pelvic floor through the vagina there was noted tightness and tenderness bilaterally R>L along the OI, levator ani, and coccygeus with referred pain to the anterior PFM that the pt describes as stinging that was released utilizing STM and thicass

## 2023-11-20 ENCOUNTER — HOSPITAL ENCOUNTER (EMERGENCY)
Age: 23
Discharge: HOME | End: 2023-11-20
Payer: COMMERCIAL

## 2023-11-20 ENCOUNTER — APPOINTMENT (OUTPATIENT)
Dept: CT IMAGING | Age: 23
End: 2023-11-20
Payer: COMMERCIAL

## 2023-11-20 VITALS
TEMPERATURE: 98.3 F | SYSTOLIC BLOOD PRESSURE: 156 MMHG | DIASTOLIC BLOOD PRESSURE: 104 MMHG | OXYGEN SATURATION: 98 % | WEIGHT: 280 LBS | HEART RATE: 98 BPM | BODY MASS INDEX: 41.35 KG/M2 | RESPIRATION RATE: 16 BRPM

## 2023-11-20 DIAGNOSIS — B34.9 NONSPECIFIC SYNDROME SUGGESTIVE OF VIRAL ILLNESS: ICD-10-CM

## 2023-11-20 DIAGNOSIS — R10.9 RIGHT FLANK PAIN: Primary | ICD-10-CM

## 2023-11-20 LAB
ALBUMIN SERPL BCG-MCNC: 4.4 G/DL (ref 3.5–5.1)
ALP SERPL-CCNC: 72 U/L (ref 38–126)
ALT SERPL W/O P-5'-P-CCNC: 40 U/L (ref 11–66)
ANION GAP SERPL CALC-SCNC: 15 MEQ/L (ref 8–16)
AST SERPL-CCNC: 28 U/L (ref 5–40)
B-HCG SERPL QL: NEGATIVE
BACTERIA URNS QL MICRO: ABNORMAL /HPF
BASOPHILS ABSOLUTE: 0 THOU/MM3 (ref 0–0.1)
BASOPHILS NFR BLD AUTO: 0.4 %
BILIRUB SERPL-MCNC: 0.3 MG/DL (ref 0.3–1.2)
BILIRUB UR QL STRIP.AUTO: NEGATIVE
BUN SERPL-MCNC: 13 MG/DL (ref 7–22)
CALCIUM SERPL-MCNC: 9.9 MG/DL (ref 8.5–10.5)
CASTS #/AREA URNS LPF: ABNORMAL /LPF
CASTS 2: ABNORMAL /LPF
CHARACTER UR: CLEAR
CHLORIDE SERPL-SCNC: 104 MEQ/L (ref 98–111)
CO2 SERPL-SCNC: 19 MEQ/L (ref 23–33)
COLOR: YELLOW
CREAT SERPL-MCNC: 0.6 MG/DL (ref 0.4–1.2)
CRYSTALS URNS MICRO: ABNORMAL
DEPRECATED RDW RBC AUTO: 40.5 FL (ref 35–45)
EOSINOPHIL NFR BLD AUTO: 0.6 %
EOSINOPHILS ABSOLUTE: 0.1 THOU/MM3 (ref 0–0.4)
EPITHELIAL CELLS, UA: ABNORMAL /HPF
ERYTHROCYTE [DISTWIDTH] IN BLOOD BY AUTOMATED COUNT: 12.2 % (ref 11.5–14.5)
GFR SERPL CREATININE-BSD FRML MDRD: > 60 ML/MIN/1.73M2
GLUCOSE SERPL-MCNC: 95 MG/DL (ref 70–108)
GLUCOSE UR QL STRIP.AUTO: NEGATIVE MG/DL
HCT VFR BLD AUTO: 43.1 % (ref 37–47)
HGB BLD-MCNC: 14.1 GM/DL (ref 12–16)
HGB UR QL STRIP.AUTO: ABNORMAL
IMM GRANULOCYTES # BLD AUTO: 0.04 THOU/MM3 (ref 0–0.07)
IMM GRANULOCYTES NFR BLD AUTO: 0.4 %
KETONES UR QL STRIP.AUTO: NEGATIVE
LIPASE SERPL-CCNC: 38.4 U/L (ref 5.6–51.3)
LYMPHOCYTES ABSOLUTE: 2.2 THOU/MM3 (ref 1–4.8)
LYMPHOCYTES NFR BLD AUTO: 22.7 %
MCH RBC QN AUTO: 29.9 PG (ref 26–33)
MCHC RBC AUTO-ENTMCNC: 32.7 GM/DL (ref 32.2–35.5)
MCV RBC AUTO: 91.5 FL (ref 81–99)
MISCELLANEOUS 2: ABNORMAL
MONOCYTES ABSOLUTE: 0.5 THOU/MM3 (ref 0.4–1.3)
MONOCYTES NFR BLD AUTO: 5.7 %
NEUTROPHILS NFR BLD AUTO: 70.2 %
NITRITE UR QL STRIP: NEGATIVE
NRBC BLD AUTO-RTO: 0 /100 WBC
OSMOLALITY SERPL CALC.SUM OF ELEC: 275.6 MOSMOL/KG (ref 275–300)
PH UR STRIP.AUTO: 6.5 [PH] (ref 5–9)
PLATELET # BLD AUTO: 269 THOU/MM3 (ref 130–400)
PMV BLD AUTO: 10.4 FL (ref 9.4–12.4)
POTASSIUM SERPL-SCNC: 4.1 MEQ/L (ref 3.5–5.2)
PROT SERPL-MCNC: 7.8 G/DL (ref 6.1–8)
PROT UR STRIP.AUTO-MCNC: ABNORMAL MG/DL
RBC # BLD AUTO: 4.71 MILL/MM3 (ref 4.2–5.4)
RBC URINE: ABNORMAL /HPF
RENAL EPI CELLS #/AREA URNS HPF: ABNORMAL /[HPF]
SEGMENTED NEUTROPHILS ABSOLUTE COUNT: 6.7 THOU/MM3 (ref 1.8–7.7)
SODIUM SERPL-SCNC: 138 MEQ/L (ref 135–145)
SP GR UR REFRACT.AUTO: 1.02 (ref 1–1.03)
UROBILINOGEN, URINE: 0.2 EU/DL (ref 0–1)
WBC # BLD AUTO: 9.6 THOU/MM3 (ref 4.8–10.8)
WBC #/AREA URNS HPF: ABNORMAL /HPF
WBC #/AREA URNS HPF: NEGATIVE /[HPF]
YEAST LIKE FUNGI URNS QL MICRO: ABNORMAL

## 2023-11-20 PROCEDURE — 83690 ASSAY OF LIPASE: CPT

## 2023-11-20 PROCEDURE — 74176 CT ABD & PELVIS W/O CONTRAST: CPT

## 2023-11-20 PROCEDURE — 84703 CHORIONIC GONADOTROPIN ASSAY: CPT

## 2023-11-20 PROCEDURE — 85025 COMPLETE CBC W/AUTO DIFF WBC: CPT

## 2023-11-20 PROCEDURE — 6360000002 HC RX W HCPCS: Performed by: NURSE PRACTITIONER

## 2023-11-20 PROCEDURE — 81001 URINALYSIS AUTO W/SCOPE: CPT

## 2023-11-20 PROCEDURE — 99284 EMERGENCY DEPT VISIT MOD MDM: CPT

## 2023-11-20 PROCEDURE — 80053 COMPREHEN METABOLIC PANEL: CPT

## 2023-11-20 PROCEDURE — 96374 THER/PROPH/DIAG INJ IV PUSH: CPT

## 2023-11-20 PROCEDURE — 96375 TX/PRO/DX INJ NEW DRUG ADDON: CPT

## 2023-11-20 PROCEDURE — 36415 COLL VENOUS BLD VENIPUNCTURE: CPT

## 2023-11-20 PROCEDURE — 2580000003 HC RX 258: Performed by: NURSE PRACTITIONER

## 2023-11-20 RX ORDER — ONDANSETRON 2 MG/ML
4 INJECTION INTRAMUSCULAR; INTRAVENOUS ONCE
Status: COMPLETED | OUTPATIENT
Start: 2023-11-20 | End: 2023-11-20

## 2023-11-20 RX ORDER — KETOROLAC TROMETHAMINE 30 MG/ML
30 INJECTION, SOLUTION INTRAMUSCULAR; INTRAVENOUS ONCE
Status: COMPLETED | OUTPATIENT
Start: 2023-11-20 | End: 2023-11-20

## 2023-11-20 RX ORDER — 0.9 % SODIUM CHLORIDE 0.9 %
1000 INTRAVENOUS SOLUTION INTRAVENOUS ONCE
Status: COMPLETED | OUTPATIENT
Start: 2023-11-20 | End: 2023-11-20

## 2023-11-20 RX ADMIN — SODIUM CHLORIDE 1000 ML: 9 INJECTION, SOLUTION INTRAVENOUS at 16:39

## 2023-11-20 RX ADMIN — ONDANSETRON 4 MG: 2 INJECTION INTRAMUSCULAR; INTRAVENOUS at 16:38

## 2023-11-20 RX ADMIN — KETOROLAC TROMETHAMINE 30 MG: 30 INJECTION, SOLUTION INTRAMUSCULAR; INTRAVENOUS at 16:37

## 2023-11-20 ASSESSMENT — PAIN SCALES - GENERAL: PAINLEVEL_OUTOF10: 9

## 2023-11-20 ASSESSMENT — PAIN DESCRIPTION - LOCATION: LOCATION: FLANK

## 2023-11-20 ASSESSMENT — PAIN DESCRIPTION - ORIENTATION: ORIENTATION: RIGHT

## 2023-11-20 NOTE — ED NOTES
Pt presents to the ED with concerns of right flank pain that started today. She reports a history of kidney stones. Rates pain at a 9/10.      Maggie Tobar Comp  11/20/23 1799

## 2023-11-22 ENCOUNTER — HOSPITAL ENCOUNTER (OUTPATIENT)
Dept: PHYSICAL THERAPY | Age: 23
Setting detail: THERAPIES SERIES
Discharge: HOME | End: 2023-11-22
Payer: COMMERCIAL

## 2023-11-22 PROCEDURE — 97140 MANUAL THERAPY 1/> REGIONS: CPT

## 2023-11-22 NOTE — PROGRESS NOTES
[address]  THERAPY  OUTPATIENT REHABILITATION - SPECIALIZED THERAPY SERVICES  [] PELVIC HEALTH EVALUATION  [x] DAILY NOTE  [] PROGRESS NOTE [] DISCHARGE NOTE    Date: 2023  Patient Name:  Eulalia Patel  : 2000  MRN: [id number]  CSN: [id number]    Referring Practitioner Gaetano Rico DO   Diagnosis Pelvic Pain    Treatment Diagnosis M54.5 - Low Back Pain  N81.84 - Pelvic Muscle Wasting (Female), R10.2 - Pelvic and Perineal Pain  R94.10 - Unspecified Dyspareunia, and R27.8 - Other Lack of Coordination   Date of Evaluation 10/12/22    Additional Pertinent History Cholelithiasis without obstruction K80.20     Chronic migraine KUE1733    Pulmonary embolism (HCC) I26.99    Anxiety F41.9    Depression F32. A    Gastroparesis K31.84    Obesity E66.9    Chronic GERD K21.9    Hypertension I10    Hx of blood clots Z86.718    Costochondral chest pain R07.89    Muscle strain T14. 8XXA    Chest pain, atypical R07.89    History of pulmonary embolism Z86.711    Mild intermittent asthma J45.20           Past Medical History:Diagnosis Date    Anxiety      Chronic GERD      Chronic migraine       takes verapamil    Depression      Gallstones 10/2019    Gastroparesis       diagnosis as a child    Hx of blood clots       PE     Hypertension      Miscarriage       multiple    Obesity      Pulmonary embolism ([address])       8 months ago-was on Xarelto-sees Dr Alma Delia Valdivia in [place]    Splenomegaly           Past Surgical History:   Procedure Laterality Date    CHOLECYSTECTOMY, LAPAROSCOPIC N/A 10/30/2019     ROBOTIC LAP BRANNON performed by Ezra Santana MD at [address] [place] [address]   2021    ELBOW SURGERY Right      TONSILLECTOMY        WISDOM TOOTH EXTRACTION        Functional Outcome Measure Used PPUF/Freq   Functional Outcome Score PPUF/Freq: 20 (10/12/22); PPUF/Freq: 16 (3/15/23)       Insurance: Primary: Payor: Sheree Eckert /  /  / ,   Secondary:

## 2023-11-27 NOTE — ED PROVIDER NOTES
Patient was turned over to me by Jaime Espinoza NP pending urinalysis. In short this is a pleasant 69-year-old female with right flank pain and concern for kidney stone. Patient affirmed cough and chills. She has been taking care of her mother who has lupus and current bout of influenza. CT was negative for any acute process. Labs and urinalysis was negative as well. I offered to test the patient for COVID and flu which she declined. Patient had been previously medicated with Toradol with improvement in pain. Patient remained stable with good vital signs. Patient was comfortable plan of care and anticipatory guidance was given. I have given the patient strict written and verbal instructions about care at home, follow-up, and signs and symptoms of worsening of condition and they did verbalize understanding.        Miguel Mazariegos PA-C  11/20/23 1911
in agreement. ED Medications administered this visit:  (None if blank)  Medications   sodium chloride 0.9 % bolus 1,000 mL (0 mLs IntraVENous Stopped 11/20/23 1912)   ketorolac (TORADOL) injection 30 mg (30 mg IntraVENous Given 11/20/23 1637)   ondansetron (ZOFRAN) injection 4 mg (4 mg IntraVENous Given 11/20/23 1638)         CONSULTS:  None    PROCEDURES: (None if blank)  Procedures:     CRITICAL CARE: (None if blank)      DISCHARGE PRESCRIPTIONS: (None if blank)  Discharge Medication List as of 11/20/2023  7:08 PM          FINAL IMPRESSION      1. Right flank pain    2. Nonspecific syndrome suggestive of viral illness          DISPOSITION/PLAN   DISPOSITION Decision To Discharge 11/20/2023 07:06:40 PM      OUTPATIENT FOLLOW UP THE PATIENT:  Valerie Kenisha [address] [facility] [address]  [address] [place] [address]    Schedule an appointment as soon as possible for a visit in 2 days        KARLA Edward CNP, APRN - CNP  11/26/23 4602

## 2023-11-29 ENCOUNTER — HOSPITAL ENCOUNTER (OUTPATIENT)
Dept: PHYSICAL THERAPY | Age: 23
Setting detail: THERAPIES SERIES
Discharge: HOME | End: 2023-11-29
Payer: COMMERCIAL

## 2023-11-29 PROCEDURE — 97140 MANUAL THERAPY 1/> REGIONS: CPT

## 2023-11-29 PROCEDURE — 97110 THERAPEUTIC EXERCISES: CPT

## 2023-11-29 NOTE — PROGRESS NOTES
oc  [address] THERAPY  OUTPATIENT REHABILITATION - SPECIALIZED THERAPY SERVICES  [] PELVIC HEALTH EVALUATION  [x] DAILY NOTE  [] PROGRESS NOTE [] DISCHARGE NOTE    Date: 2023  Patient Name:  Flor Segura  : 2000  MRN: [id number]  CSN: [id number]    Referring Practitioner Kaden Hunt DO   Diagnosis Pelvic Pain    Treatment Diagnosis M54.5 - Low Back Pain  N81.84 - Pelvic Muscle Wasting (Female), R10.2 - Pelvic and Perineal Pain  R94.10 - Unspecified Dyspareunia, and R27.8 - Other Lack of Coordination   Date of Evaluation 10/12/22    Additional Pertinent History Cholelithiasis without obstruction K80.20     Chronic migraine [id number]    Pulmonary embolism (HCC) I26.99    Anxiety F41.9    Depression F32. A    Gastroparesis K31.84    Obesity E66.9    Chronic GERD K21.9    Hypertension I10    Hx of blood clots Z86.718    Costochondral chest pain R07.89    Muscle strain T14. 8XXA    Chest pain, atypical R07.89    History of pulmonary embolism Z86.711    Mild intermittent asthma J45.20           Past Medical History:Diagnosis Date    Anxiety      Chronic GERD      Chronic migraine       takes verapamil    Depression      Gallstones 10/2019    Gastroparesis       diagnosis as a child    Hx of blood clots       PE     Hypertension      Miscarriage       multiple    Obesity      Pulmonary embolism ([address])       8 months ago-was on Xarelto-sees Dr Emeka Summers in [facility]    Splenomegaly           Past Surgical History:   Procedure Laterality Date    CHOLECYSTECTOMY, LAPAROSCOPIC N/A 10/30/2019     ROBOTIC LAP BRANNON performed by Blas Esqueda MD at [address] [place] [address]   2021    ELBOW SURGERY Right      TONSILLECTOMY        WISDOM TOOTH EXTRACTION        Functional Outcome Measure Used PPUF/Freq   Functional Outcome Score PPUF/Freq: 20 (10/12/22); PPUF/Freq: 16 (3/15/23)       Insurance: Primary: Payor: Alyssa Linder /  /  / ,   Secondary:

## 2023-12-05 ENCOUNTER — HOSPITAL ENCOUNTER (EMERGENCY)
Age: 23
Discharge: HOME | End: 2023-12-05
Payer: COMMERCIAL

## 2023-12-05 VITALS
DIASTOLIC BLOOD PRESSURE: 69 MMHG | BODY MASS INDEX: 37.77 KG/M2 | WEIGHT: 255 LBS | HEART RATE: 81 BPM | OXYGEN SATURATION: 100 % | HEIGHT: 69 IN | TEMPERATURE: 97.8 F | RESPIRATION RATE: 18 BRPM | SYSTOLIC BLOOD PRESSURE: 127 MMHG

## 2023-12-05 DIAGNOSIS — L25.9 CONTACT DERMATITIS, UNSPECIFIED CONTACT DERMATITIS TYPE, UNSPECIFIED TRIGGER: Primary | ICD-10-CM

## 2023-12-05 PROCEDURE — 99213 OFFICE O/P EST LOW 20 MIN: CPT

## 2023-12-05 PROCEDURE — 99213 OFFICE O/P EST LOW 20 MIN: CPT | Performed by: NURSE PRACTITIONER

## 2023-12-05 ASSESSMENT — PAIN - FUNCTIONAL ASSESSMENT: PAIN_FUNCTIONAL_ASSESSMENT: NONE - DENIES PAIN

## 2023-12-05 NOTE — ED PROVIDER NOTES
[address]       Chief Complaint   Patient presents with    Rash     Vaginal area         Nurses Notes reviewed and I agree except as noted in the HPI. HISTORY OF PRESENT ILLNESS   Belgica Boyer is a 24 y.o. female who presents to urgent care with complaints of a rash to her vaginal skin area. She was just on her menstrual cycle last week. She denies any vaginal discharge. She states that she is with a new partner and they did use a condom and she is unsure if she had an allergic reaction to this. She does have a history of a latex reaction. States that it is itchy and burning. REVIEW OF SYSTEMS     Review of Systems   Genitourinary:         Labial irritation         PAST MEDICAL HISTORY         Diagnosis Date    Anxiety     Chronic GERD     Chronic migraine     takes verapamil    Depression     Gallstones 10/2019    Gastroparesis     diagnosis as a child    Hx of blood clots     PE     Hypertension     Miscarriage     multiple    Obesity     Pulmonary embolism ([address])     8 months ago-was on Xarelto-sees Dr Evangelina Mart in [place]    Splenomegaly        SURGICAL HISTORY     Patient  has a past surgical history that includes Tonsillectomy; [place] tooth extraction; Elbow surgery (Right); Cholecystectomy, laparoscopic (N/A, 10/30/2019); and Dilation and curettage of uterus (03/16/2021). CURRENT MEDICATIONS       Discharge Medication List as of 12/5/2023 10:36 AM        CONTINUE these medications which have NOT CHANGED    Details   metoclopramide (REGLAN) 10 MG tablet Take 1 tablet by mouth 4 times daily for 20 doses WARNING:  May cause drowsiness. May impair ability to operate vehicles or machinery.   Do not use in combination with alcohol., Disp-20 tablet, R-0Normal      ibuprofen (IBU) 400 MG tablet Take 1 tablet by mouth every 8 (eight) hours for 5 days, Disp-15 tablet, R-0Normal      sertraline (ZOLOFT) 100 MG tablet Take 1 tablet by mouth results found for this visit on 12/05/23. IMAGING:  No orders to display     URGENT CARE COURSE:         Medications - No data to display  PROCEDURES:  FINALIMPRESSION      1. Contact dermatitis, unspecified contact dermatitis type, unspecified trigger        DISPOSITION/PLAN   DISPOSITION Decision To Discharge 12/05/2023 10:35:49 AM    Patient declined exam.  She states that she did have somewhat relief with 1% hydrocortisone cream.  She is concerned that she had allergic reaction to the condom that her significant other used. Will prescribe 2.5% hydrocortisone cream.  She was advised if symptoms do not resolve to please return. She denies questions. PATIENT REFERRED TO:  Kenisha [address] [facility]  [phone]          DISCHARGE MEDICATIONS:  Discharge Medication List as of 12/5/2023 10:36 AM        START taking these medications    Details   hydrocortisone 2.5 % cream Apply topically 2 times daily. , Disp-20 g, R-0, Normal           Discharge Medication List as of 12/5/2023 10:36 AM          KARLA Antonio CNP, APRN - CNP  12/05/23 7413

## 2023-12-26 ENCOUNTER — TELEPHONE (OUTPATIENT)
Dept: FAMILY MEDICINE CLINIC | Age: 23
End: 2023-12-26

## 2023-12-26 NOTE — TELEPHONE ENCOUNTER
Patient Communication     Append Comments   Seen Back to [facility]! All testing was negative and I didn't see anything abnormal on exam.If you still have the hydrocortisone cream, I would recommend you use it daily for ~ 2 weeks and then taper down to 3 times a week for one week and then 2 days and then stop. If the discomfort returns, we can try a different cream. Let me know how it goes. And happy holidays! .. Jacinta Tavera    Written by Dusty Colon DO on 12/22/2023  3:14 PM EST View Full Comments  Seen by patient Ebony Fanny on 12/22/2023  3:24 PM

## 2023-12-26 NOTE — TELEPHONE ENCOUNTER
----- Message from Rony Akhtar DO sent at 12/22/2023  3:14 PM EST -----  All testing negative. Will have patient trial 2 weeks hydrocortisone and if pruritis returns, will trial tacrolimus. Patient notified via [employer].     Electronically signed by Rony Akhtar DO on 12/22/2023 at 3:11 PM

## 2024-01-10 ENCOUNTER — HOSPITAL ENCOUNTER (OUTPATIENT)
Dept: PHYSICAL THERAPY | Age: 24
Setting detail: THERAPIES SERIES
End: 2024-01-10
Payer: COMMERCIAL

## 2024-01-16 ENCOUNTER — HOSPITAL ENCOUNTER (EMERGENCY)
Age: 24
Discharge: HOME | End: 2024-01-16
Payer: COMMERCIAL

## 2024-01-16 VITALS
TEMPERATURE: 97.6 F | RESPIRATION RATE: 18 BRPM | OXYGEN SATURATION: 98 % | SYSTOLIC BLOOD PRESSURE: 124 MMHG | DIASTOLIC BLOOD PRESSURE: 67 MMHG | HEART RATE: 102 BPM | BODY MASS INDEX: 40.02 KG/M2 | WEIGHT: 255 LBS | HEIGHT: 67 IN

## 2024-01-16 DIAGNOSIS — J00 ACUTE RHINITIS: Primary | ICD-10-CM

## 2024-01-16 DIAGNOSIS — J45.909 UNCOMPLICATED ASTHMA, UNSPECIFIED ASTHMA SEVERITY, UNSPECIFIED WHETHER PERSISTENT: ICD-10-CM

## 2024-01-16 LAB
FLUAV AG SPEC QL: NEGATIVE
FLUBV AG SPEC QL: NEGATIVE
SARS-COV-2 RDRP RESP QL NAA+PROBE: NOT  DETECTED

## 2024-01-16 PROCEDURE — 99213 OFFICE O/P EST LOW 20 MIN: CPT

## 2024-01-16 PROCEDURE — 87804 INFLUENZA ASSAY W/OPTIC: CPT

## 2024-01-16 PROCEDURE — 99213 OFFICE O/P EST LOW 20 MIN: CPT | Performed by: NURSE PRACTITIONER

## 2024-01-16 PROCEDURE — 87635 SARS-COV-2 COVID-19 AMP PRB: CPT

## 2024-01-16 RX ORDER — PREDNISONE 20 MG/1
TABLET ORAL
Qty: 10 TABLET | Refills: 0 | Status: SHIPPED | OUTPATIENT
Start: 2024-01-16 | End: 2024-01-22

## 2024-01-16 ASSESSMENT — ENCOUNTER SYMPTOMS
DIARRHEA: 0
TROUBLE SWALLOWING: 0
EYE DISCHARGE: 0
SHORTNESS OF BREATH: 1
CHEST TIGHTNESS: 0
WHEEZING: 0
EYE REDNESS: 0
RHINORRHEA: 1
NAUSEA: 0
SORE THROAT: 0
VOMITING: 0
COUGH: 1

## 2024-01-16 ASSESSMENT — PAIN DESCRIPTION - ONSET: ONSET: GRADUAL

## 2024-01-16 ASSESSMENT — PAIN DESCRIPTION - DESCRIPTORS: DESCRIPTORS: ACHING

## 2024-01-16 ASSESSMENT — PAIN - FUNCTIONAL ASSESSMENT
PAIN_FUNCTIONAL_ASSESSMENT: ACTIVITIES ARE NOT PREVENTED
PAIN_FUNCTIONAL_ASSESSMENT: 0-10

## 2024-01-16 ASSESSMENT — PAIN DESCRIPTION - LOCATION: LOCATION: THROAT

## 2024-01-16 ASSESSMENT — PAIN DESCRIPTION - FREQUENCY: FREQUENCY: CONTINUOUS

## 2024-01-16 ASSESSMENT — PAIN DESCRIPTION - PAIN TYPE: TYPE: ACUTE PAIN

## 2024-01-16 ASSESSMENT — PAIN SCALES - GENERAL: PAINLEVEL_OUTOF10: 3

## 2024-01-16 NOTE — ED NOTES
PT GIVEN DISCHARGE INSTRUCTIONS, VERBALIZES UNDERSTANDING.  PT ASSESSMENT UNCHANGED, DISCHARGED IN STABLE CONDITION.        Jj Goa RN  01/16/24 7033

## 2024-01-16 NOTE — ED PROVIDER NOTES
[facility]  Urgent Care Encounter      CHIEF COMPLAINT       Chief Complaint   Patient presents with    Cough    Pharyngitis    Sinusitis       Nurses Notes reviewed and I agree except as noted in the HPI.  HISTORY OF PRESENT ILLNESS   Serena Vigil is a 23 y.o. female who presents for evaluation of cough and sore throat.  Onset of symptoms over the last 3 days, unchanged.  Cough is intermittent, productive at times.  Associated sinus congestion, sore throat.  No fever, trouble swallowing.  Associated shortness of breath, occurs with moderate exertion.  No chest pain, palpitations.  No shortness of breath at rest.  History of asthma.  She has had to use her rescue inhaler.  No known exposure to COVID, strep, flu.  No improvement with current treatment.    REVIEW OF SYSTEMS     Review of Systems   Constitutional:  Negative for chills, diaphoresis, fatigue and fever.   HENT:  Positive for congestion, postnasal drip and rhinorrhea. Negative for ear pain, sore throat and trouble swallowing.    Eyes:  Negative for discharge and redness.   Respiratory:  Positive for cough and shortness of breath. Negative for chest tightness and wheezing.    Cardiovascular:  Negative for chest pain.   Gastrointestinal:  Negative for diarrhea, nausea and vomiting.   Genitourinary:  Negative for decreased urine volume.   Musculoskeletal:  Negative for neck pain and neck stiffness.   Skin:  Negative for rash.   Neurological:  Negative for headaches.   Hematological:  Negative for adenopathy.   Psychiatric/Behavioral:  Negative for sleep disturbance.        PAST MEDICAL HISTORY         Diagnosis Date    Anxiety     Chronic GERD     Chronic migraine     takes verapamil    Depression     Gallstones 10/2019    Gastroparesis     diagnosis as a child    Hx of blood clots     PE     Hypertension     Miscarriage     multiple    Obesity     Pulmonary embolism (HCC)     8 months ago-was on Xarelto-sees Dr Peabody in  cervical adenopathy.      Upper Body:      Right upper body: No supraclavicular adenopathy.      Left upper body: No supraclavicular adenopathy.   Skin:     General: Skin is warm and dry.      Capillary Refill: Capillary refill takes less than 2 seconds.      Coloration: Skin is not pale.      Findings: No rash.   Neurological:      Mental Status: She is alert and oriented to person, place, and time. She is not disoriented.   Psychiatric:         Behavior: Behavior is cooperative.         DIAGNOSTIC RESULTS   Labs:  Results for orders placed or performed during the hospital encounter of 01/16/24   Rapid influenza A/B antigens    Specimen: Nasopharyngeal   Result Value Ref Range    Flu A Antigen Negative NEGATIVE    Flu B Antigen Negative NEGATIVE   COVID-19, Rapid   Result Value Ref Range    SARS-CoV-2, LINNEA NOT  DETECTED NOT DETECTED       IMAGING:  No orders to display      URGENT CARE COURSE:     Vitals:    01/16/24 0908   BP: 124/67   Pulse: (!) 102   Resp: 18   Temp: 97.6 °F (36.4 °C)   TempSrc: Oral   SpO2: 98%   Weight: 115.7 kg (255 lb)   Height: 1.702 m (5' 7\")       Medications - No data to display  PROCEDURES:  None  FINAL IMPRESSION      1. Acute rhinitis    2. Uncomplicated asthma, unspecified asthma severity, unspecified whether persistent        DISPOSITION/PLAN   DISPOSITION Decision To Discharge 01/16/2024 09:36:19 AM    Nontoxic, no distress.  COVID, influenza negative.  Exam consistent with acute rhinitis medication as prescribed.  Take prednisone with food.  Continue current medication.  Increase fluids, rest.  If any distress go to ER.  PATIENT REFERRED TO:  Tamera Teixeira DO  [facility] [place] [address]  [phone]      Follow-up as needed.  Medication as prescribed, take with food.  Continue current medication.  Increase fluids, rest.  If any distress go to ER.    DISCHARGE MEDICATIONS:  New Prescriptions    PREDNISONE (DELTASONE) 20 MG TABLET    Take 2 tablets by mouth daily

## 2024-01-19 ENCOUNTER — NURSE ONLY (OUTPATIENT)
Dept: LAB | Age: 24
End: 2024-01-19

## 2024-01-19 DIAGNOSIS — N91.2 AMENORRHEA: ICD-10-CM

## 2024-01-19 LAB — HCG INTACT+B SERPL-ACNC: 5.3 MIU/ML (ref 0–5)

## 2024-01-20 ENCOUNTER — TELEPHONE (OUTPATIENT)
Dept: FAMILY MEDICINE CLINIC | Age: 24
End: 2024-01-20

## 2024-01-20 DIAGNOSIS — Z3A.01 LESS THAN 8 WEEKS GESTATION OF PREGNANCY: Primary | ICD-10-CM

## 2024-01-20 DIAGNOSIS — F33.1 MAJOR DEPRESSIVE DISORDER, RECURRENT, MODERATE (HCC): ICD-10-CM

## 2024-01-20 DIAGNOSIS — N96 HISTORY OF RECURRENT MISCARRIAGES: ICD-10-CM

## 2024-01-20 DIAGNOSIS — Z86.711 HISTORY OF PULMONARY EMBOLISM: ICD-10-CM

## 2024-01-20 DIAGNOSIS — O21.9 NAUSEA AND VOMITING IN PREGNANCY: ICD-10-CM

## 2024-01-20 DIAGNOSIS — F41.9 ANXIETY: ICD-10-CM

## 2024-01-20 RX ORDER — ENOXAPARIN SODIUM 100 MG/ML
60 INJECTION SUBCUTANEOUS DAILY
Qty: 18 ML | Refills: 2 | Status: SHIPPED | OUTPATIENT
Start: 2024-01-20 | End: 2024-04-19

## 2024-01-20 RX ORDER — PYRIDOXINE HCL (VITAMIN B6) 25 MG
12.5 TABLET ORAL DAILY PRN
Qty: 45 TABLET | Refills: 0 | Status: SHIPPED | OUTPATIENT
Start: 2024-01-20 | End: 2024-04-19

## 2024-01-20 RX ORDER — PNV NO.95/FERROUS FUM/FOLIC AC 28MG-0.8MG
1 TABLET ORAL DAILY
Qty: 90 TABLET | Refills: 2 | Status: SHIPPED | OUTPATIENT
Start: 2024-01-20 | End: 2024-10-16

## 2024-01-20 RX ORDER — BUPROPION HYDROCHLORIDE 75 MG/1
75 TABLET ORAL DAILY
Qty: 60 TABLET | Refills: 3 | Status: SHIPPED | OUTPATIENT
Start: 2024-01-20

## 2024-01-20 RX ORDER — SERTRALINE HYDROCHLORIDE 100 MG/1
150 TABLET, FILM COATED ORAL DAILY
Qty: 90 TABLET | Refills: 2 | Status: SHIPPED | OUTPATIENT
Start: 2024-01-20 | End: 2024-07-18

## 2024-01-20 NOTE — RESULT ENCOUNTER NOTE
Called patient to discuss positive hcg. Advised patient to start prenatal vitamin asap. Plan to decrease wellbutrin to 75 mg for one week and then discontinue. Also plan increase zoloft to 150 mg. Will discontinue abilify. Patient says mood has been up and down but right now feeling good overall. No SI/HI. Had some dark brown spotting last week but none since and denies abdominal/pelvic pain/cramping. Having some nausea and insomnia. Recommend doxylamine and night time and vitamin B6 prn nausea. Discussed plan of care with Dr Karrie Hess. Patient has history of PE so will discuss mgmt with heme/onc and start appropriate treatment.    Please call patient to schedule office visit this week with me. Thank you.    Electronically signed by Tamera Teixeira DO on 1/20/2024 at 4:19 PM

## 2024-01-21 NOTE — TELEPHONE ENCOUNTER
Will start patient on 60 mg daily due to history of recurrent PE and current positive hcg. Will also have patient evaluated by heme/onc for recommendations. Patient notified via [employer].     Electronically signed by Tamera Teixeira DO on 1/20/2024 at 11:27 PM

## 2024-01-22 ENCOUNTER — TELEPHONE (OUTPATIENT)
Dept: FAMILY MEDICINE CLINIC | Age: 24
End: 2024-01-22

## 2024-01-22 NOTE — TELEPHONE ENCOUNTER
----- Message from Tamera Teixeira DO sent at 1/20/2024  4:37 PM EST -----  Called patient to discuss positive hcg. Advised patient to start prenatal vitamin asap. Plan to decrease wellbutrin to 75 mg for one week and then discontinue. Also plan increase zoloft to 150 mg. Will discontinue abilify. Patient says mood has been up and down but right now feeling good overall. No SI/HI. Had some dark brown spotting last week but none since and denies abdominal/pelvic pain/cramping. Having some nausea and insomnia. Recommend doxylamine and night time and vitamin B6 prn nausea. Discussed plan of care with Dr Karrie Hess. Patient has history of PE so will discuss mgmt with heme/onc and start appropriate treatment.    Please call patient to schedule office visit this week with me. Thank you.    Electronically signed by Tamera Teixeira DO on 1/20/2024 at 4:19 PM

## 2024-01-23 ENCOUNTER — OFFICE VISIT (OUTPATIENT)
Dept: FAMILY MEDICINE CLINIC | Age: 24
End: 2024-01-23
Payer: COMMERCIAL

## 2024-01-23 ENCOUNTER — TELEPHONE (OUTPATIENT)
Dept: FAMILY MEDICINE CLINIC | Age: 24
End: 2024-01-23

## 2024-01-23 ENCOUNTER — NURSE ONLY (OUTPATIENT)
Dept: LAB | Age: 24
End: 2024-01-23

## 2024-01-23 VITALS
RESPIRATION RATE: 16 BRPM | SYSTOLIC BLOOD PRESSURE: 110 MMHG | OXYGEN SATURATION: 97 % | HEIGHT: 69 IN | BODY MASS INDEX: 38.83 KG/M2 | WEIGHT: 262.2 LBS | HEART RATE: 82 BPM | TEMPERATURE: 97 F | DIASTOLIC BLOOD PRESSURE: 70 MMHG

## 2024-01-23 DIAGNOSIS — N96 HISTORY OF RECURRENT MISCARRIAGES: ICD-10-CM

## 2024-01-23 DIAGNOSIS — D68.59 HYPERCOAGULABLE STATE (HCC): ICD-10-CM

## 2024-01-23 DIAGNOSIS — F33.1 MAJOR DEPRESSIVE DISORDER, RECURRENT, MODERATE (HCC): ICD-10-CM

## 2024-01-23 DIAGNOSIS — G47.00 INSOMNIA, UNSPECIFIED TYPE: ICD-10-CM

## 2024-01-23 DIAGNOSIS — R51.9 CHRONIC NONINTRACTABLE HEADACHE, UNSPECIFIED HEADACHE TYPE: ICD-10-CM

## 2024-01-23 DIAGNOSIS — Z3A.01 LESS THAN 8 WEEKS GESTATION OF PREGNANCY: Primary | ICD-10-CM

## 2024-01-23 DIAGNOSIS — O21.9 NAUSEA AND VOMITING IN PREGNANCY: ICD-10-CM

## 2024-01-23 DIAGNOSIS — Z86.711 HISTORY OF PULMONARY EMBOLISM: ICD-10-CM

## 2024-01-23 DIAGNOSIS — G89.29 CHRONIC NONINTRACTABLE HEADACHE, UNSPECIFIED HEADACHE TYPE: ICD-10-CM

## 2024-01-23 PROBLEM — I26.99 PULMONARY EMBOLISM (HCC): Status: RESOLVED | Noted: 2019-10-21 | Resolved: 2024-01-23

## 2024-01-23 LAB
BASOPHILS ABSOLUTE: 0.1 THOU/MM3 (ref 0–0.1)
BASOPHILS NFR BLD AUTO: 0.5 %
DEPRECATED RDW RBC AUTO: 42.7 FL (ref 35–45)
EOSINOPHIL NFR BLD AUTO: 1.1 %
EOSINOPHILS ABSOLUTE: 0.1 THOU/MM3 (ref 0–0.4)
ERYTHROCYTE [DISTWIDTH] IN BLOOD BY AUTOMATED COUNT: 12.3 % (ref 11.5–14.5)
HCG INTACT+B SERPL-ACNC: 22 MIU/ML (ref 0–5)
HCT VFR BLD AUTO: 44.5 % (ref 37–47)
HGB BLD-MCNC: 14.2 GM/DL (ref 12–16)
IMM GRANULOCYTES # BLD AUTO: 0.16 THOU/MM3 (ref 0–0.07)
IMM GRANULOCYTES NFR BLD AUTO: 1.5 %
LYMPHOCYTES ABSOLUTE: 2.1 THOU/MM3 (ref 1–4.8)
LYMPHOCYTES NFR BLD AUTO: 19.4 %
MCH RBC QN AUTO: 30.1 PG (ref 26–33)
MCHC RBC AUTO-ENTMCNC: 31.9 GM/DL (ref 32.2–35.5)
MCV RBC AUTO: 94.5 FL (ref 81–99)
MONOCYTES ABSOLUTE: 0.7 THOU/MM3 (ref 0.4–1.3)
MONOCYTES NFR BLD AUTO: 6.4 %
NEUTROPHILS NFR BLD AUTO: 71.1 %
NRBC BLD AUTO-RTO: 0 /100 WBC
PLATELET # BLD AUTO: 277 THOU/MM3 (ref 130–400)
PMV BLD AUTO: 10.4 FL (ref 9.4–12.4)
RBC # BLD AUTO: 4.71 MILL/MM3 (ref 4.2–5.4)
SEGMENTED NEUTROPHILS ABSOLUTE COUNT: 7.7 THOU/MM3 (ref 1.8–7.7)
WBC # BLD AUTO: 10.9 THOU/MM3 (ref 4.8–10.8)

## 2024-01-23 PROCEDURE — 3074F SYST BP LT 130 MM HG: CPT

## 2024-01-23 PROCEDURE — 99214 OFFICE O/P EST MOD 30 MIN: CPT

## 2024-01-23 PROCEDURE — 3078F DIAST BP <80 MM HG: CPT

## 2024-01-23 RX ORDER — CONTAINER,EMPTY
1 EACH MISCELLANEOUS DAILY
Qty: 1 EACH | Refills: 3 | Status: SHIPPED | OUTPATIENT
Start: 2024-01-23 | End: 2024-01-24

## 2024-01-23 ASSESSMENT — ENCOUNTER SYMPTOMS
DIARRHEA: 0
CONSTIPATION: 0
SHORTNESS OF BREATH: 0
VOMITING: 0
NAUSEA: 1
BLOOD IN STOOL: 0
WHEEZING: 0
ABDOMINAL PAIN: 0
PHOTOPHOBIA: 0
ABDOMINAL DISTENTION: 0
RHINORRHEA: 0
SORE THROAT: 0

## 2024-01-23 ASSESSMENT — PATIENT HEALTH QUESTIONNAIRE - PHQ9
2. FEELING DOWN, DEPRESSED OR HOPELESS: 1
3. TROUBLE FALLING OR STAYING ASLEEP: 3
8. MOVING OR SPEAKING SO SLOWLY THAT OTHER PEOPLE COULD HAVE NOTICED. OR THE OPPOSITE, BEING SO FIGETY OR RESTLESS THAT YOU HAVE BEEN MOVING AROUND A LOT MORE THAN USUAL: 0
1. LITTLE INTEREST OR PLEASURE IN DOING THINGS: 1
SUM OF ALL RESPONSES TO PHQ QUESTIONS 1-9: 11
5. POOR APPETITE OR OVEREATING: 2
SUM OF ALL RESPONSES TO PHQ9 QUESTIONS 1 & 2: 2
6. FEELING BAD ABOUT YOURSELF - OR THAT YOU ARE A FAILURE OR HAVE LET YOURSELF OR YOUR FAMILY DOWN: 0
9. THOUGHTS THAT YOU WOULD BE BETTER OFF DEAD, OR OF HURTING YOURSELF: 0
7. TROUBLE CONCENTRATING ON THINGS, SUCH AS READING THE NEWSPAPER OR WATCHING TELEVISION: 1
10. IF YOU CHECKED OFF ANY PROBLEMS, HOW DIFFICULT HAVE THESE PROBLEMS MADE IT FOR YOU TO DO YOUR WORK, TAKE CARE OF THINGS AT HOME, OR GET ALONG WITH OTHER PEOPLE: 0
4. FEELING TIRED OR HAVING LITTLE ENERGY: 3
SUM OF ALL RESPONSES TO PHQ QUESTIONS 1-9: 11

## 2024-01-23 NOTE — PATIENT INSTRUCTIONS
Take magnesium and tylenol for headaches and touch base with neurologist and Dr Coley about nurtec/alternative therapies    Continue prenatal vitamin    Get CBC and hCG sometime this week    Contnue zoloft 150 and wellbutrin 75 -- next week will increase zoloft to 200 mg and stop wellbutrin     Continue doxylamine at nighttime (can take up to 50 mg) and b6 as needed for nausea; can also try benadryl    Touch base with Dr Coley office and let me know if you can't be seen there     I will let you know about progesterone

## 2024-01-23 NOTE — PROGRESS NOTES
[facility] MEDICINE PRACTICE  [address]  [facility] [address]  Dept: [phone]  Dept Fax: [phone]    SUBJECTIVE     Serena Vigil is a 23 y.o.female  with PMHx anxiety, depression, recurrent miscarriage, history recurrent PE who presents for follow up of positive pregnancy test . She is a . LMP . Cycles very irregular 15 days -39 days.. GREGORIO 23. Had to have d&C 2020 for incomplete miscarriage. Also had chemical pregnancy twice in  and once in .    Had positive pregnancy test last week -- checked hCG which was 5.3 . Mood was stable but considering pregnancy, stopped abilify, decreased wellbutrin and increased zoloft. Feeling emotional but denies SI/HI or panic attacks. Feeling nauseous and still having trouble sleeping. Tried unisom but didn't help her with sleep. Denies belly pain. Had one episode vaginal spotting last week but none since.    Has new partner. Feels like its a good relationship. Had been tracking ovulation but struggled with infertility for 3 years with ex boyfriend and didn't think she could get pregnant so wasn't actively trying to avoid pregnancy.      Considering history of PE x3, started lovenox 60 mg injections daily. Also started pnv over the weekend.    Has appt with heme/onc 24.    Also has history of migraines, follows with neuro and takes nurtec a couple times a month.      Wt Readings from Last 3 Encounters:   24 118.9 kg (262 lb 3.2 oz)   24 115.7 kg (255 lb)   23 120.3 kg (265 lb 3.2 oz)     Patient Active Problem List   Diagnosis    Cholelithiasis without obstruction    Chronic migraine    Anxiety    Depression    Gastroparesis    Obesity    Chronic GERD    Hypertension    Hx of blood clots    Costochondral chest pain    Muscle strain    Chest pain, atypical    History of pulmonary embolism    Mild intermittent asthma    Uterine bleeding, dysfunctional    Agnosia for taste    Irregular

## 2024-01-23 NOTE — PROGRESS NOTES
S: 23 y.o. female with   Chief Complaint   Patient presents with    Results       HPI: please see resident note for HPI and ROS.    BP Readings from Last 3 Encounters:   24 110/70   24 124/67   23 122/78     Wt Readings from Last 3 Encounters:   24 118.9 kg (262 lb 3.2 oz)   24 115.7 kg (255 lb)   23 120.3 kg (265 lb 3.2 oz)       O: VS:  height is 1.753 m (5' 9\") and weight is 118.9 kg (262 lb 3.2 oz). Her temporal temperature is 97 °F (36.1 °C). Her blood pressure is 110/70 and her pulse is 82. Her respiration is 16 and oxygen saturation is 97%.   AAO/NAD, appropriate affect for mood       Diagnosis Orders   1. Less than 8 weeks gestation of pregnancy        2. Major depressive disorder, recurrent, moderate (HCC)        3. Hypercoagulable state (HCC)        4. History of recurrent miscarriages  HCG, Quantitative, Pregnancy    CBC with Auto Differential      5. History of pulmonary embolism  Sharps Container (SHARPS DISPOSAL BY MAIL SYSTEM) MISC    HCG, Quantitative, Pregnancy    CBC with Auto Differential      6. Nausea and vomiting in pregnancy        7. Chronic nonintractable headache, unspecified headache type        8. Insomnia, unspecified type            Plan:  Please refer to resident note for full plan.    23-year-old female here for follow up. History of recurrent miscarriages in the past. . Recently had positive pregnancy test last week. HCG quant 5.3 (2024). Agree with obtaining repeat quant and CBC at this time. Started on prenatal vitamin and Lovenox over the weekend (due to history of PE, previously on Xarelto but discontinued in the last year; was supposed to be on lifelong). Referral placed back to hematology as well. History of depression and anxiety; stable on Wellbutrin and Zoloft. Would continue to work on tapering off of Wellbutrin over the next week; can adjust Zoloft dose if needed for better mood control once off of Wellbutrin. Planning to follow

## 2024-01-24 ENCOUNTER — TELEPHONE (OUTPATIENT)
Dept: FAMILY MEDICINE CLINIC | Age: 24
End: 2024-01-24

## 2024-01-24 ENCOUNTER — HOSPITAL ENCOUNTER (OUTPATIENT)
Dept: PHYSICAL THERAPY | Age: 24
Setting detail: THERAPIES SERIES
Discharge: HOME | End: 2024-01-24
Payer: COMMERCIAL

## 2024-01-24 DIAGNOSIS — Z3A.01 LESS THAN 8 WEEKS GESTATION OF PREGNANCY: Primary | ICD-10-CM

## 2024-01-24 LAB — PROGEST SERPL-MCNC: 4.66 NG/ML

## 2024-01-24 PROCEDURE — 97140 MANUAL THERAPY 1/> REGIONS: CPT

## 2024-01-24 PROCEDURE — 97110 THERAPEUTIC EXERCISES: CPT

## 2024-01-24 PROCEDURE — 97530 THERAPEUTIC ACTIVITIES: CPT

## 2024-01-24 NOTE — PROGRESS NOTES
[facility]  OCCUPATIONAL THERAPY  OUTPATIENT REHABILITATION - SPECIALIZED THERAPY SERVICES  [] PELVIC HEALTH EVALUATION  [x] DAILY NOTE  [] PROGRESS NOTE [] DISCHARGE NOTE    Date: 2024  Patient Name:  Serena Vigil  : 2000  MRN: [id number]  CSN: [id number]    Referring Practitioner Reina Coley DO   Diagnosis Pelvic Pain    Treatment Diagnosis M54.5 - Low Back Pain  N81.84 - Pelvic Muscle Wasting (Female), R10.2 - Pelvic and Perineal Pain  R94.10 - Unspecified Dyspareunia, and R27.8 - Other Lack of Coordination   Date of Evaluation 10/12/22    Additional Pertinent History Cholelithiasis without obstruction K80.20     Chronic migraine [id number]    Pulmonary embolism (HCC) I26.99    Anxiety F41.9    Depression F32.A    Gastroparesis K31.84    Obesity E66.9    Chronic GERD K21.9    Hypertension I10    Hx of blood clots Z86.718    Costochondral chest pain R07.89    Muscle strain T14.8XXA    Chest pain, atypical R07.89    History of pulmonary embolism Z86.711    Mild intermittent asthma J45.20           Past Medical History:Diagnosis Date    Anxiety      Chronic GERD      Chronic migraine       takes verapamil    Depression      Gallstones 10/2019    Gastroparesis       diagnosis as a child    Hx of blood clots       PE     Hypertension      Miscarriage       multiple    Obesity      Pulmonary embolism (HCC)       8 months ago-was on Xarelto-sees Dr Peabody in [place]    Splenomegaly           Past Surgical History:   Procedure Laterality Date    CHOLECYSTECTOMY, LAPAROSCOPIC N/A 10/30/2019     ROBOTIC LAP BRANNON performed by Audra Rosenberg MD at [facility] OR    DILATION AND CURETTAGE OF UTERUS   2021    ELBOW SURGERY Right      TONSILLECTOMY        WISDOM TOOTH EXTRACTION        Functional Outcome Measure Used PPUF/Freq   Functional Outcome Score PPUF/Freq: 20 (10/12/22); PPUF/Freq: 16 (3/15/23)       Insurance: Primary: Payor: [employer] /  /  / ,   Secondary:

## 2024-01-25 ENCOUNTER — TELEPHONE (OUTPATIENT)
Dept: FAMILY MEDICINE CLINIC | Age: 24
End: 2024-01-25

## 2024-01-25 NOTE — TELEPHONE ENCOUNTER
Rx placed for IM solution in another encounter. No need to fill this. Thanks!    Electronically signed by Tamera Teixeira DO on 1/25/2024 at 2:37 PM

## 2024-01-25 NOTE — RESULT ENCOUNTER NOTE
Called patient and discussed plan of care extensively. WBCs mildly elevated, possible due to pregnancy. No signs of infection otherwise. Hcg doubling appropriately but progesterone very low. Please see telephone encounter for progesterone treatment plan.     Electronically signed by Tamera Teixeira DO on 1/25/2024 at 2:46 PM

## 2024-01-27 ENCOUNTER — NURSE ONLY (OUTPATIENT)
Dept: LAB | Age: 24
End: 2024-01-27

## 2024-01-27 DIAGNOSIS — Z3A.01 LESS THAN 8 WEEKS GESTATION OF PREGNANCY: ICD-10-CM

## 2024-01-27 LAB
ESTRADIOL LEVEL: 98 PG/ML
HCG INTACT+B SERPL-ACNC: 29 MIU/ML (ref 0–5)
PROGEST SERPL-MCNC: 13.44 NG/ML

## 2024-01-29 ENCOUNTER — NURSE ONLY (OUTPATIENT)
Dept: LAB | Age: 24
End: 2024-01-29

## 2024-01-29 ENCOUNTER — TELEPHONE (OUTPATIENT)
Dept: FAMILY MEDICINE CLINIC | Age: 24
End: 2024-01-29

## 2024-01-29 DIAGNOSIS — D68.59 HYPERCOAGULABLE STATE (HCC): ICD-10-CM

## 2024-01-29 DIAGNOSIS — N96 HISTORY OF RECURRENT MISCARRIAGES: ICD-10-CM

## 2024-01-29 DIAGNOSIS — Z3A.01 LESS THAN 8 WEEKS GESTATION OF PREGNANCY: Primary | ICD-10-CM

## 2024-01-29 DIAGNOSIS — Z3A.01 LESS THAN 8 WEEKS GESTATION OF PREGNANCY: ICD-10-CM

## 2024-01-29 LAB
HCG INTACT+B SERPL-ACNC: 2.9 MIU/ML (ref 0–5)
PROGEST SERPL-MCNC: 1.35 NG/ML

## 2024-01-29 NOTE — TELEPHONE ENCOUNTER
Noted. I ordered IM progesterone. OK to discontinue progesterone suppository. Thanks.    Electronically signed by Tamera Teixeira DO on 1/29/2024 at 12:36 PM

## 2024-01-29 NOTE — TELEPHONE ENCOUNTER
Trina from [facility]  called she said she can't get progesterone supp  she can get capsules  is it ok to change rx?

## 2024-01-31 ENCOUNTER — OFFICE VISIT (OUTPATIENT)
Dept: FAMILY MEDICINE CLINIC | Age: 24
End: 2024-01-31
Payer: COMMERCIAL

## 2024-01-31 ENCOUNTER — HOSPITAL ENCOUNTER (OUTPATIENT)
Dept: ULTRASOUND IMAGING | Age: 24
Discharge: HOME | End: 2024-01-31
Payer: COMMERCIAL

## 2024-01-31 VITALS
HEIGHT: 69 IN | BODY MASS INDEX: 39.55 KG/M2 | SYSTOLIC BLOOD PRESSURE: 120 MMHG | OXYGEN SATURATION: 99 % | TEMPERATURE: 98 F | RESPIRATION RATE: 20 BRPM | WEIGHT: 267 LBS | DIASTOLIC BLOOD PRESSURE: 80 MMHG | HEART RATE: 78 BPM

## 2024-01-31 DIAGNOSIS — O03.9 MISCARRIAGE: ICD-10-CM

## 2024-01-31 DIAGNOSIS — B99.9 GENITAL INFECTION: ICD-10-CM

## 2024-01-31 DIAGNOSIS — N96 HISTORY OF RECURRENT MISCARRIAGES: Primary | ICD-10-CM

## 2024-01-31 DIAGNOSIS — N89.8 VAGINAL DISCHARGE: ICD-10-CM

## 2024-01-31 DIAGNOSIS — Z86.711 HISTORY OF PULMONARY EMBOLISM: ICD-10-CM

## 2024-01-31 PROCEDURE — 76817 TRANSVAGINAL US OBSTETRIC: CPT

## 2024-01-31 PROCEDURE — 99214 OFFICE O/P EST MOD 30 MIN: CPT

## 2024-01-31 PROCEDURE — 3079F DIAST BP 80-89 MM HG: CPT

## 2024-01-31 PROCEDURE — 96372 THER/PROPH/DIAG INJ SC/IM: CPT

## 2024-01-31 PROCEDURE — 3074F SYST BP LT 130 MM HG: CPT

## 2024-01-31 RX ORDER — CEFTRIAXONE 1 G/1
1000 INJECTION, POWDER, FOR SOLUTION INTRAMUSCULAR; INTRAVENOUS ONCE
Status: DISCONTINUED | OUTPATIENT
Start: 2024-01-31 | End: 2024-01-31

## 2024-01-31 RX ORDER — DOXYCYCLINE HYCLATE 100 MG
100 TABLET ORAL 2 TIMES DAILY
Qty: 28 TABLET | Refills: 0 | Status: SHIPPED | OUTPATIENT
Start: 2024-01-31 | End: 2024-02-14

## 2024-01-31 RX ORDER — METRONIDAZOLE 500 MG/1
500 TABLET ORAL 2 TIMES DAILY
Qty: 28 TABLET | Refills: 0 | Status: SHIPPED | OUTPATIENT
Start: 2024-01-31 | End: 2024-02-14

## 2024-01-31 RX ORDER — KETOROLAC TROMETHAMINE 30 MG/ML
30 INJECTION, SOLUTION INTRAMUSCULAR; INTRAVENOUS ONCE
Qty: 1 ML | Refills: 0
Start: 2024-01-31 | End: 2024-01-31

## 2024-01-31 RX ORDER — CEFTRIAXONE 500 MG/1
500 INJECTION, POWDER, FOR SOLUTION INTRAMUSCULAR; INTRAVENOUS ONCE
Status: COMPLETED | OUTPATIENT
Start: 2024-01-31 | End: 2024-01-31

## 2024-01-31 RX ORDER — KETOROLAC TROMETHAMINE 30 MG/ML
30 INJECTION, SOLUTION INTRAMUSCULAR; INTRAVENOUS ONCE
Status: COMPLETED | OUTPATIENT
Start: 2024-01-31 | End: 2024-01-31

## 2024-01-31 RX ADMIN — CEFTRIAXONE 500 MG: 500 INJECTION, POWDER, FOR SOLUTION INTRAMUSCULAR; INTRAVENOUS at 17:34

## 2024-01-31 RX ADMIN — KETOROLAC TROMETHAMINE 30 MG: 30 INJECTION, SOLUTION INTRAMUSCULAR; INTRAVENOUS at 17:32

## 2024-01-31 RX ADMIN — CEFTRIAXONE 500 MG: 500 INJECTION, POWDER, FOR SOLUTION INTRAMUSCULAR; INTRAVENOUS at 17:36

## 2024-01-31 ASSESSMENT — ENCOUNTER SYMPTOMS
VOMITING: 0
BACK PAIN: 1
WHEEZING: 0
SORE THROAT: 0
PHOTOPHOBIA: 0
NAUSEA: 1
RHINORRHEA: 0
BLOOD IN STOOL: 0
DIARRHEA: 0
SHORTNESS OF BREATH: 0
COUGH: 0
ABDOMINAL DISTENTION: 1
ABDOMINAL PAIN: 1
CONSTIPATION: 0

## 2024-01-31 NOTE — PATIENT INSTRUCTIONS
Go get ultrasound done -- I will call you if we need to change anything    Come to ED if very heavy bleeding, severe abdominal pain, dizziness or any other concerning symptoms    Start doxycycline 100 mg twice daily and metronidazole 500 mg twice daily for 14 days-- I will let you know if you can stop these    Call if any issues

## 2024-01-31 NOTE — PROGRESS NOTES
[facility] MEDICINE PRACTICE  [address]  [facility] [address]  Dept: [phone]  Dept Fax: [phone]    SUBJECTIVE     Serena Vigil is a 23 y.o.female with pmhx recurrent pregnancy loss and recurrent PE    Pt presents for follow up of early pregnancy loss -- LMP 12/24 (~5 weeks 1 day by LMP) initial Hcg went up to 29 1/27 and then fell to 2.9 1/29. Woke up this morning with abdominal cramping 10/10 pain, diffuse, comes and goes random randomly and lasts a few minutes. Tried tylenol and pamprin without relief. Had some white and brown discharge since this morning and some spotting. Tmax 100F this morning. Denies dysuria. Some nausea and constipation. Denies rash.    Has been feeling down but no SI/HI. Doesn't want to change anything with meds for now.      Wt Readings from Last 3 Encounters:   01/31/24 121.1 kg (267 lb)   01/23/24 118.9 kg (262 lb 3.2 oz)   01/16/24 115.7 kg (255 lb)     Patient Active Problem List   Diagnosis    Cholelithiasis without obstruction    Chronic migraine    Anxiety    Depression    Gastroparesis    Obesity    Chronic GERD    Hypertension    Hx of blood clots    Costochondral chest pain    Muscle strain    Chest pain, atypical    History of pulmonary embolism    Mild intermittent asthma    Uterine bleeding, dysfunctional    Agnosia for taste    Irregular menses    Loss of sense of smell    Lower abdominal pain    Menorrhagia    Cervico-occipital neuralgia    Chronic maxillary sinusitis    Contusion of right forearm    Loss of taste    Nipple tenderness    Recurrent pregnancy loss    Sprain of right middle finger    Tongue coating    Vaginal bleeding, abnormal    Vaginal discharge    Major depressive disorder, recurrent, moderate (HCC)    Hypercoagulable state (HCC)       Current Outpatient Medications   Medication Sig Dispense Refill    doxycycline hyclate (VIBRA-TABS) 100 MG tablet Take 1 tablet by mouth 2 times daily for 14 days 28 tablet 0

## 2024-01-31 NOTE — PROGRESS NOTES
S: 23 y.o. female with   Chief Complaint   Patient presents with    Vaginitis     No bleeding, discharge of dark brown, and chunky white,vaginal odor, nausea, no vomiting, no appetite, hot flashes, severe cramps, sx started today          Reviewed hx and ROS in detail with resident prior to exam.  See notes for additional details.     BP Readings from Last 3 Encounters:   01/31/24 120/80   01/23/24 110/70   01/16/24 124/67     Wt Readings from Last 3 Encounters:   01/31/24 121.1 kg (267 lb)   01/23/24 118.9 kg (262 lb 3.2 oz)   01/16/24 115.7 kg (255 lb)           O: VS:  height is 1.753 m (5' 9\") and weight is 121.1 kg (267 lb). Her oral temperature is 98 °F (36.7 °C). Her blood pressure is 120/80 and her pulse is 78. Her respiration is 20 and oxygen saturation is 99%.   AAO/NAD, appropriate affect for mood  Mildly diaphoretic  Abd ttp suprapubic and ghanshyam lower quadrant without rebound     Diagnosis Orders   1. Miscarriage        2. Vaginal discharge  Culture, Genital    C.trachomatis N.gonorrhoeae DNA    Vaginal Pathogens DNA Panel          Plan    Agree with plan for vaginal pathogen testing, emergent ultrasound today and cover with rocephin for possible PID.  Toradol for pain control.  Sent doxy/flagyl to pharmacy pending results of US and testing. Close follow up with results tonight    Health Maintenance Due   Topic Date Due    Hepatitis B vaccine (1 of 3 - 3-dose series) Never done    Pneumococcal 0-64 years Vaccine (1 - PCV) Never done    HPV vaccine (1 - 2-dose series) Never done    DTaP/Tdap/Td vaccine (1 - Tdap) Never done    Flu vaccine (1) 08/01/2023    COVID-19 Vaccine (3 - 2023-24 season) 09/01/2023         Attending Physician Statement  I have discussed the case, including pertinent history and exam findings with the resident. I also have seen the patient and performed key portions of the examination.  I agree with the documented assessment and plan as documented by the resident.  GC modifier added to

## 2024-01-31 NOTE — PROGRESS NOTES
Administrations This Visit       cefTRIAXone (ROCEPHIN) injection 500 mg       Admin Date  01/31/2024  17:34 Action  Given Dose  500 mg Route  IntraMUSCular Site  Dorsogluteal Right Administered By  Destiny Hargrove LPN    Ordering Provider: Tamera Teixeira DO    NDC: [address]-3    Lot#: [id number]    : APOTEX    Patient Supplied?: No    Comments: Patient tolerated well.               Admin Date  01/31/2024  17:36 Action  Given Dose  500 mg Route  IntraMUSCular Site  Dorsogluteal Right Administered By  Destiny Hargrove LPN    Ordering Provider: Tamera Teixeira DO    NDC: [address]-7    Lot#: [id number]    : APOTEX    Patient Supplied?: No    Comments: Patient tolerated well.              ketorolac (TORADOL) injection 30 mg       Admin Date  01/31/2024  17:32 Action  Given Dose  30 mg Route  IntraMUSCular Site  Dorsogluteal Left Administered By  Destiny Hargrove LPN    Ordering Provider: Tamera Teixeira DO    NDC: 49276-586-65    Lot#: [id number]    : [facility]    Patient Supplied?: No    Comments: Patient tolerated well. Given IM in Left Gluteal.

## 2024-02-01 NOTE — TELEPHONE ENCOUNTER
Ordered repeat labs to monitor Hcg.    Electronically signed by Tamera Teixeira DO on 1/31/2024 at 9:02 PM

## 2024-02-01 NOTE — TELEPHONE ENCOUNTER
Orders placed for repeat Hcg and progesterone as previous Hcg did not double appropriately.    Electronically signed by Tamera Teixeira DO on 1/31/2024 at 8:59 PM

## 2024-02-01 NOTE — TELEPHONE ENCOUNTER
Added on order for progesterone.    Electronically signed by Tamera Teixeira DO on 1/31/2024 at 9:03 PM

## 2024-02-02 LAB
CANDIDA SPECIES, DNA PROBE: NEGATIVE
GARDNERELLA VAGINALIS, DNA PROBE: NEGATIVE
SOURCE: NORMAL
TRICHOMONAS VAGINALIS DNA: NEGATIVE

## 2024-02-03 LAB
BACTERIA GENITAL AEROBE CULT: NORMAL
GRAM STN GENITAL: NORMAL

## 2024-02-07 ENCOUNTER — TELEPHONE (OUTPATIENT)
Dept: FAMILY MEDICINE CLINIC | Age: 24
End: 2024-02-07

## 2024-02-07 DIAGNOSIS — F32.A DEPRESSION, UNSPECIFIED DEPRESSION TYPE: ICD-10-CM

## 2024-02-07 DIAGNOSIS — F41.9 ANXIETY: Primary | ICD-10-CM

## 2024-02-07 DIAGNOSIS — N96 RECURRENT PREGNANCY LOSS: ICD-10-CM

## 2024-02-07 DIAGNOSIS — Z86.718 HX OF BLOOD CLOTS: ICD-10-CM

## 2024-02-07 DIAGNOSIS — F51.02 ADJUSTMENT INSOMNIA: ICD-10-CM

## 2024-02-07 RX ORDER — TRAZODONE HYDROCHLORIDE 50 MG/1
50 TABLET ORAL NIGHTLY
Qty: 90 TABLET | Refills: 1 | Status: SHIPPED | OUTPATIENT
Start: 2024-02-07 | End: 2024-08-05

## 2024-02-08 NOTE — TELEPHONE ENCOUNTER
Called patient to check in. She reports she has been feeling down but does not desire to make any med changes just yet. Bleeding lessened. No more fevers. Still having insomnia, unisom not helping. Will send trazodone.     Please schedule patient for appointment next week -- if 4pm time slot does not work for her on 2/14, ok to override another appointment. Thanks.    Electronically signed by Tamera Teixeira DO on 2/7/2024 at 10:56 PM

## 2024-02-19 ENCOUNTER — NURSE ONLY (OUTPATIENT)
Dept: LAB | Age: 24
End: 2024-02-19

## 2024-02-19 DIAGNOSIS — F41.9 ANXIETY: ICD-10-CM

## 2024-02-19 DIAGNOSIS — N89.8 VAGINAL DISCHARGE: ICD-10-CM

## 2024-02-19 DIAGNOSIS — F32.A DEPRESSION, UNSPECIFIED DEPRESSION TYPE: ICD-10-CM

## 2024-02-19 DIAGNOSIS — N96 RECURRENT PREGNANCY LOSS: ICD-10-CM

## 2024-02-19 DIAGNOSIS — Z86.718 HX OF BLOOD CLOTS: ICD-10-CM

## 2024-02-19 LAB
BASOPHILS ABSOLUTE: 0 THOU/MM3 (ref 0–0.1)
BASOPHILS NFR BLD AUTO: 0.4 %
DEPRECATED RDW RBC AUTO: 42.5 FL (ref 35–45)
EOSINOPHIL NFR BLD AUTO: 0.6 %
EOSINOPHILS ABSOLUTE: 0.1 THOU/MM3 (ref 0–0.4)
ERYTHROCYTE [DISTWIDTH] IN BLOOD BY AUTOMATED COUNT: 12.2 % (ref 11.5–14.5)
HCT VFR BLD AUTO: 41.9 % (ref 37–47)
HGB BLD-MCNC: 13.3 GM/DL (ref 12–16)
IMM GRANULOCYTES # BLD AUTO: 0.09 THOU/MM3 (ref 0–0.07)
IMM GRANULOCYTES NFR BLD AUTO: 0.9 %
LYMPHOCYTES ABSOLUTE: 1.7 THOU/MM3 (ref 1–4.8)
LYMPHOCYTES NFR BLD AUTO: 18.3 %
MCH RBC QN AUTO: 30 PG (ref 26–33)
MCHC RBC AUTO-ENTMCNC: 31.7 GM/DL (ref 32.2–35.5)
MCV RBC AUTO: 94.6 FL (ref 81–99)
MONOCYTES ABSOLUTE: 0.5 THOU/MM3 (ref 0.4–1.3)
MONOCYTES NFR BLD AUTO: 5.3 %
NEUTROPHILS NFR BLD AUTO: 74.5 %
NRBC BLD AUTO-RTO: 0 /100 WBC
PLATELET # BLD AUTO: 255 THOU/MM3 (ref 130–400)
PMV BLD AUTO: 10.3 FL (ref 9.4–12.4)
RBC # BLD AUTO: 4.43 MILL/MM3 (ref 4.2–5.4)
SEGMENTED NEUTROPHILS ABSOLUTE COUNT: 7.1 THOU/MM3 (ref 1.8–7.7)
WBC # BLD AUTO: 9.5 THOU/MM3 (ref 4.8–10.8)

## 2024-02-27 ENCOUNTER — NURSE ONLY (OUTPATIENT)
Dept: LAB | Age: 24
End: 2024-02-27

## 2024-02-27 ENCOUNTER — TELEPHONE (OUTPATIENT)
Dept: FAMILY MEDICINE CLINIC | Age: 24
End: 2024-02-27

## 2024-02-27 DIAGNOSIS — N96 RECURRENT PREGNANCY LOSS: ICD-10-CM

## 2024-02-27 DIAGNOSIS — F41.9 ANXIETY: ICD-10-CM

## 2024-02-27 DIAGNOSIS — F32.A DEPRESSION, UNSPECIFIED DEPRESSION TYPE: ICD-10-CM

## 2024-02-27 DIAGNOSIS — Z86.718 HX OF BLOOD CLOTS: ICD-10-CM

## 2024-02-27 LAB
HCG INTACT+B SERPL-ACNC: < 1 MIU/ML (ref 0–5)
T4 FREE SERPL-MCNC: 1.22 NG/DL (ref 0.93–1.68)
TSH SERPL DL<=0.005 MIU/L-ACNC: 0.5 UIU/ML (ref 0.4–4.2)

## 2024-02-27 NOTE — RESULT ENCOUNTER NOTE
I called patient and notified her of normal labs. She is still having some ups and downs but started the 75 mg wellbutrin and denies concerns for safety at home. Please call her to set up a virtual visit with me in the next month. Please override other appointment types if needed as long as Clara approves. Thank you!    Electronically signed by Tamera Teixeira DO on 2/27/2024 at 11:37 AM

## 2024-02-27 NOTE — TELEPHONE ENCOUNTER
----- Message from Tamera Teixeira DO sent at 2/27/2024 11:38 AM EST -----  I called patient and notified her of normal labs. She is still having some ups and downs but started the 75 mg wellbutrin and denies concerns for safety at home. Please call her to set up a virtual visit with me in the next month. Please override oth  er appointment types if needed as long as Clara approves. Thank you!    Electronically signed by Tamera Teixeira DO on 2/27/2024 at 11:37 AM

## 2024-03-05 ENCOUNTER — TELEPHONE (OUTPATIENT)
Dept: FAMILY MEDICINE CLINIC | Age: 24
End: 2024-03-05

## 2024-03-05 NOTE — TELEPHONE ENCOUNTER
Patient called and stated that she was very dizzy and throwing up. Asked to get an office visit today with Dr. Teixeira. Advised patient that  is not in the office today. Patient stated she will go to urgent care.

## 2024-03-06 ENCOUNTER — HOSPITAL ENCOUNTER (EMERGENCY)
Age: 24
Discharge: HOME | End: 2024-03-06
Payer: COMMERCIAL

## 2024-03-06 VITALS
OXYGEN SATURATION: 98 % | BODY MASS INDEX: 39.25 KG/M2 | WEIGHT: 265 LBS | HEIGHT: 69 IN | DIASTOLIC BLOOD PRESSURE: 80 MMHG | HEART RATE: 91 BPM | RESPIRATION RATE: 14 BRPM | SYSTOLIC BLOOD PRESSURE: 112 MMHG | TEMPERATURE: 97.8 F

## 2024-03-06 DIAGNOSIS — R51.9 NONINTRACTABLE HEADACHE, UNSPECIFIED CHRONICITY PATTERN, UNSPECIFIED HEADACHE TYPE: Primary | ICD-10-CM

## 2024-03-06 PROCEDURE — 99213 OFFICE O/P EST LOW 20 MIN: CPT

## 2024-03-06 PROCEDURE — 6360000002 HC RX W HCPCS

## 2024-03-06 PROCEDURE — 96372 THER/PROPH/DIAG INJ SC/IM: CPT

## 2024-03-06 PROCEDURE — 6370000000 HC RX 637 (ALT 250 FOR IP)

## 2024-03-06 RX ORDER — MECLIZINE HCL 25MG 25 MG/1
25 TABLET, CHEWABLE ORAL ONCE
Status: COMPLETED | OUTPATIENT
Start: 2024-03-06 | End: 2024-03-06

## 2024-03-06 RX ORDER — MECLIZINE HYDROCHLORIDE 25 MG/1
25 TABLET ORAL 3 TIMES DAILY PRN
Qty: 15 TABLET | Refills: 0 | Status: SHIPPED | OUTPATIENT
Start: 2024-03-06 | End: 2024-03-16

## 2024-03-06 RX ORDER — ACETAMINOPHEN 325 MG/1
975 TABLET ORAL ONCE
Status: COMPLETED | OUTPATIENT
Start: 2024-03-06 | End: 2024-03-06

## 2024-03-06 RX ORDER — KETOROLAC TROMETHAMINE 30 MG/ML
30 INJECTION, SOLUTION INTRAMUSCULAR; INTRAVENOUS ONCE
Status: COMPLETED | OUTPATIENT
Start: 2024-03-06 | End: 2024-03-06

## 2024-03-06 RX ADMIN — KETOROLAC TROMETHAMINE 30 MG: 30 INJECTION INTRAMUSCULAR; INTRAVENOUS at 13:24

## 2024-03-06 RX ADMIN — ACETAMINOPHEN 975 MG: 325 TABLET ORAL at 13:23

## 2024-03-06 RX ADMIN — MECLIZINE HYDROCHLORIDE 25 MG: 25 TABLET, CHEWABLE ORAL at 13:23

## 2024-03-06 ASSESSMENT — PAIN DESCRIPTION - LOCATION
LOCATION: HEAD

## 2024-03-06 ASSESSMENT — PAIN SCALES - GENERAL
PAINLEVEL_OUTOF10: 6
PAINLEVEL_OUTOF10: 6
PAINLEVEL_OUTOF10: 4
PAINLEVEL_OUTOF10: 7

## 2024-03-06 ASSESSMENT — ENCOUNTER SYMPTOMS
SHORTNESS OF BREATH: 0
VOMITING: 1
WHEEZING: 0
ABDOMINAL PAIN: 0
NAUSEA: 1
COUGH: 0

## 2024-03-06 ASSESSMENT — PAIN - FUNCTIONAL ASSESSMENT: PAIN_FUNCTIONAL_ASSESSMENT: 0-10

## 2024-03-06 NOTE — ED PROVIDER NOTES
[facility] URGENT CARE  Urgent Care Encounter      CHIEF COMPLAINT       Chief Complaint   Patient presents with    Letter for School/Work    Emesis     X4/24 hours  pt reports episodes of vertigo    Headache       Nurses Notes reviewed and I agree except as noted in the HPI.  HISTORY OF PRESENT ILLNESS   Serena Vigil is a 23 y.o. female who presents to urgent care with complaints of headache, dizziness, nausea.  Patient reports symptoms have been ongoing for 3 days.  Patient reports she has a history of migraines and is also on her menstrual cycle which does make it worse.  Patient reports she has had Maxalt today for her symptoms.  Patient denies photophobia, syncope, fevers.    REVIEW OF SYSTEMS     Review of Systems   Constitutional:  Negative for fatigue and fever.   HENT:  Negative for congestion and dental problem.    Respiratory:  Negative for cough, shortness of breath and wheezing.    Cardiovascular:  Negative for chest pain.   Gastrointestinal:  Positive for nausea and vomiting. Negative for abdominal pain.   Musculoskeletal:  Negative for arthralgias.   Neurological:  Positive for dizziness and headaches. Negative for seizures.       PAST MEDICAL HISTORY         Diagnosis Date    Anxiety     Asthma     Chronic GERD     Chronic migraine     takes verapamil    Depression     Gallstones 10/2019    Gastroparesis     diagnosis as a child    Hx of blood clots     PE     Hypertension     Miscarriage     multiple    Obesity     Pulmonary embolism (HCC)     8 months ago-was on Xarelto-sees Dr Peabody in [place]    Splenomegaly        SURGICAL HISTORY     Patient  has a past surgical history that includes Tonsillectomy; [place] tooth extraction; Elbow surgery (Right); Cholecystectomy, laparoscopic (N/A, 10/30/2019); and Dilation and curettage of uterus (03/16/2021).    CURRENT MEDICATIONS       Discharge Medication List as of 3/6/2024  1:47 PM        CONTINUE these medications which have NOT  100-5 MCG/ACT inhaler Inhale 2 puffs into the lungs every 12 hours, Disp-1 each, R-2Normal      Spacer/Aero-Holding Chambers SHAWANDA DAILY Starting Mon 10/25/2021, Disp-1 each, R-0, Normal      Blood Pressure Monitor KIT Disp-1 kit, R-0, NormalTake blood pressure daily             ALLERGIES     Patient is is allergic to imitrex [sumatriptan], mucinex [guaifenesin er], and orilissa [elagolix].    FAMILY HISTORY     Patient'sfamily history includes Asthma in her brother and paternal grandmother; Cancer in her father; Colon Polyps in her maternal grandmother and paternal grandfather; Diabetes in her paternal grandfather; Gall Bladder Disease in her father and mother; High Blood Pressure in her father; Hypertension in her father and paternal grandfather; Lupus in her mother; Migraines in her mother; No Known Problems in her brother and maternal grandfather; Other in her father; Thyroid Disease in her father.    SOCIAL HISTORY     Patient  reports that she has been smoking e-cigarettes and cigarettes. She started smoking about 5 years ago. She has a 0.8 pack-year smoking history. She has never used smokeless tobacco. She reports that she does not currently use alcohol. She reports that she does not use drugs.    PHYSICAL EXAM     ED TRIAGE VITALS  BP: 112/80, Temp: 97.8 °F (36.6 °C), Pulse: 91, Respirations: 14, SpO2: 98 %  Physical Exam  Vitals and nursing note reviewed.   Constitutional:       Appearance: Normal appearance.   HENT:      Head: Normocephalic and atraumatic.      Right Ear: A middle ear effusion is present.      Left Ear: Tympanic membrane normal.   Cardiovascular:      Rate and Rhythm: Normal rate and regular rhythm.   Pulmonary:      Effort: Pulmonary effort is normal.      Breath sounds: Normal breath sounds and air entry. No stridor, decreased air movement or transmitted upper airway sounds. No decreased breath sounds.   Neurological:      General: No focal deficit present.      Mental Status: She is

## 2024-03-06 NOTE — ED NOTES
To room 3 c/o headache, veritgo on and off, and vomiting x 4/24 hours.  Pt reports s/s started last Thursday.  SHe is on day 9 of menses. States she has not taken any Zofran     Nicky Goldstein, MOHSEN  03/06/24 1316

## 2024-03-08 ENCOUNTER — TELEPHONE (OUTPATIENT)
Dept: FAMILY MEDICINE CLINIC | Age: 24
End: 2024-03-08

## 2024-03-08 NOTE — TELEPHONE ENCOUNTER
Appointment scheduled.   Future Appointments   Date Time Provider Department Center   3/13/2024  4:20 PM Tamera Teixeira DO [facility]   4/9/2024  3:00 PM Bernadette [place] OT [facility]   4/30/2024  3:00 PM Bernadette [place] OT [facility]   5/21/2024  3:00 PM Bernadette [place] OT [facility]

## 2024-03-08 NOTE — TELEPHONE ENCOUNTER
Please call patient to schedule appointment next week. Ok to do VV. Thank you!    Electronically signed by Tamera Teixeira DO on 3/8/2024 at 10:11 AM

## 2024-03-11 NOTE — PROGRESS NOTES
Serena Vigil, was evaluated through a synchronous (real-time) audio-video encounter. The patient (or guardian if applicable) is aware that this is a billable service, which includes applicable co-pays. This Virtual Visit was conducted with patient's (and/or legal guardian's) consent. Patient identification was verified, and a caregiver was present when appropriate.   The patient was located at Home: [address] [place] [address]  Provider was located at Facility (Appt Dept): [address] [place] [address] [address] [place] [id number]      Serena Vigil (:  2000) is a Established patient, presenting virtually for evaluation of the following:    Assessment & Plan   Below is the assessment and plan developed based on review of pertinent history, physical exam, labs, studies, and medications.  1. Vertigo   - continue meclizine Prn   - gave patient exercises to try at home for vertigo   - sent referral to PT in case exercises are insufficient  2. Anxiety   - continue zoloft and encouraged pt to call counseling services  3. Depression, unspecified depression type   - continue zoloft   - increase wellbutrin to 150 mg  4. Hx of blood clots   - continue lovenox -- pt does not want to switch to xarelto as she had weight gain with this in the past  5. Weight loss counseling, encounter for   -  Increase to 150 mg on wellbutrin    - Start naltrexone   - continue lifestyle changes  6. History of pulmonary embolism   - continue lovenox -- pt does not want to switch to xarelto as she had weight gain with this in the past  7. History of recurrent miscarriages   - hcg back to 0, thyroid function tests wnl, denies pelvic pain or vaginal discharge   - patient plans to continue to track ovulation, unsure of plans for future but not currently sexually active -- advised her to reach out if plans change and we can discuss which meds to stop       Return in about 1 month (around 2024) for follow up of mood and weight loss.

## 2024-03-13 ENCOUNTER — TELEPHONE (OUTPATIENT)
Dept: FAMILY MEDICINE CLINIC | Age: 24
End: 2024-03-13

## 2024-03-13 ENCOUNTER — TELEMEDICINE (OUTPATIENT)
Dept: FAMILY MEDICINE CLINIC | Age: 24
End: 2024-03-13
Payer: COMMERCIAL

## 2024-03-13 DIAGNOSIS — Z71.3 WEIGHT LOSS COUNSELING, ENCOUNTER FOR: ICD-10-CM

## 2024-03-13 DIAGNOSIS — F41.9 ANXIETY: ICD-10-CM

## 2024-03-13 DIAGNOSIS — N96 HISTORY OF RECURRENT MISCARRIAGES: ICD-10-CM

## 2024-03-13 DIAGNOSIS — F32.A DEPRESSION, UNSPECIFIED DEPRESSION TYPE: ICD-10-CM

## 2024-03-13 DIAGNOSIS — Z86.711 HISTORY OF PULMONARY EMBOLISM: ICD-10-CM

## 2024-03-13 DIAGNOSIS — R42 VERTIGO: Primary | ICD-10-CM

## 2024-03-13 DIAGNOSIS — Z86.718 HX OF BLOOD CLOTS: ICD-10-CM

## 2024-03-13 PROCEDURE — 99214 OFFICE O/P EST MOD 30 MIN: CPT

## 2024-03-13 RX ORDER — NALTREXONE HYDROCHLORIDE 50 MG/1
TABLET, FILM COATED ORAL
Qty: 45 TABLET | Refills: 0 | Status: SHIPPED | OUTPATIENT
Start: 2024-03-13

## 2024-03-13 RX ORDER — NALTREXONE HYDROCHLORIDE 50 MG/1
TABLET, FILM COATED ORAL
Qty: 45 TABLET | Refills: 0 | Status: SHIPPED | OUTPATIENT
Start: 2024-03-13 | End: 2024-03-13

## 2024-03-13 RX ORDER — BUPROPION HYDROCHLORIDE 150 MG/1
150 TABLET ORAL EVERY MORNING
Qty: 30 TABLET | Refills: 3 | Status: SHIPPED | OUTPATIENT
Start: 2024-03-13

## 2024-03-13 SDOH — ECONOMIC STABILITY: FOOD INSECURITY: WITHIN THE PAST 12 MONTHS, YOU WORRIED THAT YOUR FOOD WOULD RUN OUT BEFORE YOU GOT MONEY TO BUY MORE.: PATIENT DECLINED

## 2024-03-13 SDOH — ECONOMIC STABILITY: INCOME INSECURITY: HOW HARD IS IT FOR YOU TO PAY FOR THE VERY BASICS LIKE FOOD, HOUSING, MEDICAL CARE, AND HEATING?: PATIENT DECLINED

## 2024-03-13 SDOH — ECONOMIC STABILITY: FOOD INSECURITY: WITHIN THE PAST 12 MONTHS, THE FOOD YOU BOUGHT JUST DIDN'T LAST AND YOU DIDN'T HAVE MONEY TO GET MORE.: PATIENT DECLINED

## 2024-03-13 ASSESSMENT — ENCOUNTER SYMPTOMS
SHORTNESS OF BREATH: 0
WHEEZING: 0
CONSTIPATION: 0
NAUSEA: 1
COUGH: 0
DIARRHEA: 0
SORE THROAT: 0
BLOOD IN STOOL: 0
SINUS PRESSURE: 0
ABDOMINAL PAIN: 0
VOMITING: 1
ABDOMINAL DISTENTION: 0
PHOTOPHOBIA: 0
RHINORRHEA: 0
SINUS PAIN: 0

## 2024-03-13 NOTE — TELEPHONE ENCOUNTER
Received fax from [facility] - patients Naltrexone 50mg is backordered/unavailable. Uploaded to patients media.

## 2024-03-14 NOTE — PROGRESS NOTES
Attending Physician Note    I saw and evaluated the patient, performing the key elements of the service.  I discussed the findings, assessment and plan with the resident and agree with the resident's findings and plan as documented in the resident's note.  GC modifier added.    Brief summary:  Anxiety and depression - continue sertraline and change to bupropion XL 150mg daily with consideration for increase to 300mg dose if needed.    Obesity - add naltrexone.  Pt aware that this is not safe for use in pregnancy and will discontinue if she plans to attempt pregnancy.    Hx PE x 3 - hypercoagulability evaluation done [id number] and no abnormalities found.  Saw Dr. Page/hematology 2/10/22 and recommended indefinite anticoagulation.  Continue enoxaparin.

## 2024-03-14 NOTE — TELEPHONE ENCOUNTER
Sent naltrexone to [facility] in [facility] [place] [facility] per patient preference. Please let me know if any issues. Thanks.    Electronically signed by Tamera Teixeira DO on 3/13/2024 at 10:08 PM

## 2024-03-22 ENCOUNTER — HOSPITAL ENCOUNTER (EMERGENCY)
Age: 24
Discharge: HOME | End: 2024-03-22
Payer: COMMERCIAL

## 2024-03-22 VITALS
TEMPERATURE: 96.9 F | DIASTOLIC BLOOD PRESSURE: 86 MMHG | RESPIRATION RATE: 16 BRPM | OXYGEN SATURATION: 100 % | HEART RATE: 73 BPM | SYSTOLIC BLOOD PRESSURE: 123 MMHG

## 2024-03-22 DIAGNOSIS — J02.9 VIRAL PHARYNGITIS: Primary | ICD-10-CM

## 2024-03-22 LAB — S PYO AG THROAT QL: NEGATIVE

## 2024-03-22 PROCEDURE — 99213 OFFICE O/P EST LOW 20 MIN: CPT

## 2024-03-22 PROCEDURE — 87651 STREP A DNA AMP PROBE: CPT

## 2024-03-22 ASSESSMENT — ENCOUNTER SYMPTOMS
COUGH: 0
DIARRHEA: 0
WHEEZING: 0
ABDOMINAL PAIN: 0
SORE THROAT: 1
SHORTNESS OF BREATH: 0

## 2024-03-22 NOTE — ED PROVIDER NOTES
[facility] URGENT CARE  Urgent Care Encounter      CHIEF COMPLAINT       Chief Complaint   Patient presents with    Pharyngitis       Nurses Notes reviewed and I agree except as noted in the HPI.  HISTORY OF PRESENT ILLNESS   Serena Vigil is a 23 y.o. female who presents to urgent care with complaints of sore throat.  Patient reports symptoms started today.  Patient reports she did take Tylenol for her symptoms.  Patient denies fevers, abdominal pain, diarrhea.  Patient denies being around anyone sick recently.    REVIEW OF SYSTEMS     Review of Systems   Constitutional:  Negative for fatigue and fever.   HENT:  Positive for sore throat. Negative for congestion.    Respiratory:  Negative for cough, shortness of breath and wheezing.    Cardiovascular:  Negative for chest pain.   Gastrointestinal:  Negative for abdominal pain, diarrhea and vomiting.   Musculoskeletal:  Negative for arthralgias.   Neurological:  Negative for dizziness and seizures.       PAST MEDICAL HISTORY         Diagnosis Date    Anxiety     Asthma     Chronic GERD     Chronic migraine     takes verapamil    Depression     Gallstones 10/2019    Gastroparesis     diagnosis as a child    Hx of blood clots     PE     Hypertension     Miscarriage     multiple    Obesity     Pulmonary embolism (HCC)     8 months ago-was on Xarelto-sees Dr Peabody in [place]    Splenomegaly        SURGICAL HISTORY     Patient  has a past surgical history that includes Tonsillectomy; [place] tooth extraction; Elbow surgery (Right); Cholecystectomy, laparoscopic (N/A, 10/30/2019); and Dilation and curettage of uterus (03/16/2021).    CURRENT MEDICATIONS       Discharge Medication List as of 3/22/2024  7:37 PM        CONTINUE these medications which have NOT CHANGED    Details   buPROPion (WELLBUTRIN XL) 150 MG extended release tablet Take 1 tablet by mouth every morning, Disp-30 tablet, R-3Normal      naltrexone (DEPADE) 50 MG tablet Take 1/4 tablet daily x

## 2024-03-22 NOTE — DISCHARGE INSTRUCTIONS
Increase water intake, frequent hand washing.  Tylenol, Chloraseptic Spray, Gargling with warm salt water as needed for fever and or pain.  Follow up with PCP in 3-5 days if no improvement or sooner with worsening symptoms.

## 2024-04-12 DIAGNOSIS — O21.9 NAUSEA AND VOMITING IN PREGNANCY: ICD-10-CM

## 2024-04-12 RX ORDER — DIPHENHYDRAMINE HYDROCHLORIDE 25 MG/1
CAPSULE ORAL
Qty: 45 TABLET | Refills: 0 | OUTPATIENT
Start: 2024-04-12

## 2024-04-12 NOTE — TELEPHONE ENCOUNTER
Patient's last appointment was : 3/13/2024 with our   Patient's next appointment is : Visit date not found with our DrLobo Nothing scheduled  Last refilled on: 0/20/24  Which pharmacy does the script need sent to:   [facility]/pharmacy #[id number] - [place], [place] - [address]          Lab Results   Component Value Date    LABA1C 4.7 11/30/2021     Lab Results   Component Value Date    CHOL 159 02/13/2020    TRIG 85 02/13/2020    HDL 46 02/13/2020    LDLCALC 96 02/13/2020     Lab Results   Component Value Date     11/20/2023    K 4.1 11/20/2023     11/20/2023    CO2 19 (L) 11/20/2023    BUN 13 11/20/2023    CREATININE 0.6 11/20/2023    GLUCOSE 95 11/20/2023    CALCIUM 9.9 11/20/2023    PROT 7.8 11/20/2023    LABALBU 4.4 11/20/2023    BILITOT 0.3 11/20/2023    ALKPHOS 72 11/20/2023    AST 28 11/20/2023    ALT 40 11/20/2023    LABGLOM >60 11/20/2023     Lab Results   Component Value Date    TSH 0.498 02/27/2024    T4FREE 1.22 02/27/2024     Lab Results   Component Value Date    WBC 9.5 02/19/2024    HGB 13.3 02/19/2024    HCT 41.9 02/19/2024    MCV 94.6 02/19/2024     02/19/2024

## 2024-04-13 NOTE — TELEPHONE ENCOUNTER
Patient no longer taking this medication. Please call patient to schedule follow up for mood. OK to do virtual. Thanks!    Electronically signed by Tamera Teixeira DO on 4/12/2024 at 8:03 PM

## 2024-04-17 NOTE — TELEPHONE ENCOUNTER
Called and spoke to patient, advised of medication not being refilled. Scheduled VV form 04/24 with Dr. Teixeira

## 2024-04-24 ENCOUNTER — TELEPHONE (OUTPATIENT)
Dept: FAMILY MEDICINE CLINIC | Age: 24
End: 2024-04-24

## 2024-04-24 NOTE — TELEPHONE ENCOUNTER
Called patient as she missed todays VV. Patient states she could not get logged into her [employer] [place] for the appointment, so we rescheduled that for a later date. While on the phone, patient stated she would like to stop the shots. Let patient know I would tell her provider. Thanks!

## 2024-06-10 ENCOUNTER — HOSPITAL ENCOUNTER (EMERGENCY)
Age: 24
Discharge: HOME | End: 2024-06-10
Payer: COMMERCIAL

## 2024-06-10 VITALS
SYSTOLIC BLOOD PRESSURE: 125 MMHG | TEMPERATURE: 98.9 F | OXYGEN SATURATION: 99 % | HEART RATE: 72 BPM | DIASTOLIC BLOOD PRESSURE: 84 MMHG | RESPIRATION RATE: 17 BRPM

## 2024-06-10 DIAGNOSIS — M79.644 PAIN OF FINGER OF RIGHT HAND: Primary | ICD-10-CM

## 2024-06-10 PROCEDURE — 99213 OFFICE O/P EST LOW 20 MIN: CPT

## 2024-06-10 NOTE — ED NOTES
Pt to [facility] with c/o finger injury yesterday. She reports that she injured her finger yesterday. There doesn't appear to be any obvious deformities.      Mary Joseph RN  06/10/24 0900

## 2024-06-10 NOTE — DISCHARGE INSTRUCTIONS
Bracing and buddy taping for additional support.  Rest, apply ice, elevate.  Tylenol / Ibuprofen as needed for fever and or pain.  Follow up with PCP in 3-5 days if no improvement or sooner with worsening symptoms.

## 2024-06-10 NOTE — ED PROVIDER NOTES
[facility] URGENT CARE  Urgent Care Encounter       CHIEF COMPLAINT       Chief Complaint   Patient presents with    Finger Pain     Right 4th finger       Nurses Notes reviewed and I agree except as noted in the HPI.  HISTORY OF PRESENT ILLNESS   Serena Vigil is a 23 y.o. female who presents with concerns of right fourth finger pain due to an injury yesterday. Reports she was moving her couch yesterday, pulling from the top back towards her and feels like she heard a \"pop\". Reports treating with Tylenol.     HPI    REVIEW OF SYSTEMS     Review of Systems   Musculoskeletal:         Finger pain, 4th digit on right hand   All other systems reviewed and are negative.      PAST MEDICAL HISTORY         Diagnosis Date    Anxiety     Asthma     Chronic GERD     Chronic migraine     takes verapamil    Depression     Gallstones 10/2019    Gastroparesis     diagnosis as a child    Hx of blood clots     PE     Hypertension     Miscarriage     multiple    Obesity     Pulmonary embolism (HCC)     8 months ago-was on Xarelto-sees Dr Peabody in [place]    Splenomegaly        SURGICALHISTORY     Patient  has a past surgical history that includes Tonsillectomy; [place] tooth extraction; Elbow surgery (Right); Cholecystectomy, laparoscopic (N/A, 10/30/2019); and Dilation and curettage of uterus (03/16/2021).    CURRENT MEDICATIONS       Discharge Medication List as of 6/10/2024  9:13 AM        CONTINUE these medications which have NOT CHANGED    Details   buPROPion (WELLBUTRIN XL) 150 MG extended release tablet Take 1 tablet by mouth every morning, Disp-30 tablet, R-3Normal      naltrexone (DEPADE) 50 MG tablet Take 1/4 tablet daily x 7 days, then 1/4 tablet twice daily x 7 days, then 1/3 tablet in AM and 1/4 tablet in PM x 7 days then 1/3 tablet twice daily, Disp-45 tablet, R-0Normal      traZODone (DESYREL) 50 MG tablet Take 1 tablet by mouth nightly, Disp-90 tablet, R-1Normal      Syringe, Disposable, 3 ML MISC  TWICE WEEKLY Starting Thu 1/25/2024, Until Thu 2/1/2024, For 7 days, Disp-14 each, R-3, Normal      NEEDLE, DISP, 18 G (EASY TOUCH FLIPLOCK NEEDLES) 18G X 1\" MISC 1 Needle by Does not apply route Twice a Week, Disp-50 each, R-1Normal      NEEDLE, DISP, 23 G 23G X 1-1/2\" MISC TWICE WEEKLY Starting Thu 1/25/2024, Until Sat 2/24/2024, For 30 days, Disp-50 each, R-1, Normal      Sharps Container (SHARPS DISPOSAL BY MAIL SYSTEM) MISC DAILY Starting Tue 1/23/2024, Until Wed 1/31/2024, For 1 dose, Disp-1 each, R-3, Normal      Prenatal Vit-Fe Fumarate-FA (PRENATAL VITAMINS) 28-0.8 MG TABS Take 1 tablet by mouth daily, Disp-90 tablet, R-2Normal      pyridoxine (B-6) 25 MG tablet Take 0.5 tablets by mouth daily as needed (nausea), Disp-45 tablet, R-0Normal      sertraline (ZOLOFT) 100 MG tablet Take 1.5 tablets by mouth daily, Disp-90 tablet, R-2Normal      ARIPiprazole (ABILIFY) 2 MG tablet Take 1 tablet by mouth daily, Disp-30 tablet, R-3Normal      NURTEC 75 MG TBDP as needed, DAWHistorical Med      rizatriptan (MAXALT-MLT) 10 MG disintegrating tablet as neededHistorical Med      ondansetron (ZOFRAN-ODT) 4 MG disintegrating tablet Take 1 tablet by mouth 3 times daily as needed for Nausea or Vomiting, Disp-30 tablet, R-3Normal      albuterol sulfate HFA (VENTOLIN HFA) 108 (90 Base) MCG/ACT inhaler Inhale 2 puffs into the lungs 4 times daily as needed for Wheezing, Disp-1 each, R-1Normal      mometasone-formoterol (DULERA) 100-5 MCG/ACT inhaler Inhale 2 puffs into the lungs every 12 hours, Disp-1 each, R-2Normal      Spacer/Aero-Holding Chambers SHAWANDA DAILY Starting Mon 10/25/2021, Disp-1 each, R-0, Normal      Blood Pressure Monitor KIT Disp-1 kit, R-0, NormalTake blood pressure daily             ALLERGIES     Patient is is allergic to imitrex [sumatriptan], mucinex [guaifenesin er], and orilissa [elagolix].    Patients   Immunization History   Administered Date(s) Administered    COVID-19, MODERNA BLUE border, Primary or

## 2024-06-13 ENCOUNTER — TELEPHONE (OUTPATIENT)
Dept: FAMILY MEDICINE CLINIC | Age: 24
End: 2024-06-13

## 2024-06-13 NOTE — TELEPHONE ENCOUNTER
Regarding: Weightloeleni  Contact: [phone]  ----- Message from Tamera Teixeira DO sent at 6/13/2024 11:18 AM EDT -----       ----- Message from Serena Vigil to Tamera Teixeira DO sent at 6/13/2024 10:33 AM -----   Yes please      ----- Message -----       From:Tamera Teixeira DO       Sent:6/13/2024 10:32 AM EDT         To:Serena Vigil    Subject:Weightyolanda Lyons! We can definitely discuss it -- I'd want to see you in person and do an exam and get some labs done beforehand. Also insurance companies usually want some proof that you've been doing an exercise/diet program so if you have any evidence of that please gather it together. If you want the office to call you to schedule let me know..      ----- Message -----       From:Serena Vigil       Sent:6/13/2024  8:51 AM EDT         To:Tamera Teixeira DO    Subject:Weightloss    Hey, I was wondering if there was any way we could try and see if I could be put on a glp-1 medication for weight loss.

## 2024-06-26 ENCOUNTER — OFFICE VISIT (OUTPATIENT)
Dept: FAMILY MEDICINE CLINIC | Age: 24
End: 2024-06-26
Payer: COMMERCIAL

## 2024-06-26 VITALS
OXYGEN SATURATION: 99 % | WEIGHT: 271.2 LBS | BODY MASS INDEX: 40.17 KG/M2 | HEIGHT: 69 IN | RESPIRATION RATE: 12 BRPM | SYSTOLIC BLOOD PRESSURE: 122 MMHG | DIASTOLIC BLOOD PRESSURE: 84 MMHG | TEMPERATURE: 97.9 F | HEART RATE: 84 BPM

## 2024-06-26 DIAGNOSIS — F41.9 ANXIETY: ICD-10-CM

## 2024-06-26 DIAGNOSIS — E66.01 CLASS 3 SEVERE OBESITY DUE TO EXCESS CALORIES WITHOUT SERIOUS COMORBIDITY WITH BODY MASS INDEX (BMI) OF 40.0 TO 44.9 IN ADULT (HCC): ICD-10-CM

## 2024-06-26 DIAGNOSIS — Z71.3 WEIGHT LOSS COUNSELING, ENCOUNTER FOR: Primary | ICD-10-CM

## 2024-06-26 DIAGNOSIS — G43.E09 CHRONIC MIGRAINE WITH AURA WITHOUT STATUS MIGRAINOSUS, NOT INTRACTABLE: ICD-10-CM

## 2024-06-26 DIAGNOSIS — Z86.711 HISTORY OF PULMONARY EMBOLISM: ICD-10-CM

## 2024-06-26 PROCEDURE — 3074F SYST BP LT 130 MM HG: CPT

## 2024-06-26 PROCEDURE — 3079F DIAST BP 80-89 MM HG: CPT

## 2024-06-26 PROCEDURE — 99214 OFFICE O/P EST MOD 30 MIN: CPT

## 2024-06-26 RX ORDER — RIZATRIPTAN BENZOATE 10 MG/1
10 TABLET, ORALLY DISINTEGRATING ORAL AS NEEDED
Qty: 10 TABLET | Refills: 1 | Status: SHIPPED | OUTPATIENT
Start: 2024-06-26 | End: 2024-07-06

## 2024-06-26 RX ORDER — TIRZEPATIDE 2.5 MG/.5ML
2.5 INJECTION, SOLUTION SUBCUTANEOUS WEEKLY
Qty: 2 ML | Refills: 2 | Status: CANCELLED | OUTPATIENT
Start: 2024-06-26 | End: 2024-07-26

## 2024-06-26 RX ORDER — HYDROXYZINE HYDROCHLORIDE 10 MG/1
10 TABLET, FILM COATED ORAL 3 TIMES DAILY PRN
Qty: 30 TABLET | Refills: 1 | Status: SHIPPED | OUTPATIENT
Start: 2024-06-26 | End: 2024-07-26

## 2024-06-26 RX ORDER — TIRZEPATIDE 2.5 MG/.5ML
2.5 INJECTION, SOLUTION SUBCUTANEOUS WEEKLY
Qty: 2 ML | Refills: 2 | Status: SHIPPED | OUTPATIENT
Start: 2024-06-26

## 2024-06-26 ASSESSMENT — ENCOUNTER SYMPTOMS
ABDOMINAL PAIN: 0
SORE THROAT: 0
SHORTNESS OF BREATH: 0
WHEEZING: 0
NAUSEA: 0
PHOTOPHOBIA: 0
CONSTIPATION: 0
BLOOD IN STOOL: 0
ABDOMINAL DISTENTION: 0
DIARRHEA: 0
RHINORRHEA: 0
VOMITING: 0

## 2024-06-26 NOTE — PROGRESS NOTES
S: 23 y.o. female with   Chief Complaint   Patient presents with    Weight Management     Help with weight loss       Reviewed hx and ROS in detail with resident prior to exam.  See notes for additional details.     BP Readings from Last 3 Encounters:   06/26/24 122/84   06/10/24 125/84   03/22/24 123/86     Wt Readings from Last 3 Encounters:   06/26/24 123 kg (271 lb 3.2 oz)   03/06/24 120.2 kg (265 lb)   01/31/24 121.1 kg (267 lb)           O: VS:  height is 1.753 m (5' 9.02\") and weight is 123 kg (271 lb 3.2 oz). Her oral temperature is 97.9 °F (36.6 °C). Her blood pressure is 122/84 and her pulse is 84. Her respiration is 12 and oxygen saturation is 99%.        Diagnosis Orders   1. Class 2 obesity due to excess calories without serious comorbidity with body mass index (BMI) of 39.0 to 39.9 in adult        2. Weight loss counseling, encounter for              Health Maintenance Due   Topic Date Due    Hepatitis B vaccine (1 of 3 - 3-dose series) Never done    Pneumococcal 0-64 years Vaccine (1 of 2 - PCV) Never done    HPV vaccine (1 - 2-dose series) Never done    DTaP/Tdap/Td vaccine (1 - Tdap) Never done    COVID-19 Vaccine (3 - 2023-24 season) 09/01/2023    Pap smear  03/05/2024         Attending Physician Statement  I have discussed the case, including pertinent history and exam findings with the resident.  I agree with the documented assessment and plan as documented by the resident.  GC modifier added to this encounter      Katelyn Ann Leopold, MD 6/26/2024 3:19 PM

## 2024-06-26 NOTE — PROGRESS NOTES
[facility]  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 Doesn't think she's still taking abilify but will verify. Has been having a lot of anxiety recently. Atarax has helped in the past.No SI/HI.     Naltrexone started in March -- initally lost weight because cravings decreased but then gained a lot of weight and then stopped and plateaued and restarted and gained weight.     Exercise -- gym 3 times a week 1.5-2 hours, a mix of everything.  Tries portion control, high protein, more vegetables. Has used [facility] to record calories in the past but hasn't been using it recently. Has tried topamax in the past but had negative side effects. Went to the weight management clinic last year but missed a vew appointments and can't afford to start the whole process over again.    Ran out of lovenox a month ago. Is open to eliquis but doesn't want xarelto. Denies chest pain, SOB, leg swelling/pain/redness.    Wt Readings from Last 3 Encounters:   06/26/24 123 kg (271 lb 3.2 oz)   03/06/24 120.2 kg (265 lb)   01/31/24 121.1 kg (267 lb)     Patient Active Problem List   Diagnosis    Cholelithiasis without obstruction    Chronic migraine    Anxiety    Depression    Gastroparesis    Obesity    Chronic GERD    Hypertension    Hx of blood clots    Costochondral chest pain    Muscle strain    Chest pain, atypical    History of pulmonary embolism    Mild intermittent asthma    Uterine bleeding, dysfunctional    Agnosia for taste    Irregular menses    Loss of sense of smell    Lower abdominal pain    Menorrhagia    Cervico-occipital neuralgia    Chronic maxillary sinusitis    Contusion of right forearm    Loss of taste

## 2024-06-26 NOTE — PATIENT INSTRUCTIONS
I  will send zepbound in -- let me know if you want to try anything different    Let me know if anxiety worsening /you want to try anything different    Start eliquis and let me know if any issues    Get labs done    Thank you   Thank you for trusting us with your healthcare needs. You may receive a survey regarding today's visit. It would help us out if you would take a few moments to provide your feedback. We value your input.  Please bring in ALL medications BOTTLES, including inhalers, herbal supplements, over the counter, prescribed & non-prescribed medicine. The office would like actual medication bottles and a list.         4.  Prior to getting your labs drawn, check with your insurance company for benefits and eligibility of lab services.  Often, insurance companies cover certain tests for preventative visits only.  It is patient's responsibility to    see what is covered.    5.  If the list below has been completed, PLEASE FAX RECORDS TO OUR OFFICE @ [phone]. Once the records have been received we will update your records at our office:  Health Maintenance Due   Topic Date Due    Hepatitis B vaccine (1 of 3 - 3-dose series) Never done    Pneumococcal 0-64 years Vaccine (1 of 2 - PCV) Never done    HPV vaccine (1 - 2-dose series) Never done    DTaP/Tdap/Td vaccine (1 - Tdap) Never done    COVID-19 Vaccine (3 - 2023-24 season) 09/01/2023    Pap smear  03/05/2024          Tobacco Cessation Programs     Telephonic behavior modification  1-800-QUIT-NOW (751-3090)  Counseling service for those who are ready to quit using tobacco.    Available for uninsured [place] residents, Medicaid recipients, pregnant women, or patients whose health plans or employers are members of the [place] [employer]    Online behavior modification  http://[place].[employer]uitlogix.org  Online support program to help patients through each step of the quitting process.  Available 24 hours a day 7 days a week.  Provides up to date researched

## 2024-06-26 NOTE — PROGRESS NOTES
Health Maintenance Due   Topic Date Due    Hepatitis B vaccine (1 of 3 - 3-dose series) Never done    Pneumococcal 0-64 years Vaccine (1 of 2 - PCV) Never done    HPV vaccine (1 - 2-dose series) Never done    DTaP/Tdap/Td vaccine (1 - Tdap) Never done    COVID-19 Vaccine (3 - 2023-24 season) 09/01/2023    Pap smear  03/05/2024

## 2024-09-13 ENCOUNTER — TELEPHONE (OUTPATIENT)
Dept: FAMILY MEDICINE CLINIC | Age: 24
End: 2024-09-13

## 2024-09-17 ENCOUNTER — OFFICE VISIT (OUTPATIENT)
Dept: FAMILY MEDICINE CLINIC | Age: 24
End: 2024-09-17
Payer: COMMERCIAL

## 2024-09-17 VITALS
OXYGEN SATURATION: 98 % | WEIGHT: 257.8 LBS | HEART RATE: 82 BPM | DIASTOLIC BLOOD PRESSURE: 62 MMHG | BODY MASS INDEX: 38.18 KG/M2 | SYSTOLIC BLOOD PRESSURE: 110 MMHG | HEIGHT: 69 IN

## 2024-09-17 DIAGNOSIS — N63.20 MASS OF LEFT BREAST, UNSPECIFIED QUADRANT: Primary | ICD-10-CM

## 2024-09-17 PROCEDURE — 3078F DIAST BP <80 MM HG: CPT

## 2024-09-17 PROCEDURE — 99213 OFFICE O/P EST LOW 20 MIN: CPT

## 2024-09-17 PROCEDURE — 3074F SYST BP LT 130 MM HG: CPT

## 2024-09-20 ENCOUNTER — HOSPITAL ENCOUNTER (OUTPATIENT)
Dept: WOMENS IMAGING | Age: 24
Discharge: HOME | End: 2024-09-20
Payer: COMMERCIAL

## 2024-09-20 VITALS — HEIGHT: 69 IN | BODY MASS INDEX: 38.06 KG/M2 | WEIGHT: 257 LBS

## 2024-09-20 DIAGNOSIS — N63.20 MASS OF LEFT BREAST, UNSPECIFIED QUADRANT: ICD-10-CM

## 2024-09-20 PROCEDURE — 76642 ULTRASOUND BREAST LIMITED: CPT

## 2024-09-24 ENCOUNTER — HOSPITAL ENCOUNTER (OUTPATIENT)
Dept: WOMENS IMAGING | Age: 24
Discharge: HOME | End: 2024-09-24
Payer: COMMERCIAL

## 2024-09-24 DIAGNOSIS — N63.25 MASS OVERLAPPING MULTIPLE QUADRANTS OF LEFT BREAST: ICD-10-CM

## 2024-09-24 PROCEDURE — 19083 BX BREAST 1ST LESION US IMAG: CPT

## 2024-09-24 PROCEDURE — 88305 TISSUE EXAM BY PATHOLOGIST: CPT

## 2024-09-25 ENCOUNTER — CLINICAL DOCUMENTATION (OUTPATIENT)
Dept: WOMENS IMAGING | Age: 24
End: 2024-09-25

## 2024-09-26 DIAGNOSIS — Z09 FOLLOW-UP EXAM: ICD-10-CM

## 2024-09-26 DIAGNOSIS — N63.24 MASS OF LOWER INNER QUADRANT OF LEFT BREAST: Primary | ICD-10-CM

## 2024-10-01 ENCOUNTER — TELEPHONE (OUTPATIENT)
Dept: FAMILY MEDICINE CLINIC | Age: 24
End: 2024-10-01

## 2024-10-01 ENCOUNTER — LAB (OUTPATIENT)
Dept: LAB | Age: 24
End: 2024-10-01

## 2024-10-01 DIAGNOSIS — N96 RECURRENT PREGNANCY LOSS: ICD-10-CM

## 2024-10-01 DIAGNOSIS — Z86.718 HX OF BLOOD CLOTS: ICD-10-CM

## 2024-10-01 DIAGNOSIS — Z32.01 POSITIVE PREGNANCY TEST: ICD-10-CM

## 2024-10-01 DIAGNOSIS — Z86.718 HX OF BLOOD CLOTS: Primary | ICD-10-CM

## 2024-10-01 LAB
HCG INTACT+B SERPL-ACNC: 4451 MIU/ML (ref 0–5)
PROGEST SERPL-MCNC: 7.26 NG/ML

## 2024-10-01 NOTE — TELEPHONE ENCOUNTER
Received message from patient about positive pregnancy test. Due to history of recurrent pregnancy loss, ordered quant hcg, progesterone and estradiol for patient to get done asap. Will discuss anticoagulation with patient to ensure this has started.    Electronically signed by Tamera Teixeira DO on 10/1/2024 at 2:22 PM

## 2024-10-03 LAB — ESTRADIOL LEVEL: 189 PG/ML

## 2024-10-04 ENCOUNTER — LAB (OUTPATIENT)
Dept: LAB | Age: 24
End: 2024-10-04

## 2024-10-04 DIAGNOSIS — Z3A.01 LESS THAN 8 WEEKS GESTATION OF PREGNANCY: ICD-10-CM

## 2024-10-04 DIAGNOSIS — N96 HISTORY OF RECURRENT MISCARRIAGES: ICD-10-CM

## 2024-10-04 LAB
HCG INTACT+B SERPL-ACNC: 8267 MIU/ML (ref 0–5)
PROGEST SERPL-MCNC: 12.53 NG/ML

## 2024-10-05 NOTE — RESULT ENCOUNTER NOTE
Patient notified via [employer]. Hcg indicates early pregnancy. Progesterone low -- start vaginal micronized progesterone 400 mg weekly.    Electronically signed by Tamera Teixeira DO on 10/5/2024 at 1:55 PM

## 2024-10-05 NOTE — RESULT ENCOUNTER NOTE
I already notified patient via mychart. Hcg rising but not doubling at the average rate and progesterone low. Will add progesterone 100 mg IM weekly.    Electronically signed by Tamera Teixeira DO on 10/5/2024 at 1:54 PM

## 2024-10-08 ENCOUNTER — LAB (OUTPATIENT)
Dept: LAB | Age: 24
End: 2024-10-08

## 2024-10-08 DIAGNOSIS — Z3A.01 LESS THAN 8 WEEKS GESTATION OF PREGNANCY: ICD-10-CM

## 2024-10-08 DIAGNOSIS — R79.89 LOW SERUM PROGESTERONE: ICD-10-CM

## 2024-10-08 DIAGNOSIS — N96 HISTORY OF RECURRENT MISCARRIAGES: ICD-10-CM

## 2024-10-08 LAB
HCG INTACT+B SERPL-ACNC: ABNORMAL MIU/ML (ref 0–5)
PROGEST SERPL-MCNC: 8.9 NG/ML

## 2024-10-08 NOTE — RESULT ENCOUNTER NOTE
I sent message to patient already. Hcg rising but slowly. Progesterone very low. Recommend continue 400 mg vaginal capsules and weekly 100-200 mg injections. Plan to recheck on Saturday.    Electronically signed by Tamera Teixeira DO on 10/8/2024 at 6:17 PM

## 2024-10-11 ENCOUNTER — LAB (OUTPATIENT)
Dept: LAB | Age: 24
End: 2024-10-11

## 2024-10-11 DIAGNOSIS — Z3A.01 LESS THAN 8 WEEKS GESTATION OF PREGNANCY: ICD-10-CM

## 2024-10-11 DIAGNOSIS — N96 HISTORY OF RECURRENT MISCARRIAGES: ICD-10-CM

## 2024-10-11 DIAGNOSIS — R79.89 LOW SERUM PROGESTERONE: ICD-10-CM

## 2024-10-11 LAB
HCG INTACT+B SERPL-ACNC: ABNORMAL MIU/ML (ref 0–5)
PROGEST SERPL-MCNC: 12.03 NG/ML

## 2024-10-12 ENCOUNTER — HOSPITAL ENCOUNTER (EMERGENCY)
Age: 24
Discharge: HOME | End: 2024-10-12
Attending: STUDENT IN AN ORGANIZED HEALTH CARE EDUCATION/TRAINING PROGRAM
Payer: COMMERCIAL

## 2024-10-12 ENCOUNTER — APPOINTMENT (OUTPATIENT)
Dept: ULTRASOUND IMAGING | Age: 24
End: 2024-10-12
Payer: COMMERCIAL

## 2024-10-12 VITALS
SYSTOLIC BLOOD PRESSURE: 122 MMHG | OXYGEN SATURATION: 97 % | TEMPERATURE: 98.1 F | HEIGHT: 69 IN | HEART RATE: 73 BPM | RESPIRATION RATE: 18 BRPM | WEIGHT: 260 LBS | DIASTOLIC BLOOD PRESSURE: 80 MMHG | BODY MASS INDEX: 38.51 KG/M2

## 2024-10-12 DIAGNOSIS — O46.90 VAGINAL BLEEDING IN PREGNANCY: Primary | ICD-10-CM

## 2024-10-12 LAB
ABO: NORMAL
ALBUMIN SERPL BCG-MCNC: 3.7 G/DL (ref 3.5–5.1)
ALP SERPL-CCNC: 57 U/L (ref 38–126)
ALT SERPL W/O P-5'-P-CCNC: 32 U/L (ref 11–66)
ANION GAP SERPL CALC-SCNC: 11 MEQ/L (ref 8–16)
AST SERPL-CCNC: 26 U/L (ref 5–40)
BASOPHILS ABSOLUTE: 0 THOU/MM3 (ref 0–0.1)
BASOPHILS NFR BLD AUTO: 0.4 %
BILIRUB CONJ SERPL-MCNC: < 0.1 MG/DL (ref 0.1–13.8)
BILIRUB SERPL-MCNC: 0.3 MG/DL (ref 0.3–1.2)
BILIRUB UR QL STRIP.AUTO: NEGATIVE
BUN SERPL-MCNC: 8 MG/DL (ref 7–22)
CALCIUM SERPL-MCNC: 8.6 MG/DL (ref 8.5–10.5)
CHARACTER UR: CLEAR
CHLORIDE SERPL-SCNC: 103 MEQ/L (ref 98–111)
CO2 SERPL-SCNC: 21 MEQ/L (ref 23–33)
COLOR, UA: YELLOW
CREAT SERPL-MCNC: 0.5 MG/DL (ref 0.4–1.2)
DEPRECATED RDW RBC AUTO: 41.9 FL (ref 35–45)
EOSINOPHIL NFR BLD AUTO: 0.8 %
EOSINOPHILS ABSOLUTE: 0.1 THOU/MM3 (ref 0–0.4)
ERYTHROCYTE [DISTWIDTH] IN BLOOD BY AUTOMATED COUNT: 12 % (ref 11.5–14.5)
GFR SERPL CREATININE-BSD FRML MDRD: > 90 ML/MIN/1.73M2
GLUCOSE SERPL-MCNC: 83 MG/DL (ref 70–108)
GLUCOSE UR QL STRIP.AUTO: NEGATIVE MG/DL
HCG INTACT+B SERPL-ACNC: ABNORMAL MIU/ML (ref 0–5)
HCT VFR BLD AUTO: 38.7 % (ref 37–47)
HGB BLD-MCNC: 12.7 GM/DL (ref 12–16)
HGB UR QL STRIP.AUTO: NEGATIVE
IMM GRANULOCYTES # BLD AUTO: 0.04 THOU/MM3 (ref 0–0.07)
IMM GRANULOCYTES NFR BLD AUTO: 0.5 %
KETONES UR QL STRIP.AUTO: NEGATIVE
LYMPHOCYTES ABSOLUTE: 1.4 THOU/MM3 (ref 1–4.8)
LYMPHOCYTES NFR BLD AUTO: 17.8 %
MCH RBC QN AUTO: 30.8 PG (ref 26–33)
MCHC RBC AUTO-ENTMCNC: 32.8 GM/DL (ref 32.2–35.5)
MCV RBC AUTO: 93.7 FL (ref 81–99)
MONOCYTES ABSOLUTE: 0.4 THOU/MM3 (ref 0.4–1.3)
MONOCYTES NFR BLD AUTO: 5.8 %
NEUTROPHILS ABSOLUTE: 5.7 THOU/MM3 (ref 1.8–7.7)
NEUTROPHILS NFR BLD AUTO: 74.7 %
NITRITE UR QL STRIP: NEGATIVE
NRBC BLD AUTO-RTO: 0 /100 WBC
OSMOLALITY SERPL CALC.SUM OF ELEC: 267.6 MOSMOL/KG (ref 275–300)
PH UR STRIP.AUTO: 8 [PH] (ref 5–9)
PLATELET # BLD AUTO: 233 THOU/MM3 (ref 130–400)
PMV BLD AUTO: 10.9 FL (ref 9.4–12.4)
POTASSIUM SERPL-SCNC: 3.8 MEQ/L (ref 3.5–5.2)
PROT SERPL-MCNC: 6.5 G/DL (ref 6.1–8)
PROT UR STRIP.AUTO-MCNC: NEGATIVE MG/DL
RBC # BLD AUTO: 4.13 MILL/MM3 (ref 4.2–5.4)
RH FACTOR: NORMAL
SODIUM SERPL-SCNC: 135 MEQ/L (ref 135–145)
SP GR UR REFRACT.AUTO: 1.01 (ref 1–1.03)
UROBILINOGEN, URINE: 1 EU/DL (ref 0–1)
WBC # BLD AUTO: 7.6 THOU/MM3 (ref 4.8–10.8)
WBC #/AREA URNS HPF: NEGATIVE /[HPF]

## 2024-10-12 PROCEDURE — 6370000000 HC RX 637 (ALT 250 FOR IP)

## 2024-10-12 PROCEDURE — 80053 COMPREHEN METABOLIC PANEL: CPT

## 2024-10-12 PROCEDURE — 99284 EMERGENCY DEPT VISIT MOD MDM: CPT

## 2024-10-12 PROCEDURE — 84702 CHORIONIC GONADOTROPIN TEST: CPT

## 2024-10-12 PROCEDURE — 36415 COLL VENOUS BLD VENIPUNCTURE: CPT

## 2024-10-12 PROCEDURE — 86900 BLOOD TYPING SEROLOGIC ABO: CPT

## 2024-10-12 PROCEDURE — 85025 COMPLETE CBC W/AUTO DIFF WBC: CPT

## 2024-10-12 PROCEDURE — 81003 URINALYSIS AUTO W/O SCOPE: CPT

## 2024-10-12 PROCEDURE — 86901 BLOOD TYPING SEROLOGIC RH(D): CPT

## 2024-10-12 PROCEDURE — 82248 BILIRUBIN DIRECT: CPT

## 2024-10-12 PROCEDURE — 76817 TRANSVAGINAL US OBSTETRIC: CPT

## 2024-10-12 RX ORDER — ACETAMINOPHEN 500 MG
1000 TABLET ORAL
Status: COMPLETED | OUTPATIENT
Start: 2024-10-12 | End: 2024-10-12

## 2024-10-12 RX ADMIN — ACETAMINOPHEN 1000 MG: 500 TABLET ORAL at 16:55

## 2024-10-12 ASSESSMENT — PAIN DESCRIPTION - DESCRIPTORS
DESCRIPTORS: ACHING
DESCRIPTORS: CRAMPING

## 2024-10-12 ASSESSMENT — PAIN DESCRIPTION - PAIN TYPE
TYPE: ACUTE PAIN
TYPE: ACUTE PAIN

## 2024-10-12 ASSESSMENT — PAIN SCALES - GENERAL
PAINLEVEL_OUTOF10: 3
PAINLEVEL_OUTOF10: 9
PAINLEVEL_OUTOF10: 9
PAINLEVEL_OUTOF10: 3

## 2024-10-12 ASSESSMENT — PAIN DESCRIPTION - LOCATION
LOCATION: ABDOMEN
LOCATION: HEAD
LOCATION: HEAD
LOCATION: ABDOMEN

## 2024-10-12 ASSESSMENT — PAIN - FUNCTIONAL ASSESSMENT
PAIN_FUNCTIONAL_ASSESSMENT: 0-10

## 2024-10-12 NOTE — DISCHARGE INSTRUCTIONS
Follow-up with your OB at your scheduled appointment on Tuesday    Avoid any strenuous activity until you see your OB    Return to the emergency department immediately if there is any new or concerning symptom.

## 2024-10-12 NOTE — ED TRIAGE NOTES
Pt to the ED through triage with c/o vaginal spotting/cramping and headache. Pt reports these symptoms started yesterday and became more apparent today. Pt states her OB (Dr. Coley) advised her to come to the ED. Pt has hx of miscarriages. Pt rates cramping 3/10. Vitals stable. LMP 8/23, pt reports being 7wks 2 days.

## 2024-10-12 NOTE — ED NOTES
Upon first presentation to pt, pt resting on cot in position of comfort. Pt is A&Ox4, resps easy and unlabored. Pt reports having vaginal spotting and abdominal cramping starting yesterday along with a migraine. Pt reports contacting OBGYN who advised ED assessment. Pt reports 3/10 abdominal cramping at this time, reports not having to change pad since spotting started. Pt reports history of migraines. Pt reports light sensitivity at this time. Pt medicated per MAR. Updated pt on POC. Will monitor.

## 2024-10-13 NOTE — ED PROVIDER NOTES
06:57:43 PM  Condition at Disposition: Data Unavailable      This transcription was electronically signed. It was dictated by use of voice recognition software and electronically transcribed. The transcription may contain errors not detected in proofreading.    Electronically signed by Aneta Tran MD on 10/12/24 at 9:40 PM Jovan Camarena DO  10/12/24 [id number]

## 2024-10-14 ENCOUNTER — TELEPHONE (OUTPATIENT)
Dept: FAMILY MEDICINE CLINIC | Age: 24
End: 2024-10-14

## 2024-10-14 NOTE — TELEPHONE ENCOUNTER
----- Message from Dr. Tamera Teixeira DO sent at 10/13/2024  7:10 PM EDT -----  Discussed with patient already. Hcg rising but not appropriately. Patient went to ED for spotting. Continuing on progesterone for now but will repeat levels wed or thurs..    Electronically signed by Tamera Teixeira DO on 10/13/2024 at 7:10 PM

## 2024-10-16 ENCOUNTER — LAB (OUTPATIENT)
Dept: LAB | Age: 24
End: 2024-10-16

## 2024-10-16 DIAGNOSIS — R79.89 LOW SERUM PROGESTERONE: ICD-10-CM

## 2024-10-16 DIAGNOSIS — N96 HISTORY OF RECURRENT MISCARRIAGES: ICD-10-CM

## 2024-10-16 DIAGNOSIS — Z3A.01 LESS THAN 8 WEEKS GESTATION OF PREGNANCY: ICD-10-CM

## 2024-10-16 LAB
HCG INTACT+B SERPL-ACNC: ABNORMAL MIU/ML (ref 0–5)
PROGEST SERPL-MCNC: 10.55 NG/ML

## 2024-10-19 DIAGNOSIS — N96 HISTORY OF RECURRENT MISCARRIAGES: ICD-10-CM

## 2024-10-19 DIAGNOSIS — Z3A.01 LESS THAN 8 WEEKS GESTATION OF PREGNANCY: ICD-10-CM

## 2024-10-19 DIAGNOSIS — R79.89 LOW SERUM PROGESTERONE: ICD-10-CM

## 2024-10-21 RX ORDER — PROGESTERONE 200 MG/1
400 CAPSULE ORAL NIGHTLY
Qty: 42 CAPSULE | Refills: 1 | Status: SHIPPED | OUTPATIENT
Start: 2024-10-21 | End: 2024-11-20

## 2024-10-21 NOTE — TELEPHONE ENCOUNTER
Patient's last appointment was: 9/17/2024  with our   Patient's next appointment is: 11/4/2024  with our Dr. Teixeira  Last refilled on: 10/1/2024  Which pharmacy does the script need sent to: [facility]      Lab Results   Component Value Date    LABA1C 4.7 11/30/2021     Lab Results   Component Value Date    CHOL 159 02/13/2020    TRIG 85 02/13/2020    HDL 46 02/13/2020     Lab Results   Component Value Date     10/12/2024    K 3.8 10/12/2024     10/12/2024    CO2 21 (L) 10/12/2024    BUN 8 10/12/2024    CREATININE 0.5 10/12/2024    GLUCOSE 83 10/12/2024    CALCIUM 8.6 10/12/2024    BILITOT 0.3 10/12/2024    ALKPHOS 57 10/12/2024    AST 26 10/12/2024    ALT 32 10/12/2024    LABGLOM > 90 10/12/2024     Lab Results   Component Value Date    TSH 0.498 02/27/2024    T4FREE 1.22 02/27/2024     Lab Results   Component Value Date    WBC 7.6 10/12/2024    HGB 12.7 10/12/2024    HCT 38.7 10/12/2024    MCV 93.7 10/12/2024     10/12/2024

## 2024-10-25 ENCOUNTER — HOSPITAL ENCOUNTER (OUTPATIENT)
Dept: ULTRASOUND IMAGING | Age: 24
Discharge: HOME | End: 2024-10-25
Payer: COMMERCIAL

## 2024-10-25 DIAGNOSIS — N96 HISTORY OF RECURRENT MISCARRIAGES: ICD-10-CM

## 2024-10-25 DIAGNOSIS — Z3A.01 LESS THAN 8 WEEKS GESTATION OF PREGNANCY: ICD-10-CM

## 2024-10-25 PROCEDURE — 76801 OB US < 14 WKS SINGLE FETUS: CPT

## 2024-11-13 ENCOUNTER — LAB (OUTPATIENT)
Dept: LAB | Age: 24
End: 2024-11-13

## 2024-11-13 DIAGNOSIS — R79.89 LOW SERUM PROGESTERONE: ICD-10-CM

## 2024-11-13 DIAGNOSIS — N96 HISTORY OF RECURRENT MISCARRIAGES: ICD-10-CM

## 2024-11-13 LAB
ABO: NORMAL
ANTIBODY SCREEN: NORMAL
BASOPHILS ABSOLUTE: 0 THOU/MM3 (ref 0–0.1)
BASOPHILS NFR BLD AUTO: 0.5 %
DEPRECATED MEAN GLUCOSE BLD GHB EST-ACNC: 78 MG/DL (ref 70–126)
DEPRECATED RDW RBC AUTO: 41.5 FL (ref 35–45)
EOSINOPHIL NFR BLD AUTO: 0.9 %
EOSINOPHILS ABSOLUTE: 0.1 THOU/MM3 (ref 0–0.4)
ERYTHROCYTE [DISTWIDTH] IN BLOOD BY AUTOMATED COUNT: 12.1 % (ref 11.5–14.5)
HBA1C MFR BLD HPLC: 4.6 % (ref 4.4–6.4)
HBV SURFACE AG SERPL QL IA: NEGATIVE
HCG INTACT+B SERPL-ACNC: ABNORMAL MIU/ML (ref 0–5)
HCT VFR BLD AUTO: 39 % (ref 37–47)
HCV IGG SERPL QL IA: NEGATIVE
HGB BLD-MCNC: 12.7 GM/DL (ref 12–16)
IMM GRANULOCYTES # BLD AUTO: 0.05 THOU/MM3 (ref 0–0.07)
IMM GRANULOCYTES NFR BLD AUTO: 0.6 %
LYMPHOCYTES ABSOLUTE: 1.9 THOU/MM3 (ref 1–4.8)
LYMPHOCYTES NFR BLD AUTO: 22.5 %
MCH RBC QN AUTO: 30.3 PG (ref 26–33)
MCHC RBC AUTO-ENTMCNC: 32.6 GM/DL (ref 32.2–35.5)
MCV RBC AUTO: 93.1 FL (ref 81–99)
MONOCYTES ABSOLUTE: 0.5 THOU/MM3 (ref 0.4–1.3)
MONOCYTES NFR BLD AUTO: 6.2 %
NEUTROPHILS ABSOLUTE: 6 THOU/MM3 (ref 1.8–7.7)
NEUTROPHILS NFR BLD AUTO: 69.3 %
NRBC BLD AUTO-RTO: 0 /100 WBC
PLATELET # BLD AUTO: 245 THOU/MM3 (ref 130–400)
PMV BLD AUTO: 11.1 FL (ref 9.4–12.4)
PROGEST SERPL-MCNC: 4.42 NG/ML
RBC # BLD AUTO: 4.19 MILL/MM3 (ref 4.2–5.4)
RH FACTOR: NORMAL
RUBV IGG SERPL IA-ACNC: 74.8 IU/ML
WBC # BLD AUTO: 8.6 THOU/MM3 (ref 4.8–10.8)

## 2024-11-14 ENCOUNTER — TELEPHONE (OUTPATIENT)
Dept: FAMILY MEDICINE CLINIC | Age: 24
End: 2024-11-14

## 2024-11-14 ENCOUNTER — HOSPITAL ENCOUNTER (OUTPATIENT)
Dept: ULTRASOUND IMAGING | Age: 24
Discharge: HOME | End: 2024-11-14
Payer: COMMERCIAL

## 2024-11-14 DIAGNOSIS — N93.9 VAGINAL BLEEDING, ABNORMAL: ICD-10-CM

## 2024-11-14 DIAGNOSIS — R79.89 LOW SERUM PROGESTERONE: ICD-10-CM

## 2024-11-14 DIAGNOSIS — Z34.91 FIRST TRIMESTER PREGNANCY: ICD-10-CM

## 2024-11-14 DIAGNOSIS — N93.9 VAGINAL SPOTTING: Primary | ICD-10-CM

## 2024-11-14 DIAGNOSIS — N96 HISTORY OF RECURRENT MISCARRIAGES: ICD-10-CM

## 2024-11-14 DIAGNOSIS — N96 RECURRENT PREGNANCY LOSS: ICD-10-CM

## 2024-11-14 LAB
HIV 1+2 AB+HIV1 P24 AG SERPL QL IA: NORMAL
RPR SER QL: NONREACTIVE

## 2024-11-14 PROCEDURE — 76801 OB US < 14 WKS SINGLE FETUS: CPT

## 2024-11-16 NOTE — RESULT ENCOUNTER NOTE
I discussed with patient.. CBC wnl, A1c, HIV, RPR, Hep B, Hep C, antibody screen all wnl. Hcg and progesterone very low.. Patient having spotting -- ordered TVUS. Offered patient to increase progesterone dose and awaiting decision.    Electronically signed by Tamera Teixeira DO on 11/16/2024 at 10:59 AM

## 2024-11-16 NOTE — RESULT ENCOUNTER NOTE
Sent message to patient TVUS IUP 9w0d composite sonographic age but HR 81 bpm.. discussed with patient this in conjunction with labs could indicate early miscarriage. Offered increased dose progesterone but could prolong miscarriage if already underway.    Electronically signed by Tamera Teixeira DO on 11/16/2024 at 11:04 AM

## 2024-11-18 ENCOUNTER — TELEPHONE (OUTPATIENT)
Dept: FAMILY MEDICINE CLINIC | Age: 24
End: 2024-11-18

## 2024-11-18 NOTE — TELEPHONE ENCOUNTER
----- Message from Dr. Tamera Teixeira DO sent at 11/16/2024 11:04 AM EST -----  Sent message to patient TVUS IUP 9w0d composite sonographic age but HR 81 bpm.. discussed with patient this in conjunction with labs could indicate early miscarriage. Offered increased dose progesterone but could prolong miscarriage if already underw  ay.    Electronically signed by Tamera Teixeira DO on 11/16/2024 at 11:04 AM

## 2024-11-20 ENCOUNTER — LAB (OUTPATIENT)
Dept: LAB | Age: 24
End: 2024-11-20

## 2024-11-20 LAB
APTT PPP: 30.8 SECONDS (ref 22–38)
INR PPP: 0.98 (ref 0.85–1.13)
TSH SERPL DL<=0.005 MIU/L-ACNC: 0.31 UIU/ML (ref 0.4–4.2)

## 2024-11-23 LAB
B2 GLYCOPROT1 IGG SERPL IA-ACNC: < 10 SGU
B2 GLYCOPROT1 IGM SERPL IA-ACNC: < 10 SMU
CARDIOLIPIN IGG SER IA-ACNC: < 10 GPL
CARDIOLIPIN IGM SER IA-ACNC: < 10 MPL

## 2024-11-24 LAB
CONFIRM DRVVT STA-STACLOT: NORMAL S
DRVVT SCREEN TO CONFIRM RATIO: NORMAL {RATIO}
DRVVT/DRVVT CFM P DOAC NEUT NORM PPP-RTO: NORMAL {RATIO}
HEPARIN NT PPP QL: NORMAL
LA 3 SCREEN W REFLEX-IMP: NORMAL
LMW HEPARIN IND PLT AB SER QL: NORMAL
MIXING DRVVT/NORMAL: NORMAL %
PROTHROMBIN TIME: 12.7 S (ref 12–15.5)
SCREEN APTT/NORMAL: 0.92
SCREEN APTT/NORMAL: NORMAL
SCREEN DRVVT/NORMAL: 0.83 %
THROMBIN TIME: NORMAL S

## 2025-02-04 ENCOUNTER — HOSPITAL ENCOUNTER (EMERGENCY)
Age: 25
Discharge: HOME | End: 2025-02-04
Payer: COMMERCIAL

## 2025-02-04 VITALS
BODY MASS INDEX: 39.25 KG/M2 | WEIGHT: 265 LBS | HEIGHT: 69 IN | RESPIRATION RATE: 16 BRPM | OXYGEN SATURATION: 99 % | TEMPERATURE: 97.3 F | HEART RATE: 107 BPM

## 2025-02-04 DIAGNOSIS — R68.89 FLU-LIKE SYMPTOMS: Primary | ICD-10-CM

## 2025-02-04 PROCEDURE — 99213 OFFICE O/P EST LOW 20 MIN: CPT

## 2025-02-04 PROCEDURE — 99213 OFFICE O/P EST LOW 20 MIN: CPT | Performed by: NURSE PRACTITIONER

## 2025-02-04 RX ORDER — DEXTROMETHORPHAN HYDROBROMIDE AND PROMETHAZINE HYDROCHLORIDE 15; 6.25 MG/5ML; MG/5ML
10 SYRUP ORAL NIGHTLY PRN
Qty: 70 ML | Refills: 0 | Status: SHIPPED | OUTPATIENT
Start: 2025-02-04 | End: 2025-02-11

## 2025-02-04 RX ORDER — BENZONATATE 200 MG/1
200 CAPSULE ORAL 2 TIMES DAILY PRN
Qty: 14 CAPSULE | Refills: 0 | Status: SHIPPED | OUTPATIENT
Start: 2025-02-04 | End: 2025-02-11

## 2025-02-04 RX ORDER — CETIRIZINE HYDROCHLORIDE, PSEUDOEPHEDRINE HYDROCHLORIDE 5; 120 MG/1; MG/1
1 TABLET, FILM COATED, EXTENDED RELEASE ORAL 2 TIMES DAILY
Qty: 14 TABLET | Refills: 0 | Status: SHIPPED | OUTPATIENT
Start: 2025-02-04 | End: 2025-02-11

## 2025-02-04 ASSESSMENT — ENCOUNTER SYMPTOMS
SHORTNESS OF BREATH: 0
STRIDOR: 0
COUGH: 1
WHEEZING: 0
SINUS PRESSURE: 1
SINUS CONGESTION: 1
RHINORRHEA: 1
CHOKING: 0
EYE DISCHARGE: 0
APNEA: 0
CHEST TIGHTNESS: 0
SORE THROAT: 0

## 2025-02-04 ASSESSMENT — PAIN DESCRIPTION - LOCATION: LOCATION: OTHER (COMMENT)

## 2025-02-04 ASSESSMENT — PAIN SCALES - GENERAL: PAINLEVEL_OUTOF10: 6

## 2025-02-04 ASSESSMENT — PAIN - FUNCTIONAL ASSESSMENT: PAIN_FUNCTIONAL_ASSESSMENT: 0-10

## 2025-02-04 NOTE — ED PROVIDER NOTES
[facility] URGENT CARE  Urgent Care Encounter      CHIEF COMPLAINT       Chief Complaint   Patient presents with    Cough     CONGESTION  FEVER \" 30 PEOPLE AT JOB HAVE FLU AND COVID AT WORK\"       Nurses Notes reviewed and I agree except as noted in the HPI.  HISTORY OFPRESENT ILLNESS   Serena Vigil is a 24 y.o.  The history is provided by the patient. No  was used.   Cough  Cough characteristics:  Dry and harsh  Sputum characteristics:  Unable to specify  Severity:  Severe  Onset quality:  Gradual  Duration:  3 days  Timing:  Constant  Progression:  Worsening  Chronicity:  New  Smoker: no    Context: upper respiratory infection    Context: not animal exposure, not exposure to allergens, not fumes, not occupational exposure, not sick contacts, not smoke exposure, not weather changes and not with activity    Relieved by:  Nothing  Worsened by:  Activity  Ineffective treatments:  Rest and fluids  Associated symptoms: headaches, rhinorrhea and sinus congestion    Associated symptoms: no chest pain, no chills, no diaphoresis, no ear fullness, no ear pain, no eye discharge, no fever, no myalgias, no rash, no shortness of breath, no sore throat, no weight loss and no wheezing    Risk factors: no chemical exposure, no recent infection and no recent travel        REVIEW OF SYSTEMS     Review of Systems   Constitutional:  Positive for activity change, appetite change and fatigue. Negative for chills, diaphoresis, fever and weight loss.   HENT:  Positive for congestion, postnasal drip, rhinorrhea and sinus pressure. Negative for ear pain and sore throat.    Eyes:  Negative for discharge.   Respiratory:  Positive for cough. Negative for apnea, choking, chest tightness, shortness of breath, wheezing and stridor.    Cardiovascular:  Negative for chest pain, palpitations and leg swelling.   Musculoskeletal:  Negative for myalgias.   Skin:  Negative for rash.   Neurological:  Positive for dizziness and

## 2025-02-04 NOTE — DISCHARGE INSTRUCTIONS
Suggestions for symptom management that are available over the counter.   If you have questions regarding allergies or contraindications to use, please speak to the pharmacy staff or your family provider.    For fevers or pain: acetaminophen (Tylenol), ibuprofen (Motrin)  For dry cough: medications containing dextromethorphan, such as Delsym, Robitussin DM or Mucinex DM and medicated throat lozenges  For congestion or sinus pressure: decongestant nasal sprays, such as Afrin (for up to 3 days), nasal steroid sprays, such as Flonase, Sensimist, Rhinocort or Nasonex and saline nasal sprays, neti pot or sinus rinse bottle  For runny nose, sneezing or watery/itchy eyes: less sedating antihistamines, such as loratidine (Claritin), fexofenadine (Allegra) or Cetirizine (Zyrtec) and antihistamine eye drops, such as ketotifen (Zaditor, Alaway) or olopatadine (Pataday)  For sore throat:  Chloraseptic throat spray or sore throat lozenges or  Mucinex Instasoothe Sore Throat & Pain Cherry Spray  If you have high blood pressure, a brand like Coricidin HBP may be an option.you should avoid medications containing pseudoephedrine or phenylephrine, such as Sudafed,  running a humidifier in your bedroom may be helpful for many of your symptoms.  If your cough is keeping you awake at night, you can try raising your head with an extra pillow.  If the skin around your nose and lips becomes sore, you can put some petroleum jelly on the area.      Suggestions for over-the-counter supplements to help your immune system, if you have questions regarding allergies or contraindications to use, please speak to the pharmacy staff or your family provider.    Multi-vitamin/multi-mineral daily   Vitamin D3 3000 IU daily  Zinc 30 mg daily  Vitamin C 1000 mg twice daily  B-100 complex as daily as directed on bottle  Probiotic/Prebiotic bernardo  Gargle with mouthwash 3 times daily, may use Scope, Crest, or Listerine.    COLD-EEZE  over the counter: Use

## 2025-02-04 NOTE — ED TRIAGE NOTES
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 Flat affect  Snappy responses to triage questions

## 2025-04-03 ENCOUNTER — OFFICE VISIT (OUTPATIENT)
Dept: FAMILY MEDICINE CLINIC | Age: 25
End: 2025-04-03

## 2025-04-03 VITALS
SYSTOLIC BLOOD PRESSURE: 122 MMHG | OXYGEN SATURATION: 99 % | WEIGHT: 270.5 LBS | TEMPERATURE: 98.3 F | HEIGHT: 69 IN | BODY MASS INDEX: 40.07 KG/M2 | RESPIRATION RATE: 19 BRPM | DIASTOLIC BLOOD PRESSURE: 78 MMHG | HEART RATE: 67 BPM

## 2025-04-03 DIAGNOSIS — G43.911 INTRACTABLE MIGRAINE WITH STATUS MIGRAINOSUS, UNSPECIFIED MIGRAINE TYPE: Primary | ICD-10-CM

## 2025-04-03 RX ORDER — RIMEGEPANT SULFATE 75 MG/75MG
75 TABLET, ORALLY DISINTEGRATING ORAL DAILY
Qty: 30 TABLET | Refills: 2 | Status: SHIPPED | OUTPATIENT
Start: 2025-04-03 | End: 2025-07-02

## 2025-04-03 RX ORDER — KETOROLAC TROMETHAMINE 30 MG/ML
30 INJECTION, SOLUTION INTRAMUSCULAR; INTRAVENOUS ONCE
Status: CANCELLED | OUTPATIENT
Start: 2025-04-03 | End: 2025-04-03

## 2025-04-03 RX ORDER — KETOROLAC TROMETHAMINE 30 MG/ML
60 INJECTION, SOLUTION INTRAMUSCULAR; INTRAVENOUS ONCE
Status: COMPLETED | OUTPATIENT
Start: 2025-04-03 | End: 2025-04-03

## 2025-04-03 RX ADMIN — KETOROLAC TROMETHAMINE 60 MG: 30 INJECTION, SOLUTION INTRAMUSCULAR; INTRAVENOUS at 12:51

## 2025-04-03 SDOH — ECONOMIC STABILITY: FOOD INSECURITY: WITHIN THE PAST 12 MONTHS, YOU WORRIED THAT YOUR FOOD WOULD RUN OUT BEFORE YOU GOT MONEY TO BUY MORE.: PATIENT DECLINED

## 2025-04-03 SDOH — ECONOMIC STABILITY: FOOD INSECURITY: WITHIN THE PAST 12 MONTHS, THE FOOD YOU BOUGHT JUST DIDN'T LAST AND YOU DIDN'T HAVE MONEY TO GET MORE.: PATIENT DECLINED

## 2025-04-03 ASSESSMENT — PATIENT HEALTH QUESTIONNAIRE - PHQ9: DEPRESSION UNABLE TO ASSESS: PT REFUSES

## 2025-04-03 NOTE — PROGRESS NOTES
Serena Vigil is a 24 y.o. female who presents today for:  Chief Complaint   Patient presents with    Migraine     HPI:   Serena Vigil is 24 y.o. who presents today for acute care visit.    Reports terrible migraine HA since this morning.  Reports that she took all meds, including Maxalt, Tylenol, tried conservative measures as well, however symptoms not relieved.  Patient with known history of migraine, previously on Nurtec.  Used to follow with neurology at [facility], however reports being dismissed from their office due to missing appointments.  Has been out of Nurtec x over 1 year.  Not on any maintenance therapy at this time.  Notes previously being treated with Toradol injections for symptom relief.    Patient is not currently pregnant.  LN 3/23.  Does plan on future pregnancy.  Reports last miscarriage x 11/2024.    No other acute concerns or complaints noted today.      Objective:     Vitals:    04/03/25 1127   BP: 122/78   BP Site: Left Upper Arm   Patient Position: Sitting   BP Cuff Size: Medium Adult   Pulse: 67   Resp: 19   Temp: 98.3 °F (36.8 °C)   TempSrc: Oral   SpO2: 99%   Weight: 122.7 kg (270 lb 8 oz)   Height: 1.753 m (5' 9\")       Wt Readings from Last 3 Encounters:   04/03/25 122.7 kg (270 lb 8 oz)   02/04/25 120.2 kg (265 lb)   10/12/24 117.9 kg (260 lb)       BP Readings from Last 3 Encounters:   04/03/25 122/78   10/12/24 122/80   09/17/24 110/62       Lab Results   Component Value Date    WBC 8.6 11/13/2024    HGB 12.7 11/13/2024    HCT 39.0 11/13/2024    MCV 93.1 11/13/2024     11/13/2024     Lab Results   Component Value Date     10/12/2024    K 3.8 10/12/2024     10/12/2024    CO2 21 (L) 10/12/2024    BUN 8 10/12/2024    CREATININE 0.5 10/12/2024    GLUCOSE 83 10/12/2024    CALCIUM 8.6 10/12/2024    BILITOT 0.3 10/12/2024    ALKPHOS 57 10/12/2024    AST 26 10/12/2024    ALT 32 10/12/2024    LABGLOM > 90 10/12/2024     Lab Results   Component Value Date

## 2025-04-22 ENCOUNTER — TELEMEDICINE ON DEMAND (OUTPATIENT)
Age: 25
End: 2025-04-22
Payer: COMMERCIAL

## 2025-04-22 DIAGNOSIS — L50.8 ACUTE URTICARIA: Primary | ICD-10-CM

## 2025-04-22 PROCEDURE — G8417 CALC BMI ABV UP PARAM F/U: HCPCS | Performed by: NURSE PRACTITIONER

## 2025-04-22 PROCEDURE — 99213 OFFICE O/P EST LOW 20 MIN: CPT | Performed by: NURSE PRACTITIONER

## 2025-04-22 PROCEDURE — G8427 DOCREV CUR MEDS BY ELIG CLIN: HCPCS | Performed by: NURSE PRACTITIONER

## 2025-04-22 PROCEDURE — 4004F PT TOBACCO SCREEN RCVD TLK: CPT | Performed by: NURSE PRACTITIONER

## 2025-04-22 RX ORDER — METHYLPREDNISOLONE 4 MG/1
TABLET ORAL
Qty: 1 KIT | Refills: 0 | Status: SHIPPED | OUTPATIENT
Start: 2025-04-22 | End: 2025-04-28

## 2025-04-22 NOTE — PROGRESS NOTES
Serena Vigil (:  2000) is a Established patient, here for evaluation of the following:    Urticaria (X3 days: started out as a small patch underneath left eye. Hives have scattered on face, ears and neck. hurts when itching and rubbing face. )       Assessment & Plan:  Below is the assessment and plan developed based on review of pertinent history, physical exam, labs, studies, and medications.  1. Acute urticaria  Reviewed educational materials in AVS  Take antihistamines  Moisturize skin  -     methylPREDNISolone (MEDROL DOSEPACK) 4 MG tablet; Take by mouth., Disp-1 kit, R-0Normal    No follow-ups on file.  The patient would benefit from future follow up with their usual PCP. As of the end of their Virtualist Visit today, follow up visit status is as follows: Patient to follow up as previously instructed by PCP    Subjective:   Patient complains of hives on her face that started on Saturday. It itches. It is in her cheeks, neck, ears, and eye. Her eye was swollen this morning. She has tried benadril itch and cortisone cream with no relief.       Review of Systems   Skin:  Positive for rash.        Red, patcy rash on cheeks, neck, ear, and eye       Objective:  Patient-Reported Vitals  No data recorded         2025     5:46 PM   Patient-Reported Vitals   Patient-Reported Weight 275   Patient-Reported Height 5’9        Physical Exam:  [INSTRUCTIONS:  \"[x]\" Indicates a positive item  \"[]\" Indicates a negative item  -- DELETE ALL ITEMS NOT EXAMINED]    Constitutional: [x] Appears well-developed and well-nourished [x] No apparent distress      [] Abnormal -     Mental status: [x] Alert and awake  [x] Oriented to person/place/time [x] Able to follow commands    [] Abnormal -     Eyes:   EOM    [x]  Normal    [] Abnormal -   Sclera  [x]  Normal    [] Abnormal -          Discharge [x]  None visible   [] Abnormal -     HENT: [x] Normocephalic, atraumatic  [] Abnormal -   [x] Mouth/Throat: Mucous membranes

## 2025-05-08 ENCOUNTER — OFFICE VISIT (OUTPATIENT)
Dept: FAMILY MEDICINE CLINIC | Age: 25
End: 2025-05-08

## 2025-05-08 VITALS
BODY MASS INDEX: 41.65 KG/M2 | HEIGHT: 69 IN | RESPIRATION RATE: 16 BRPM | HEART RATE: 91 BPM | WEIGHT: 281.2 LBS | DIASTOLIC BLOOD PRESSURE: 64 MMHG | OXYGEN SATURATION: 98 % | SYSTOLIC BLOOD PRESSURE: 100 MMHG

## 2025-05-08 DIAGNOSIS — E66.813 CLASS 3 SEVERE OBESITY WITH SERIOUS COMORBIDITY AND BODY MASS INDEX (BMI) OF 40.0 TO 44.9 IN ADULT, UNSPECIFIED OBESITY TYPE (HCC): Primary | ICD-10-CM

## 2025-05-08 DIAGNOSIS — F33.1 MAJOR DEPRESSIVE DISORDER, RECURRENT, MODERATE (HCC): ICD-10-CM

## 2025-05-08 DIAGNOSIS — Z71.6 ENCOUNTER FOR SMOKING CESSATION COUNSELING: ICD-10-CM

## 2025-05-08 DIAGNOSIS — K31.84 GASTROPARESIS: ICD-10-CM

## 2025-05-08 RX ORDER — ONDANSETRON 4 MG/1
4 TABLET, ORALLY DISINTEGRATING ORAL 3 TIMES DAILY PRN
Qty: 60 TABLET | Refills: 2 | Status: SHIPPED | OUTPATIENT
Start: 2025-05-08

## 2025-05-08 RX ORDER — VARENICLINE TARTRATE 1 MG/1
1 TABLET, FILM COATED ORAL 2 TIMES DAILY
Qty: 60 TABLET | Refills: 5 | Status: SHIPPED | OUTPATIENT
Start: 2025-05-08

## 2025-05-08 RX ORDER — VARENICLINE TARTRATE 0.5 MG/1
.5-1 TABLET, FILM COATED ORAL SEE ADMIN INSTRUCTIONS
Qty: 57 TABLET | Refills: 0 | Status: SHIPPED | OUTPATIENT
Start: 2025-05-08

## 2025-05-08 RX ORDER — PHENTERMINE HYDROCHLORIDE 37.5 MG/1
37.5 TABLET ORAL
Qty: 30 TABLET | Refills: 0 | Status: SHIPPED | OUTPATIENT
Start: 2025-05-08 | End: 2025-06-07

## 2025-05-08 ASSESSMENT — PATIENT HEALTH QUESTIONNAIRE - PHQ9
10. IF YOU CHECKED OFF ANY PROBLEMS, HOW DIFFICULT HAVE THESE PROBLEMS MADE IT FOR YOU TO DO YOUR WORK, TAKE CARE OF THINGS AT HOME, OR GET ALONG WITH OTHER PEOPLE: SOMEWHAT DIFFICULT
SUM OF ALL RESPONSES TO PHQ QUESTIONS 1-9: 14
2. FEELING DOWN, DEPRESSED OR HOPELESS: SEVERAL DAYS
1. LITTLE INTEREST OR PLEASURE IN DOING THINGS: NEARLY EVERY DAY
6. FEELING BAD ABOUT YOURSELF - OR THAT YOU ARE A FAILURE OR HAVE LET YOURSELF OR YOUR FAMILY DOWN: SEVERAL DAYS
3. TROUBLE FALLING OR STAYING ASLEEP: NOT AT ALL
SUM OF ALL RESPONSES TO PHQ QUESTIONS 1-9: 14
7. TROUBLE CONCENTRATING ON THINGS, SUCH AS READING THE NEWSPAPER OR WATCHING TELEVISION: NEARLY EVERY DAY
SUM OF ALL RESPONSES TO PHQ QUESTIONS 1-9: 14
4. FEELING TIRED OR HAVING LITTLE ENERGY: NOT AT ALL
9. THOUGHTS THAT YOU WOULD BE BETTER OFF DEAD, OR OF HURTING YOURSELF: NOT AT ALL
SUM OF ALL RESPONSES TO PHQ QUESTIONS 1-9: 14
5. POOR APPETITE OR OVEREATING: NEARLY EVERY DAY
8. MOVING OR SPEAKING SO SLOWLY THAT OTHER PEOPLE COULD HAVE NOTICED. OR THE OPPOSITE, BEING SO FIGETY OR RESTLESS THAT YOU HAVE BEEN MOVING AROUND A LOT MORE THAN USUAL: NEARLY EVERY DAY

## 2025-05-09 ASSESSMENT — ENCOUNTER SYMPTOMS
CHEST TIGHTNESS: 0
DIARRHEA: 0
EYES NEGATIVE: 1
ABDOMINAL PAIN: 0
SHORTNESS OF BREATH: 0
RHINORRHEA: 0
COUGH: 0
NAUSEA: 0
SORE THROAT: 0
BACK PAIN: 0

## 2025-05-09 NOTE — PROGRESS NOTES
[facility] PROFESSIONAL SERVS  [facility]  [address] [address]  Dept: [phone]  Dept Fax: [phone]  Loc: [phone]  PROGRESS NOTE      VisitDate: 5/8/2025    Serena Vigil is a 24 y.o. female who presents today for:  Chief Complaint   Patient presents with    New Patient     Patient here for a new patient appointment.  Patient has a hx of blood clots. Reoccurring miscarriages. Patient states she has Gastroparesis     Weight Management     Would like to discuss weight loss medication.     Nicotine Dependence     Patient would like to discuss quitting smoking. Patient tried to quit cold turkey, but that did not work.    Discuss Medications     Would like a zofran Rx for nausea       Impression/Plan:  1. Class 3 severe obesity with serious comorbidity and body mass index (BMI) of 40.0 to 44.9 in adult, unspecified obesity type (HCC)    2. Encounter for smoking cessation counseling    3. Major depressive disorder, recurrent, moderate (HCC)    4. Gastroparesis      Requested Prescriptions     Signed Prescriptions Disp Refills    varenicline (CHANTIX) 1 MG tablet 60 tablet 5     Sig: Take 1 tablet by mouth 2 times daily    varenicline (CHANTIX) 0.5 MG tablet 57 tablet 0     Sig: Take 1-2 tablets by mouth See Admin Instructions 0.5mg DAILY for 3 days followed by 0.5mg TWICE DAILY for 4 days followed by 1mg TWICE DAILY    phentermine (ADIPEX-P) 37.5 MG tablet 30 tablet 0     Sig: Take 1 tablet by mouth every morning (before breakfast) for 30 days. Max Daily Amount: 37.5 mg    ondansetron (ZOFRAN-ODT) 4 MG disintegrating tablet 60 tablet 2     Sig: Take 1 tablet by mouth 3 times daily as needed for Nausea or Vomiting     No orders of the defined types were placed in this encounter.        Subjective:  History of Present Illness  The patient presents as a new patient for nicotine addiction, weight management, anxiety and depression, asthma, nausea, and recurrent

## 2025-06-12 DIAGNOSIS — E66.813 CLASS 3 SEVERE OBESITY WITH SERIOUS COMORBIDITY AND BODY MASS INDEX (BMI) OF 40.0 TO 44.9 IN ADULT, UNSPECIFIED OBESITY TYPE (HCC): ICD-10-CM

## 2025-06-12 RX ORDER — PHENTERMINE HYDROCHLORIDE 37.5 MG/1
37.5 TABLET ORAL
Qty: 30 TABLET | Refills: 0 | Status: SHIPPED | OUTPATIENT
Start: 2025-06-12 | End: 2025-07-12

## 2025-08-01 ENCOUNTER — PATIENT MESSAGE (OUTPATIENT)
Dept: FAMILY MEDICINE CLINIC | Age: 25
End: 2025-08-01

## 2025-08-01 ENCOUNTER — LAB (OUTPATIENT)
Dept: LAB | Age: 25
End: 2025-08-01

## 2025-08-01 DIAGNOSIS — N93.9 VAGINAL BLEEDING, ABNORMAL: ICD-10-CM

## 2025-08-01 DIAGNOSIS — N93.9 VAGINAL SPOTTING: Primary | ICD-10-CM

## 2025-08-01 DIAGNOSIS — N93.9 VAGINAL SPOTTING: ICD-10-CM

## 2025-08-01 LAB
B-HCG SERPL QL: NEGATIVE
BASOPHILS ABSOLUTE: 0 THOU/MM3 (ref 0–0.1)
BASOPHILS NFR BLD AUTO: 0.5 %
DEPRECATED RDW RBC AUTO: 41.2 FL (ref 35–45)
EOSINOPHIL NFR BLD AUTO: 1 %
EOSINOPHILS ABSOLUTE: 0.1 THOU/MM3 (ref 0–0.4)
ERYTHROCYTE [DISTWIDTH] IN BLOOD BY AUTOMATED COUNT: 12 % (ref 11.5–14.5)
HCT VFR BLD AUTO: 41.1 % (ref 37–47)
HGB BLD-MCNC: 13.3 GM/DL (ref 12–16)
IMM GRANULOCYTES # BLD AUTO: 0.03 THOU/MM3 (ref 0–0.07)
IMM GRANULOCYTES NFR BLD AUTO: 0.4 %
LYMPHOCYTES ABSOLUTE: 2.2 THOU/MM3 (ref 1–4.8)
LYMPHOCYTES NFR BLD AUTO: 28.9 %
MCH RBC QN AUTO: 30 PG (ref 26–33)
MCHC RBC AUTO-ENTMCNC: 32.4 GM/DL (ref 32.2–35.5)
MCV RBC AUTO: 92.6 FL (ref 81–99)
MONOCYTES ABSOLUTE: 0.5 THOU/MM3 (ref 0.4–1.3)
MONOCYTES NFR BLD AUTO: 6 %
NEUTROPHILS ABSOLUTE: 4.8 THOU/MM3 (ref 1.8–7.7)
NEUTROPHILS NFR BLD AUTO: 63.2 %
NRBC BLD AUTO-RTO: 0 /100 WBC
PLATELET # BLD AUTO: 263 THOU/MM3 (ref 130–400)
PMV BLD AUTO: 11 FL (ref 9.4–12.4)
RBC # BLD AUTO: 4.44 MILL/MM3 (ref 4.2–5.4)
T4 FREE SERPL-MCNC: 1.1 NG/DL (ref 0.92–1.68)
TSH SERPL DL<=0.05 MIU/L-ACNC: 0.55 UIU/ML (ref 0.27–4.2)
WBC # BLD AUTO: 7.6 THOU/MM3 (ref 4.8–10.8)

## 2025-08-17 ENCOUNTER — HOSPITAL ENCOUNTER (OUTPATIENT)
Age: 25
Setting detail: OBSERVATION
Discharge: HOME | End: 2025-08-21
Attending: FAMILY MEDICINE
Payer: COMMERCIAL

## 2025-08-17 DIAGNOSIS — R53.1 GENERALIZED WEAKNESS: ICD-10-CM

## 2025-08-17 DIAGNOSIS — M54.31 BILATERAL SCIATICA: Primary | ICD-10-CM

## 2025-08-17 DIAGNOSIS — M54.9 INTRACTABLE BACK PAIN: ICD-10-CM

## 2025-08-17 DIAGNOSIS — M54.32 BILATERAL SCIATICA: Primary | ICD-10-CM

## 2025-08-17 PROCEDURE — 99285 EMERGENCY DEPT VISIT HI MDM: CPT

## 2025-08-17 ASSESSMENT — PAIN - FUNCTIONAL ASSESSMENT: PAIN_FUNCTIONAL_ASSESSMENT: 0-10

## 2025-08-17 ASSESSMENT — PAIN SCALES - GENERAL: PAINLEVEL_OUTOF10: 8

## 2025-08-18 ENCOUNTER — APPOINTMENT (OUTPATIENT)
Dept: MRI IMAGING | Age: 25
End: 2025-08-18
Payer: COMMERCIAL

## 2025-08-18 ENCOUNTER — APPOINTMENT (OUTPATIENT)
Dept: CT IMAGING | Age: 25
End: 2025-08-18
Payer: COMMERCIAL

## 2025-08-18 PROBLEM — M54.32 BILATERAL SCIATICA: Status: ACTIVE | Noted: 2025-08-18

## 2025-08-18 PROBLEM — M54.9 BACK PAIN, UNSPECIFIED BACK LOCATION, UNSPECIFIED BACK PAIN LATERALITY, UNSPECIFIED CHRONICITY: Status: ACTIVE | Noted: 2025-08-18

## 2025-08-18 PROBLEM — M54.31 BILATERAL SCIATICA: Status: ACTIVE | Noted: 2025-08-18

## 2025-08-18 LAB
ANION GAP SERPL CALC-SCNC: 12 MEQ/L (ref 8–16)
BASOPHILS ABSOLUTE: 0 THOU/MM3 (ref 0–0.1)
BASOPHILS NFR BLD AUTO: 0.4 %
BILIRUB UR QL STRIP.AUTO: NEGATIVE
BUN SERPL-MCNC: 19 MG/DL (ref 8–23)
CALCIUM SERPL-MCNC: 9.1 MG/DL (ref 8.5–10.5)
CHARACTER UR: CLEAR
CHLORIDE SERPL-SCNC: 104 MEQ/L (ref 98–111)
CO2 SERPL-SCNC: 22 MEQ/L (ref 22–29)
COLOR, UA: YELLOW
CREAT SERPL-MCNC: 0.8 MG/DL (ref 0.5–0.9)
CRP SERPL-MCNC: 0.78 MG/DL (ref 0–0.5)
DEPRECATED RDW RBC AUTO: 40.6 FL (ref 35–45)
EOSINOPHIL NFR BLD AUTO: 1 %
EOSINOPHILS ABSOLUTE: 0.1 THOU/MM3 (ref 0–0.4)
ERYTHROCYTE [DISTWIDTH] IN BLOOD BY AUTOMATED COUNT: 12.2 % (ref 11.5–14.5)
ERYTHROCYTE [SEDIMENTATION RATE] IN BLOOD BY WESTERGREN METHOD: 19 MM/HR (ref 0–20)
GFR SERPL CREATININE-BSD FRML MDRD: > 90 ML/MIN/1.73M2
GLUCOSE SERPL-MCNC: 102 MG/DL (ref 74–109)
GLUCOSE UR QL STRIP.AUTO: NEGATIVE MG/DL
HCG INTACT+B SERPL-ACNC: < 1 MIU/ML (ref 0–5)
HCT VFR BLD AUTO: 38.5 % (ref 37–47)
HGB BLD-MCNC: 12.5 GM/DL (ref 12–16)
HGB UR QL STRIP.AUTO: NEGATIVE
IMM GRANULOCYTES # BLD AUTO: 0.03 THOU/MM3 (ref 0–0.07)
IMM GRANULOCYTES NFR BLD AUTO: 0.3 %
KETONES UR QL STRIP.AUTO: NEGATIVE
LYMPHOCYTES ABSOLUTE: 2.4 THOU/MM3 (ref 1–4.8)
LYMPHOCYTES NFR BLD AUTO: 24.9 %
MCH RBC QN AUTO: 29.7 PG (ref 26–33)
MCHC RBC AUTO-ENTMCNC: 32.5 GM/DL (ref 32.2–35.5)
MCV RBC AUTO: 91.4 FL (ref 81–99)
MONOCYTES ABSOLUTE: 0.5 THOU/MM3 (ref 0.4–1.3)
MONOCYTES NFR BLD AUTO: 5.4 %
NEUTROPHILS ABSOLUTE: 6.5 THOU/MM3 (ref 1.8–7.7)
NEUTROPHILS NFR BLD AUTO: 68 %
NITRITE UR QL STRIP: NEGATIVE
NRBC BLD AUTO-RTO: 0 /100 WBC
OSMOLALITY SERPL CALC.SUM OF ELEC: 278.1 MOSMOL/KG (ref 275–300)
PH UR STRIP.AUTO: 6.5 [PH] (ref 5–9)
PLATELET # BLD AUTO: 252 THOU/MM3 (ref 130–400)
PMV BLD AUTO: 10.4 FL (ref 9.4–12.4)
POTASSIUM SERPL-SCNC: 3.4 MEQ/L (ref 3.5–5.2)
PROT UR STRIP.AUTO-MCNC: NEGATIVE MG/DL
RBC # BLD AUTO: 4.21 MILL/MM3 (ref 4.2–5.4)
SODIUM SERPL-SCNC: 138 MEQ/L (ref 135–145)
SP GR UR REFRACT.AUTO: > 1.03 (ref 1–1.03)
UROBILINOGEN, URINE: 0.2 EU/DL (ref 0–1)
WBC # BLD AUTO: 9.5 THOU/MM3 (ref 4.8–10.8)
WBC #/AREA URNS HPF: NEGATIVE /[HPF]

## 2025-08-18 PROCEDURE — 81003 URINALYSIS AUTO W/O SCOPE: CPT

## 2025-08-18 PROCEDURE — 2500000003 HC RX 250 WO HCPCS: Performed by: INTERNAL MEDICINE

## 2025-08-18 PROCEDURE — 96374 THER/PROPH/DIAG INJ IV PUSH: CPT

## 2025-08-18 PROCEDURE — 84702 CHORIONIC GONADOTROPIN TEST: CPT

## 2025-08-18 PROCEDURE — 6360000002 HC RX W HCPCS: Performed by: INTERNAL MEDICINE

## 2025-08-18 PROCEDURE — 6360000002 HC RX W HCPCS

## 2025-08-18 PROCEDURE — 76376 3D RENDER W/INTRP POSTPROCES: CPT

## 2025-08-18 PROCEDURE — 6370000000 HC RX 637 (ALT 250 FOR IP): Performed by: INTERNAL MEDICINE

## 2025-08-18 PROCEDURE — 96376 TX/PRO/DX INJ SAME DRUG ADON: CPT

## 2025-08-18 PROCEDURE — 6370000000 HC RX 637 (ALT 250 FOR IP)

## 2025-08-18 PROCEDURE — 85025 COMPLETE CBC W/AUTO DIFF WBC: CPT

## 2025-08-18 PROCEDURE — 99223 1ST HOSP IP/OBS HIGH 75: CPT | Performed by: INTERNAL MEDICINE

## 2025-08-18 PROCEDURE — 6360000004 HC RX CONTRAST MEDICATION

## 2025-08-18 PROCEDURE — 72148 MRI LUMBAR SPINE W/O DYE: CPT

## 2025-08-18 PROCEDURE — 96372 THER/PROPH/DIAG INJ SC/IM: CPT

## 2025-08-18 PROCEDURE — 96375 TX/PRO/DX INJ NEW DRUG ADDON: CPT

## 2025-08-18 PROCEDURE — 85651 RBC SED RATE NONAUTOMATED: CPT

## 2025-08-18 PROCEDURE — 2500000003 HC RX 250 WO HCPCS

## 2025-08-18 PROCEDURE — 80048 BASIC METABOLIC PNL TOTAL CA: CPT

## 2025-08-18 PROCEDURE — 86140 C-REACTIVE PROTEIN: CPT

## 2025-08-18 PROCEDURE — G0378 HOSPITAL OBSERVATION PER HR: HCPCS

## 2025-08-18 PROCEDURE — 36415 COLL VENOUS BLD VENIPUNCTURE: CPT

## 2025-08-18 PROCEDURE — 74177 CT ABD & PELVIS W/CONTRAST: CPT

## 2025-08-18 RX ORDER — ACETAMINOPHEN 325 MG/1
650 TABLET ORAL EVERY 6 HOURS PRN
Status: DISCONTINUED | OUTPATIENT
Start: 2025-08-18 | End: 2025-08-21 | Stop reason: HOSPADM

## 2025-08-18 RX ORDER — KETOROLAC TROMETHAMINE 30 MG/ML
30 INJECTION, SOLUTION INTRAMUSCULAR; INTRAVENOUS ONCE
Status: COMPLETED | OUTPATIENT
Start: 2025-08-18 | End: 2025-08-18

## 2025-08-18 RX ORDER — PANTOPRAZOLE SODIUM 40 MG/1
40 TABLET, DELAYED RELEASE ORAL
Status: DISCONTINUED | OUTPATIENT
Start: 2025-08-18 | End: 2025-08-21 | Stop reason: HOSPADM

## 2025-08-18 RX ORDER — MAGNESIUM SULFATE IN WATER 40 MG/ML
2000 INJECTION, SOLUTION INTRAVENOUS PRN
Status: DISCONTINUED | OUTPATIENT
Start: 2025-08-18 | End: 2025-08-21 | Stop reason: HOSPADM

## 2025-08-18 RX ORDER — ALBUTEROL SULFATE 90 UG/1
2 INHALANT RESPIRATORY (INHALATION) 4 TIMES DAILY PRN
Status: DISCONTINUED | OUTPATIENT
Start: 2025-08-18 | End: 2025-08-21 | Stop reason: HOSPADM

## 2025-08-18 RX ORDER — ACETAMINOPHEN 650 MG/1
650 SUPPOSITORY RECTAL EVERY 6 HOURS PRN
Status: DISCONTINUED | OUTPATIENT
Start: 2025-08-18 | End: 2025-08-21 | Stop reason: HOSPADM

## 2025-08-18 RX ORDER — LIDOCAINE 4 G/G
1 PATCH TOPICAL ONCE
Status: COMPLETED | OUTPATIENT
Start: 2025-08-18 | End: 2025-08-18

## 2025-08-18 RX ORDER — ENOXAPARIN SODIUM 100 MG/ML
30 INJECTION SUBCUTANEOUS 2 TIMES DAILY
Status: DISCONTINUED | OUTPATIENT
Start: 2025-08-18 | End: 2025-08-21 | Stop reason: HOSPADM

## 2025-08-18 RX ORDER — IOPAMIDOL 755 MG/ML
80 INJECTION, SOLUTION INTRAVASCULAR
Status: COMPLETED | OUTPATIENT
Start: 2025-08-18 | End: 2025-08-18

## 2025-08-18 RX ORDER — CYCLOBENZAPRINE HCL 10 MG
10 TABLET ORAL ONCE
Status: COMPLETED | OUTPATIENT
Start: 2025-08-18 | End: 2025-08-18

## 2025-08-18 RX ORDER — PREDNISONE 20 MG/1
40 TABLET ORAL ONCE
Status: COMPLETED | OUTPATIENT
Start: 2025-08-18 | End: 2025-08-18

## 2025-08-18 RX ORDER — POLYETHYLENE GLYCOL 3350 17 G/17G
17 POWDER, FOR SOLUTION ORAL DAILY PRN
Status: DISCONTINUED | OUTPATIENT
Start: 2025-08-18 | End: 2025-08-21 | Stop reason: HOSPADM

## 2025-08-18 RX ORDER — POTASSIUM CHLORIDE 1500 MG/1
40 TABLET, EXTENDED RELEASE ORAL PRN
Status: DISCONTINUED | OUTPATIENT
Start: 2025-08-18 | End: 2025-08-21 | Stop reason: HOSPADM

## 2025-08-18 RX ORDER — ONDANSETRON 4 MG/1
4 TABLET, ORALLY DISINTEGRATING ORAL EVERY 8 HOURS PRN
Status: DISCONTINUED | OUTPATIENT
Start: 2025-08-18 | End: 2025-08-21 | Stop reason: HOSPADM

## 2025-08-18 RX ORDER — DIAZEPAM 2 MG/1
2 TABLET ORAL EVERY 8 HOURS PRN
Status: DISCONTINUED | OUTPATIENT
Start: 2025-08-18 | End: 2025-08-18

## 2025-08-18 RX ORDER — DIAZEPAM 2 MG/1
2 TABLET ORAL EVERY 8 HOURS PRN
Status: DISCONTINUED | OUTPATIENT
Start: 2025-08-18 | End: 2025-08-21 | Stop reason: HOSPADM

## 2025-08-18 RX ORDER — ONDANSETRON 2 MG/ML
4 INJECTION INTRAMUSCULAR; INTRAVENOUS EVERY 6 HOURS PRN
Status: DISCONTINUED | OUTPATIENT
Start: 2025-08-18 | End: 2025-08-21 | Stop reason: HOSPADM

## 2025-08-18 RX ORDER — PANTOPRAZOLE SODIUM 40 MG/10ML
40 INJECTION, POWDER, LYOPHILIZED, FOR SOLUTION INTRAVENOUS DAILY
Status: DISCONTINUED | OUTPATIENT
Start: 2025-08-18 | End: 2025-08-18

## 2025-08-18 RX ORDER — VARENICLINE TARTRATE 1 MG/1
1 TABLET, FILM COATED ORAL 2 TIMES DAILY
Status: CANCELLED | OUTPATIENT
Start: 2025-08-18

## 2025-08-18 RX ORDER — PREDNISONE 20 MG/1
40 TABLET ORAL DAILY
Status: DISCONTINUED | OUTPATIENT
Start: 2025-08-19 | End: 2025-08-18

## 2025-08-18 RX ORDER — SODIUM CHLORIDE 9 MG/ML
INJECTION, SOLUTION INTRAVENOUS PRN
Status: DISCONTINUED | OUTPATIENT
Start: 2025-08-18 | End: 2025-08-21 | Stop reason: HOSPADM

## 2025-08-18 RX ORDER — GABAPENTIN 100 MG/1
200 CAPSULE ORAL 3 TIMES DAILY
Status: DISCONTINUED | OUTPATIENT
Start: 2025-08-18 | End: 2025-08-21 | Stop reason: HOSPADM

## 2025-08-18 RX ORDER — POTASSIUM CHLORIDE 7.45 MG/ML
10 INJECTION INTRAVENOUS PRN
Status: DISCONTINUED | OUTPATIENT
Start: 2025-08-18 | End: 2025-08-21 | Stop reason: HOSPADM

## 2025-08-18 RX ORDER — SODIUM CHLORIDE 0.9 % (FLUSH) 0.9 %
5-40 SYRINGE (ML) INJECTION PRN
Status: DISCONTINUED | OUTPATIENT
Start: 2025-08-18 | End: 2025-08-21 | Stop reason: HOSPADM

## 2025-08-18 RX ORDER — SODIUM CHLORIDE 0.9 % (FLUSH) 0.9 %
5-40 SYRINGE (ML) INJECTION EVERY 12 HOURS SCHEDULED
Status: DISCONTINUED | OUTPATIENT
Start: 2025-08-18 | End: 2025-08-21 | Stop reason: HOSPADM

## 2025-08-18 RX ORDER — BUTALBITAL, ACETAMINOPHEN AND CAFFEINE 50; 325; 40 MG/1; MG/1; MG/1
1 TABLET ORAL EVERY 6 HOURS PRN
Status: DISCONTINUED | OUTPATIENT
Start: 2025-08-18 | End: 2025-08-21 | Stop reason: HOSPADM

## 2025-08-18 RX ORDER — HYDROCODONE BITARTRATE AND ACETAMINOPHEN 5; 325 MG/1; MG/1
1 TABLET ORAL ONCE
Status: COMPLETED | OUTPATIENT
Start: 2025-08-18 | End: 2025-08-18

## 2025-08-18 RX ADMIN — HYDROMORPHONE HYDROCHLORIDE 0.5 MG: 1 INJECTION, SOLUTION INTRAMUSCULAR; INTRAVENOUS; SUBCUTANEOUS at 22:00

## 2025-08-18 RX ADMIN — POTASSIUM CHLORIDE 40 MEQ: 1500 TABLET, EXTENDED RELEASE ORAL at 14:00

## 2025-08-18 RX ADMIN — GABAPENTIN 200 MG: 100 CAPSULE ORAL at 22:17

## 2025-08-18 RX ADMIN — IOPAMIDOL 80 ML: 755 INJECTION, SOLUTION INTRAVENOUS at 03:53

## 2025-08-18 RX ADMIN — ENOXAPARIN SODIUM 30 MG: 100 INJECTION SUBCUTANEOUS at 10:11

## 2025-08-18 RX ADMIN — DIAZEPAM 2 MG: 2 TABLET ORAL at 18:52

## 2025-08-18 RX ADMIN — HYDROMORPHONE HYDROCHLORIDE 0.5 MG: 1 INJECTION, SOLUTION INTRAMUSCULAR; INTRAVENOUS; SUBCUTANEOUS at 04:14

## 2025-08-18 RX ADMIN — HYDROCODONE BITARTRATE AND ACETAMINOPHEN 1 TABLET: 5; 325 TABLET ORAL at 00:40

## 2025-08-18 RX ADMIN — SODIUM CHLORIDE, PRESERVATIVE FREE 10 ML: 5 INJECTION INTRAVENOUS at 10:56

## 2025-08-18 RX ADMIN — ENOXAPARIN SODIUM 30 MG: 100 INJECTION SUBCUTANEOUS at 21:45

## 2025-08-18 RX ADMIN — KETOROLAC TROMETHAMINE 30 MG: 30 INJECTION, SOLUTION INTRAMUSCULAR at 02:23

## 2025-08-18 RX ADMIN — GABAPENTIN 100 MG: 100 CAPSULE ORAL at 13:21

## 2025-08-18 RX ADMIN — PANTOPRAZOLE SODIUM 40 MG: 40 TABLET, DELAYED RELEASE ORAL at 06:52

## 2025-08-18 RX ADMIN — SODIUM CHLORIDE, PRESERVATIVE FREE 10 ML: 5 INJECTION INTRAVENOUS at 21:46

## 2025-08-18 RX ADMIN — HYDROMORPHONE HYDROCHLORIDE 0.5 MG: 1 INJECTION, SOLUTION INTRAMUSCULAR; INTRAVENOUS; SUBCUTANEOUS at 16:08

## 2025-08-18 RX ADMIN — HYDROMORPHONE HYDROCHLORIDE 0.5 MG: 1 INJECTION, SOLUTION INTRAMUSCULAR; INTRAVENOUS; SUBCUTANEOUS at 10:11

## 2025-08-18 RX ADMIN — ONDANSETRON 4 MG: 4 TABLET, ORALLY DISINTEGRATING ORAL at 10:17

## 2025-08-18 RX ADMIN — CYCLOBENZAPRINE 10 MG: 10 TABLET, FILM COATED ORAL at 00:40

## 2025-08-18 RX ADMIN — WATER 40 MG: 1 INJECTION INTRAMUSCULAR; INTRAVENOUS; SUBCUTANEOUS at 21:46

## 2025-08-18 RX ADMIN — POTASSIUM BICARBONATE 40 MEQ: 782 TABLET, EFFERVESCENT ORAL at 06:52

## 2025-08-18 RX ADMIN — HYDROMORPHONE HYDROCHLORIDE 0.25 MG: 1 INJECTION, SOLUTION INTRAMUSCULAR; INTRAVENOUS; SUBCUTANEOUS at 06:52

## 2025-08-18 RX ADMIN — HYDROMORPHONE HYDROCHLORIDE 0.5 MG: 1 INJECTION, SOLUTION INTRAMUSCULAR; INTRAVENOUS; SUBCUTANEOUS at 05:23

## 2025-08-18 RX ADMIN — PREDNISONE 40 MG: 20 TABLET ORAL at 02:23

## 2025-08-18 RX ADMIN — KETOROLAC TROMETHAMINE 30 MG: 30 INJECTION, SOLUTION INTRAMUSCULAR at 18:52

## 2025-08-18 ASSESSMENT — PAIN DESCRIPTION - LOCATION
LOCATION: BACK
LOCATION: BACK
LOCATION: LEG;BACK;COCCYX
LOCATION: BACK
LOCATION: BACK

## 2025-08-18 ASSESSMENT — PAIN DESCRIPTION - ORIENTATION
ORIENTATION: RIGHT;LEFT
ORIENTATION: LOWER
ORIENTATION: RIGHT;LEFT;MID
ORIENTATION: RIGHT;LEFT
ORIENTATION: RIGHT;LEFT;LOWER

## 2025-08-18 ASSESSMENT — PAIN SCALES - WONG BAKER: WONGBAKER_NUMERICALRESPONSE: HURTS A LITTLE BIT

## 2025-08-18 ASSESSMENT — PAIN DESCRIPTION - DESCRIPTORS
DESCRIPTORS: BURNING;SHARP
DESCRIPTORS: BURNING;SHARP;STABBING
DESCRIPTORS: BURNING;SHARP;STABBING

## 2025-08-18 ASSESSMENT — PAIN SCALES - GENERAL
PAINLEVEL_OUTOF10: 7
PAINLEVEL_OUTOF10: 9
PAINLEVEL_OUTOF10: 10
PAINLEVEL_OUTOF10: 0
PAINLEVEL_OUTOF10: 10
PAINLEVEL_OUTOF10: 9

## 2025-08-18 ASSESSMENT — PAIN - FUNCTIONAL ASSESSMENT
PAIN_FUNCTIONAL_ASSESSMENT: 0-10

## 2025-08-19 LAB
ANION GAP SERPL CALC-SCNC: 9 MEQ/L (ref 8–16)
BUN SERPL-MCNC: 8 MG/DL (ref 8–23)
CALCIUM SERPL-MCNC: 8.7 MG/DL (ref 8.5–10.5)
CHLORIDE SERPL-SCNC: 106 MEQ/L (ref 98–111)
CO2 SERPL-SCNC: 23 MEQ/L (ref 22–29)
CREAT SERPL-MCNC: 0.6 MG/DL (ref 0.5–0.9)
GFR SERPL CREATININE-BSD FRML MDRD: > 90 ML/MIN/1.73M2
GLUCOSE SERPL-MCNC: 149 MG/DL (ref 74–109)
POTASSIUM SERPL-SCNC: 4.5 MEQ/L (ref 3.5–5.2)
SODIUM SERPL-SCNC: 138 MEQ/L (ref 135–145)

## 2025-08-19 PROCEDURE — 2500000003 HC RX 250 WO HCPCS

## 2025-08-19 PROCEDURE — 6370000000 HC RX 637 (ALT 250 FOR IP)

## 2025-08-19 PROCEDURE — 6370000000 HC RX 637 (ALT 250 FOR IP): Performed by: INTERNAL MEDICINE

## 2025-08-19 PROCEDURE — 96376 TX/PRO/DX INJ SAME DRUG ADON: CPT

## 2025-08-19 PROCEDURE — 99232 SBSQ HOSP IP/OBS MODERATE 35: CPT | Performed by: INTERNAL MEDICINE

## 2025-08-19 PROCEDURE — 6360000002 HC RX W HCPCS

## 2025-08-19 PROCEDURE — 80048 BASIC METABOLIC PNL TOTAL CA: CPT

## 2025-08-19 PROCEDURE — 2500000003 HC RX 250 WO HCPCS: Performed by: INTERNAL MEDICINE

## 2025-08-19 PROCEDURE — 6360000002 HC RX W HCPCS: Performed by: INTERNAL MEDICINE

## 2025-08-19 PROCEDURE — 36415 COLL VENOUS BLD VENIPUNCTURE: CPT

## 2025-08-19 PROCEDURE — G0378 HOSPITAL OBSERVATION PER HR: HCPCS

## 2025-08-19 RX ORDER — HYDROCODONE BITARTRATE AND ACETAMINOPHEN 5; 325 MG/1; MG/1
2 TABLET ORAL EVERY 4 HOURS PRN
Status: DISCONTINUED | OUTPATIENT
Start: 2025-08-19 | End: 2025-08-21 | Stop reason: HOSPADM

## 2025-08-19 RX ORDER — HYDROCODONE BITARTRATE AND ACETAMINOPHEN 5; 325 MG/1; MG/1
1 TABLET ORAL EVERY 4 HOURS PRN
Status: DISCONTINUED | OUTPATIENT
Start: 2025-08-19 | End: 2025-08-21 | Stop reason: HOSPADM

## 2025-08-19 RX ORDER — IOPAMIDOL 408 MG/ML
1 INJECTION, SOLUTION INTRATHECAL ONCE
Status: CANCELLED | OUTPATIENT
Start: 2025-08-19 | End: 2025-08-19

## 2025-08-19 RX ORDER — BUPIVACAINE HYDROCHLORIDE 2.5 MG/ML
5 INJECTION, SOLUTION EPIDURAL; INFILTRATION; INTRACAUDAL; PERINEURAL ONCE
Status: CANCELLED | OUTPATIENT
Start: 2025-08-19 | End: 2025-08-19

## 2025-08-19 RX ORDER — DIPHENHYDRAMINE HCL 25 MG
25 TABLET ORAL EVERY 6 HOURS PRN
Status: DISCONTINUED | OUTPATIENT
Start: 2025-08-19 | End: 2025-08-21 | Stop reason: HOSPADM

## 2025-08-19 RX ORDER — DEXAMETHASONE SODIUM PHOSPHATE 4 MG/ML
4 INJECTION, SOLUTION INTRA-ARTICULAR; INTRALESIONAL; INTRAMUSCULAR; INTRAVENOUS; SOFT TISSUE ONCE
Status: CANCELLED | OUTPATIENT
Start: 2025-08-19 | End: 2025-08-19

## 2025-08-19 RX ADMIN — GABAPENTIN 200 MG: 100 CAPSULE ORAL at 14:07

## 2025-08-19 RX ADMIN — ONDANSETRON 4 MG: 4 TABLET, ORALLY DISINTEGRATING ORAL at 09:10

## 2025-08-19 RX ADMIN — WATER 40 MG: 1 INJECTION INTRAMUSCULAR; INTRAVENOUS; SUBCUTANEOUS at 09:07

## 2025-08-19 RX ADMIN — HYDROCODONE BITARTRATE AND ACETAMINOPHEN 2 TABLET: 5; 325 TABLET ORAL at 19:38

## 2025-08-19 RX ADMIN — DIPHENHYDRAMINE HYDROCHLORIDE 25 MG: 25 TABLET ORAL at 18:03

## 2025-08-19 RX ADMIN — HYDROCODONE BITARTRATE AND ACETAMINOPHEN 1 TABLET: 5; 325 TABLET ORAL at 03:50

## 2025-08-19 RX ADMIN — HYDROMORPHONE HYDROCHLORIDE 0.5 MG: 1 INJECTION, SOLUTION INTRAMUSCULAR; INTRAVENOUS; SUBCUTANEOUS at 10:24

## 2025-08-19 RX ADMIN — PANTOPRAZOLE SODIUM 40 MG: 40 TABLET, DELAYED RELEASE ORAL at 06:06

## 2025-08-19 RX ADMIN — DIAZEPAM 2 MG: 2 TABLET ORAL at 02:34

## 2025-08-19 RX ADMIN — HYDROCODONE BITARTRATE AND ACETAMINOPHEN 2 TABLET: 5; 325 TABLET ORAL at 09:10

## 2025-08-19 RX ADMIN — GABAPENTIN 200 MG: 100 CAPSULE ORAL at 19:42

## 2025-08-19 RX ADMIN — DIAZEPAM 2 MG: 2 TABLET ORAL at 21:43

## 2025-08-19 RX ADMIN — HYDROMORPHONE HYDROCHLORIDE 0.5 MG: 1 INJECTION, SOLUTION INTRAMUSCULAR; INTRAVENOUS; SUBCUTANEOUS at 02:29

## 2025-08-19 RX ADMIN — SODIUM CHLORIDE, PRESERVATIVE FREE 10 ML: 5 INJECTION INTRAVENOUS at 19:42

## 2025-08-19 RX ADMIN — SODIUM CHLORIDE, PRESERVATIVE FREE 10 ML: 5 INJECTION INTRAVENOUS at 09:07

## 2025-08-19 RX ADMIN — GABAPENTIN 200 MG: 100 CAPSULE ORAL at 09:07

## 2025-08-19 RX ADMIN — BUTALBITAL, ACETAMINOPHEN AND CAFFEINE 1 TABLET: 325; 50; 40 TABLET ORAL at 19:38

## 2025-08-19 RX ADMIN — HYDROMORPHONE HYDROCHLORIDE 0.5 MG: 1 INJECTION, SOLUTION INTRAMUSCULAR; INTRAVENOUS; SUBCUTANEOUS at 16:42

## 2025-08-19 RX ADMIN — DIAZEPAM 2 MG: 2 TABLET ORAL at 13:21

## 2025-08-19 ASSESSMENT — PAIN SCALES - GENERAL
PAINLEVEL_OUTOF10: 8
PAINLEVEL_OUTOF10: 6
PAINLEVEL_OUTOF10: 5
PAINLEVEL_OUTOF10: 10
PAINLEVEL_OUTOF10: 9
PAINLEVEL_OUTOF10: 8
PAINLEVEL_OUTOF10: 6
PAINLEVEL_OUTOF10: 10
PAINLEVEL_OUTOF10: 8

## 2025-08-19 ASSESSMENT — PAIN DESCRIPTION - LOCATION
LOCATION: BACK;LEG

## 2025-08-19 ASSESSMENT — PAIN DESCRIPTION - DESCRIPTORS
DESCRIPTORS: BURNING;STABBING
DESCRIPTORS: BURNING;ACHING
DESCRIPTORS: BURNING

## 2025-08-19 ASSESSMENT — PAIN DESCRIPTION - ORIENTATION
ORIENTATION: RIGHT;LEFT;LOWER
ORIENTATION: LEFT;RIGHT
ORIENTATION: RIGHT;LEFT;LOWER
ORIENTATION: RIGHT;LEFT
ORIENTATION: RIGHT;LEFT;LOWER
ORIENTATION: LEFT;RIGHT;LOWER

## 2025-08-19 ASSESSMENT — PAIN - FUNCTIONAL ASSESSMENT
PAIN_FUNCTIONAL_ASSESSMENT: 0-10

## 2025-08-19 ASSESSMENT — ENCOUNTER SYMPTOMS
ABDOMINAL DISTENTION: 0
BACK PAIN: 1
SHORTNESS OF BREATH: 0

## 2025-08-20 ENCOUNTER — APPOINTMENT (OUTPATIENT)
Dept: INTERVENTIONAL RADIOLOGY/VASCULAR | Age: 25
End: 2025-08-20
Payer: COMMERCIAL

## 2025-08-20 PROBLEM — Z86.69 HISTORY OF MIGRAINE: Status: ACTIVE | Noted: 2025-08-20

## 2025-08-20 PROBLEM — Z87.09 HISTORY OF ASTHMA: Status: ACTIVE | Noted: 2025-08-20

## 2025-08-20 PROCEDURE — 6360000002 HC RX W HCPCS: Performed by: INTERNAL MEDICINE

## 2025-08-20 PROCEDURE — 99232 SBSQ HOSP IP/OBS MODERATE 35: CPT | Performed by: NURSE PRACTITIONER

## 2025-08-20 PROCEDURE — 6370000000 HC RX 637 (ALT 250 FOR IP)

## 2025-08-20 PROCEDURE — 97166 OT EVAL MOD COMPLEX 45 MIN: CPT

## 2025-08-20 PROCEDURE — 6360000004 HC RX CONTRAST MEDICATION: Performed by: RADIOLOGY

## 2025-08-20 PROCEDURE — 2500000003 HC RX 250 WO HCPCS

## 2025-08-20 PROCEDURE — 96372 THER/PROPH/DIAG INJ SC/IM: CPT

## 2025-08-20 PROCEDURE — G0378 HOSPITAL OBSERVATION PER HR: HCPCS

## 2025-08-20 PROCEDURE — 6360000002 HC RX W HCPCS: Performed by: RADIOLOGY

## 2025-08-20 PROCEDURE — 97530 THERAPEUTIC ACTIVITIES: CPT

## 2025-08-20 PROCEDURE — 62323 NJX INTERLAMINAR LMBR/SAC: CPT

## 2025-08-20 PROCEDURE — 6360000002 HC RX W HCPCS

## 2025-08-20 PROCEDURE — 6370000000 HC RX 637 (ALT 250 FOR IP): Performed by: PHYSICIAN ASSISTANT

## 2025-08-20 PROCEDURE — 97116 GAIT TRAINING THERAPY: CPT

## 2025-08-20 PROCEDURE — 2709999900 IR INJ INTERLAMINAR EPI/SUBARACHNOID LUMB/SAC

## 2025-08-20 PROCEDURE — 96376 TX/PRO/DX INJ SAME DRUG ADON: CPT

## 2025-08-20 PROCEDURE — 2500000003 HC RX 250 WO HCPCS: Performed by: INTERNAL MEDICINE

## 2025-08-20 PROCEDURE — 97162 PT EVAL MOD COMPLEX 30 MIN: CPT

## 2025-08-20 PROCEDURE — 96375 TX/PRO/DX INJ NEW DRUG ADDON: CPT

## 2025-08-20 RX ORDER — DIAZEPAM 5 MG/1
5 TABLET ORAL ONCE
Status: COMPLETED | OUTPATIENT
Start: 2025-08-20 | End: 2025-08-20

## 2025-08-20 RX ORDER — BUPIVACAINE HYDROCHLORIDE 2.5 MG/ML
5 INJECTION, SOLUTION EPIDURAL; INFILTRATION; INTRACAUDAL; PERINEURAL ONCE
Status: DISCONTINUED | OUTPATIENT
Start: 2025-08-20 | End: 2025-08-21 | Stop reason: HOSPADM

## 2025-08-20 RX ORDER — IOPAMIDOL 408 MG/ML
1 INJECTION, SOLUTION INTRATHECAL ONCE
Status: COMPLETED | OUTPATIENT
Start: 2025-08-20 | End: 2025-08-20

## 2025-08-20 RX ORDER — IOPAMIDOL 408 MG/ML
1 INJECTION, SOLUTION INTRATHECAL ONCE
Status: DISCONTINUED | OUTPATIENT
Start: 2025-08-20 | End: 2025-08-21 | Stop reason: HOSPADM

## 2025-08-20 RX ORDER — DEXAMETHASONE SODIUM PHOSPHATE 4 MG/ML
4 INJECTION, SOLUTION INTRA-ARTICULAR; INTRALESIONAL; INTRAMUSCULAR; INTRAVENOUS; SOFT TISSUE ONCE
Status: CANCELLED | OUTPATIENT
Start: 2025-08-20 | End: 2025-08-20

## 2025-08-20 RX ORDER — BUPIVACAINE HYDROCHLORIDE 2.5 MG/ML
2 INJECTION, SOLUTION EPIDURAL; INFILTRATION; INTRACAUDAL; PERINEURAL ONCE
Status: COMPLETED | OUTPATIENT
Start: 2025-08-20 | End: 2025-08-20

## 2025-08-20 RX ORDER — LIDOCAINE HYDROCHLORIDE 20 MG/ML
2 INJECTION, SOLUTION INFILTRATION; PERINEURAL ONCE
Status: COMPLETED | OUTPATIENT
Start: 2025-08-20 | End: 2025-08-20

## 2025-08-20 RX ORDER — DEXAMETHASONE SODIUM PHOSPHATE 4 MG/ML
4 INJECTION, SOLUTION INTRA-ARTICULAR; INTRALESIONAL; INTRAMUSCULAR; INTRAVENOUS; SOFT TISSUE ONCE
Status: COMPLETED | OUTPATIENT
Start: 2025-08-20 | End: 2025-08-20

## 2025-08-20 RX ADMIN — GABAPENTIN 200 MG: 100 CAPSULE ORAL at 14:29

## 2025-08-20 RX ADMIN — SODIUM CHLORIDE, PRESERVATIVE FREE 10 ML: 5 INJECTION INTRAVENOUS at 21:10

## 2025-08-20 RX ADMIN — WATER 40 MG: 1 INJECTION INTRAMUSCULAR; INTRAVENOUS; SUBCUTANEOUS at 08:52

## 2025-08-20 RX ADMIN — IOPAMIDOL 1 ML: 408 INJECTION, SOLUTION INTRATHECAL at 08:20

## 2025-08-20 RX ADMIN — BUTALBITAL, ACETAMINOPHEN AND CAFFEINE 1 TABLET: 325; 50; 40 TABLET ORAL at 20:48

## 2025-08-20 RX ADMIN — HYDROCODONE BITARTRATE AND ACETAMINOPHEN 2 TABLET: 5; 325 TABLET ORAL at 20:43

## 2025-08-20 RX ADMIN — ONDANSETRON 4 MG: 4 TABLET, ORALLY DISINTEGRATING ORAL at 14:29

## 2025-08-20 RX ADMIN — GABAPENTIN 200 MG: 100 CAPSULE ORAL at 08:52

## 2025-08-20 RX ADMIN — ONDANSETRON 4 MG: 2 INJECTION, SOLUTION INTRAMUSCULAR; INTRAVENOUS at 20:44

## 2025-08-20 RX ADMIN — HYDROCODONE BITARTRATE AND ACETAMINOPHEN 2 TABLET: 5; 325 TABLET ORAL at 05:28

## 2025-08-20 RX ADMIN — DEXAMETHASONE SODIUM PHOSPHATE 4 MG: 4 INJECTION, SOLUTION INTRAMUSCULAR; INTRAVENOUS at 08:20

## 2025-08-20 RX ADMIN — BUPIVACAINE HYDROCHLORIDE 2 ML: 2.5 INJECTION, SOLUTION EPIDURAL; INFILTRATION; INTRACAUDAL; PERINEURAL at 08:20

## 2025-08-20 RX ADMIN — HYDROCODONE BITARTRATE AND ACETAMINOPHEN 2 TABLET: 5; 325 TABLET ORAL at 00:59

## 2025-08-20 RX ADMIN — DIAZEPAM 5 MG: 5 TABLET ORAL at 07:56

## 2025-08-20 RX ADMIN — HYDROCODONE BITARTRATE AND ACETAMINOPHEN 2 TABLET: 5; 325 TABLET ORAL at 14:29

## 2025-08-20 RX ADMIN — BUTALBITAL, ACETAMINOPHEN AND CAFFEINE 1 TABLET: 325; 50; 40 TABLET ORAL at 15:55

## 2025-08-20 RX ADMIN — SODIUM CHLORIDE, PRESERVATIVE FREE 10 ML: 5 INJECTION INTRAVENOUS at 08:52

## 2025-08-20 RX ADMIN — PANTOPRAZOLE SODIUM 40 MG: 40 TABLET, DELAYED RELEASE ORAL at 05:29

## 2025-08-20 RX ADMIN — GABAPENTIN 200 MG: 100 CAPSULE ORAL at 20:44

## 2025-08-20 RX ADMIN — ONDANSETRON 4 MG: 4 TABLET, ORALLY DISINTEGRATING ORAL at 05:29

## 2025-08-20 RX ADMIN — LIDOCAINE HYDROCHLORIDE 1 ML: 20 INJECTION, SOLUTION INFILTRATION; PERINEURAL at 08:20

## 2025-08-20 ASSESSMENT — PAIN SCALES - GENERAL
PAINLEVEL_OUTOF10: 8
PAINLEVEL_OUTOF10: 9
PAINLEVEL_OUTOF10: 7
PAINLEVEL_OUTOF10: 5
PAINLEVEL_OUTOF10: 8
PAINLEVEL_OUTOF10: 9
PAINLEVEL_OUTOF10: 10
PAINLEVEL_OUTOF10: 8
PAINLEVEL_OUTOF10: 6

## 2025-08-20 ASSESSMENT — PAIN - FUNCTIONAL ASSESSMENT
PAIN_FUNCTIONAL_ASSESSMENT: 0-10

## 2025-08-20 ASSESSMENT — PAIN DESCRIPTION - ORIENTATION
ORIENTATION: LOWER;RIGHT;LEFT
ORIENTATION: LOWER;RIGHT;LEFT
ORIENTATION: LOWER

## 2025-08-20 ASSESSMENT — PAIN DESCRIPTION - DESCRIPTORS
DESCRIPTORS: BURNING;STABBING
DESCRIPTORS: ACHING

## 2025-08-20 ASSESSMENT — PAIN DESCRIPTION - LOCATION
LOCATION: HEAD
LOCATION: BACK
LOCATION: BACK;LEG
LOCATION: BACK
LOCATION: BACK
LOCATION: BACK;LEG

## 2025-08-21 VITALS
SYSTOLIC BLOOD PRESSURE: 93 MMHG | DIASTOLIC BLOOD PRESSURE: 66 MMHG | WEIGHT: 292.5 LBS | BODY MASS INDEX: 43.32 KG/M2 | TEMPERATURE: 98.3 F | HEART RATE: 100 BPM | OXYGEN SATURATION: 93 % | HEIGHT: 69 IN | RESPIRATION RATE: 19 BRPM

## 2025-08-21 LAB
ANION GAP SERPL CALC-SCNC: 12 MEQ/L (ref 8–16)
BASOPHILS ABSOLUTE: 0 THOU/MM3 (ref 0–0.1)
BASOPHILS NFR BLD AUTO: 0.3 %
BUN SERPL-MCNC: 8 MG/DL (ref 8–23)
CALCIUM SERPL-MCNC: 8.8 MG/DL (ref 8.5–10.5)
CHLORIDE SERPL-SCNC: 104 MEQ/L (ref 98–111)
CO2 SERPL-SCNC: 23 MEQ/L (ref 22–29)
CREAT SERPL-MCNC: 0.6 MG/DL (ref 0.5–0.9)
DEPRECATED RDW RBC AUTO: 41.3 FL (ref 35–45)
EOSINOPHIL NFR BLD AUTO: 0.1 %
EOSINOPHILS ABSOLUTE: 0 THOU/MM3 (ref 0–0.4)
ERYTHROCYTE [DISTWIDTH] IN BLOOD BY AUTOMATED COUNT: 12.1 % (ref 11.5–14.5)
GFR SERPL CREATININE-BSD FRML MDRD: > 90 ML/MIN/1.73M2
GLUCOSE SERPL-MCNC: 106 MG/DL (ref 74–109)
HCT VFR BLD AUTO: 37.9 % (ref 37–47)
HGB BLD-MCNC: 12.5 GM/DL (ref 12–16)
IMM GRANULOCYTES # BLD AUTO: 0.1 THOU/MM3 (ref 0–0.07)
IMM GRANULOCYTES NFR BLD AUTO: 0.8 %
LYMPHOCYTES ABSOLUTE: 2.5 THOU/MM3 (ref 1–4.8)
LYMPHOCYTES NFR BLD AUTO: 19.6 %
MCH RBC QN AUTO: 30.7 PG (ref 26–33)
MCHC RBC AUTO-ENTMCNC: 33 GM/DL (ref 32.2–35.5)
MCV RBC AUTO: 93.1 FL (ref 81–99)
MONOCYTES ABSOLUTE: 0.8 THOU/MM3 (ref 0.4–1.3)
MONOCYTES NFR BLD AUTO: 6.3 %
NEUTROPHILS ABSOLUTE: 9.5 THOU/MM3 (ref 1.8–7.7)
NEUTROPHILS NFR BLD AUTO: 72.9 %
NRBC BLD AUTO-RTO: 0 /100 WBC
PLATELET # BLD AUTO: 255 THOU/MM3 (ref 130–400)
PMV BLD AUTO: 10.6 FL (ref 9.4–12.4)
POTASSIUM SERPL-SCNC: 3.7 MEQ/L (ref 3.5–5.2)
RBC # BLD AUTO: 4.07 MILL/MM3 (ref 4.2–5.4)
SODIUM SERPL-SCNC: 139 MEQ/L (ref 135–145)
WBC # BLD AUTO: 13 THOU/MM3 (ref 4.8–10.8)

## 2025-08-21 PROCEDURE — G0378 HOSPITAL OBSERVATION PER HR: HCPCS

## 2025-08-21 PROCEDURE — 6370000000 HC RX 637 (ALT 250 FOR IP)

## 2025-08-21 PROCEDURE — 97116 GAIT TRAINING THERAPY: CPT

## 2025-08-21 PROCEDURE — 99239 HOSP IP/OBS DSCHRG MGMT >30: CPT | Performed by: NURSE PRACTITIONER

## 2025-08-21 PROCEDURE — 80048 BASIC METABOLIC PNL TOTAL CA: CPT

## 2025-08-21 PROCEDURE — 96376 TX/PRO/DX INJ SAME DRUG ADON: CPT

## 2025-08-21 PROCEDURE — 97530 THERAPEUTIC ACTIVITIES: CPT

## 2025-08-21 PROCEDURE — 85025 COMPLETE CBC W/AUTO DIFF WBC: CPT

## 2025-08-21 PROCEDURE — 2500000003 HC RX 250 WO HCPCS

## 2025-08-21 PROCEDURE — 6360000002 HC RX W HCPCS

## 2025-08-21 PROCEDURE — 36415 COLL VENOUS BLD VENIPUNCTURE: CPT

## 2025-08-21 RX ORDER — GABAPENTIN 100 MG/1
200 CAPSULE ORAL 3 TIMES DAILY
Qty: 24 CAPSULE | Refills: 0 | Status: SHIPPED | OUTPATIENT
Start: 2025-08-21 | End: 2025-08-25

## 2025-08-21 RX ADMIN — HYDROCODONE BITARTRATE AND ACETAMINOPHEN 2 TABLET: 5; 325 TABLET ORAL at 03:32

## 2025-08-21 RX ADMIN — ONDANSETRON 4 MG: 2 INJECTION, SOLUTION INTRAMUSCULAR; INTRAVENOUS at 03:29

## 2025-08-21 RX ADMIN — PANTOPRAZOLE SODIUM 40 MG: 40 TABLET, DELAYED RELEASE ORAL at 09:23

## 2025-08-21 RX ADMIN — GABAPENTIN 200 MG: 100 CAPSULE ORAL at 09:20

## 2025-08-21 RX ADMIN — SODIUM CHLORIDE, PRESERVATIVE FREE 10 ML: 5 INJECTION INTRAVENOUS at 09:21

## 2025-08-21 RX ADMIN — BUTALBITAL, ACETAMINOPHEN AND CAFFEINE 1 TABLET: 325; 50; 40 TABLET ORAL at 03:28

## 2025-08-21 ASSESSMENT — PAIN SCALES - GENERAL
PAINLEVEL_OUTOF10: 8
PAINLEVEL_OUTOF10: 8

## 2025-08-21 ASSESSMENT — PAIN DESCRIPTION - LOCATION: LOCATION: BACK

## 2025-08-21 ASSESSMENT — PAIN - FUNCTIONAL ASSESSMENT: PAIN_FUNCTIONAL_ASSESSMENT: 0-10

## 2025-08-22 ENCOUNTER — CARE COORDINATION (OUTPATIENT)
Dept: CARE COORDINATION | Age: 25
End: 2025-08-22

## 2025-08-22 ENCOUNTER — OFFICE VISIT (OUTPATIENT)
Dept: FAMILY MEDICINE CLINIC | Age: 25
End: 2025-08-22

## 2025-08-22 VITALS
HEART RATE: 74 BPM | OXYGEN SATURATION: 99 % | BODY MASS INDEX: 41.5 KG/M2 | HEIGHT: 69 IN | DIASTOLIC BLOOD PRESSURE: 72 MMHG | RESPIRATION RATE: 18 BRPM | WEIGHT: 280.2 LBS | SYSTOLIC BLOOD PRESSURE: 118 MMHG

## 2025-08-22 DIAGNOSIS — M47.816 LUMBAR SPONDYLOSIS: ICD-10-CM

## 2025-08-22 DIAGNOSIS — M54.16 LUMBAR RADICULOPATHY: Primary | ICD-10-CM

## 2025-08-22 DIAGNOSIS — Z09 HOSPITAL DISCHARGE FOLLOW-UP: ICD-10-CM

## 2025-08-22 DIAGNOSIS — J02.9 SORE THROAT: ICD-10-CM

## 2025-08-22 DIAGNOSIS — R29.6 FALLS: ICD-10-CM

## 2025-08-22 DIAGNOSIS — J02.9 ACUTE VIRAL PHARYNGITIS: ICD-10-CM

## 2025-08-22 LAB — STREPTOCOCCUS A RNA: NEGATIVE

## 2025-08-22 RX ORDER — OXYCODONE AND ACETAMINOPHEN 5; 325 MG/1; MG/1
1 TABLET ORAL EVERY 6 HOURS PRN
Qty: 28 TABLET | Refills: 0 | Status: SHIPPED | OUTPATIENT
Start: 2025-08-22 | End: 2025-08-29

## 2025-08-25 ENCOUNTER — PATIENT MESSAGE (OUTPATIENT)
Dept: FAMILY MEDICINE CLINIC | Age: 25
End: 2025-08-25

## 2025-08-27 ENCOUNTER — HOSPITAL ENCOUNTER (OUTPATIENT)
Dept: OTHER | Age: 25
Discharge: HOME | End: 2025-08-27
Payer: COMMERCIAL

## 2025-08-27 ENCOUNTER — HOSPITAL ENCOUNTER (OUTPATIENT)
Dept: GENERAL RADIOLOGY | Age: 25
Discharge: HOME | End: 2025-08-27
Payer: COMMERCIAL

## 2025-08-27 ENCOUNTER — TELEPHONE (OUTPATIENT)
Dept: FAMILY MEDICINE CLINIC | Age: 25
End: 2025-08-27

## 2025-08-27 DIAGNOSIS — Z01.811 PRE-OP CHEST EXAM: ICD-10-CM

## 2025-08-27 DIAGNOSIS — M51.27 DISPLACEMENT OF LUMBOSACRAL INTERVERTEBRAL DISC: ICD-10-CM

## 2025-08-27 LAB
ALBUMIN SERPL BCG-MCNC: 4.2 G/DL (ref 3.4–4.9)
ANION GAP SERPL CALC-SCNC: 12 MEQ/L (ref 8–16)
BASOPHILS ABSOLUTE: 0 THOU/MM3 (ref 0–0.1)
BASOPHILS NFR BLD AUTO: 0.4 %
BUN SERPL-MCNC: 11 MG/DL (ref 8–23)
CALCIUM SERPL-MCNC: 9.5 MG/DL (ref 8.5–10.5)
CHLORIDE SERPL-SCNC: 101 MEQ/L (ref 98–111)
CO2 SERPL-SCNC: 23 MEQ/L (ref 22–29)
CREAT SERPL-MCNC: 0.7 MG/DL (ref 0.5–0.9)
DEPRECATED RDW RBC AUTO: 41.7 FL (ref 35–45)
EKG ATRIAL RATE: 91 BPM
EKG P AXIS: 50 DEGREES
EKG P-R INTERVAL: 142 MS
EKG Q-T INTERVAL: 326 MS
EKG QRS DURATION: 74 MS
EKG QTC CALCULATION (BAZETT): 400 MS
EKG R AXIS: 47 DEGREES
EKG T AXIS: 61 DEGREES
EKG VENTRICULAR RATE: 91 BPM
EOSINOPHIL NFR BLD AUTO: 1.2 %
EOSINOPHILS ABSOLUTE: 0.1 THOU/MM3 (ref 0–0.4)
ERYTHROCYTE [DISTWIDTH] IN BLOOD BY AUTOMATED COUNT: 12.1 % (ref 11.5–14.5)
GFR SERPL CREATININE-BSD FRML MDRD: > 90 ML/MIN/1.73M2
GLUCOSE SERPL-MCNC: 123 MG/DL (ref 74–109)
HCT VFR BLD AUTO: 44.6 % (ref 37–47)
HGB BLD-MCNC: 14.2 GM/DL (ref 12–16)
IMM GRANULOCYTES # BLD AUTO: 0.12 THOU/MM3 (ref 0–0.07)
IMM GRANULOCYTES NFR BLD AUTO: 1.6 %
LYMPHOCYTES ABSOLUTE: 1.5 THOU/MM3 (ref 1–4.8)
LYMPHOCYTES NFR BLD AUTO: 20 %
MCH RBC QN AUTO: 29.8 PG (ref 26–33)
MCHC RBC AUTO-ENTMCNC: 31.8 GM/DL (ref 32.2–35.5)
MCV RBC AUTO: 93.5 FL (ref 81–99)
MONOCYTES ABSOLUTE: 0.4 THOU/MM3 (ref 0.4–1.3)
MONOCYTES NFR BLD AUTO: 5 %
NEUTROPHILS ABSOLUTE: 5.5 THOU/MM3 (ref 1.8–7.7)
NEUTROPHILS NFR BLD AUTO: 71.8 %
NRBC BLD AUTO-RTO: 0 /100 WBC
PLATELET # BLD AUTO: 285 THOU/MM3 (ref 130–400)
PMV BLD AUTO: 10.1 FL (ref 9.4–12.4)
POTASSIUM SERPL-SCNC: 4.2 MEQ/L (ref 3.5–5.2)
PREALB SERPL-MCNC: 25.4 MG/DL (ref 20–40)
RBC # BLD AUTO: 4.77 MILL/MM3 (ref 4.2–5.4)
SODIUM SERPL-SCNC: 136 MEQ/L (ref 135–145)
WBC # BLD AUTO: 7.7 THOU/MM3 (ref 4.8–10.8)

## 2025-08-27 PROCEDURE — 93005 ELECTROCARDIOGRAM TRACING: CPT | Performed by: PHYSICIAN ASSISTANT

## 2025-08-27 PROCEDURE — 82040 ASSAY OF SERUM ALBUMIN: CPT

## 2025-08-27 PROCEDURE — 71046 X-RAY EXAM CHEST 2 VIEWS: CPT

## 2025-08-27 PROCEDURE — 80048 BASIC METABOLIC PNL TOTAL CA: CPT

## 2025-08-27 PROCEDURE — 36415 COLL VENOUS BLD VENIPUNCTURE: CPT

## 2025-08-27 PROCEDURE — 85025 COMPLETE CBC W/AUTO DIFF WBC: CPT

## 2025-08-27 PROCEDURE — 87641 MR-STAPH DNA AMP PROBE: CPT

## 2025-08-27 PROCEDURE — 84134 ASSAY OF PREALBUMIN: CPT

## 2025-08-27 PROCEDURE — 93010 ELECTROCARDIOGRAM REPORT: CPT | Performed by: INTERNAL MEDICINE

## 2025-08-27 RX ORDER — NYSTATIN 100000 [USP'U]/ML
500000 SUSPENSION ORAL 4 TIMES DAILY
Qty: 200 ML | Refills: 0 | Status: SHIPPED | OUTPATIENT
Start: 2025-08-27 | End: 2025-09-06

## 2025-08-28 ENCOUNTER — CARE COORDINATION (OUTPATIENT)
Dept: CARE COORDINATION | Age: 25
End: 2025-08-28

## 2025-08-29 LAB — MECA+MECC+MREJ ISLT/SPM QL: ABNORMAL

## 2025-09-03 ENCOUNTER — ANESTHESIA (OUTPATIENT)
Dept: OPERATING ROOM | Age: 25
End: 2025-09-03
Payer: COMMERCIAL

## 2025-09-03 ENCOUNTER — HOSPITAL ENCOUNTER (OUTPATIENT)
Age: 25
Setting detail: OUTPATIENT SURGERY
Discharge: HOME | End: 2025-09-03
Attending: ORTHOPAEDIC SURGERY | Admitting: ORTHOPAEDIC SURGERY
Payer: COMMERCIAL

## 2025-09-03 ENCOUNTER — ANESTHESIA EVENT (OUTPATIENT)
Dept: OPERATING ROOM | Age: 25
End: 2025-09-03
Payer: COMMERCIAL

## 2025-09-03 ENCOUNTER — APPOINTMENT (OUTPATIENT)
Dept: GENERAL RADIOLOGY | Age: 25
End: 2025-09-03
Attending: ORTHOPAEDIC SURGERY
Payer: COMMERCIAL

## 2025-09-03 VITALS
DIASTOLIC BLOOD PRESSURE: 71 MMHG | HEIGHT: 69 IN | BODY MASS INDEX: 39.87 KG/M2 | RESPIRATION RATE: 18 BRPM | WEIGHT: 269.2 LBS | TEMPERATURE: 97.3 F | SYSTOLIC BLOOD PRESSURE: 132 MMHG | HEART RATE: 105 BPM | OXYGEN SATURATION: 99 %

## 2025-09-03 DIAGNOSIS — Z98.890 S/P LUMBAR MICRODISCECTOMY: Primary | ICD-10-CM

## 2025-09-03 LAB
ABO GROUP BLD: NORMAL
IAT IGG-SP REAG SERPL QL: NORMAL
PREGNANCY, URINE: NEGATIVE
RH BLD: NORMAL

## 2025-09-03 PROCEDURE — 3700000000 HC ANESTHESIA ATTENDED CARE: Performed by: ORTHOPAEDIC SURGERY

## 2025-09-03 PROCEDURE — 6360000002 HC RX W HCPCS: Performed by: PHYSICIAN ASSISTANT

## 2025-09-03 PROCEDURE — 7100000001 HC PACU RECOVERY - ADDTL 15 MIN: Performed by: ORTHOPAEDIC SURGERY

## 2025-09-03 PROCEDURE — 3700000001 HC ADD 15 MINUTES (ANESTHESIA): Performed by: ORTHOPAEDIC SURGERY

## 2025-09-03 PROCEDURE — 3600000004 HC SURGERY LEVEL 4 BASE: Performed by: ORTHOPAEDIC SURGERY

## 2025-09-03 PROCEDURE — 2580000003 HC RX 258: Performed by: PHYSICIAN ASSISTANT

## 2025-09-03 PROCEDURE — 6360000002 HC RX W HCPCS: Performed by: NURSE ANESTHETIST, CERTIFIED REGISTERED

## 2025-09-03 PROCEDURE — 2500000003 HC RX 250 WO HCPCS: Performed by: NURSE ANESTHETIST, CERTIFIED REGISTERED

## 2025-09-03 PROCEDURE — 86900 BLOOD TYPING SEROLOGIC ABO: CPT

## 2025-09-03 PROCEDURE — 2720000010 HC SURG SUPPLY STERILE: Performed by: ORTHOPAEDIC SURGERY

## 2025-09-03 PROCEDURE — 86885 COOMBS TEST INDIRECT QUAL: CPT

## 2025-09-03 PROCEDURE — 7100000010 HC PHASE II RECOVERY - FIRST 15 MIN: Performed by: ORTHOPAEDIC SURGERY

## 2025-09-03 PROCEDURE — 3600000014 HC SURGERY LEVEL 4 ADDTL 15MIN: Performed by: ORTHOPAEDIC SURGERY

## 2025-09-03 PROCEDURE — 6360000002 HC RX W HCPCS

## 2025-09-03 PROCEDURE — 81025 URINE PREGNANCY TEST: CPT

## 2025-09-03 PROCEDURE — 72020 X-RAY EXAM OF SPINE 1 VIEW: CPT

## 2025-09-03 PROCEDURE — 6360000002 HC RX W HCPCS: Performed by: ORTHOPAEDIC SURGERY

## 2025-09-03 PROCEDURE — 2580000003 HC RX 258: Performed by: NURSE ANESTHETIST, CERTIFIED REGISTERED

## 2025-09-03 PROCEDURE — C1713 ANCHOR/SCREW BN/BN,TIS/BN: HCPCS | Performed by: ORTHOPAEDIC SURGERY

## 2025-09-03 PROCEDURE — 86901 BLOOD TYPING SEROLOGIC RH(D): CPT

## 2025-09-03 PROCEDURE — 7100000011 HC PHASE II RECOVERY - ADDTL 15 MIN: Performed by: ORTHOPAEDIC SURGERY

## 2025-09-03 PROCEDURE — 2709999900 HC NON-CHARGEABLE SUPPLY: Performed by: ORTHOPAEDIC SURGERY

## 2025-09-03 PROCEDURE — 7100000000 HC PACU RECOVERY - FIRST 15 MIN: Performed by: ORTHOPAEDIC SURGERY

## 2025-09-03 PROCEDURE — 36415 COLL VENOUS BLD VENIPUNCTURE: CPT

## 2025-09-03 RX ORDER — LIDOCAINE HYDROCHLORIDE AND EPINEPHRINE 10; 10 MG/ML; UG/ML
INJECTION, SOLUTION INFILTRATION; PERINEURAL PRN
Status: DISCONTINUED | OUTPATIENT
Start: 2025-09-03 | End: 2025-09-03 | Stop reason: ALTCHOICE

## 2025-09-03 RX ORDER — DEXAMETHASONE SODIUM PHOSPHATE 10 MG/ML
INJECTION, EMULSION INTRAMUSCULAR; INTRAVENOUS
Status: DISCONTINUED | OUTPATIENT
Start: 2025-09-03 | End: 2025-09-03 | Stop reason: SDUPTHER

## 2025-09-03 RX ORDER — FENTANYL CITRATE 50 UG/ML
INJECTION, SOLUTION INTRAMUSCULAR; INTRAVENOUS
Status: COMPLETED
Start: 2025-09-03 | End: 2025-09-03

## 2025-09-03 RX ORDER — DROPERIDOL 2.5 MG/ML
INJECTION, SOLUTION INTRAMUSCULAR; INTRAVENOUS
Status: COMPLETED
Start: 2025-09-03 | End: 2025-09-03

## 2025-09-03 RX ORDER — HYDROMORPHONE HYDROCHLORIDE 2 MG/1
1 TABLET ORAL
Refills: 0 | Status: DISCONTINUED | OUTPATIENT
Start: 2025-09-03 | End: 2025-09-03 | Stop reason: HOSPADM

## 2025-09-03 RX ORDER — OXYCODONE AND ACETAMINOPHEN 5; 325 MG/1; MG/1
1 TABLET ORAL EVERY 4 HOURS PRN
Qty: 42 TABLET | Refills: 0 | Status: SHIPPED | OUTPATIENT
Start: 2025-09-03 | End: 2025-09-10

## 2025-09-03 RX ORDER — FENTANYL CITRATE 50 UG/ML
50 INJECTION, SOLUTION INTRAMUSCULAR; INTRAVENOUS EVERY 5 MIN PRN
Status: DISCONTINUED | OUTPATIENT
Start: 2025-09-03 | End: 2025-09-03 | Stop reason: HOSPADM

## 2025-09-03 RX ORDER — TRANEXAMIC ACID 100 MG/ML
INJECTION, SOLUTION INTRAVENOUS
Status: DISCONTINUED | OUTPATIENT
Start: 2025-09-03 | End: 2025-09-03 | Stop reason: SDUPTHER

## 2025-09-03 RX ORDER — SCOPOLAMINE 1 MG/3D
1 PATCH, EXTENDED RELEASE TRANSDERMAL ONCE
Status: DISCONTINUED | OUTPATIENT
Start: 2025-09-03 | End: 2025-09-03 | Stop reason: HOSPADM

## 2025-09-03 RX ORDER — CYCLOBENZAPRINE HCL 10 MG
10 TABLET ORAL 3 TIMES DAILY PRN
Status: DISCONTINUED | OUTPATIENT
Start: 2025-09-03 | End: 2025-09-03 | Stop reason: HOSPADM

## 2025-09-03 RX ORDER — ROCURONIUM BROMIDE 10 MG/ML
INJECTION, SOLUTION INTRAVENOUS
Status: DISCONTINUED | OUTPATIENT
Start: 2025-09-03 | End: 2025-09-03 | Stop reason: SDUPTHER

## 2025-09-03 RX ORDER — SODIUM CHLORIDE, SODIUM LACTATE, POTASSIUM CHLORIDE, CALCIUM CHLORIDE 600; 310; 30; 20 MG/100ML; MG/100ML; MG/100ML; MG/100ML
INJECTION, SOLUTION INTRAVENOUS
Status: DISCONTINUED | OUTPATIENT
Start: 2025-09-03 | End: 2025-09-03 | Stop reason: SDUPTHER

## 2025-09-03 RX ORDER — OXYCODONE AND ACETAMINOPHEN 5; 325 MG/1; MG/1
1 TABLET ORAL EVERY 4 HOURS PRN
Refills: 0 | Status: DISCONTINUED | OUTPATIENT
Start: 2025-09-03 | End: 2025-09-03 | Stop reason: HOSPADM

## 2025-09-03 RX ORDER — SODIUM CHLORIDE 9 MG/ML
INJECTION, SOLUTION INTRAVENOUS CONTINUOUS
Status: DISCONTINUED | OUTPATIENT
Start: 2025-09-03 | End: 2025-09-03 | Stop reason: HOSPADM

## 2025-09-03 RX ORDER — ONDANSETRON 2 MG/ML
INJECTION INTRAMUSCULAR; INTRAVENOUS
Status: DISCONTINUED | OUTPATIENT
Start: 2025-09-03 | End: 2025-09-03 | Stop reason: SDUPTHER

## 2025-09-03 RX ORDER — DOCUSATE SODIUM 100 MG/1
100 CAPSULE, LIQUID FILLED ORAL 2 TIMES DAILY PRN
Qty: 60 CAPSULE | Refills: 0 | Status: SHIPPED | OUTPATIENT
Start: 2025-09-03 | End: 2025-10-03

## 2025-09-03 RX ORDER — DROPERIDOL 2.5 MG/ML
0.62 INJECTION, SOLUTION INTRAMUSCULAR; INTRAVENOUS ONCE
Status: COMPLETED | OUTPATIENT
Start: 2025-09-03 | End: 2025-09-03

## 2025-09-03 RX ORDER — MIDAZOLAM HYDROCHLORIDE 1 MG/ML
INJECTION, SOLUTION INTRAMUSCULAR; INTRAVENOUS
Status: DISCONTINUED | OUTPATIENT
Start: 2025-09-03 | End: 2025-09-03 | Stop reason: SDUPTHER

## 2025-09-03 RX ORDER — LIDOCAINE HYDROCHLORIDE 20 MG/ML
INJECTION, SOLUTION INTRAVENOUS
Status: DISCONTINUED | OUTPATIENT
Start: 2025-09-03 | End: 2025-09-03 | Stop reason: SDUPTHER

## 2025-09-03 RX ORDER — SODIUM CHLORIDE 0.9 % (FLUSH) 0.9 %
5-40 SYRINGE (ML) INJECTION EVERY 12 HOURS SCHEDULED
Status: DISCONTINUED | OUTPATIENT
Start: 2025-09-03 | End: 2025-09-03 | Stop reason: HOSPADM

## 2025-09-03 RX ORDER — HYDROMORPHONE HYDROCHLORIDE 2 MG/ML
INJECTION, SOLUTION INTRAMUSCULAR; INTRAVENOUS; SUBCUTANEOUS
Status: DISCONTINUED | OUTPATIENT
Start: 2025-09-03 | End: 2025-09-03 | Stop reason: SDUPTHER

## 2025-09-03 RX ORDER — CYCLOBENZAPRINE HCL 10 MG
10 TABLET ORAL 3 TIMES DAILY PRN
Qty: 42 TABLET | Refills: 0 | Status: SHIPPED | OUTPATIENT
Start: 2025-09-03 | End: 2025-09-17

## 2025-09-03 RX ORDER — SODIUM CHLORIDE 0.9 % (FLUSH) 0.9 %
5-40 SYRINGE (ML) INJECTION PRN
Status: DISCONTINUED | OUTPATIENT
Start: 2025-09-03 | End: 2025-09-03 | Stop reason: HOSPADM

## 2025-09-03 RX ORDER — PROPOFOL 10 MG/ML
INJECTION, EMULSION INTRAVENOUS
Status: DISCONTINUED | OUTPATIENT
Start: 2025-09-03 | End: 2025-09-03 | Stop reason: SDUPTHER

## 2025-09-03 RX ORDER — FENTANYL CITRATE 50 UG/ML
INJECTION, SOLUTION INTRAMUSCULAR; INTRAVENOUS
Status: DISCONTINUED | OUTPATIENT
Start: 2025-09-03 | End: 2025-09-03 | Stop reason: SDUPTHER

## 2025-09-03 RX ADMIN — FENTANYL CITRATE 50 MCG: 50 INJECTION, SOLUTION INTRAMUSCULAR; INTRAVENOUS at 09:00

## 2025-09-03 RX ADMIN — SODIUM CHLORIDE, POTASSIUM CHLORIDE, SODIUM LACTATE AND CALCIUM CHLORIDE: 600; 310; 30; 20 INJECTION, SOLUTION INTRAVENOUS at 08:38

## 2025-09-03 RX ADMIN — TRANEXAMIC ACID 1000 MG: 100 INJECTION, SOLUTION INTRAVENOUS at 07:46

## 2025-09-03 RX ADMIN — FENTANYL CITRATE 50 MCG: 50 INJECTION INTRAMUSCULAR; INTRAVENOUS at 09:00

## 2025-09-03 RX ADMIN — LIDOCAINE HYDROCHLORIDE 100 MG: 20 INJECTION, SOLUTION INTRAVENOUS at 07:20

## 2025-09-03 RX ADMIN — MIDAZOLAM 2 MG: 1 INJECTION INTRAMUSCULAR; INTRAVENOUS at 07:15

## 2025-09-03 RX ADMIN — SODIUM CHLORIDE: 0.9 INJECTION, SOLUTION INTRAVENOUS at 06:34

## 2025-09-03 RX ADMIN — HYDROMORPHONE HYDROCHLORIDE 0.5 MG: 1 INJECTION, SOLUTION INTRAMUSCULAR; INTRAVENOUS; SUBCUTANEOUS at 09:05

## 2025-09-03 RX ADMIN — HYDROMORPHONE HYDROCHLORIDE 1 MG: 2 INJECTION INTRAMUSCULAR; INTRAVENOUS; SUBCUTANEOUS at 07:34

## 2025-09-03 RX ADMIN — HYDROMORPHONE HYDROCHLORIDE 1 MG: 2 INJECTION INTRAMUSCULAR; INTRAVENOUS; SUBCUTANEOUS at 07:43

## 2025-09-03 RX ADMIN — Medication 2000 MG: at 06:35

## 2025-09-03 RX ADMIN — FENTANYL CITRATE 50 MCG: 50 INJECTION INTRAMUSCULAR; INTRAVENOUS at 07:33

## 2025-09-03 RX ADMIN — Medication 3000 MG: at 07:34

## 2025-09-03 RX ADMIN — Medication 0.5 MG: at 09:05

## 2025-09-03 RX ADMIN — DROPERIDOL 0.62 MG: 2.5 INJECTION, SOLUTION INTRAMUSCULAR; INTRAVENOUS at 09:10

## 2025-09-03 RX ADMIN — ROCURONIUM BROMIDE 10 MG: 10 INJECTION, SOLUTION INTRAVENOUS at 08:02

## 2025-09-03 RX ADMIN — FENTANYL CITRATE 50 MCG: 50 INJECTION INTRAMUSCULAR; INTRAVENOUS at 07:43

## 2025-09-03 RX ADMIN — SUGAMMADEX 200 MG: 100 INJECTION, SOLUTION INTRAVENOUS at 08:30

## 2025-09-03 RX ADMIN — FENTANYL CITRATE 150 MCG: 50 INJECTION INTRAMUSCULAR; INTRAVENOUS at 07:20

## 2025-09-03 RX ADMIN — DEXAMETHASONE SODIUM PHOSPHATE 10 MG: 10 INJECTION, EMULSION INTRAMUSCULAR; INTRAVENOUS at 07:26

## 2025-09-03 RX ADMIN — ROCURONIUM BROMIDE 50 MG: 10 INJECTION, SOLUTION INTRAVENOUS at 07:20

## 2025-09-03 RX ADMIN — FENTANYL CITRATE 100 MCG: 50 INJECTION INTRAMUSCULAR; INTRAVENOUS at 08:54

## 2025-09-03 RX ADMIN — PROPOFOL 200 MG: 10 INJECTION, EMULSION INTRAVENOUS at 07:20

## 2025-09-03 RX ADMIN — ONDANSETRON 4 MG: 2 INJECTION, SOLUTION INTRAMUSCULAR; INTRAVENOUS at 08:30

## 2025-09-03 ASSESSMENT — PAIN - FUNCTIONAL ASSESSMENT
PAIN_FUNCTIONAL_ASSESSMENT: 0-10

## 2025-09-03 ASSESSMENT — PAIN DESCRIPTION - ONSET: ONSET: ON-GOING

## 2025-09-03 ASSESSMENT — ENCOUNTER SYMPTOMS
BACK PAIN: 1
SHORTNESS OF BREATH: 0
ABDOMINAL DISTENTION: 0

## 2025-09-03 ASSESSMENT — PAIN DESCRIPTION - DESCRIPTORS
DESCRIPTORS: STABBING
DESCRIPTORS: ACHING;SORE

## 2025-09-03 ASSESSMENT — PAIN SCALES - GENERAL
PAINLEVEL_OUTOF10: 6
PAINLEVEL_OUTOF10: 6
PAINLEVEL_OUTOF10: 9
PAINLEVEL_OUTOF10: 8
PAINLEVEL_OUTOF10: 6
PAINLEVEL_OUTOF10: 6
PAINLEVEL_OUTOF10: 9
PAINLEVEL_OUTOF10: 4

## 2025-09-03 ASSESSMENT — PAIN DESCRIPTION - FREQUENCY: FREQUENCY: CONTINUOUS

## 2025-09-03 ASSESSMENT — PAIN DESCRIPTION - ORIENTATION: ORIENTATION: LOWER

## 2025-09-03 ASSESSMENT — PAIN DESCRIPTION - LOCATION: LOCATION: BACK

## 2025-09-03 ASSESSMENT — PAIN SCALES - WONG BAKER
WONGBAKER_NUMERICALRESPONSE: HURTS EVEN MORE
WONGBAKER_NUMERICALRESPONSE: HURTS LITTLE MORE

## 2025-09-03 ASSESSMENT — PAIN DESCRIPTION - DIRECTION: RADIATING_TOWARDS: RIGHT HIP

## 2025-09-03 ASSESSMENT — PAIN DESCRIPTION - PAIN TYPE: TYPE: CHRONIC PAIN

## 2025-09-05 ENCOUNTER — CARE COORDINATION (OUTPATIENT)
Dept: CARE COORDINATION | Age: 25
End: 2025-09-05

## (undated) DEVICE — CANNULA SEAL

## (undated) DEVICE — COVER ARMBRD W13XL28.5IN IMPERV BLU FOR OP RM

## (undated) DEVICE — TIP COVER ACCESSORY

## (undated) DEVICE — SPONGE GZ W4XL4IN COT 12 PLY TYP VII WVN C FLD DSGN STERILE

## (undated) DEVICE — TROCAR: Brand: KII FIOS FIRST ENTRY

## (undated) DEVICE — GLOVE SURG SZ 9 THK91MIL LTX FREE SYN POLYISOPRENE ANTI

## (undated) DEVICE — DRESSING PETRO W5XL9IN 3% BISMUTH TRIBROMOPHENATE XRFRM

## (undated) DEVICE — BLADELESS OBTURATOR: Brand: WECK VISTA

## (undated) DEVICE — KIT TABLE SPINAL JACKSON WITH PRONESAFE (MINIMUM ORDER QTY 2 CA)

## (undated) DEVICE — SOLUTION ANTIFOG VIS SYS CLEARIFY LAPSCP

## (undated) DEVICE — GLOVE ORANGE PI 7   MSG9070

## (undated) DEVICE — SUTURE VICRYL + 1 L27IN ABSRB UD CT-1 L36MM 1/2 CIR TAPR PNT VCP261H

## (undated) DEVICE — Device

## (undated) DEVICE — SOLUTION IV IRRIG POUR BRL 0.9% SODIUM CHL 2F7124

## (undated) DEVICE — BAG SPEC REM 224ML W4XL6IN DIA10MM 1 HND GYN DISP ENDOPCH

## (undated) DEVICE — ARM DRAPE

## (undated) DEVICE — YANKAUER,BULB TIP,W/O VENT,RIGID,STERILE: Brand: MEDLINE

## (undated) DEVICE — INSUFFLATION NEEDLE TO ESTABLISH PNEUMOPERITONEUM.: Brand: INSUFFLATION NEEDLE

## (undated) DEVICE — AGENT HEMSTAT W2XL4IN OXIDIZED REGENERATED CELOS ABSRB SFT

## (undated) DEVICE — SUTURE NRLN SZ 4-0 L18IN NONABSORBABLE BLK L17MM RB-1 1/2 C554D

## (undated) DEVICE — SUTURE VICRYL + SZ 2-0 L27IN ABSRB UD CP-1 1/2 CIR REV CUT VCP266H

## (undated) DEVICE — BLADE ES L6IN ELASTOMERIC COAT EXT DURABLE BEND UPTO 90DEG

## (undated) DEVICE — BANDAGE GZE STR LF 4 1/2X4.1YDS (100/CS)

## (undated) DEVICE — GARMENT,MEDLINE,DVT,INT,CALF,LG, GEN2: Brand: MEDLINE

## (undated) DEVICE — TAPE MED L 5.5 YD X W 4 IN ELAS FOAM ADH MULTIDIRECTIONAL

## (undated) DEVICE — COLUMN DRAPE

## (undated) DEVICE — SUTURE ETHBND D SPEC NO 0 UR 6 30IN D9436

## (undated) DEVICE — SKIN AFFIX SURG ADHESIVE 72/CS 0.55ML: Brand: MEDLINE

## (undated) DEVICE — GOWN,SIRUS,NON REINFRCD,LARGE,SET IN SL: Brand: MEDLINE

## (undated) DEVICE — KIT,ANTI FOG,W/SPONGE & FLUID,SOFT PACK: Brand: MEDLINE

## (undated) DEVICE — GENERAL LAPAROSCOPY PACK-LF: Brand: MEDLINE INDUSTRIES, INC.

## (undated) DEVICE — TUBING INSUFFLATOR HEAT HUMIDIFIED SMK EVAC SET PNEUMOCLEAR

## (undated) DEVICE — PAD TBL W2XH20XL72IN URETHANE CONVOLUTED FOAM OP RM

## (undated) DEVICE — SEAL

## (undated) DEVICE — PUMP SUC IRR TBNG L10FT W/ HNDPC ASSEMB STRYKEFLOW 2

## (undated) DEVICE — SPONGE NEURO XRAY DETECT STRL 1X1IN 10PK

## (undated) DEVICE — SOLUTION IV 1000ML 0.9% SOD CHL PH 5 INJ USP VIAFLX PLAS

## (undated) DEVICE — SUTURE N ABSRB MONOFILAMENT 2-0 FSLX 75 CM 36 MM ETHILON

## (undated) DEVICE — GLOVE ORANGE PI 7 1/2   MSG9075

## (undated) DEVICE — REDUCER: Brand: ENDOWRIST

## (undated) DEVICE — ELECTRO LUBE IS A SINGLE PATIENT USE DEVICE THAT IS INTENDED TO BE USED ON ELECTROSURGICAL ELECTRODES TO REDUCE STICKING.: Brand: KEY SURGICAL ELECTRO LUBE

## (undated) DEVICE — SPONGE NEURO CTN WHT STRL XRAY DET 0.5X.05IN 10PK

## (undated) DEVICE — AGENT HEMOSTATIC SURGIFLOW MATRIX KIT W/THROMBIN